# Patient Record
Sex: FEMALE | Race: BLACK OR AFRICAN AMERICAN | Employment: OTHER | URBAN - METROPOLITAN AREA
[De-identification: names, ages, dates, MRNs, and addresses within clinical notes are randomized per-mention and may not be internally consistent; named-entity substitution may affect disease eponyms.]

---

## 2017-01-10 RX ORDER — ALPRAZOLAM 0.5 MG/1
TABLET ORAL
Qty: 90 TABLET | Refills: 2 | Status: SHIPPED | OUTPATIENT
Start: 2017-01-10 | End: 2017-04-07 | Stop reason: SDUPTHER

## 2017-01-17 ENCOUNTER — HOSPITAL ENCOUNTER (OUTPATIENT)
Dept: ENDOSCOPY | Age: 82
Discharge: HOME | End: 2017-01-17
Attending: INTERNAL MEDICINE | Admitting: INTERNAL MEDICINE

## 2017-01-27 RX ORDER — TRAZODONE HYDROCHLORIDE 50 MG/1
TABLET ORAL
Qty: 30 TABLET | Refills: 2 | Status: SHIPPED | OUTPATIENT
Start: 2017-01-27 | End: 2017-06-29 | Stop reason: SDUPTHER

## 2017-02-23 RX ORDER — SITAGLIPTIN 50 MG/1
TABLET, FILM COATED ORAL
Qty: 30 TABLET | Refills: 5 | Status: SHIPPED | OUTPATIENT
Start: 2017-02-23 | End: 2017-10-30 | Stop reason: SDUPTHER

## 2017-03-08 ENCOUNTER — TELEPHONE (OUTPATIENT)
Dept: OTHER | Facility: CLINIC | Age: 82
End: 2017-03-08

## 2017-03-14 ENCOUNTER — HOSPITAL ENCOUNTER (OUTPATIENT)
Dept: ENDOSCOPY | Age: 82
Discharge: HOME | End: 2017-03-14
Attending: INTERNAL MEDICINE | Admitting: INTERNAL MEDICINE

## 2017-03-14 VITALS
TEMPERATURE: 98.4 F | OXYGEN SATURATION: 97 % | DIASTOLIC BLOOD PRESSURE: 78 MMHG | SYSTOLIC BLOOD PRESSURE: 152 MMHG | HEART RATE: 70 BPM | WEIGHT: 210 LBS | BODY MASS INDEX: 37.21 KG/M2 | RESPIRATION RATE: 20 BRPM | HEIGHT: 63 IN

## 2017-03-14 LAB
GLUCOSE BLD-MCNC: 93 MG/DL (ref 70–99)
PERFORMED ON: NORMAL

## 2017-03-20 RX ORDER — BACLOFEN 20 MG/1
TABLET ORAL
Qty: 90 TABLET | Refills: 2 | Status: SHIPPED | OUTPATIENT
Start: 2017-03-20 | End: 2017-08-11 | Stop reason: SDUPTHER

## 2017-04-24 ENCOUNTER — CARE COORDINATION (OUTPATIENT)
Dept: OTHER | Facility: CLINIC | Age: 82
End: 2017-04-24

## 2017-05-01 ENCOUNTER — CARE COORDINATION (OUTPATIENT)
Dept: OTHER | Facility: CLINIC | Age: 82
End: 2017-05-01

## 2017-05-09 ENCOUNTER — CARE COORDINATION (OUTPATIENT)
Dept: OTHER | Facility: CLINIC | Age: 82
End: 2017-05-09

## 2017-05-09 ENCOUNTER — HOSPITAL ENCOUNTER (OUTPATIENT)
Dept: ENDOSCOPY | Age: 82
Discharge: HOME | End: 2017-05-09
Attending: INTERNAL MEDICINE | Admitting: INTERNAL MEDICINE

## 2017-05-09 VITALS
DIASTOLIC BLOOD PRESSURE: 63 MMHG | TEMPERATURE: 98.5 F | BODY MASS INDEX: 38.98 KG/M2 | OXYGEN SATURATION: 98 % | HEART RATE: 74 BPM | SYSTOLIC BLOOD PRESSURE: 128 MMHG | WEIGHT: 220 LBS | RESPIRATION RATE: 20 BRPM | HEIGHT: 63 IN

## 2017-05-09 LAB
GLUCOSE BLD-MCNC: 83 MG/DL (ref 70–99)
PERFORMED ON: NORMAL

## 2017-05-09 RX ORDER — ONDANSETRON 2 MG/ML
4 INJECTION INTRAMUSCULAR; INTRAVENOUS ONCE
Status: DISCONTINUED | OUTPATIENT
Start: 2017-05-09 | End: 2017-05-10 | Stop reason: HOSPADM

## 2017-05-12 ENCOUNTER — CARE COORDINATION (OUTPATIENT)
Dept: CASE MANAGEMENT | Age: 82
End: 2017-05-12

## 2017-05-13 ENCOUNTER — CARE COORDINATION (OUTPATIENT)
Dept: CASE MANAGEMENT | Age: 82
End: 2017-05-13

## 2017-05-14 ENCOUNTER — CARE COORDINATION (OUTPATIENT)
Dept: CASE MANAGEMENT | Age: 82
End: 2017-05-14

## 2017-05-16 ENCOUNTER — TELEPHONE (OUTPATIENT)
Dept: INTERNAL MEDICINE | Age: 82
End: 2017-05-16

## 2017-06-08 ENCOUNTER — TELEPHONE (OUTPATIENT)
Dept: INTERNAL MEDICINE | Age: 82
End: 2017-06-08

## 2017-06-16 ENCOUNTER — TELEPHONE (OUTPATIENT)
Dept: INTERNAL MEDICINE | Age: 82
End: 2017-06-16

## 2017-06-20 ENCOUNTER — OFFICE VISIT (OUTPATIENT)
Dept: INTERNAL MEDICINE | Age: 82
End: 2017-06-20

## 2017-06-20 VITALS — DIASTOLIC BLOOD PRESSURE: 78 MMHG | HEIGHT: 63 IN | SYSTOLIC BLOOD PRESSURE: 122 MMHG

## 2017-06-20 DIAGNOSIS — I10 ESSENTIAL HYPERTENSION: ICD-10-CM

## 2017-06-20 DIAGNOSIS — E11.69 DIABETES MELLITUS TYPE 2 IN OBESE (HCC): ICD-10-CM

## 2017-06-20 DIAGNOSIS — N30.00 ACUTE CYSTITIS WITHOUT HEMATURIA: ICD-10-CM

## 2017-06-20 DIAGNOSIS — N30.00 ACUTE CYSTITIS WITHOUT HEMATURIA: Primary | ICD-10-CM

## 2017-06-20 DIAGNOSIS — E66.9 DIABETES MELLITUS TYPE 2 IN OBESE (HCC): ICD-10-CM

## 2017-06-20 LAB
BACTERIA: ABNORMAL /HPF
BILIRUBIN URINE: NEGATIVE
BLOOD, URINE: ABNORMAL
CLARITY: ABNORMAL
COLOR: YELLOW
COMMENT UA: ABNORMAL
EPITHELIAL CELLS, UA: 2 /HPF (ref 0–5)
GLUCOSE URINE: NEGATIVE MG/DL
KETONES, URINE: NEGATIVE MG/DL
LEUKOCYTE ESTERASE, URINE: ABNORMAL
MICROSCOPIC EXAMINATION: YES
NITRITE, URINE: POSITIVE
PH UA: 5.5
PROTEIN UA: >=300 MG/DL
RBC UA: 38 /HPF (ref 0–4)
SPECIFIC GRAVITY UA: 1.02
URINE TYPE: ABNORMAL
UROBILINOGEN, URINE: 0.2 E.U./DL
WBC UA: >900 /HPF (ref 0–5)

## 2017-06-20 PROCEDURE — 4040F PNEUMOC VAC/ADMIN/RCVD: CPT | Performed by: INTERNAL MEDICINE

## 2017-06-20 PROCEDURE — G8399 PT W/DXA RESULTS DOCUMENT: HCPCS | Performed by: INTERNAL MEDICINE

## 2017-06-20 PROCEDURE — 1123F ACP DISCUSS/DSCN MKR DOCD: CPT | Performed by: INTERNAL MEDICINE

## 2017-06-20 PROCEDURE — 99214 OFFICE O/P EST MOD 30 MIN: CPT | Performed by: INTERNAL MEDICINE

## 2017-06-20 PROCEDURE — G8427 DOCREV CUR MEDS BY ELIG CLIN: HCPCS | Performed by: INTERNAL MEDICINE

## 2017-06-20 PROCEDURE — 1036F TOBACCO NON-USER: CPT | Performed by: INTERNAL MEDICINE

## 2017-06-20 PROCEDURE — G8417 CALC BMI ABV UP PARAM F/U: HCPCS | Performed by: INTERNAL MEDICINE

## 2017-06-20 PROCEDURE — 1090F PRES/ABSN URINE INCON ASSESS: CPT | Performed by: INTERNAL MEDICINE

## 2017-06-20 ASSESSMENT — ENCOUNTER SYMPTOMS
EYE REDNESS: 0
ABDOMINAL PAIN: 0
BACK PAIN: 1
CHEST TIGHTNESS: 0
COUGH: 0
SHORTNESS OF BREATH: 0
VOMITING: 0
NAUSEA: 0

## 2017-06-20 ASSESSMENT — PATIENT HEALTH QUESTIONNAIRE - PHQ9
1. LITTLE INTEREST OR PLEASURE IN DOING THINGS: 0
2. FEELING DOWN, DEPRESSED OR HOPELESS: 0
SUM OF ALL RESPONSES TO PHQ QUESTIONS 1-9: 0
SUM OF ALL RESPONSES TO PHQ9 QUESTIONS 1 & 2: 0

## 2017-06-21 ENCOUNTER — TELEPHONE (OUTPATIENT)
Dept: INTERNAL MEDICINE | Age: 82
End: 2017-06-21

## 2017-06-21 RX ORDER — NITROFURANTOIN 25; 75 MG/1; MG/1
100 CAPSULE ORAL 2 TIMES DAILY
Qty: 14 CAPSULE | Refills: 0 | Status: SHIPPED | OUTPATIENT
Start: 2017-06-21 | End: 2017-06-22 | Stop reason: SINTOL

## 2017-06-22 ENCOUNTER — TELEPHONE (OUTPATIENT)
Dept: INTERNAL MEDICINE | Age: 82
End: 2017-06-22

## 2017-06-22 LAB
ORGANISM: ABNORMAL
URINE CULTURE, ROUTINE: ABNORMAL

## 2017-06-22 RX ORDER — CIPROFLOXACIN 250 MG/1
250 TABLET, FILM COATED ORAL 2 TIMES DAILY
Qty: 20 TABLET | Refills: 0 | Status: SHIPPED | OUTPATIENT
Start: 2017-06-22 | End: 2017-07-02

## 2017-06-22 RX ORDER — METOPROLOL TARTRATE 50 MG/1
TABLET, FILM COATED ORAL
Qty: 60 TABLET | Refills: 11 | Status: SHIPPED | OUTPATIENT
Start: 2017-06-22 | End: 2018-07-19 | Stop reason: SDUPTHER

## 2017-06-30 RX ORDER — TRAZODONE HYDROCHLORIDE 50 MG/1
TABLET ORAL
Qty: 30 TABLET | Refills: 2 | Status: SHIPPED | OUTPATIENT
Start: 2017-06-30 | End: 2017-10-13 | Stop reason: SDUPTHER

## 2017-07-11 RX ORDER — ALPRAZOLAM 0.5 MG/1
TABLET ORAL
Qty: 90 TABLET | Refills: 1 | OUTPATIENT
Start: 2017-07-11 | End: 2017-09-12 | Stop reason: SDUPTHER

## 2017-07-25 ENCOUNTER — HOSPITAL ENCOUNTER (OUTPATIENT)
Dept: ENDOSCOPY | Age: 82
Discharge: HOME | End: 2017-07-25
Attending: INTERNAL MEDICINE | Admitting: INTERNAL MEDICINE

## 2017-07-25 VITALS
BODY MASS INDEX: 37.21 KG/M2 | TEMPERATURE: 98.6 F | DIASTOLIC BLOOD PRESSURE: 71 MMHG | SYSTOLIC BLOOD PRESSURE: 120 MMHG | HEIGHT: 63 IN | OXYGEN SATURATION: 97 % | WEIGHT: 210 LBS | RESPIRATION RATE: 18 BRPM | HEART RATE: 73 BPM

## 2017-07-25 LAB
GLUCOSE BLD-MCNC: 101 MG/DL (ref 70–99)
PERFORMED ON: ABNORMAL

## 2017-07-25 RX ORDER — ONDANSETRON 2 MG/ML
INJECTION INTRAMUSCULAR; INTRAVENOUS
Status: DISPENSED
Start: 2017-07-25 | End: 2017-07-25

## 2017-08-07 ENCOUNTER — TELEPHONE (OUTPATIENT)
Dept: INTERNAL MEDICINE | Age: 82
End: 2017-08-07

## 2017-08-11 ENCOUNTER — OFFICE VISIT (OUTPATIENT)
Dept: INTERNAL MEDICINE | Age: 82
End: 2017-08-11

## 2017-08-11 VITALS — HEIGHT: 63 IN | DIASTOLIC BLOOD PRESSURE: 74 MMHG | SYSTOLIC BLOOD PRESSURE: 152 MMHG

## 2017-08-11 DIAGNOSIS — N30.00 ACUTE CYSTITIS WITHOUT HEMATURIA: ICD-10-CM

## 2017-08-11 DIAGNOSIS — I10 ESSENTIAL HYPERTENSION: ICD-10-CM

## 2017-08-11 DIAGNOSIS — E11.69 DIABETES MELLITUS TYPE 2 IN OBESE (HCC): Primary | ICD-10-CM

## 2017-08-11 DIAGNOSIS — E66.9 DIABETES MELLITUS TYPE 2 IN OBESE (HCC): Primary | ICD-10-CM

## 2017-08-11 PROCEDURE — 1036F TOBACCO NON-USER: CPT | Performed by: INTERNAL MEDICINE

## 2017-08-11 PROCEDURE — G8399 PT W/DXA RESULTS DOCUMENT: HCPCS | Performed by: INTERNAL MEDICINE

## 2017-08-11 PROCEDURE — 1090F PRES/ABSN URINE INCON ASSESS: CPT | Performed by: INTERNAL MEDICINE

## 2017-08-11 PROCEDURE — 99214 OFFICE O/P EST MOD 30 MIN: CPT | Performed by: INTERNAL MEDICINE

## 2017-08-11 PROCEDURE — 4040F PNEUMOC VAC/ADMIN/RCVD: CPT | Performed by: INTERNAL MEDICINE

## 2017-08-11 PROCEDURE — G8427 DOCREV CUR MEDS BY ELIG CLIN: HCPCS | Performed by: INTERNAL MEDICINE

## 2017-08-11 PROCEDURE — 3288F FALL RISK ASSESSMENT DOCD: CPT | Performed by: INTERNAL MEDICINE

## 2017-08-11 PROCEDURE — G8510 SCR DEP NEG, NO PLAN REQD: HCPCS | Performed by: INTERNAL MEDICINE

## 2017-08-11 PROCEDURE — 1123F ACP DISCUSS/DSCN MKR DOCD: CPT | Performed by: INTERNAL MEDICINE

## 2017-08-11 PROCEDURE — G8417 CALC BMI ABV UP PARAM F/U: HCPCS | Performed by: INTERNAL MEDICINE

## 2017-08-11 RX ORDER — LANSOPRAZOLE 30 MG/1
CAPSULE, DELAYED RELEASE ORAL
Qty: 60 CAPSULE | Refills: 5 | Status: SHIPPED | OUTPATIENT
Start: 2017-08-11 | End: 2018-02-26 | Stop reason: SDUPTHER

## 2017-08-11 RX ORDER — BACLOFEN 20 MG/1
TABLET ORAL
Qty: 90 TABLET | Refills: 2 | Status: SHIPPED | OUTPATIENT
Start: 2017-08-11 | End: 2017-12-13 | Stop reason: SDUPTHER

## 2017-08-11 ASSESSMENT — ENCOUNTER SYMPTOMS
CHEST TIGHTNESS: 0
SHORTNESS OF BREATH: 0
EYE REDNESS: 0
COUGH: 0
BACK PAIN: 0
VOMITING: 0
ABDOMINAL PAIN: 0
NAUSEA: 0

## 2017-08-11 ASSESSMENT — PATIENT HEALTH QUESTIONNAIRE - PHQ9
SUM OF ALL RESPONSES TO PHQ QUESTIONS 1-9: 0
1. LITTLE INTEREST OR PLEASURE IN DOING THINGS: 0
2. FEELING DOWN, DEPRESSED OR HOPELESS: 0
SUM OF ALL RESPONSES TO PHQ9 QUESTIONS 1 & 2: 0

## 2017-08-31 ENCOUNTER — TELEPHONE (OUTPATIENT)
Dept: INTERNAL MEDICINE | Age: 82
End: 2017-08-31

## 2017-09-01 ENCOUNTER — TELEPHONE (OUTPATIENT)
Dept: INTERNAL MEDICINE | Age: 82
End: 2017-09-01

## 2017-09-01 RX ORDER — FAMOTIDINE 20 MG/1
20 TABLET, FILM COATED ORAL 2 TIMES DAILY
Qty: 30 TABLET | Refills: 2 | Status: SHIPPED | OUTPATIENT
Start: 2017-09-01 | End: 2017-10-18 | Stop reason: SDUPTHER

## 2017-09-12 RX ORDER — ALPRAZOLAM 0.5 MG/1
0.5 TABLET ORAL 3 TIMES DAILY PRN
Qty: 90 TABLET | Refills: 1 | OUTPATIENT
Start: 2017-09-12 | End: 2017-12-06 | Stop reason: SDUPTHER

## 2017-09-25 ENCOUNTER — OFFICE VISIT (OUTPATIENT)
Dept: INTERNAL MEDICINE | Age: 82
End: 2017-09-25

## 2017-09-25 VITALS
SYSTOLIC BLOOD PRESSURE: 112 MMHG | OXYGEN SATURATION: 98 % | RESPIRATION RATE: 16 BRPM | HEART RATE: 68 BPM | DIASTOLIC BLOOD PRESSURE: 70 MMHG

## 2017-09-25 DIAGNOSIS — K21.00 GASTROESOPHAGEAL REFLUX DISEASE WITH ESOPHAGITIS: Primary | ICD-10-CM

## 2017-09-25 DIAGNOSIS — I10 ESSENTIAL HYPERTENSION: ICD-10-CM

## 2017-09-25 DIAGNOSIS — F33.41 MAJOR DEPRESSIVE DISORDER, RECURRENT, IN PARTIAL REMISSION (HCC): ICD-10-CM

## 2017-09-25 DIAGNOSIS — E11.9 TYPE 2 DIABETES MELLITUS WITHOUT COMPLICATION, WITHOUT LONG-TERM CURRENT USE OF INSULIN (HCC): ICD-10-CM

## 2017-09-25 DIAGNOSIS — Z23 NEED FOR INFLUENZA VACCINATION: ICD-10-CM

## 2017-09-25 LAB
ANION GAP SERPL CALCULATED.3IONS-SCNC: 11 MMOL/L (ref 3–16)
BUN BLDV-MCNC: 17 MG/DL (ref 7–20)
CALCIUM SERPL-MCNC: 10.5 MG/DL (ref 8.3–10.6)
CHLORIDE BLD-SCNC: 102 MMOL/L (ref 99–110)
CO2: 31 MMOL/L (ref 21–32)
CREAT SERPL-MCNC: 0.8 MG/DL (ref 0.6–1.2)
GFR AFRICAN AMERICAN: >60
GFR NON-AFRICAN AMERICAN: >60
GLUCOSE BLD-MCNC: 83 MG/DL (ref 70–99)
POTASSIUM SERPL-SCNC: 4.2 MMOL/L (ref 3.5–5.1)
SODIUM BLD-SCNC: 144 MMOL/L (ref 136–145)

## 2017-09-25 PROCEDURE — 99214 OFFICE O/P EST MOD 30 MIN: CPT | Performed by: INTERNAL MEDICINE

## 2017-09-25 PROCEDURE — G0008 ADMIN INFLUENZA VIRUS VAC: HCPCS | Performed by: INTERNAL MEDICINE

## 2017-09-25 PROCEDURE — G8399 PT W/DXA RESULTS DOCUMENT: HCPCS | Performed by: INTERNAL MEDICINE

## 2017-09-25 PROCEDURE — 4040F PNEUMOC VAC/ADMIN/RCVD: CPT | Performed by: INTERNAL MEDICINE

## 2017-09-25 PROCEDURE — 1036F TOBACCO NON-USER: CPT | Performed by: INTERNAL MEDICINE

## 2017-09-25 PROCEDURE — G8427 DOCREV CUR MEDS BY ELIG CLIN: HCPCS | Performed by: INTERNAL MEDICINE

## 2017-09-25 PROCEDURE — G8417 CALC BMI ABV UP PARAM F/U: HCPCS | Performed by: INTERNAL MEDICINE

## 2017-09-25 PROCEDURE — 90662 IIV NO PRSV INCREASED AG IM: CPT | Performed by: INTERNAL MEDICINE

## 2017-09-25 PROCEDURE — 1090F PRES/ABSN URINE INCON ASSESS: CPT | Performed by: INTERNAL MEDICINE

## 2017-09-25 PROCEDURE — 1123F ACP DISCUSS/DSCN MKR DOCD: CPT | Performed by: INTERNAL MEDICINE

## 2017-09-25 RX ORDER — ONDANSETRON 4 MG/1
4 TABLET, FILM COATED ORAL EVERY 8 HOURS PRN
Qty: 20 TABLET | Refills: 0 | Status: SHIPPED | OUTPATIENT
Start: 2017-09-25 | End: 2019-01-14 | Stop reason: SDUPTHER

## 2017-09-25 ASSESSMENT — ENCOUNTER SYMPTOMS
SHORTNESS OF BREATH: 0
HEARTBURN: 1
ABDOMINAL PAIN: 1
CHEST TIGHTNESS: 0
EYE REDNESS: 0
COUGH: 1
BACK PAIN: 0
NAUSEA: 1
BELCHING: 1

## 2017-09-26 LAB
ESTIMATED AVERAGE GLUCOSE: 91.1 MG/DL
HBA1C MFR BLD: 4.8 %

## 2017-10-11 ENCOUNTER — TELEPHONE (OUTPATIENT)
Dept: INTERNAL MEDICINE | Age: 82
End: 2017-10-11

## 2017-10-11 RX ORDER — AZITHROMYCIN 250 MG/1
TABLET, FILM COATED ORAL
Qty: 1 PACKET | Refills: 1 | Status: SHIPPED | OUTPATIENT
Start: 2017-10-11 | End: 2017-10-21

## 2017-10-16 RX ORDER — TRAZODONE HYDROCHLORIDE 50 MG/1
TABLET ORAL
Qty: 30 TABLET | Refills: 2 | Status: SHIPPED | OUTPATIENT
Start: 2017-10-16 | End: 2018-01-12 | Stop reason: SDUPTHER

## 2017-10-18 ENCOUNTER — TELEPHONE (OUTPATIENT)
Dept: INTERNAL MEDICINE | Age: 82
End: 2017-10-18

## 2017-10-18 RX ORDER — FAMOTIDINE 20 MG/1
20 TABLET, FILM COATED ORAL 2 TIMES DAILY
Qty: 30 TABLET | Refills: 5 | Status: SHIPPED | OUTPATIENT
Start: 2017-10-18 | End: 2018-06-14 | Stop reason: SDUPTHER

## 2017-10-18 NOTE — TELEPHONE ENCOUNTER
Pt is out of refills and would like to know if she is suppose to continue taking famotidine (PEPCID) 20 MG tablet. If so, pt will need a new rx sent into her pharmacy. Pharmacy info above. Pt would also like to make sure that she is taking her Super Biotin 1000 Mcg correctly. Please call.

## 2017-10-26 ENCOUNTER — TELEPHONE (OUTPATIENT)
Dept: INTERNAL MEDICINE | Age: 82
End: 2017-10-26

## 2017-10-30 RX ORDER — SITAGLIPTIN 50 MG/1
TABLET, FILM COATED ORAL
Qty: 30 TABLET | Refills: 5 | Status: SHIPPED | OUTPATIENT
Start: 2017-10-30 | End: 2018-05-14 | Stop reason: SDUPTHER

## 2017-11-15 ENCOUNTER — OFFICE VISIT (OUTPATIENT)
Dept: ENT CLINIC | Age: 82
End: 2017-11-15

## 2017-11-15 VITALS
WEIGHT: 200 LBS | BODY MASS INDEX: 35.44 KG/M2 | HEIGHT: 63 IN | DIASTOLIC BLOOD PRESSURE: 62 MMHG | SYSTOLIC BLOOD PRESSURE: 122 MMHG | HEART RATE: 60 BPM

## 2017-11-15 DIAGNOSIS — H61.22 HEARING LOSS OF LEFT EAR DUE TO CERUMEN IMPACTION: Primary | ICD-10-CM

## 2017-11-15 PROCEDURE — 69210 REMOVE IMPACTED EAR WAX UNI: CPT | Performed by: OTOLARYNGOLOGY

## 2017-11-27 ENCOUNTER — HOSPITAL ENCOUNTER (OUTPATIENT)
Dept: SURGERY | Age: 82
Discharge: HOME | End: 2017-11-27
Attending: OTOLARYNGOLOGY | Admitting: OTOLARYNGOLOGY

## 2017-11-27 VITALS
HEART RATE: 51 BPM | BODY MASS INDEX: 36.5 KG/M2 | WEIGHT: 206 LBS | RESPIRATION RATE: 17 BRPM | HEIGHT: 63 IN | DIASTOLIC BLOOD PRESSURE: 68 MMHG | SYSTOLIC BLOOD PRESSURE: 127 MMHG | TEMPERATURE: 97 F | OXYGEN SATURATION: 98 %

## 2017-11-27 LAB
GLUCOSE BLD-MCNC: 84 MG/DL (ref 70–99)
GLUCOSE BLD-MCNC: 89 MG/DL (ref 70–99)
PERFORMED ON: NORMAL
PERFORMED ON: NORMAL

## 2017-11-27 PROCEDURE — 69210 REMOVE IMPACTED EAR WAX UNI: CPT | Performed by: OTOLARYNGOLOGY

## 2017-11-27 RX ORDER — SODIUM CHLORIDE 0.9 % (FLUSH) 0.9 %
10 SYRINGE (ML) INJECTION EVERY 12 HOURS SCHEDULED
Status: DISCONTINUED | OUTPATIENT
Start: 2017-11-27 | End: 2017-11-28 | Stop reason: HOSPADM

## 2017-11-27 RX ORDER — ONDANSETRON 2 MG/ML
4 INJECTION INTRAMUSCULAR; INTRAVENOUS EVERY 6 HOURS PRN
Status: DISCONTINUED | OUTPATIENT
Start: 2017-11-27 | End: 2017-11-28 | Stop reason: HOSPADM

## 2017-11-27 RX ORDER — LIDOCAINE HYDROCHLORIDE 10 MG/ML
0.5 INJECTION, SOLUTION EPIDURAL; INFILTRATION; INTRACAUDAL; PERINEURAL ONCE
Status: DISCONTINUED | OUTPATIENT
Start: 2017-11-27 | End: 2017-11-28 | Stop reason: HOSPADM

## 2017-11-27 RX ORDER — SODIUM CHLORIDE 0.9 % (FLUSH) 0.9 %
10 SYRINGE (ML) INJECTION PRN
Status: DISCONTINUED | OUTPATIENT
Start: 2017-11-27 | End: 2017-11-28 | Stop reason: HOSPADM

## 2017-11-27 RX ORDER — SODIUM CHLORIDE 9 MG/ML
INJECTION, SOLUTION INTRAVENOUS CONTINUOUS
Status: DISCONTINUED | OUTPATIENT
Start: 2017-11-27 | End: 2017-11-28 | Stop reason: HOSPADM

## 2017-11-27 RX ORDER — FENTANYL CITRATE 50 UG/ML
25 INJECTION, SOLUTION INTRAMUSCULAR; INTRAVENOUS EVERY 5 MIN PRN
Status: DISCONTINUED | OUTPATIENT
Start: 2017-11-27 | End: 2017-11-28 | Stop reason: HOSPADM

## 2017-11-27 RX ORDER — HYDROMORPHONE HCL 110MG/55ML
0.5 PATIENT CONTROLLED ANALGESIA SYRINGE INTRAVENOUS EVERY 5 MIN PRN
Status: DISCONTINUED | OUTPATIENT
Start: 2017-11-27 | End: 2017-11-28 | Stop reason: HOSPADM

## 2017-11-27 RX ORDER — MEPERIDINE HYDROCHLORIDE 25 MG/ML
12.5 INJECTION INTRAMUSCULAR; INTRAVENOUS; SUBCUTANEOUS EVERY 5 MIN PRN
Status: DISCONTINUED | OUTPATIENT
Start: 2017-11-27 | End: 2017-11-28 | Stop reason: HOSPADM

## 2017-11-27 RX ORDER — ONDANSETRON 2 MG/ML
4 INJECTION INTRAMUSCULAR; INTRAVENOUS
Status: ACTIVE | OUTPATIENT
Start: 2017-11-27 | End: 2017-11-27

## 2017-11-27 RX ORDER — HYDROMORPHONE HCL 110MG/55ML
0.25 PATIENT CONTROLLED ANALGESIA SYRINGE INTRAVENOUS EVERY 5 MIN PRN
Status: DISCONTINUED | OUTPATIENT
Start: 2017-11-27 | End: 2017-11-28 | Stop reason: HOSPADM

## 2017-11-27 RX ORDER — LIDOCAINE HYDROCHLORIDE 10 MG/ML
1 INJECTION, SOLUTION EPIDURAL; INFILTRATION; INTRACAUDAL; PERINEURAL
Status: ACTIVE | OUTPATIENT
Start: 2017-11-27 | End: 2017-11-27

## 2017-11-27 RX ORDER — FENTANYL CITRATE 50 UG/ML
50 INJECTION, SOLUTION INTRAMUSCULAR; INTRAVENOUS EVERY 5 MIN PRN
Status: DISCONTINUED | OUTPATIENT
Start: 2017-11-27 | End: 2017-11-28 | Stop reason: HOSPADM

## 2017-11-27 RX ORDER — ONDANSETRON 2 MG/ML
4 INJECTION INTRAMUSCULAR; INTRAVENOUS PRN
Status: DISCONTINUED | OUTPATIENT
Start: 2017-11-27 | End: 2017-11-28 | Stop reason: HOSPADM

## 2017-11-27 RX ADMIN — SODIUM CHLORIDE: 9 INJECTION, SOLUTION INTRAVENOUS at 12:24

## 2017-11-27 RX ADMIN — FENTANYL CITRATE 50 MCG: 50 INJECTION, SOLUTION INTRAMUSCULAR; INTRAVENOUS at 13:47

## 2017-11-27 ASSESSMENT — PAIN SCALES - GENERAL: PAINLEVEL_OUTOF10: 8

## 2017-11-27 ASSESSMENT — PAIN - FUNCTIONAL ASSESSMENT: PAIN_FUNCTIONAL_ASSESSMENT: 0-10

## 2017-11-27 NOTE — H&P
test; Sickle cell anemia ([facility] [address] and UTI (urinary tract infection). Past Surgical History:   has a past surgical history that includes Appendectomy; Hysterectomy;  section (N/A); and Cystocopy (2015). Medications:  Current Outpatient Prescriptions on File Prior to Encounter   Medication Sig Dispense Refill    JANUVIA 50 MG tablet 1 TABLET DAILY 30 tablet 5    famotidine (PEPCID) 20 MG tablet Take 1 tablet by mouth 2 times daily 30 tablet 5    traZODone (DESYREL) 50 MG tablet 1 TABLET AT BEDTIME FOR SLEEP 30 tablet 2    ALPRAZolam (XANAX) 0.5 MG tablet Take 1 tablet by mouth 3 times daily as needed for Anxiety 90 tablet 1    lansoprazole (PREVACID) 30 MG delayed release capsule TAKE 1 CAPSULE BY MOUTH TWO TIMES A DAY 60 capsule 5    baclofen (LIORESAL) 20 MG tablet Take 1 tablet by mouth 3 times daily as needed (muscle spasm) 90 tablet 2    metoprolol tartrate (LOPRESSOR) 50 MG tablet Take 1 tablet by mouth 2 times daily 60 tablet 11    Blood Glucose Monitoring Suppl KIT Tests daily 1 kit 0    mirtazapine (REMERON) 7.5 MG tablet Take 1 tablet by mouth nightly 30 tablet 3    solifenacin (VESICARE) 10 MG tablet Take 5 mg by mouth daily      promethazine (PHENERGAN) 12.5 MG tablet Take 1 tablet by mouth every 6 hours as needed for Nausea 20 tablet 0    levothyroxine (SYNTHROID) 100 MCG tablet 1 TABLET EVERY DAY 30 tablet 11    nystatin (MYCOSTATIN) 545609 UNIT/GM powder USE AS DIRECTED 60 g 5    glucose monitoring kit (FREESTYLE) monitoring kit 1 kit by Does not apply route daily as needed 1 kit 0    glucose blood VI test strips (FREESTYLE TEST STRIPS) strip 1 each by In Vitro route 2 times daily 100 each 3    SUPER BIOTIN PO Take 6,000 mcg by mouth daily      acetaminophen (TYLENOL) 500 MG tablet Take 500 mg by mouth every 6 hours as needed for Pain      Pyridoxine HCl (VITAMIN B-6) 50 MG tablet Take 50 mg by mouth daily.        No current facility-administered medications on file prior to encounter. Allergies: Allergies   Allergen Reactions    Aspirin     Coumadin [Warfarin Sodium]     Dalteparin Sodium     Heparin     Lovenox [Enoxaparin Sodium]     Other      Almonds    Peanut-Containing Drug Products     Penicillins     Plavix [Clopidogrel Bisulfate]     Sulfa Antibiotics     Vicodin [Hydrocodone-Acetaminophen]      Vomit and diarrhea        Social History:   reports that she quit smoking about 47 years ago. She has never used smokeless tobacco. She reports that she does not drink alcohol or use drugs. Family History:  family history includes Diabetes in her mother; Heart Disease in her mother. Physical Exam:  BP (!) 159/79   Pulse (!) 41   Temp 98 °F (36.7 °C) (Temporal)   Resp 18   Ht 5' 3\" (1.6 m)   Wt 206 lb (93.4 kg)   SpO2 99%   BMI 36.49 kg/m²     General appearance:  Appears comfortable. Well nourished  Eyes: Sclera clear, pupils equal  ENT: Moist mucus membranes, no thrush. Trachea midline. Cardiovascular: Regular rhythm, normal S1, S2. No murmur, gallop, rub. No edema in lower extremities  Respiratory: Clear to auscultation bilaterally, no wheeze, good inspiratory effort  Gastrointestinal: Abdomen soft, non-tender, not distended, normal bowel sounds  Musculoskeletal: No cyanosis in digits, neck supple  Neurology: Cranial nerves grossly intact. Alert and oriented in time, place and person. No speech or motor deficits  Psychiatry: Appropriate affect.  Not agitated  Skin: Warm, dry, normal turgor, no rash    Labs:  CBC:   Lab Results   Component Value Date    WBC 6.8 04/20/2017    RBC 4.74 04/20/2017    HGB 11.7 04/20/2017    HCT 38.6 04/20/2017    MCV 81.5 04/20/2017    MCH 24.7 04/20/2017    MCHC 30.4 04/20/2017    RDW 14.1 04/20/2017     04/20/2017    MPV 9.1 04/20/2017     BMP:    Lab Results   Component Value Date     09/25/2017    K 4.2 09/25/2017     09/25/2017    CO2 31 09/25/2017    BUN 17 09/25/2017    CREATININE 0.8 09/25/2017    CALCIUM 10.5 09/25/2017    GFRAA >60 09/25/2017    GFRAA 85 12/24/2012    LABGLOM >60 09/25/2017    GLUCOSE 83 09/25/2017     EKG was reviewed by me and showed :  SB with a rate of 51   No acute findings noted       Problem List  HTN  GERD  T2DM      Assessment & Recommendation:     1. GERD:  On bid PPI therapy,  Recent EGD showed mild gastritis  2. HTN:  BP control is adequate  3. T2DM:  Last AIC was 4.8,   BG today is 80     Patient is medically optimized for surgery. Thank you for the opportunity to participate in the care of your patient.   Samira Powell CNP    11/27/2017 7:29 AM

## 2017-11-27 NOTE — PROGRESS NOTES
Report received from [facility]. Preparing pt and family for d/c home. Doing well. Pain controlled.     Parvin Duenas RN, BSN, [facility]  Pre-Op/Recovery   Same Day Surgery

## 2017-11-27 NOTE — ANESTHESIA PRE-OP
[facility] Anesthesiology  Pre-Anesthesia Evaluation/Consultation       Name:  Maco Schwartz                                         Age:  80 y.o.   MRN:    [id number]           Procedure (Scheduled): EAR WAX REMOVAL   Surgeon:     Dr. Roland Parra MD     Allergies   Allergen Reactions    Aspirin     Coumadin [Warfarin Sodium]     Dalteparin Sodium     Heparin     Lovenox [Enoxaparin Sodium]     Other      Almonds    Peanut-Containing Drug Products     Penicillins     Plavix [Clopidogrel Bisulfate]     Sulfa Antibiotics     Vicodin [Hydrocodone-Acetaminophen]      Vomit and diarrhea     Patient Active Problem List   Diagnosis    Acquired hypothyroidism    Esophageal reflux    Urinary tract infection    Adjustment disorder with depressed mood    Urinary incontinence    Skin rash    Back pain    Morbidly obese (HCC)    Chest pain    Arthritis    Chronic pain    Reactive depression    Anxiety    Mixed incontinence    Diabetes mellitus type 2 in obese ([address]    Lump of right breast    Carpal tunnel syndrome, right    Essential hypertension    Gastroesophageal reflux disease without esophagitis    Type 2 diabetes mellitus without complication, without long-term current use of insulin (HCC)    Acute cystitis without hematuria    Physical deconditioning    Hearing loss of left ear due to cerumen impaction     Past Medical History:   Diagnosis Date    Arthritis     Chronic back pain     Diabetes mellitus ([address]     Esophagitis     Essential hypertension 2016    Fatty liver     G6PD deficiency (HCC)     Gastric polyps     GERD (gastroesophageal reflux disease)     Hemorrhoids     Hypothyroidism     Kidney stone     Normal cardiac stress test 3/2014    Sickle cell anemia (HCC)     UTI (urinary tract infection) 3/2014    Ecoli - R to Cipro and ampicillin     Past Surgical History:   Procedure Laterality Date    APPENDECTOMY       SECTION N/A     tablet Take 500 mg by mouth every 6 hours as needed for Pain    Historical Provider, MD   Pyridoxine HCl (VITAMIN B-6) 50 MG tablet Take 50 mg by mouth daily. Historical Provider, MD       Current Outpatient Prescriptions   Medication Sig Dispense Refill    JANUVIA 50 MG tablet 1 TABLET DAILY 30 tablet 5    famotidine (PEPCID) 20 MG tablet Take 1 tablet by mouth 2 times daily 30 tablet 5    traZODone (DESYREL) 50 MG tablet 1 TABLET AT BEDTIME FOR SLEEP 30 tablet 2    ALPRAZolam (XANAX) 0.5 MG tablet Take 1 tablet by mouth 3 times daily as needed for Anxiety 90 tablet 1    lansoprazole (PREVACID) 30 MG delayed release capsule TAKE 1 CAPSULE BY MOUTH TWO TIMES A DAY 60 capsule 5    baclofen (LIORESAL) 20 MG tablet Take 1 tablet by mouth 3 times daily as needed (muscle spasm) 90 tablet 2    metoprolol tartrate (LOPRESSOR) 50 MG tablet Take 1 tablet by mouth 2 times daily 60 tablet 11    Blood Glucose Monitoring Suppl KIT Tests daily 1 kit 0    mirtazapine (REMERON) 7.5 MG tablet Take 1 tablet by mouth nightly 30 tablet 3    solifenacin (VESICARE) 10 MG tablet Take 5 mg by mouth daily      promethazine (PHENERGAN) 12.5 MG tablet Take 1 tablet by mouth every 6 hours as needed for Nausea 20 tablet 0    levothyroxine (SYNTHROID) 100 MCG tablet 1 TABLET EVERY DAY 30 tablet 11    nystatin (MYCOSTATIN) 896453 UNIT/GM powder USE AS DIRECTED 60 g 5    glucose monitoring kit (FREESTYLE) monitoring kit 1 kit by Does not apply route daily as needed 1 kit 0    glucose blood VI test strips (FREESTYLE TEST STRIPS) strip 1 each by In Vitro route 2 times daily 100 each 3    SUPER BIOTIN PO Take 6,000 mcg by mouth daily      acetaminophen (TYLENOL) 500 MG tablet Take 500 mg by mouth every 6 hours as needed for Pain      Pyridoxine HCl (VITAMIN B-6) 50 MG tablet Take 50 mg by mouth daily.        Current Facility-Administered Medications   Medication Dose Route Frequency Provider Last Rate Last Dose    fentaNYL (SUBLIMAZE) injection 25 mcg  25 mcg Intravenous Q5 Min PRN Ajay Eyal, DO        fentaNYL (SUBLIMAZE) injection 50 mcg  50 mcg Intravenous Q5 Min PRN Ajay Eyal, DO        HYDROmorphone (DILAUDID) injection 0.25 mg  0.25 mg Intravenous Q5 Min PRN Ajay Eyal, DO        HYDROmorphone (DILAUDID) injection 0.5 mg  0.5 mg Intravenous Q5 Min PRN Ajay Eyal, DO        [facility]) injection 4 mg  4 mg Intravenous Once PRN Ajay Eyal, DO        meperidine (DEMEROL) injection 12.5 mg  12.5 mg Intravenous Q5 Min PRN Ajay Eyal, DO           Vital Signs  (Current) There were no vitals filed for this visit.   (for past 48 hrs)  No Data Recorded  (last three values)   BP Readings from Last 3 Encounters:   11/15/17 122/62   09/25/17 112/70   08/11/17 (!) 152/74       CBC  Lab Results   Component Value Date    WBC 6.8 04/20/2017    RBC 4.74 04/20/2017    HGB 11.7 04/20/2017    HCT 38.6 04/20/2017    MCV 81.5 04/20/2017    RDW 14.1 04/20/2017     04/20/2017       CMP    Lab Results   Component Value Date     09/25/2017    K 4.2 09/25/2017     09/25/2017    CO2 31 09/25/2017    BUN 17 09/25/2017    CREATININE 0.8 09/25/2017    GFRAA >60 09/25/2017    GFRAA 85 12/24/2012    AGRATIO 1.1 09/08/2016    LABGLOM >60 09/25/2017    GLUCOSE 83 09/25/2017    PROT 7.5 04/17/2017    PROT 7.9 08/04/2012    CALCIUM 10.5 09/25/2017    BILITOT 0.4 04/17/2017    ALKPHOS 48 04/17/2017    AST 30 04/17/2017    ALT 15 04/17/2017       BMP    Lab Results   Component Value Date     09/25/2017    K 4.2 09/25/2017     09/25/2017    CO2 31 09/25/2017    BUN 17 09/25/2017    CREATININE 0.8 09/25/2017    CALCIUM 10.5 09/25/2017    GFRAA >60 09/25/2017    GFRAA 85 12/24/2012    LABGLOM >60 09/25/2017    GLUCOSE 83 09/25/2017       Coags   Lab Results   Component Value Date    PROTIME 11.6 06/26/2015    INR 1.07 06/26/2015    APTT 38.6 06/26/2015       HCG (If Applicable) No results found for: PREGTESTUR, PREGSERUM, HCG, HCGQUANT     ABGs  No results found for: PHART, PO2ART, AYM2EJD, BIO7LMT, BEART, N5AVMHUT     Type & Screen (If Applicable)  No results found for: LABABO, LABRH      POCGlucose  No results for input(s): GLUCOSE in the last 72 hours. NPO Status  > 8 hours                       BMI  There is no height or weight on file to calculate BMI. Estimated body mass index is 35.43 kg/m² as calculated from the following:    Height as of 11/15/17: 5' 3\" (1.6 m). Weight as of 11/15/17: 200 lb (90.7 kg). Additional Testing (Echo, Stress, ECG, PFTs, etc)        Anesthesia Evaluation  Patient summary reviewed and Nursing notes reviewed  Airway: Mallampati: II  TM distance: >3 FB   Neck ROM: full  Mouth opening: > = 3 FB Dental: normal exam         Pulmonary:normal exam                               Cardiovascular:  Exercise tolerance: poor (<4 METS),   (+) hypertension: moderate,       ECG reviewed  Rhythm: regular  Rate: normal    Stress test reviewed             ROS comment: 2014:  Summary   Normal myocardial perfusion study.   Normal LV function. Neuro/Psych:   (+) psychiatric history:   (-) neuromuscular disease           GI/Hepatic/Renal:   (+) GERD:, liver disease:,           Endo/Other:    (+) Type II DM, well controlled, , hypothyroidism::., .                 Abdominal:           Vascular:                                        Anesthesia Plan      general     ASA 3     (Plan for GETA/Mask with standard ASA monitoring. Additional monitoring as dictated by intra-operative course. Patient appropriately NPO for the procedure. Risk/Benefits reviewed with patient and all anesthetic questions answered prior to procedure.  )  Induction: intravenous. MIPS: Postoperative opioids intended and Prophylactic antiemetics administered. Anesthetic plan and risks discussed with patient. Plan discussed with CRNA.                 DOS STAFF ADDENDUM:    Pt seen and examined,

## 2017-11-28 RX ORDER — LEVOTHYROXINE SODIUM 0.1 MG/1
TABLET ORAL
Qty: 30 TABLET | Refills: 11 | Status: SHIPPED | OUTPATIENT
Start: 2017-11-28 | End: 2018-11-20 | Stop reason: SDUPTHER

## 2017-11-28 NOTE — OP NOTE
[facility]                      [facility], [address] [place] [address]                                 OPERATIVE REPORT    PATIENT NAME: Augusto Kong                    :        1935  MED REC NO:   [id number]                          ROOM:  ACCOUNT NO:   [de-identified]                          ADMIT DATE: 2017  PROVIDER:     Flory Silvestre MD    DATE OF PROCEDURE:  2017        PREOPERATIVE DIAGNOSIS:  Left ear canal obstruction. POSTOPERATIVE DIAGNOSIS:  Left ear canal obstruction. PROCEDURE:  Exam under anesthesia of left ear and ear cleaning. The patient is an 70-year-old presenting with a left-sided hearing loss. There was a massive accumulation of cerumen, old blood, keratin, and  squamous debris that had accumulated in the left ear canal.  Attempts at  removal in the office were unsuccessful given the patient's sensitivity,  the propensity for bleeding, and concerns for the underlying tympanic  membrane. She was therefore brought to the operating room and placed on  the table in supine position. After general anesthesia was induced, the  right ear was examined and noted to be normal with respect to the ear  canal, eardrum, and middle ear space. On the left side, however, there was  total occlusion obscuring the aditus. There was scabbing and almost a  _cast____ like nature to the debris in the canal.  This was bluntly removed off  the ear canal with a curette. Alcohol irrigation was required to loosen  the debris whereby suction irrigation was able to evacuate the debris in  the more medial portions of the ear canal.  Following removal, it could be  seen that the eardrum was normal.  There was no laceration or perforation. I saw no evidence of secondary infection or otomycosis. Cortisporin  suspension was placed as a one time application and the patient then  awakened and taken to recovery room.   She tolerated the procedure

## 2017-12-01 ENCOUNTER — OFFICE VISIT (OUTPATIENT)
Dept: ENT CLINIC | Age: 82
End: 2017-12-01

## 2017-12-01 VITALS — HEART RATE: 60 BPM | SYSTOLIC BLOOD PRESSURE: 136 MMHG | DIASTOLIC BLOOD PRESSURE: 76 MMHG

## 2017-12-01 DIAGNOSIS — H61.22 HEARING LOSS OF LEFT EAR DUE TO CERUMEN IMPACTION: Primary | ICD-10-CM

## 2017-12-01 PROCEDURE — 99024 POSTOP FOLLOW-UP VISIT: CPT | Performed by: OTOLARYNGOLOGY

## 2017-12-07 RX ORDER — PROMETHAZINE HYDROCHLORIDE 12.5 MG/1
TABLET ORAL
Qty: 20 TABLET | Refills: 5 | Status: SHIPPED | OUTPATIENT
Start: 2017-12-07 | End: 2018-02-14 | Stop reason: SDUPTHER

## 2017-12-13 ENCOUNTER — TELEPHONE (OUTPATIENT)
Dept: INTERNAL MEDICINE | Age: 82
End: 2017-12-13

## 2017-12-13 NOTE — TELEPHONE ENCOUNTER
Pt has an rx for Tramadol 300 Mgs Take 1 Tablet Daily that was last filled 11-. The rx has refills left on it, but she has 1 pill left. Pt is not able to have her refills filled because it is too early. Pt said that she is early because she had surgery last week so she took more then the directions state. Pt would like to know if Dr. Gera Elliott can call and okay an early refill. Pharmacy info above. Please call pt to discuss.

## 2017-12-13 NOTE — TELEPHONE ENCOUNTER
This is not my prescription. This came from Dr Puja Romero, so he has to decide what to do with her.

## 2017-12-14 RX ORDER — BACLOFEN 20 MG/1
TABLET ORAL
Qty: 90 TABLET | Refills: 2 | Status: SHIPPED | OUTPATIENT
Start: 2017-12-14 | End: 2018-04-09 | Stop reason: SDUPTHER

## 2018-01-12 RX ORDER — TRAZODONE HYDROCHLORIDE 50 MG/1
TABLET ORAL
Qty: 30 TABLET | Refills: 2 | Status: SHIPPED | OUTPATIENT
Start: 2018-01-12 | End: 2018-04-16 | Stop reason: SDUPTHER

## 2018-02-14 ENCOUNTER — OFFICE VISIT (OUTPATIENT)
Dept: INTERNAL MEDICINE | Age: 83
End: 2018-02-14

## 2018-02-14 VITALS
DIASTOLIC BLOOD PRESSURE: 62 MMHG | HEART RATE: 67 BPM | WEIGHT: 200 LBS | SYSTOLIC BLOOD PRESSURE: 118 MMHG | OXYGEN SATURATION: 97 % | BODY MASS INDEX: 35.44 KG/M2 | HEIGHT: 63 IN

## 2018-02-14 DIAGNOSIS — R10.13 EPIGASTRIC PAIN: ICD-10-CM

## 2018-02-14 DIAGNOSIS — I10 ESSENTIAL HYPERTENSION: ICD-10-CM

## 2018-02-14 DIAGNOSIS — N28.89 RIGHT RENAL MASS: ICD-10-CM

## 2018-02-14 DIAGNOSIS — E66.9 DIABETES MELLITUS TYPE 2 IN OBESE (HCC): ICD-10-CM

## 2018-02-14 DIAGNOSIS — E66.9 DIABETES MELLITUS TYPE 2 IN OBESE (HCC): Primary | ICD-10-CM

## 2018-02-14 DIAGNOSIS — R11.0 NAUSEA: ICD-10-CM

## 2018-02-14 DIAGNOSIS — R53.83 OTHER FATIGUE: ICD-10-CM

## 2018-02-14 DIAGNOSIS — E11.69 DIABETES MELLITUS TYPE 2 IN OBESE (HCC): ICD-10-CM

## 2018-02-14 DIAGNOSIS — E11.69 DIABETES MELLITUS TYPE 2 IN OBESE (HCC): Primary | ICD-10-CM

## 2018-02-14 LAB
A/G RATIO: 1.3 (ref 1.1–2.2)
ALBUMIN SERPL-MCNC: 4.4 G/DL (ref 3.4–5)
ALP BLD-CCNC: 63 U/L (ref 40–129)
ALT SERPL-CCNC: 19 U/L (ref 10–40)
ANION GAP SERPL CALCULATED.3IONS-SCNC: 14 MMOL/L (ref 3–16)
AST SERPL-CCNC: 29 U/L (ref 15–37)
BASOPHILS ABSOLUTE: 0 K/UL (ref 0–0.2)
BASOPHILS RELATIVE PERCENT: 0.5 %
BILIRUB SERPL-MCNC: 0.5 MG/DL (ref 0–1)
BUN BLDV-MCNC: 18 MG/DL (ref 7–20)
CALCIUM SERPL-MCNC: 10.6 MG/DL (ref 8.3–10.6)
CHLORIDE BLD-SCNC: 100 MMOL/L (ref 99–110)
CO2: 29 MMOL/L (ref 21–32)
CREAT SERPL-MCNC: 0.7 MG/DL (ref 0.6–1.2)
EOSINOPHILS ABSOLUTE: 0.1 K/UL (ref 0–0.6)
EOSINOPHILS RELATIVE PERCENT: 1.2 %
GFR AFRICAN AMERICAN: >60
GFR NON-AFRICAN AMERICAN: >60
GLOBULIN: 3.3 G/DL
GLUCOSE BLD-MCNC: 113 MG/DL (ref 70–99)
HCT VFR BLD CALC: 39.1 % (ref 36–48)
HEMOGLOBIN: 12.2 G/DL (ref 12–16)
LYMPHOCYTES ABSOLUTE: 2.2 K/UL (ref 1–5.1)
LYMPHOCYTES RELATIVE PERCENT: 31.7 %
MCH RBC QN AUTO: 25.3 PG (ref 26–34)
MCHC RBC AUTO-ENTMCNC: 31.2 G/DL (ref 31–36)
MCV RBC AUTO: 81 FL (ref 80–100)
MONOCYTES ABSOLUTE: 0.3 K/UL (ref 0–1.3)
MONOCYTES RELATIVE PERCENT: 4.6 %
NEUTROPHILS ABSOLUTE: 4.3 K/UL (ref 1.7–7.7)
NEUTROPHILS RELATIVE PERCENT: 62 %
PDW BLD-RTO: 13.5 % (ref 12.4–15.4)
PLATELET # BLD: 190 K/UL (ref 135–450)
PMV BLD AUTO: 9.8 FL (ref 5–10.5)
POTASSIUM SERPL-SCNC: 4.4 MMOL/L (ref 3.5–5.1)
RBC # BLD: 4.83 M/UL (ref 4–5.2)
SODIUM BLD-SCNC: 143 MMOL/L (ref 136–145)
TOTAL PROTEIN: 7.7 G/DL (ref 6.4–8.2)
TSH REFLEX: 1.2 UIU/ML (ref 0.27–4.2)
WBC # BLD: 6.9 K/UL (ref 4–11)

## 2018-02-14 PROCEDURE — G8484 FLU IMMUNIZE NO ADMIN: HCPCS | Performed by: INTERNAL MEDICINE

## 2018-02-14 PROCEDURE — 1036F TOBACCO NON-USER: CPT | Performed by: INTERNAL MEDICINE

## 2018-02-14 PROCEDURE — G8427 DOCREV CUR MEDS BY ELIG CLIN: HCPCS | Performed by: INTERNAL MEDICINE

## 2018-02-14 PROCEDURE — 1123F ACP DISCUSS/DSCN MKR DOCD: CPT | Performed by: INTERNAL MEDICINE

## 2018-02-14 PROCEDURE — 99214 OFFICE O/P EST MOD 30 MIN: CPT | Performed by: INTERNAL MEDICINE

## 2018-02-14 PROCEDURE — G8399 PT W/DXA RESULTS DOCUMENT: HCPCS | Performed by: INTERNAL MEDICINE

## 2018-02-14 PROCEDURE — 4040F PNEUMOC VAC/ADMIN/RCVD: CPT | Performed by: INTERNAL MEDICINE

## 2018-02-14 PROCEDURE — 1090F PRES/ABSN URINE INCON ASSESS: CPT | Performed by: INTERNAL MEDICINE

## 2018-02-14 PROCEDURE — G8417 CALC BMI ABV UP PARAM F/U: HCPCS | Performed by: INTERNAL MEDICINE

## 2018-02-14 RX ORDER — PROMETHAZINE HYDROCHLORIDE 12.5 MG/1
TABLET ORAL
Qty: 20 TABLET | Refills: 5 | Status: SHIPPED | OUTPATIENT
Start: 2018-02-14 | End: 2018-04-12 | Stop reason: SDUPTHER

## 2018-02-14 ASSESSMENT — ENCOUNTER SYMPTOMS
COUGH: 0
EYE REDNESS: 0
ABDOMINAL PAIN: 1
BACK PAIN: 0
VOMITING: 1
NAUSEA: 1
SHORTNESS OF BREATH: 0
CHEST TIGHTNESS: 0

## 2018-02-14 NOTE — PROGRESS NOTES
Subjective:      Patient ID: Rangel Mckeon is a 80 y.o. female. Chief Complaint   Patient presents with    Hypertension     f/u, unable to eat       Hypertension   This is a chronic problem. The current episode started more than 1 year ago. The problem is controlled. Pertinent negatives include no chest pain, headaches, neck pain, peripheral edema or shortness of breath. There are no associated agents to hypertension. Past treatments include lifestyle changes. The current treatment provides significant improvement. Compliance problems include exercise. Abdominal Pain   This is a chronic problem. The current episode started more than 1 month ago. The onset quality is gradual. The problem occurs constantly. The pain is located in the epigastric region. The pain is at a severity of 2/10. The pain is mild. The quality of the pain is aching. The abdominal pain does not radiate. Associated symptoms include nausea and vomiting. Pertinent negatives include no arthralgias, dysuria, fever, frequency, headaches or weight loss. The pain is aggravated by eating. The pain is relieved by belching. She has tried proton pump inhibitors for the symptoms. The treatment provided mild relief. Prior diagnostic workup includes GI consult. Her past medical history is significant for GERD. Current Outpatient Prescriptions on File Prior to Visit   Medication Sig Dispense Refill    traZODone (DESYREL) 50 MG tablet 1 TABLET AT BEDTIME FOR SLEEP 30 tablet 2    baclofen (LIORESAL) 20 MG tablet Take 1 tablet by mouth 3 times daily as needed (muscle spasm) 90 tablet 2    ALPRAZolam (XANAX) 0.5 MG tablet Take 1 tablet by mouth 3 times daily as needed for Anxiety .  90 tablet 1    levothyroxine (SYNTHROID) 100 MCG tablet 1 TABLET EVERY DAY 30 tablet 11    JANUVIA 50 MG tablet 1 TABLET DAILY 30 tablet 5    famotidine (PEPCID) 20 MG tablet Take 1 tablet by mouth 2 times daily 30 tablet 5    lansoprazole (PREVACID) 30 MG delayed release capsule TAKE 1 CAPSULE BY MOUTH TWO TIMES A DAY 60 capsule 5    metoprolol tartrate (LOPRESSOR) 50 MG tablet Take 1 tablet by mouth 2 times daily 60 tablet 11    Blood Glucose Monitoring Suppl KIT Tests daily 1 kit 0    mirtazapine (REMERON) 7.5 MG tablet Take 1 tablet by mouth nightly 30 tablet 3    solifenacin (VESICARE) 10 MG tablet Take 5 mg by mouth daily      nystatin (MYCOSTATIN) 185396 UNIT/GM powder USE AS DIRECTED 60 g 5    glucose monitoring kit (FREESTYLE) monitoring kit 1 kit by Does not apply route daily as needed 1 kit 0    glucose blood VI test strips (FREESTYLE TEST STRIPS) strip 1 each by In Vitro route 2 times daily 100 each 3    SUPER BIOTIN PO Take 6,000 mcg by mouth daily      acetaminophen (TYLENOL) 500 MG tablet Take 500 mg by mouth every 6 hours as needed for Pain      Pyridoxine HCl (VITAMIN B-6) 50 MG tablet Take 50 mg by mouth daily. No current facility-administered medications on file prior to visit. Review of Systems   Constitutional: Negative for fatigue, fever, unexpected weight change and weight loss. HENT: Negative for congestion and hearing loss. Eyes: Negative for redness and visual disturbance. Respiratory: Negative for cough, chest tightness and shortness of breath. Cardiovascular: Negative for chest pain and leg swelling. Gastrointestinal: Positive for abdominal pain, nausea and vomiting. Endocrine: Negative for polydipsia and polyuria. Genitourinary: Negative for dysuria and frequency. Musculoskeletal: Negative for arthralgias, back pain and neck pain. Skin: Negative for rash and wound. Neurological: Negative for dizziness, weakness and headaches. Hematological: Negative for adenopathy. Does not bruise/bleed easily. Psychiatric/Behavioral: Negative for sleep disturbance. The patient is not nervous/anxious. Objective:   Physical Exam   Constitutional: She is oriented to person, place, and time.  She appears

## 2018-02-15 LAB
ESTIMATED AVERAGE GLUCOSE: 88.2 MG/DL
HBA1C MFR BLD: 4.7 %

## 2018-02-26 RX ORDER — LANSOPRAZOLE 30 MG/1
CAPSULE, DELAYED RELEASE ORAL
Qty: 60 CAPSULE | Refills: 5 | Status: SHIPPED | OUTPATIENT
Start: 2018-02-26 | End: 2018-08-27 | Stop reason: SDUPTHER

## 2018-03-16 ENCOUNTER — TELEPHONE (OUTPATIENT)
Dept: INTERNAL MEDICINE | Age: 83
End: 2018-03-16

## 2018-03-16 RX ORDER — SPIRONOLACTONE 25 MG/1
25 TABLET ORAL DAILY
Qty: 30 TABLET | Refills: 5 | Status: SHIPPED | OUTPATIENT
Start: 2018-03-16 | End: 2018-09-14 | Stop reason: SDUPTHER

## 2018-03-16 NOTE — TELEPHONE ENCOUNTER
Rx sent to her pharmacy.      Orders Placed This Encounter   Medications    spironolactone (ALDACTONE) 25 MG tablet     Sig: Take 1 tablet by mouth daily     Dispense:  30 tablet     Refill:  5

## 2018-03-16 NOTE — TELEPHONE ENCOUNTER
Dr. Ivan Dejesus took the patient off of Mytbetriq and Vesicare as of today. The patient was told to call Dr. Rina Lance and ask for a medicine that can take the urine off of her body. Pt said that she is swelling in her legs. Pt is not sure if she needs to be seen first. Please call to discuss. Pharmacy info above.

## 2018-04-09 RX ORDER — BACLOFEN 20 MG/1
TABLET ORAL
Qty: 90 TABLET | Refills: 2 | Status: ON HOLD | OUTPATIENT
Start: 2018-04-09 | End: 2018-06-06 | Stop reason: HOSPADM

## 2018-04-12 ENCOUNTER — TELEPHONE (OUTPATIENT)
Dept: INTERNAL MEDICINE | Age: 83
End: 2018-04-12

## 2018-04-12 DIAGNOSIS — R11.0 NAUSEA: ICD-10-CM

## 2018-04-12 RX ORDER — PROMETHAZINE HYDROCHLORIDE 12.5 MG/1
TABLET ORAL
Qty: 20 TABLET | Refills: 1 | Status: SHIPPED | OUTPATIENT
Start: 2018-04-12 | End: 2018-05-03 | Stop reason: SDUPTHER

## 2018-04-20 ENCOUNTER — OFFICE VISIT (OUTPATIENT)
Dept: INTERNAL MEDICINE | Age: 83
End: 2018-04-20

## 2018-04-20 VITALS
OXYGEN SATURATION: 98 % | DIASTOLIC BLOOD PRESSURE: 60 MMHG | HEART RATE: 64 BPM | RESPIRATION RATE: 14 BRPM | SYSTOLIC BLOOD PRESSURE: 118 MMHG

## 2018-04-20 DIAGNOSIS — E66.9 DIABETES MELLITUS TYPE 2 IN OBESE (HCC): ICD-10-CM

## 2018-04-20 DIAGNOSIS — I10 ESSENTIAL HYPERTENSION: Primary | ICD-10-CM

## 2018-04-20 DIAGNOSIS — R11.0 NAUSEA: ICD-10-CM

## 2018-04-20 DIAGNOSIS — E11.69 DIABETES MELLITUS TYPE 2 IN OBESE (HCC): ICD-10-CM

## 2018-04-20 PROCEDURE — 1090F PRES/ABSN URINE INCON ASSESS: CPT | Performed by: INTERNAL MEDICINE

## 2018-04-20 PROCEDURE — 1036F TOBACCO NON-USER: CPT | Performed by: INTERNAL MEDICINE

## 2018-04-20 PROCEDURE — G8417 CALC BMI ABV UP PARAM F/U: HCPCS | Performed by: INTERNAL MEDICINE

## 2018-04-20 PROCEDURE — 4040F PNEUMOC VAC/ADMIN/RCVD: CPT | Performed by: INTERNAL MEDICINE

## 2018-04-20 PROCEDURE — 99214 OFFICE O/P EST MOD 30 MIN: CPT | Performed by: INTERNAL MEDICINE

## 2018-04-20 PROCEDURE — G8399 PT W/DXA RESULTS DOCUMENT: HCPCS | Performed by: INTERNAL MEDICINE

## 2018-04-20 PROCEDURE — G8427 DOCREV CUR MEDS BY ELIG CLIN: HCPCS | Performed by: INTERNAL MEDICINE

## 2018-04-20 PROCEDURE — 1123F ACP DISCUSS/DSCN MKR DOCD: CPT | Performed by: INTERNAL MEDICINE

## 2018-04-20 ASSESSMENT — ENCOUNTER SYMPTOMS
ABDOMINAL PAIN: 1
BACK PAIN: 0
CHEST TIGHTNESS: 0
NAUSEA: 1
VOMITING: 1
EYE REDNESS: 0
SHORTNESS OF BREATH: 0
COUGH: 0

## 2018-05-03 ENCOUNTER — TELEPHONE (OUTPATIENT)
Dept: INTERNAL MEDICINE | Age: 83
End: 2018-05-03

## 2018-05-03 DIAGNOSIS — R11.0 NAUSEA: ICD-10-CM

## 2018-05-03 RX ORDER — PROMETHAZINE HYDROCHLORIDE 12.5 MG/1
TABLET ORAL
Qty: 20 TABLET | Refills: 1 | Status: SHIPPED | OUTPATIENT
Start: 2018-05-03 | End: 2018-05-29 | Stop reason: SDUPTHER

## 2018-05-14 RX ORDER — SITAGLIPTIN 50 MG/1
TABLET, FILM COATED ORAL
Qty: 30 TABLET | Refills: 5 | Status: SHIPPED | OUTPATIENT
Start: 2018-05-14 | End: 2018-12-10 | Stop reason: SDUPTHER

## 2018-05-29 DIAGNOSIS — R11.0 NAUSEA: ICD-10-CM

## 2018-05-29 RX ORDER — PROMETHAZINE HYDROCHLORIDE 12.5 MG/1
TABLET ORAL
Qty: 20 TABLET | Refills: 1 | Status: SHIPPED | OUTPATIENT
Start: 2018-05-29 | End: 2018-06-25 | Stop reason: SDUPTHER

## 2018-05-31 ENCOUNTER — HOSPITAL ENCOUNTER (OUTPATIENT)
Dept: ULTRASOUND IMAGING | Age: 83
Discharge: HOME | End: 2018-05-31
Attending: INTERNAL MEDICINE | Admitting: INTERNAL MEDICINE

## 2018-05-31 DIAGNOSIS — R11.0 NAUSEA: ICD-10-CM

## 2018-06-02 PROBLEM — N39.0 UTI (URINARY TRACT INFECTION): Status: ACTIVE | Noted: 2018-06-02

## 2018-06-03 PROBLEM — R41.0 DELIRIUM: Status: ACTIVE | Noted: 2018-06-03

## 2018-06-04 PROBLEM — R10.11 RUQ PAIN: Status: ACTIVE | Noted: 2018-06-04

## 2018-06-08 ENCOUNTER — TELEPHONE (OUTPATIENT)
Dept: INTERNAL MEDICINE | Age: 83
End: 2018-06-08

## 2018-06-08 DIAGNOSIS — K21.00 GASTROESOPHAGEAL REFLUX DISEASE WITH ESOPHAGITIS: Primary | ICD-10-CM

## 2018-06-08 RX ORDER — FAMOTIDINE 20 MG/1
20 TABLET, FILM COATED ORAL 2 TIMES DAILY
Qty: 60 TABLET | Refills: 5 | Status: CANCELLED | OUTPATIENT
Start: 2018-06-08

## 2018-06-14 ENCOUNTER — TELEPHONE (OUTPATIENT)
Dept: INTERNAL MEDICINE | Age: 83
End: 2018-06-14

## 2018-06-14 DIAGNOSIS — F32.9 REACTIVE DEPRESSION: Primary | Chronic | ICD-10-CM

## 2018-06-14 DIAGNOSIS — K21.9 GASTROESOPHAGEAL REFLUX DISEASE WITHOUT ESOPHAGITIS: ICD-10-CM

## 2018-06-15 RX ORDER — MIRTAZAPINE 7.5 MG/1
7.5 TABLET, FILM COATED ORAL NIGHTLY
Qty: 30 TABLET | Refills: 3 | Status: SHIPPED | OUTPATIENT
Start: 2018-06-15 | End: 2018-10-08 | Stop reason: SDUPTHER

## 2018-06-15 RX ORDER — FAMOTIDINE 20 MG/1
20 TABLET, FILM COATED ORAL 2 TIMES DAILY
Qty: 30 TABLET | Refills: 5 | Status: ON HOLD | OUTPATIENT
Start: 2018-06-15 | End: 2018-10-29 | Stop reason: HOSPADM

## 2018-06-20 ENCOUNTER — TELEPHONE (OUTPATIENT)
Dept: INTERNAL MEDICINE | Age: 83
End: 2018-06-20

## 2018-06-20 DIAGNOSIS — N30.00 ACUTE CYSTITIS WITHOUT HEMATURIA: Primary | ICD-10-CM

## 2018-06-20 RX ORDER — CIPROFLOXACIN 250 MG/1
250 TABLET, FILM COATED ORAL 2 TIMES DAILY
Qty: 14 TABLET | Refills: 0 | Status: SHIPPED | OUTPATIENT
Start: 2018-06-20 | End: 2018-06-30

## 2018-06-25 ENCOUNTER — TELEPHONE (OUTPATIENT)
Dept: INTERNAL MEDICINE | Age: 83
End: 2018-06-25

## 2018-07-02 PROBLEM — N39.0 UTI (URINARY TRACT INFECTION): Status: RESOLVED | Noted: 2018-06-02 | Resolved: 2018-07-02

## 2018-07-09 ENCOUNTER — TELEPHONE (OUTPATIENT)
Dept: INTERNAL MEDICINE | Age: 83
End: 2018-07-09

## 2018-07-09 DIAGNOSIS — N30.00 ACUTE CYSTITIS WITHOUT HEMATURIA: Primary | ICD-10-CM

## 2018-07-09 RX ORDER — NITROFURANTOIN 25; 75 MG/1; MG/1
100 CAPSULE ORAL 2 TIMES DAILY WITH MEALS
Qty: 14 CAPSULE | Refills: 0 | Status: SHIPPED | OUTPATIENT
Start: 2018-07-09 | End: 2018-07-16

## 2018-07-17 ENCOUNTER — ANESTHESIA (OUTPATIENT)
Dept: ENDOSCOPY | Age: 83
End: 2018-07-17
Payer: MEDICARE

## 2018-07-17 ENCOUNTER — HOSPITAL ENCOUNTER (OUTPATIENT)
Age: 83
Setting detail: OUTPATIENT SURGERY
Discharge: HOME | End: 2018-07-17
Attending: INTERNAL MEDICINE | Admitting: INTERNAL MEDICINE
Payer: MEDICARE

## 2018-07-17 ENCOUNTER — ANESTHESIA EVENT (OUTPATIENT)
Dept: ENDOSCOPY | Age: 83
End: 2018-07-17
Payer: MEDICARE

## 2018-07-17 VITALS
RESPIRATION RATE: 15 BRPM | SYSTOLIC BLOOD PRESSURE: 116 MMHG | DIASTOLIC BLOOD PRESSURE: 56 MMHG | OXYGEN SATURATION: 99 %

## 2018-07-17 VITALS
DIASTOLIC BLOOD PRESSURE: 80 MMHG | TEMPERATURE: 98.1 F | WEIGHT: 210 LBS | BODY MASS INDEX: 35.85 KG/M2 | HEIGHT: 64 IN | SYSTOLIC BLOOD PRESSURE: 134 MMHG | RESPIRATION RATE: 16 BRPM | OXYGEN SATURATION: 96 % | HEART RATE: 74 BPM

## 2018-07-17 PROCEDURE — 2720000010 HC SURG SUPPLY STERILE: Performed by: INTERNAL MEDICINE

## 2018-07-17 PROCEDURE — 7100000010 HC PHASE II RECOVERY - FIRST 15 MIN: Performed by: INTERNAL MEDICINE

## 2018-07-17 PROCEDURE — 2580000003 HC RX 258: Performed by: NURSE ANESTHETIST, CERTIFIED REGISTERED

## 2018-07-17 PROCEDURE — 2500000003 HC RX 250 WO HCPCS: Performed by: NURSE ANESTHETIST, CERTIFIED REGISTERED

## 2018-07-17 PROCEDURE — 3700000000 HC ANESTHESIA ATTENDED CARE: Performed by: INTERNAL MEDICINE

## 2018-07-17 PROCEDURE — 88305 TISSUE EXAM BY PATHOLOGIST: CPT

## 2018-07-17 PROCEDURE — 6360000002 HC RX W HCPCS: Performed by: NURSE ANESTHETIST, CERTIFIED REGISTERED

## 2018-07-17 PROCEDURE — 7100000011 HC PHASE II RECOVERY - ADDTL 15 MIN: Performed by: INTERNAL MEDICINE

## 2018-07-17 PROCEDURE — 3609012400 HC EGD TRANSORAL BIOPSY SINGLE/MULTIPLE: Performed by: INTERNAL MEDICINE

## 2018-07-17 RX ORDER — LIDOCAINE HYDROCHLORIDE 20 MG/ML
INJECTION, SOLUTION EPIDURAL; INFILTRATION; INTRACAUDAL; PERINEURAL PRN
Status: DISCONTINUED | OUTPATIENT
Start: 2018-07-17 | End: 2018-07-17 | Stop reason: SDUPTHER

## 2018-07-17 RX ORDER — BACLOFEN 20 MG/1
TABLET ORAL 3 TIMES DAILY PRN
Refills: 2 | COMMUNITY
Start: 2018-06-25 | End: 2018-09-07 | Stop reason: ALTCHOICE

## 2018-07-17 RX ORDER — SODIUM CHLORIDE, SODIUM LACTATE, POTASSIUM CHLORIDE, CALCIUM CHLORIDE 600; 310; 30; 20 MG/100ML; MG/100ML; MG/100ML; MG/100ML
INJECTION, SOLUTION INTRAVENOUS CONTINUOUS PRN
Status: DISCONTINUED | OUTPATIENT
Start: 2018-07-17 | End: 2018-07-17 | Stop reason: SDUPTHER

## 2018-07-17 RX ORDER — PROPOFOL 10 MG/ML
INJECTION, EMULSION INTRAVENOUS PRN
Status: DISCONTINUED | OUTPATIENT
Start: 2018-07-17 | End: 2018-07-17 | Stop reason: SDUPTHER

## 2018-07-17 RX ADMIN — PROPOFOL 40 MG: 10 INJECTION, EMULSION INTRAVENOUS at 11:15

## 2018-07-17 RX ADMIN — SODIUM CHLORIDE, SODIUM LACTATE, POTASSIUM CHLORIDE, AND CALCIUM CHLORIDE: 600; 310; 30; 20 INJECTION, SOLUTION INTRAVENOUS at 11:09

## 2018-07-17 RX ADMIN — LIDOCAINE HYDROCHLORIDE 100 MG: 20 INJECTION, SOLUTION EPIDURAL; INFILTRATION; INTRACAUDAL; PERINEURAL at 11:14

## 2018-07-17 RX ADMIN — PROPOFOL 30 MG: 10 INJECTION, EMULSION INTRAVENOUS at 11:17

## 2018-07-17 ASSESSMENT — PULMONARY FUNCTION TESTS
PIF_VALUE: 1
PIF_VALUE: 0
PIF_VALUE: 1
PIF_VALUE: 0
PIF_VALUE: 1

## 2018-07-17 ASSESSMENT — PAIN - FUNCTIONAL ASSESSMENT: PAIN_FUNCTIONAL_ASSESSMENT: 0-10

## 2018-07-17 NOTE — ANESTHESIA PRE PROCEDURE
Department of Anesthesiology  Preprocedure Note       Name:  Lydia Rich   Age:  80 y.o.  :  1935                                          MRN:  [id number]         Date:  2018      Surgeon: Alaina Contreras):  Sofy Cramer MD    Procedure: Procedure(s):  EGD ESOPHAGOGASTRODUODENOSCOPY    Medications prior to admission:   Prior to Admission medications    Medication Sig Start Date End Date Taking? Authorizing Provider   promethazine (PHENERGAN) 12.5 MG tablet Take 1 tablet by mouth every 6 hours as needed for Nausea 18   Litzy Recinos MD   famotidine (PEPCID) 20 MG tablet Take 1 tablet by mouth 2 times daily 6/15/18   Cassie Doss MD   mirtazapine (REMERON) 7.5 MG tablet Take 1 tablet by mouth nightly 6/15/18   Cassie Doss MD   traMADol [facility]) 300 MG extended release tablet Take 300 mg by mouth daily. Ac Lock     Historical Provider, MD   JANUVIA 50 MG tablet 1 TABLET DAILY 18   Cassie Doss MD   traZODone (DESYREL) 50 MG tablet 1 TABLET AT BEDTIME FOR SLEEP 18   Cassie Doss MD   spironolactone (ALDACTONE) 25 MG tablet Take 1 tablet by mouth daily 3/16/18   Cassie Doss MD   lansoprazole (PREVACID) 30 MG delayed release capsule TAKE 1 CAPSULE BY MOUTH TWO TIMES A DAY 18   Cassie Doss MD   levothyroxine (SYNTHROID) 100 MCG tablet 1 TABLET EVERY DAY 17   Cassie Doss MD   metoprolol tartrate (LOPRESSOR) 50 MG tablet Take 1 tablet by mouth 2 times daily 17   Cassie Doss MD   Blood Glucose Monitoring Suppl KIT Tests daily 17   Cassie Doss MD   solifenacin (VESICARE) 10 MG tablet Take 5 mg by mouth daily    Historical Provider, MD   glucose monitoring kit (FREESTYLE) monitoring kit 1 kit by Does not apply route daily as needed 16   Richard Verdin MD   glucose blood VI test strips (FREESTYLE TEST STRIPS) strip 1 each by In Vitro route 2 times daily 16   Cassie Doss MD   SUPER BIOTIN PO Take 6,000 mcg by mouth daily    Historical Provider, MD   acetaminophen (TYLENOL) 500 MG tablet Take 500 mg by mouth every 6 hours as needed for Pain    Historical Provider, MD   Pyridoxine HCl (VITAMIN B-6) 50 MG tablet Take 50 mg by mouth daily. Historical Provider, MD       Current medications:    No current facility-administered medications for this encounter. Current Outpatient Prescriptions   Medication Sig Dispense Refill    promethazine (PHENERGAN) 12.5 MG tablet Take 1 tablet by mouth every 6 hours as needed for Nausea 30 tablet 5    famotidine (PEPCID) 20 MG tablet Take 1 tablet by mouth 2 times daily 30 tablet 5    mirtazapine (REMERON) 7.5 MG tablet Take 1 tablet by mouth nightly 30 tablet 3    traMADol (ULTRAM ER) 300 MG extended release tablet Take 300 mg by mouth daily. .     Susie Bumps 50 MG tablet 1 TABLET DAILY 30 tablet 5    traZODone (DESYREL) 50 MG tablet 1 TABLET AT BEDTIME FOR SLEEP 30 tablet 5    spironolactone (ALDACTONE) 25 MG tablet Take 1 tablet by mouth daily 30 tablet 5    lansoprazole (PREVACID) 30 MG delayed release capsule TAKE 1 CAPSULE BY MOUTH TWO TIMES A DAY 60 capsule 5    levothyroxine (SYNTHROID) 100 MCG tablet 1 TABLET EVERY DAY 30 tablet 11    metoprolol tartrate (LOPRESSOR) 50 MG tablet Take 1 tablet by mouth 2 times daily 60 tablet 11    Blood Glucose Monitoring Suppl KIT Tests daily 1 kit 0    solifenacin (VESICARE) 10 MG tablet Take 5 mg by mouth daily      glucose monitoring kit (FREESTYLE) monitoring kit 1 kit by Does not apply route daily as needed 1 kit 0    glucose blood VI test strips (FREESTYLE TEST STRIPS) strip 1 each by In Vitro route 2 times daily 100 each 3    SUPER BIOTIN PO Take 6,000 mcg by mouth daily      acetaminophen (TYLENOL) 500 MG tablet Take 500 mg by mouth every 6 hours as needed for Pain      Pyridoxine HCl (VITAMIN B-6) 50 MG tablet Take 50 mg by mouth daily. Allergies:     Allergies   Allergen Reactions    Aspirin     Coumadin [Warfarin Sodium]     Dalteparin Sodium     Heparin     Lovenox [Enoxaparin

## 2018-07-17 NOTE — ANESTHESIA POSTPROCEDURE EVALUATION
Department of Anesthesiology  Postprocedure Note    Patient: Blas Muniz  MRN: [id number]  YOB: 1935  Date of evaluation: 7/17/2018  Time:  11:29 AM     Procedure Summary     Date:  07/17/18 Room / Location:  [facility] ENDO 02 / [facility] ENDOSCOPY    Anesthesia Start:  1109 Anesthesia Stop:  0290    Procedure:  EGD ESOPHAGOGASTRODUODENOSCOPY (N/A ) Diagnosis:  (NAUSEA R11.0)    Surgeon:  Concha Dalton MD Responsible Provider:  Chapis Banda DO    Anesthesia Type:  MAC ASA Status:  3          Anesthesia Type: MAC    Eliseo Phase I: Eliseo Score: 10    Eliseo Phase II:      Last vitals: Reviewed and per EMR flowsheets.        Anesthesia Post Evaluation    Patient location during evaluation: PACU  Patient participation: complete - patient participated  Level of consciousness: awake and awake and alert  Pain score: 0  Airway patency: patent  Nausea & Vomiting: no nausea and no vomiting  Complications: no  Cardiovascular status: blood pressure returned to baseline and hemodynamically stable  Respiratory status: acceptable, spontaneous ventilation and nasal cannula  Hydration status: euvolemic

## 2018-07-25 ENCOUNTER — OFFICE VISIT (OUTPATIENT)
Dept: INTERNAL MEDICINE | Age: 83
End: 2018-07-25

## 2018-07-25 VITALS
RESPIRATION RATE: 16 BRPM | DIASTOLIC BLOOD PRESSURE: 86 MMHG | SYSTOLIC BLOOD PRESSURE: 126 MMHG | HEART RATE: 89 BPM | OXYGEN SATURATION: 97 %

## 2018-07-25 DIAGNOSIS — K31.84 GASTROPARESIS DUE TO DM (HCC): ICD-10-CM

## 2018-07-25 DIAGNOSIS — E11.43 GASTROPARESIS DUE TO DM (HCC): ICD-10-CM

## 2018-07-25 DIAGNOSIS — E11.69 DIABETES MELLITUS TYPE 2 IN OBESE (HCC): Primary | ICD-10-CM

## 2018-07-25 DIAGNOSIS — E66.9 DIABETES MELLITUS TYPE 2 IN OBESE (HCC): Primary | ICD-10-CM

## 2018-07-25 DIAGNOSIS — F32.9 REACTIVE DEPRESSION: Chronic | ICD-10-CM

## 2018-07-25 DIAGNOSIS — I10 ESSENTIAL HYPERTENSION: ICD-10-CM

## 2018-07-25 DIAGNOSIS — R10.11 RIGHT UPPER QUADRANT ABDOMINAL PAIN: ICD-10-CM

## 2018-07-25 PROCEDURE — 4040F PNEUMOC VAC/ADMIN/RCVD: CPT | Performed by: INTERNAL MEDICINE

## 2018-07-25 PROCEDURE — 1123F ACP DISCUSS/DSCN MKR DOCD: CPT | Performed by: INTERNAL MEDICINE

## 2018-07-25 PROCEDURE — G8417 CALC BMI ABV UP PARAM F/U: HCPCS | Performed by: INTERNAL MEDICINE

## 2018-07-25 PROCEDURE — G8427 DOCREV CUR MEDS BY ELIG CLIN: HCPCS | Performed by: INTERNAL MEDICINE

## 2018-07-25 PROCEDURE — 1036F TOBACCO NON-USER: CPT | Performed by: INTERNAL MEDICINE

## 2018-07-25 PROCEDURE — 99214 OFFICE O/P EST MOD 30 MIN: CPT | Performed by: INTERNAL MEDICINE

## 2018-07-25 PROCEDURE — G8399 PT W/DXA RESULTS DOCUMENT: HCPCS | Performed by: INTERNAL MEDICINE

## 2018-07-25 PROCEDURE — 1090F PRES/ABSN URINE INCON ASSESS: CPT | Performed by: INTERNAL MEDICINE

## 2018-07-25 PROCEDURE — 1101F PT FALLS ASSESS-DOCD LE1/YR: CPT | Performed by: INTERNAL MEDICINE

## 2018-07-25 ASSESSMENT — ENCOUNTER SYMPTOMS
BACK PAIN: 0
COUGH: 0
NAUSEA: 0
SHORTNESS OF BREATH: 0
EYE REDNESS: 0
ABDOMINAL PAIN: 1
CHEST TIGHTNESS: 0

## 2018-07-25 NOTE — PROGRESS NOTES
Subjective:      Patient ID: Esvin Eaton is a 80 y.o. female    Chief Complaint   Patient presents with    Diabetes       Diabetes   She presents for her follow-up diabetic visit. She has type 2 diabetes mellitus. Her disease course has been stable. Pertinent negatives for hypoglycemia include no dizziness, headaches, nervousness/anxiousness, speech difficulty or tremors. Pertinent negatives for diabetes include no chest pain, no fatigue, no polydipsia, no polyuria, no weakness and no weight loss. Pertinent negatives for hypoglycemia complications include no nocturnal hypoglycemia. Current diabetic treatment includes diet and oral agent (monotherapy). She is compliant with treatment all of the time. She is following a generally healthy diet. She never participates in exercise. An ACE inhibitor/angiotensin II receptor blocker is not being taken. Hypertension   This is a chronic problem. The current episode started more than 1 year ago. The problem is controlled. Associated symptoms include peripheral edema. Pertinent negatives include no chest pain, headaches, neck pain or shortness of breath. Risk factors for coronary artery disease include sedentary lifestyle, obesity and diabetes mellitus. Past treatments include beta blockers and diuretics. The current treatment provides significant improvement. There are no compliance problems. Abdominal Pain   This is a chronic problem. The current episode started more than 1 month ago. The onset quality is gradual. The problem has been unchanged. The pain is located in the RUQ. The pain is at a severity of 3/10. The pain is mild. The quality of the pain is aching and colicky. The abdominal pain does not radiate. Pertinent negatives include no arthralgias, dysuria, fever, frequency, headaches, nausea or weight loss. Nothing aggravates the pain. The pain is relieved by nothing. She has tried oral narcotic analgesics for the symptoms. The treatment provided mild relief. Peanut-Containing Drug Products     Penicillins     Plavix [Clopidogrel Bisulfate]     Sulfa Antibiotics     Vicodin [Hydrocodone-Acetaminophen] Other (See Comments)     Vomit and diarrhea    Aspirin Nausea And Vomiting       Review of Systems   Constitutional: Negative for fatigue, fever, unexpected weight change and weight loss. HENT: Negative for congestion and hearing loss. Eyes: Negative for redness and visual disturbance. Respiratory: Negative for cough, chest tightness and shortness of breath. Cardiovascular: Negative for chest pain and leg swelling. Gastrointestinal: Positive for abdominal pain. Negative for nausea. Endocrine: Negative for polydipsia and polyuria. Genitourinary: Negative for dysuria and frequency. Musculoskeletal: Negative for arthralgias, back pain and neck pain. Skin: Negative for rash and wound. Neurological: Negative for dizziness, tremors, speech difficulty, weakness and headaches. Hematological: Negative for adenopathy. Does not bruise/bleed easily. Psychiatric/Behavioral: Negative for sleep disturbance. The patient is not nervous/anxious. Stable depression. Objective:   Physical Exam   Constitutional: She is oriented to person, place, and time. She appears well-developed and well-nourished. Cardiovascular: Normal rate and regular rhythm. No murmur heard. Pulmonary/Chest: Effort normal and breath sounds normal. She has no wheezes. She has no rales. Abdominal: Soft. Bowel sounds are normal. She exhibits no distension. There is tenderness (RUQ mild tenderness ). Musculoskeletal: She exhibits no edema. Neurological: She is alert and oriented to person, place, and time. Skin: Skin is warm and dry. No rash noted. Assessment and plan       1. Right upper quadrant abdominal pain  Persistent right upper quadrant pain. History of cholelithiasis.  Recent EGD was normal. She is scheduled to follow-up with Dr. Kiel Smith for further

## 2018-08-01 ENCOUNTER — HOSPITAL ENCOUNTER (OUTPATIENT)
Dept: NUCLEAR MEDICINE | Age: 83
Discharge: HOME | End: 2018-08-01
Payer: MEDICARE

## 2018-08-01 ENCOUNTER — TELEPHONE (OUTPATIENT)
Dept: INTERNAL MEDICINE | Age: 83
End: 2018-08-01

## 2018-08-01 DIAGNOSIS — R11.2 NAUSEA AND VOMITING, INTRACTABILITY OF VOMITING NOT SPECIFIED, UNSPECIFIED VOMITING TYPE: ICD-10-CM

## 2018-08-01 PROCEDURE — 78264 GASTRIC EMPTYING IMG STUDY: CPT

## 2018-08-01 PROCEDURE — 3430000000 HC RX DIAGNOSTIC RADIOPHARMACEUTICAL: Performed by: INTERNAL MEDICINE

## 2018-08-01 PROCEDURE — A9541 TC99M SULFUR COLLOID: HCPCS | Performed by: INTERNAL MEDICINE

## 2018-08-01 RX ADMIN — Medication 500 MICRO CURIE: at 09:06

## 2018-08-06 ENCOUNTER — TELEPHONE (OUTPATIENT)
Dept: INTERNAL MEDICINE | Age: 83
End: 2018-08-06

## 2018-08-06 DIAGNOSIS — K80.10 CALCULUS OF GALLBLADDER WITH CHRONIC CHOLECYSTITIS WITHOUT OBSTRUCTION: Primary | ICD-10-CM

## 2018-08-06 NOTE — TELEPHONE ENCOUNTER
Patient called to get referral to a Surgeon by Dr. Sridevi Rocha (GI). Patient had EGD and the recommendation was to have surgery.

## 2018-08-17 ENCOUNTER — OFFICE VISIT (OUTPATIENT)
Dept: SURGERY | Age: 83
End: 2018-08-17

## 2018-08-17 VITALS
HEIGHT: 63 IN | SYSTOLIC BLOOD PRESSURE: 125 MMHG | BODY MASS INDEX: 39.87 KG/M2 | TEMPERATURE: 98 F | WEIGHT: 225 LBS | DIASTOLIC BLOOD PRESSURE: 81 MMHG

## 2018-08-17 DIAGNOSIS — K80.20 SYMPTOMATIC CHOLELITHIASIS: Primary | ICD-10-CM

## 2018-08-17 PROCEDURE — G8417 CALC BMI ABV UP PARAM F/U: HCPCS | Performed by: SURGERY

## 2018-08-17 PROCEDURE — 99203 OFFICE O/P NEW LOW 30 MIN: CPT | Performed by: SURGERY

## 2018-08-17 PROCEDURE — 4040F PNEUMOC VAC/ADMIN/RCVD: CPT | Performed by: SURGERY

## 2018-08-17 PROCEDURE — G8427 DOCREV CUR MEDS BY ELIG CLIN: HCPCS | Performed by: SURGERY

## 2018-08-17 PROCEDURE — 1036F TOBACCO NON-USER: CPT | Performed by: SURGERY

## 2018-08-17 PROCEDURE — G8399 PT W/DXA RESULTS DOCUMENT: HCPCS | Performed by: SURGERY

## 2018-08-17 PROCEDURE — 1123F ACP DISCUSS/DSCN MKR DOCD: CPT | Performed by: SURGERY

## 2018-08-17 PROCEDURE — 1090F PRES/ABSN URINE INCON ASSESS: CPT | Performed by: SURGERY

## 2018-08-17 PROCEDURE — 1101F PT FALLS ASSESS-DOCD LE1/YR: CPT | Performed by: SURGERY

## 2018-08-17 NOTE — PROGRESS NOTES
PATIENT NAME: Belkis Perales OF BIRTH: 1935     TODAY'S DATE: 8/17/2018    Reason for Visit:  Postprandial pain    Requesting Physician:  Dr. Smalls Camera:              The patient is a 80 y.o. female who presents with complaints of nausea and abdominal pain. The patient is a poor historian but there does seem to be a postprandial element. The patient underwent an EGD which was normal.     Chief Complaint   Patient presents with    Surgical Consult     gallstones with  abdominal pain that radiates to back pain and nausea     Establish Care       REVIEW OF SYSTEMS:  CONSTITUTIONAL:  negative  HEENT:  negative  RESPIRATORY:  negative  CARDIOVASCULAR:  negative  GASTROINTESTINAL:  negative except for nausea and abdominal pain  GENITOURINARY:  negative  HEMATOLOGIC/LYMPHATIC:  negative  NEUROLOGICAL:  negative    PHYSICAL EXAM:  VITALS:  /81   Temp 98 °F (36.7 °C)   Ht 5' 3\" (1.6 m)   Wt 225 lb (102.1 kg)   BMI 39.86 kg/m²     CONSTITUTIONAL:  alert, no apparent distress and moderately obese  EYES:  sclera clear  ENT:  normocepalic, without obvious abnormality  NECK:  supple, symmetrical, trachea midline and no carotid bruits  LUNGS:  clear to auscultation  CARDIOVASCULAR:  regular rate and rhythm and no murmur noted  ABDOMEN:  scars noted lower midline, normal bowel sounds, soft, non-distended, non-tender, voluntary guarding absent, no masses palpated and hernia absent  MUSCULOSKELETAL:  0+ pitting edema lower extremities  NEUROLOGIC:  Mental Status Exam:  Level of Alertness:   awake  Orientation:   person, place, time  SKIN:  no bruising or bleeding and normal skin color, texture, turgor    Radiology Review:    LIVER: Slightly increased in echogenicity. No focal mass. Antegrade flow in portal vein.       GALLBLADDER: Normal wall thickness with scattered small   gallstones, all measuring less than 1 cm and no sonographic   Campuzano's sign.       COMMON DUCT: 5.1 mm.

## 2018-08-17 NOTE — PATIENT INSTRUCTIONS
Sayra Zapien has a pain level on 0/10 scale  7    Location  Abdominal pain     Description aching    Radiation   Yes to back pain     Duration  2 month(s)    Time  constant, moderate

## 2018-08-17 NOTE — LETTER
[facility] [address] [phone]   [phone]                                                                                                      Junior Phu MD [facility]                                        SURGERY ORDER   -- Time of order -- 18    1:21 PM    Facility:   [facility]  # _________________                                  Scheduled by: ____________    Surgery Date & Time:                                                                              Pt arrival:      Patient Name:  Margaret Flor     :  1935 PCP:  Johanny Bishop MD       Home Ph:    [phone] (home) [phone] (work)                                                    PROCEDURE:  Laparoscopic cholecystectomy with gram  [id number]    DIAGNOSIS:  Symptomatic cholelithiasis K 80.20    Anesthesia: _General  Time Needed:  1 hour   Pt Position:  supine         Outpatient __x__ Admit ______  Assist._____  Pre-Op H & P to be done by: PCP     Labs Needed:   CBC ___  PT/PTT___ INR ____  CMP ___ EKG ___   Urine Hcg ___    Cardiac Clearance ___  (if Krystyna Grise to be done by) __________________    Patient to meet with Anesthesiology prior to surgery ___no__     Medications to be stopped 5 days before surgery: _________  Additional / Special Orders:             **Needs medical clearance                                                                                         Junior Phu MD [address] [facility] [address]   Fax   [phone]            Date Of Procedure:    PATIENT:       Margaret Flor                    :  1935        Allergies   Allergen Reactions    Coumadin [Warfarin Sodium]     Dalteparin Sodium     Heparin     Lovenox [Enoxaparin Sodium]     Other      Almonds    Peanut-Containing Drug Products     Penicillins     Plavix [Clopidogrel Bisulfate]     Sulfa Antibiotics

## 2018-08-22 ENCOUNTER — TELEPHONE (OUTPATIENT)
Dept: INTERNAL MEDICINE | Age: 83
End: 2018-08-22

## 2018-08-22 NOTE — TELEPHONE ENCOUNTER
Spoke with patient. She would like an order for a power wheel chair sent to [facility]. She wants a wheel chair that will lift up to help her get up. Patient is having pain in her hips and legs. Patient will call back with fax number. She states worker's comp is paying for this.

## 2018-08-23 ENCOUNTER — TELEPHONE (OUTPATIENT)
Dept: SURGERY | Age: 83
End: 2018-08-23

## 2018-08-23 ENCOUNTER — TELEPHONE (OUTPATIENT)
Dept: INTERNAL MEDICINE CLINIC | Age: 83
End: 2018-08-23

## 2018-08-27 RX ORDER — LANSOPRAZOLE 30 MG/1
CAPSULE, DELAYED RELEASE ORAL
Qty: 60 CAPSULE | Refills: 5 | Status: SHIPPED | OUTPATIENT
Start: 2018-08-27 | End: 2019-03-08 | Stop reason: SDUPTHER

## 2018-08-28 NOTE — TELEPHONE ENCOUNTER
Called patient to schedule surgery, medical clearance is scheduled 9/5/18 . Scheduled surgery for TUES 9/11/18 to arrive @ 8:30 am main entrance of [facility]. Gave instructions & mailed to patient's home address.

## 2018-09-05 ENCOUNTER — OFFICE VISIT (OUTPATIENT)
Dept: INTERNAL MEDICINE | Age: 83
End: 2018-09-05

## 2018-09-05 VITALS
DIASTOLIC BLOOD PRESSURE: 70 MMHG | SYSTOLIC BLOOD PRESSURE: 118 MMHG | WEIGHT: 215 LBS | BODY MASS INDEX: 38.09 KG/M2 | HEART RATE: 78 BPM

## 2018-09-05 DIAGNOSIS — I10 ESSENTIAL HYPERTENSION: ICD-10-CM

## 2018-09-05 DIAGNOSIS — Z01.818 PRE-OP EXAM: Primary | ICD-10-CM

## 2018-09-05 DIAGNOSIS — Z01.818 PRE-OP EXAM: ICD-10-CM

## 2018-09-05 DIAGNOSIS — F33.41 RECURRENT MAJOR DEPRESSIVE DISORDER IN PARTIAL REMISSION (HCC): ICD-10-CM

## 2018-09-05 DIAGNOSIS — E11.9 TYPE 2 DIABETES MELLITUS WITHOUT COMPLICATION, WITHOUT LONG-TERM CURRENT USE OF INSULIN (HCC): ICD-10-CM

## 2018-09-05 DIAGNOSIS — K81.1 CHRONIC CHOLECYSTITIS: ICD-10-CM

## 2018-09-05 LAB
A/G RATIO: 1.2 (ref 1.1–2.2)
ALBUMIN SERPL-MCNC: 4.3 G/DL (ref 3.4–5)
ALP BLD-CCNC: 62 U/L (ref 40–129)
ALT SERPL-CCNC: 20 U/L (ref 10–40)
ANION GAP SERPL CALCULATED.3IONS-SCNC: 14 MMOL/L (ref 3–16)
AST SERPL-CCNC: 32 U/L (ref 15–37)
BASOPHILS ABSOLUTE: 0.1 K/UL (ref 0–0.2)
BASOPHILS RELATIVE PERCENT: 0.7 %
BILIRUB SERPL-MCNC: 0.4 MG/DL (ref 0–1)
BUN BLDV-MCNC: 19 MG/DL (ref 7–20)
CALCIUM SERPL-MCNC: 10.5 MG/DL (ref 8.3–10.6)
CHLORIDE BLD-SCNC: 101 MMOL/L (ref 99–110)
CO2: 27 MMOL/L (ref 21–32)
CREAT SERPL-MCNC: 1 MG/DL (ref 0.6–1.2)
EOSINOPHILS ABSOLUTE: 0.1 K/UL (ref 0–0.6)
EOSINOPHILS RELATIVE PERCENT: 1.5 %
GFR AFRICAN AMERICAN: >60
GFR NON-AFRICAN AMERICAN: 53
GLOBULIN: 3.5 G/DL
GLUCOSE BLD-MCNC: 101 MG/DL (ref 70–99)
HCT VFR BLD CALC: 38.6 % (ref 36–48)
HEMOGLOBIN: 12.1 G/DL (ref 12–16)
LYMPHOCYTES ABSOLUTE: 3.2 K/UL (ref 1–5.1)
LYMPHOCYTES RELATIVE PERCENT: 42.9 %
MCH RBC QN AUTO: 25.1 PG (ref 26–34)
MCHC RBC AUTO-ENTMCNC: 31.4 G/DL (ref 31–36)
MCV RBC AUTO: 80.1 FL (ref 80–100)
MONOCYTES ABSOLUTE: 0.3 K/UL (ref 0–1.3)
MONOCYTES RELATIVE PERCENT: 4.2 %
NEUTROPHILS ABSOLUTE: 3.8 K/UL (ref 1.7–7.7)
NEUTROPHILS RELATIVE PERCENT: 50.7 %
PDW BLD-RTO: 13.7 % (ref 12.4–15.4)
PLATELET # BLD: 191 K/UL (ref 135–450)
PMV BLD AUTO: 9.5 FL (ref 5–10.5)
POTASSIUM SERPL-SCNC: 4.9 MMOL/L (ref 3.5–5.1)
RBC # BLD: 4.82 M/UL (ref 4–5.2)
SODIUM BLD-SCNC: 142 MMOL/L (ref 136–145)
TOTAL PROTEIN: 7.8 G/DL (ref 6.4–8.2)
WBC # BLD: 7.5 K/UL (ref 4–11)

## 2018-09-05 PROCEDURE — G8399 PT W/DXA RESULTS DOCUMENT: HCPCS | Performed by: INTERNAL MEDICINE

## 2018-09-05 PROCEDURE — 1036F TOBACCO NON-USER: CPT | Performed by: INTERNAL MEDICINE

## 2018-09-05 PROCEDURE — G8427 DOCREV CUR MEDS BY ELIG CLIN: HCPCS | Performed by: INTERNAL MEDICINE

## 2018-09-05 PROCEDURE — 1123F ACP DISCUSS/DSCN MKR DOCD: CPT | Performed by: INTERNAL MEDICINE

## 2018-09-05 PROCEDURE — 1090F PRES/ABSN URINE INCON ASSESS: CPT | Performed by: INTERNAL MEDICINE

## 2018-09-05 PROCEDURE — G8417 CALC BMI ABV UP PARAM F/U: HCPCS | Performed by: INTERNAL MEDICINE

## 2018-09-05 PROCEDURE — 99214 OFFICE O/P EST MOD 30 MIN: CPT | Performed by: INTERNAL MEDICINE

## 2018-09-05 PROCEDURE — 4040F PNEUMOC VAC/ADMIN/RCVD: CPT | Performed by: INTERNAL MEDICINE

## 2018-09-05 PROCEDURE — 1101F PT FALLS ASSESS-DOCD LE1/YR: CPT | Performed by: INTERNAL MEDICINE

## 2018-09-05 NOTE — PROGRESS NOTES
Chief Complaint   Patient presents with   Alex Dubois gallstone removal on 18     Dysuria     thinks she has an infection          Jasmina Mccormick is a 80 y.o. female who presents for a preoperative physical examination. She is scheduled to have laparoscopic cholecystectomy done by Dr. Leda Hope. Roxana at [facility] on 18    History of Present Illness:      She recently had a bout of acute cholecystitis. She was found to have gallstones. She has improved with antibiotic treatment. She has been recommended for gallbladder surgery.     Past Medical History:   Diagnosis Date    Arthritis     Chronic back pain     Diabetes mellitus ([address]     Esophagitis     Essential hypertension 2016    Fatty liver     G6PD deficiency (HCC)     Gastric polyps     GERD (gastroesophageal reflux disease)     Hemorrhoids     Hypothyroidism     Kidney stone     Normal cardiac stress test 3/2014    Sickle cell anemia (HCC)     UTI (urinary tract infection) 3/2014    Ecoli - R to Cipro and ampicillin        Review of patient's past surgical history indicates:     Past Surgical History:   Procedure Laterality Date    APPENDECTOMY       SECTION      X1    CYSTOSCOPY  2015    urethral dilitation    HYSTERECTOMY      OTHER SURGICAL HISTORY  2017    Ear cleaning under anesthesia    UPPER GASTROINTESTINAL ENDOSCOPY N/A 2018    EGD BIOPSY performed by Dionisio Pastrana MD at [facility] ENDOSCOPY                                                   Current Outpatient Prescriptions   Medication Sig Dispense Refill    lansoprazole (PREVACID) 30 MG delayed release capsule TAKE 1 CAPSULE BY MOUTH TWO TIMES A DAY 60 capsule 5    metoprolol tartrate (LOPRESSOR) 50 MG tablet Take 1 tablet by mouth 2 times daily 60 tablet 11    baclofen (LIORESAL) 20 MG tablet 3 times daily as needed  2    promethazine (PHENERGAN) 12.5 MG tablet Take 1 tablet by mouth every 6 hours as needed for Nausea 30 tablet 5 Differential; Future  - Comprehensive Metabolic Panel; Future    3. Essential hypertension  Stable hypertension.  - Comprehensive Metabolic Panel; Future    4. Type 2 diabetes mellitus without complication, without long-term current use of insulin (HCC)  Stable borderline diabetes. - Hemoglobin A1C; Future    5. Recurrent major depressive disorder in partial remission (HCC)  Stable. Continue Remeron. She is medically cleared for surgery and anesthesia.

## 2018-09-06 LAB
ESTIMATED AVERAGE GLUCOSE: 99.7 MG/DL
HBA1C MFR BLD: 5.1 %

## 2018-09-07 RX ORDER — IBUPROFEN 800 MG
TABLET ORAL DAILY
COMMUNITY

## 2018-09-07 NOTE — PROGRESS NOTES
[address]                          Date of Procedure 9/11 Time of Procedure 1030    PRIOR TO PROCEDURE DATE:  1. Please follow any guidelines/instructions prior to your procedure as advised by your surgeon. 2. Arrange for someone to drive you home and be with you for the first 24 hours after discharge for your safety after your procedure for which you received sedation. Ensure it is someone we can share information with regarding your discharge. 3. You must contact your surgeon for instructions IF:   You are taking any blood thinners, aspirin, anti-inflammatory or vitamin E.   There is a change in your physical condition such as a cold, fever, rash, cuts, sores or any other infection, especially near your surgical site. 4. Do not drink alcohol the day before or day of your procedure. 5. A Pre-op History and Physical for surgery MUST be completed by your Physician or Urgent Care within 30 days of your procedure date. Please bring a copy with you on the day of your procedure and along with any other testing performed. THE DAY OF YOUR PROCEDURE:  1. Follow instructions for ARRIVAL TIME as DIRECTED BY YOUR SURGEON. If your surgeon does not give you a specific arrival time, please arrive at  0830.    2. Enter the MAIN entrance from [address] [place] [address] Street and follow the signs to the free [employer] parking (offered free of charge 6am-5pm). 3. Enter the Main Entrance of the hospital (do not enter from the lower level of the parking garage). Upon entrance, check in with the  at the main desk on your left. If no one is available at the desk, proceed into the [facility] Waiting Room and go through the door directly into the [facility]. There is a Check-in desk ACROSS from Room 5 (marked with a sign hanging from the ceiling). The phone number for the surgery center is [phone].     4. Please call [phone] option #2 option #2 if you have not been preregistered yet. On the day of your procedure bring your insurance card and photo ID. You will be registered at your bedside once brought back to your room. 5. DO NOT EAT OR DRINK ANYTHING AFTER MIDNIGHT. 6. MEDICATIONS    Take the following medications with a SMALL sip of water: TAKE METOPROLOL, LEVOTHYROXINE, PRILOSEC, AND PREVACID    Use your usual dose of inhalers the morning of surgery. BRING your rescue inhaler with you to hospital.    Anesthesia does NOT want you to take insulin the morning of surgery. They will control your blood sugar while you are at the hospital. Please contact your ordering physician for instructions regarding your insulin the night before your procedure. If you have an insulin pump, please keep it set on basal rate. 7. Do not swallow water when brushing teeth. No gum, candy, mints or ice chips. Refrain from smoking or at least decrease the amount. 8. Dress in loose, comfortable clothing appropriate for redressing after your procedure. Do not wear jewelry (including body piercings), make-up (especially NO eye make-up), fingernail polish (NO toenail polish if foot/leg surgery), lotion, powders or metal hairclips. 9. Dentures, glasses, or contacts will need to be removed before your procedure. Bring cases for your glasses, contacts, dentures, or hearing aids to protect them while you are in surgery. 10. If you use a CPAP, please bring it with you on the day of your procedure. 11. We recommend that valuable personal  belongings, such as credit cards, cash, cell phones, e-tablets or jewelry, be left at home during your stay. The hospital will not be responsible for valuables that are not secured in the hospital safe. However, if your insurance requires a co-pay, you may want to bring a method of payment, i.e. Check or credit card, if you wish to pay your co-pay the day of surgery.       12. If you are to stay overnight, you may bring a bag patterns. Nausea, headache, muscle aches, or sore throat may also occur after anesthesia. Your nurse will help you manage these potential side effects. 2. For comfort and safety, arrange to have someone at home with you for the first 24 hours after discharge. 3. You and your family will be given written instructions about your diet, activity, dressing care, medications, and return visits. 4. Once at home, should issues with nausea, pain, or bleeding occur, or should you notice any signs of infection, you should call your surgeon. 5. Always clean your hands before and after caring for your wound. Do not let your family touch your surgery site without cleaning their hands. 6. Narcotic pain medications can cause significant constipation. You may want to add a stool softener to your postoperative medication schedule or speak to your surgeon on how best to manage this SIDE EFFECT. SPECIAL INSTRUCTIONS     Thank you for allowing us to care for you. We strive to exceed your expectations in the delivery of care and service provided to you and your family. If you need to contact us for any reason, please call us at [phone]    Instructions reviewed with patient during preadmission testing phone interview. Karly Cisse. 9/7/2018 .11:44 AM      ADDITIONAL EDUCATIONAL INFORMATION REVIEWED PER PHONE WITH YOU AND/OR YOUR FAMILY:  No Bring a urine sample on day of surgery  Yes Pain Goal-Taking Control of Your Pain  Yes FAQs about Surgical Site Infections  Yes Hibiclens® Bathing Instructions

## 2018-09-10 ENCOUNTER — ANESTHESIA EVENT (OUTPATIENT)
Dept: OPERATING ROOM | Age: 83
End: 2018-09-10
Payer: MEDICARE

## 2018-09-11 ENCOUNTER — ANESTHESIA (OUTPATIENT)
Dept: OPERATING ROOM | Age: 83
End: 2018-09-11
Payer: MEDICARE

## 2018-09-11 ENCOUNTER — APPOINTMENT (OUTPATIENT)
Dept: GENERAL RADIOLOGY | Age: 83
End: 2018-09-11
Attending: SURGERY
Payer: MEDICARE

## 2018-09-11 ENCOUNTER — HOSPITAL ENCOUNTER (OUTPATIENT)
Age: 83
Setting detail: OUTPATIENT SURGERY
Discharge: HOME | End: 2018-09-11
Attending: SURGERY | Admitting: SURGERY
Payer: MEDICARE

## 2018-09-11 VITALS
OXYGEN SATURATION: 93 % | HEART RATE: 82 BPM | SYSTOLIC BLOOD PRESSURE: 139 MMHG | BODY MASS INDEX: 38.09 KG/M2 | TEMPERATURE: 98.2 F | WEIGHT: 215 LBS | DIASTOLIC BLOOD PRESSURE: 87 MMHG | HEIGHT: 63 IN | RESPIRATION RATE: 18 BRPM

## 2018-09-11 VITALS
SYSTOLIC BLOOD PRESSURE: 136 MMHG | DIASTOLIC BLOOD PRESSURE: 59 MMHG | OXYGEN SATURATION: 100 % | RESPIRATION RATE: 8 BRPM | TEMPERATURE: 97.3 F

## 2018-09-11 DIAGNOSIS — K81.9 CHOLECYSTITIS: ICD-10-CM

## 2018-09-11 LAB
GLUCOSE BLD-MCNC: 110 MG/DL (ref 70–99)
GLUCOSE BLD-MCNC: 123 MG/DL (ref 70–99)
PERFORMED ON: ABNORMAL
PERFORMED ON: ABNORMAL

## 2018-09-11 PROCEDURE — 2780000010 HC IMPLANT OTHER: Performed by: SURGERY

## 2018-09-11 PROCEDURE — 2500000003 HC RX 250 WO HCPCS: Performed by: SURGERY

## 2018-09-11 PROCEDURE — 47379 UNLISTED LAPS PX LIVER: CPT | Performed by: SURGERY

## 2018-09-11 PROCEDURE — 74300 X-RAY BILE DUCTS/PANCREAS: CPT

## 2018-09-11 PROCEDURE — 3600000014 HC SURGERY LEVEL 4 ADDTL 15MIN: Performed by: SURGERY

## 2018-09-11 PROCEDURE — C1773 RET DEV, INSERTABLE: HCPCS | Performed by: SURGERY

## 2018-09-11 PROCEDURE — 6360000002 HC RX W HCPCS: Performed by: NURSE ANESTHETIST, CERTIFIED REGISTERED

## 2018-09-11 PROCEDURE — 2720000010 HC SURG SUPPLY STERILE: Performed by: SURGERY

## 2018-09-11 PROCEDURE — 6360000004 HC RX CONTRAST MEDICATION: Performed by: SURGERY

## 2018-09-11 PROCEDURE — 7100000000 HC PACU RECOVERY - FIRST 15 MIN: Performed by: SURGERY

## 2018-09-11 PROCEDURE — 2580000003 HC RX 258: Performed by: SURGERY

## 2018-09-11 PROCEDURE — 3700000001 HC ADD 15 MINUTES (ANESTHESIA): Performed by: SURGERY

## 2018-09-11 PROCEDURE — 88304 TISSUE EXAM BY PATHOLOGIST: CPT

## 2018-09-11 PROCEDURE — 7100000010 HC PHASE II RECOVERY - FIRST 15 MIN: Performed by: SURGERY

## 2018-09-11 PROCEDURE — 88313 SPECIAL STAINS GROUP 2: CPT

## 2018-09-11 PROCEDURE — 47563 LAPARO CHOLECYSTECTOMY/GRAPH: CPT | Performed by: SURGERY

## 2018-09-11 PROCEDURE — 2500000003 HC RX 250 WO HCPCS: Performed by: NURSE ANESTHETIST, CERTIFIED REGISTERED

## 2018-09-11 PROCEDURE — 3700000000 HC ANESTHESIA ATTENDED CARE: Performed by: SURGERY

## 2018-09-11 PROCEDURE — 2580000003 HC RX 258: Performed by: ANESTHESIOLOGY

## 2018-09-11 PROCEDURE — 6360000002 HC RX W HCPCS: Performed by: SURGERY

## 2018-09-11 PROCEDURE — 7100000011 HC PHASE II RECOVERY - ADDTL 15 MIN: Performed by: SURGERY

## 2018-09-11 PROCEDURE — 88307 TISSUE EXAM BY PATHOLOGIST: CPT

## 2018-09-11 PROCEDURE — 3600000004 HC SURGERY LEVEL 4 BASE: Performed by: SURGERY

## 2018-09-11 PROCEDURE — C1758 CATHETER, URETERAL: HCPCS | Performed by: SURGERY

## 2018-09-11 PROCEDURE — 7100000001 HC PACU RECOVERY - ADDTL 15 MIN: Performed by: SURGERY

## 2018-09-11 PROCEDURE — 6360000002 HC RX W HCPCS: Performed by: ANESTHESIOLOGY

## 2018-09-11 PROCEDURE — 2709999900 HC NON-CHARGEABLE SUPPLY: Performed by: SURGERY

## 2018-09-11 RX ORDER — SUCCINYLCHOLINE CHLORIDE 20 MG/ML
INJECTION INTRAMUSCULAR; INTRAVENOUS PRN
Status: DISCONTINUED | OUTPATIENT
Start: 2018-09-11 | End: 2018-09-11 | Stop reason: SDUPTHER

## 2018-09-11 RX ORDER — SODIUM CHLORIDE 0.9 % (FLUSH) 0.9 %
10 SYRINGE (ML) INJECTION EVERY 12 HOURS SCHEDULED
Status: DISCONTINUED | OUTPATIENT
Start: 2018-09-11 | End: 2018-09-11 | Stop reason: HOSPADM

## 2018-09-11 RX ORDER — CIPROFLOXACIN 2 MG/ML
400 INJECTION, SOLUTION INTRAVENOUS ONCE
Status: COMPLETED | OUTPATIENT
Start: 2018-09-11 | End: 2018-09-11

## 2018-09-11 RX ORDER — SODIUM CHLORIDE 0.9 % (FLUSH) 0.9 %
10 SYRINGE (ML) INJECTION PRN
Status: DISCONTINUED | OUTPATIENT
Start: 2018-09-11 | End: 2018-09-11 | Stop reason: HOSPADM

## 2018-09-11 RX ORDER — FENTANYL CITRATE 50 UG/ML
25 INJECTION, SOLUTION INTRAMUSCULAR; INTRAVENOUS EVERY 5 MIN PRN
Status: DISCONTINUED | OUTPATIENT
Start: 2018-09-11 | End: 2018-09-11 | Stop reason: HOSPADM

## 2018-09-11 RX ORDER — FENTANYL CITRATE 50 UG/ML
INJECTION, SOLUTION INTRAMUSCULAR; INTRAVENOUS PRN
Status: DISCONTINUED | OUTPATIENT
Start: 2018-09-11 | End: 2018-09-11 | Stop reason: SDUPTHER

## 2018-09-11 RX ORDER — METOPROLOL TARTRATE 50 MG/1
50 TABLET, FILM COATED ORAL 2 TIMES DAILY
COMMUNITY
End: 2019-06-04 | Stop reason: SDUPTHER

## 2018-09-11 RX ORDER — OXYCODONE HYDROCHLORIDE 5 MG/1
5 TABLET ORAL EVERY 6 HOURS PRN
Qty: 20 TABLET | Refills: 0 | Status: SHIPPED | OUTPATIENT
Start: 2018-09-11 | End: 2018-09-16

## 2018-09-11 RX ORDER — BUPIVACAINE HYDROCHLORIDE 5 MG/ML
INJECTION, SOLUTION EPIDURAL; INTRACAUDAL PRN
Status: DISCONTINUED | OUTPATIENT
Start: 2018-09-11 | End: 2018-09-11 | Stop reason: HOSPADM

## 2018-09-11 RX ORDER — LIDOCAINE HYDROCHLORIDE 10 MG/ML
INJECTION, SOLUTION EPIDURAL; INFILTRATION; INTRACAUDAL; PERINEURAL
Status: DISCONTINUED
Start: 2018-09-11 | End: 2018-09-11 | Stop reason: HOSPADM

## 2018-09-11 RX ORDER — GLYCOPYRROLATE 1 MG/5 ML
SYRINGE (ML) INTRAVENOUS PRN
Status: DISCONTINUED | OUTPATIENT
Start: 2018-09-11 | End: 2018-09-11 | Stop reason: SDUPTHER

## 2018-09-11 RX ORDER — NEOSTIGMINE METHYLSULFATE 5 MG/5 ML
SYRINGE (ML) INTRAVENOUS PRN
Status: DISCONTINUED | OUTPATIENT
Start: 2018-09-11 | End: 2018-09-11 | Stop reason: SDUPTHER

## 2018-09-11 RX ORDER — PROMETHAZINE HYDROCHLORIDE 25 MG/ML
6.25 INJECTION, SOLUTION INTRAMUSCULAR; INTRAVENOUS
Status: COMPLETED | OUTPATIENT
Start: 2018-09-11 | End: 2018-09-11

## 2018-09-11 RX ORDER — ONDANSETRON 2 MG/ML
4 INJECTION INTRAMUSCULAR; INTRAVENOUS
Status: COMPLETED | OUTPATIENT
Start: 2018-09-11 | End: 2018-09-11

## 2018-09-11 RX ORDER — SODIUM CHLORIDE, SODIUM LACTATE, POTASSIUM CHLORIDE, AND CALCIUM CHLORIDE .6; .31; .03; .02 G/100ML; G/100ML; G/100ML; G/100ML
IRRIGANT IRRIGATION PRN
Status: DISCONTINUED | OUTPATIENT
Start: 2018-09-11 | End: 2018-09-11 | Stop reason: HOSPADM

## 2018-09-11 RX ORDER — FENTANYL CITRATE 50 UG/ML
50 INJECTION, SOLUTION INTRAMUSCULAR; INTRAVENOUS EVERY 5 MIN PRN
Status: DISCONTINUED | OUTPATIENT
Start: 2018-09-11 | End: 2018-09-11 | Stop reason: HOSPADM

## 2018-09-11 RX ORDER — SODIUM CHLORIDE, SODIUM LACTATE, POTASSIUM CHLORIDE, CALCIUM CHLORIDE 600; 310; 30; 20 MG/100ML; MG/100ML; MG/100ML; MG/100ML
INJECTION, SOLUTION INTRAVENOUS CONTINUOUS
Status: DISCONTINUED | OUTPATIENT
Start: 2018-09-11 | End: 2018-09-11 | Stop reason: HOSPADM

## 2018-09-11 RX ORDER — ROCURONIUM BROMIDE 10 MG/ML
INJECTION, SOLUTION INTRAVENOUS PRN
Status: DISCONTINUED | OUTPATIENT
Start: 2018-09-11 | End: 2018-09-11 | Stop reason: SDUPTHER

## 2018-09-11 RX ORDER — MAGNESIUM HYDROXIDE 1200 MG/15ML
LIQUID ORAL CONTINUOUS PRN
Status: DISCONTINUED | OUTPATIENT
Start: 2018-09-11 | End: 2018-09-11 | Stop reason: HOSPADM

## 2018-09-11 RX ORDER — PROPOFOL 10 MG/ML
INJECTION, EMULSION INTRAVENOUS PRN
Status: DISCONTINUED | OUTPATIENT
Start: 2018-09-11 | End: 2018-09-11 | Stop reason: SDUPTHER

## 2018-09-11 RX ORDER — LIDOCAINE HYDROCHLORIDE 10 MG/ML
1 INJECTION, SOLUTION EPIDURAL; INFILTRATION; INTRACAUDAL; PERINEURAL
Status: DISCONTINUED | OUTPATIENT
Start: 2018-09-11 | End: 2018-09-11 | Stop reason: HOSPADM

## 2018-09-11 RX ADMIN — HYDROMORPHONE HYDROCHLORIDE 0.25 MG: 1 INJECTION, SOLUTION INTRAMUSCULAR; INTRAVENOUS; SUBCUTANEOUS at 14:07

## 2018-09-11 RX ADMIN — PHENYLEPHRINE HYDROCHLORIDE 100 MCG: 10 INJECTION, SOLUTION INTRAMUSCULAR; INTRAVENOUS; SUBCUTANEOUS at 12:11

## 2018-09-11 RX ADMIN — PHENYLEPHRINE HYDROCHLORIDE 100 MCG: 10 INJECTION, SOLUTION INTRAMUSCULAR; INTRAVENOUS; SUBCUTANEOUS at 12:06

## 2018-09-11 RX ADMIN — Medication 0.5 MG: at 13:01

## 2018-09-11 RX ADMIN — ROCURONIUM BROMIDE 40 MG: 10 INJECTION, SOLUTION INTRAVENOUS at 12:01

## 2018-09-11 RX ADMIN — PROMETHAZINE HYDROCHLORIDE 6.25 MG: 25 INJECTION INTRAMUSCULAR; INTRAVENOUS at 13:41

## 2018-09-11 RX ADMIN — HYDROMORPHONE HYDROCHLORIDE 0.25 MG: 1 INJECTION, SOLUTION INTRAMUSCULAR; INTRAVENOUS; SUBCUTANEOUS at 13:57

## 2018-09-11 RX ADMIN — PROMETHAZINE HYDROCHLORIDE 6.25 MG: 25 INJECTION INTRAMUSCULAR; INTRAVENOUS at 15:45

## 2018-09-11 RX ADMIN — CIPROFLOXACIN 400 MG: 2 INJECTION, SOLUTION INTRAVENOUS at 11:59

## 2018-09-11 RX ADMIN — Medication 0.4 MG: at 12:58

## 2018-09-11 RX ADMIN — PROPOFOL 40 MG: 10 INJECTION, EMULSION INTRAVENOUS at 12:17

## 2018-09-11 RX ADMIN — SUCCINYLCHOLINE CHLORIDE 100 MG: 20 INJECTION, SOLUTION INTRAMUSCULAR; INTRAVENOUS; PARENTERAL at 11:55

## 2018-09-11 RX ADMIN — Medication 2 MG: at 12:58

## 2018-09-11 RX ADMIN — Medication 3 MG: at 13:01

## 2018-09-11 RX ADMIN — HYDROMORPHONE HYDROCHLORIDE 0.5 MG: 1 INJECTION, SOLUTION INTRAMUSCULAR; INTRAVENOUS; SUBCUTANEOUS at 14:27

## 2018-09-11 RX ADMIN — PROPOFOL 100 MG: 10 INJECTION, EMULSION INTRAVENOUS at 11:54

## 2018-09-11 RX ADMIN — SODIUM CHLORIDE, SODIUM LACTATE, POTASSIUM CHLORIDE, AND CALCIUM CHLORIDE: 600; 310; 30; 20 INJECTION, SOLUTION INTRAVENOUS at 11:51

## 2018-09-11 RX ADMIN — PROPOFOL 50 MG: 10 INJECTION, EMULSION INTRAVENOUS at 11:55

## 2018-09-11 RX ADMIN — ONDANSETRON 4 MG: 2 INJECTION INTRAMUSCULAR; INTRAVENOUS at 13:30

## 2018-09-11 RX ADMIN — FENTANYL CITRATE 50 MCG: 50 INJECTION INTRAMUSCULAR; INTRAVENOUS at 12:19

## 2018-09-11 RX ADMIN — FENTANYL CITRATE 25 MCG: 50 INJECTION INTRAMUSCULAR; INTRAVENOUS at 15:49

## 2018-09-11 RX ADMIN — PHENYLEPHRINE HYDROCHLORIDE 100 MCG: 10 INJECTION, SOLUTION INTRAMUSCULAR; INTRAVENOUS; SUBCUTANEOUS at 12:03

## 2018-09-11 RX ADMIN — FENTANYL CITRATE 50 MCG: 50 INJECTION INTRAMUSCULAR; INTRAVENOUS at 11:53

## 2018-09-11 ASSESSMENT — PULMONARY FUNCTION TESTS
PIF_VALUE: 29
PIF_VALUE: 28
PIF_VALUE: 28
PIF_VALUE: 17
PIF_VALUE: 28
PIF_VALUE: 29
PIF_VALUE: 31
PIF_VALUE: 17
PIF_VALUE: 23
PIF_VALUE: 23
PIF_VALUE: 17
PIF_VALUE: 2
PIF_VALUE: 20
PIF_VALUE: 28
PIF_VALUE: 2
PIF_VALUE: 2
PIF_VALUE: 4
PIF_VALUE: 13
PIF_VALUE: 20
PIF_VALUE: 22
PIF_VALUE: 9
PIF_VALUE: 28
PIF_VALUE: 28
PIF_VALUE: 18
PIF_VALUE: 0
PIF_VALUE: 17
PIF_VALUE: 21
PIF_VALUE: 18
PIF_VALUE: 29
PIF_VALUE: 24
PIF_VALUE: 23
PIF_VALUE: 24
PIF_VALUE: 18
PIF_VALUE: 27
PIF_VALUE: 17
PIF_VALUE: 21
PIF_VALUE: 18
PIF_VALUE: 30
PIF_VALUE: 21
PIF_VALUE: 28
PIF_VALUE: 29
PIF_VALUE: 21
PIF_VALUE: 21
PIF_VALUE: 24
PIF_VALUE: 18
PIF_VALUE: 27
PIF_VALUE: 28
PIF_VALUE: 28
PIF_VALUE: 17
PIF_VALUE: 20
PIF_VALUE: 0
PIF_VALUE: 1
PIF_VALUE: 28
PIF_VALUE: 22
PIF_VALUE: 28
PIF_VALUE: 17
PIF_VALUE: 18
PIF_VALUE: 28
PIF_VALUE: 18
PIF_VALUE: 28
PIF_VALUE: 28
PIF_VALUE: 27
PIF_VALUE: 28
PIF_VALUE: 20
PIF_VALUE: 1
PIF_VALUE: 19
PIF_VALUE: 2
PIF_VALUE: 28
PIF_VALUE: 27
PIF_VALUE: 28
PIF_VALUE: 27
PIF_VALUE: 29
PIF_VALUE: 18
PIF_VALUE: 28
PIF_VALUE: 0
PIF_VALUE: 27
PIF_VALUE: 4
PIF_VALUE: 18
PIF_VALUE: 25

## 2018-09-11 ASSESSMENT — PAIN SCALES - GENERAL
PAINLEVEL_OUTOF10: 9
PAINLEVEL_OUTOF10: 6
PAINLEVEL_OUTOF10: 4
PAINLEVEL_OUTOF10: 4

## 2018-09-11 ASSESSMENT — PAIN - FUNCTIONAL ASSESSMENT: PAIN_FUNCTIONAL_ASSESSMENT: 0-10

## 2018-09-11 ASSESSMENT — PAIN DESCRIPTION - DESCRIPTORS
DESCRIPTORS: PATIENT UNABLE TO DESCRIBE
DESCRIPTORS: ACHING
DESCRIPTORS: ACHING

## 2018-09-11 ASSESSMENT — PAIN DESCRIPTION - PROGRESSION: CLINICAL_PROGRESSION: NOT CHANGED

## 2018-09-11 ASSESSMENT — LIFESTYLE VARIABLES: SMOKING_STATUS: 0

## 2018-09-11 ASSESSMENT — PAIN DESCRIPTION - PAIN TYPE
TYPE: SURGICAL PAIN
TYPE: SURGICAL PAIN

## 2018-09-11 ASSESSMENT — PAIN DESCRIPTION - LOCATION
LOCATION: ABDOMEN
LOCATION: ABDOMEN

## 2018-09-11 ASSESSMENT — PAIN DESCRIPTION - ORIENTATION: ORIENTATION: UPPER

## 2018-09-11 NOTE — BRIEF OP NOTE
Brief Postoperative Note  ______________________________________________________________    Patient: Jillian Trujillo  YOB: 1935  MRN: [id number]  Date of Procedure: 9/11/2018    Pre-Op Diagnosis: SYMPTOMATIC CHOLECYSTITIS    Post-Op Diagnosis: Same       Procedure(s):  LAPAROSCOPIC CHOLECYSTECTOMY WITH CHOLANGIOGRAM; True cut liver biopsy    Anesthesia: General    Surgeon(s):  Aubrie Campos MD    Staff:  First Assistant: Solitario Cheng MD  Scrub Person First: Joel Salas RN; Darling Walls     Estimated Blood Loss: 15 mL    Complications: None    Specimens:   ID Type Source Tests Collected by Time Destination   A : GALLBLADDER Tissue Tissue SURGICAL PATHOLOGY Aubrie Campos MD 9/11/2018 1230    B : B LIVER BIOPSY Tissue Tissue SURGICAL PATHOLOGY Aubrie Campos MD 9/11/2018 1253        Implants:  * No implants in log *      Drains:      Findings: cirrhotic liver     Solitario Cheng MD  Date: 9/11/2018  Time: 1:05 PM

## 2018-09-11 NOTE — PROGRESS NOTES
Pt complaining of severe nausea and pain. Questioned as to which was worse. Pt has already been treated for both. Pt states pain. Medicated for pain at this time.

## 2018-09-11 NOTE — ANESTHESIA PRE PROCEDURE
Department of Anesthesiology  Preprocedure Note       Name:  Juarez Uribe   Age:  80 y.o.  :  1935                                          MRN:  [id number]         Date:  2018      Surgeon: Mich Penny):  Syeda Lin MD    Procedure: Procedure(s):  LAPAROSCOPIC CHOLECYSTECTOMY WITH CHOLANGIOGRAM    Medications prior to admission:   Prior to Admission medications    Medication Sig Start Date End Date Taking? Authorizing Provider   metoprolol tartrate (LOPRESSOR) 50 MG tablet Take 50 mg by mouth 2 times daily   Yes Historical Provider, MD   Cholecalciferol (VITAMIN D3) 400 units CAPS Take by mouth daily   Yes Historical Provider, MD   lansoprazole (PREVACID) 30 MG delayed release capsule TAKE 1 CAPSULE BY MOUTH TWO TIMES A DAY 18  Yes Yonatan Gross MD   metoprolol tartrate (LOPRESSOR) 50 MG tablet Take 1 tablet by mouth 2 times daily 18 Yes Richard Verdin MD   promethazine (PHENERGAN) 12.5 MG tablet Take 1 tablet by mouth every 6 hours as needed for Nausea 18  Yes Minesh David MD   famotidine (PEPCID) 20 MG tablet Take 1 tablet by mouth 2 times daily 6/15/18  Yes Yonatan Gross MD   mirtazapine (REMERON) 7.5 MG tablet Take 1 tablet by mouth nightly 6/15/18  Yes Yonatan Gross MD   traMADol [facility]) 300 MG extended release tablet Take 300 mg by mouth daily. Delvin Abraham Historical Provider, MD CALZADAUVIA 50 MG tablet 1 TABLET DAILY 18  Yes Yonatan Gross MD   traZODone (DESYREL) 50 MG tablet 1 TABLET AT BEDTIME FOR SLEEP 18  Yes Yonatan Gross MD   spironolactone (ALDACTONE) 25 MG tablet Take 1 tablet by mouth daily 3/16/18  Yes Yonatan Gross MD   levothyroxine (SYNTHROID) 100 MCG tablet 1 TABLET EVERY DAY 17  Yes Yonatan Gross MD   Blood Glucose Monitoring Suppl KIT Tests daily 17  Yes Yonatan Gross MD   glucose monitoring kit (FREESTYLE) monitoring kit 1 kit by Does not apply route daily as needed 16  Yes Yonatan Gross MD   glucose blood VI test strips (FREESTYLE TEST STRIPS) strip 1 each by In Vitro route 2 times daily 2/26/16  Yes Francisco Abraham MD   SUPER BIOTIN PO Take 1,000 mcg by mouth daily    Yes Historical Provider, MD   acetaminophen (TYLENOL) 500 MG tablet Take 500 mg by mouth every 6 hours as needed for Pain   Yes Historical Provider, MD   Pyridoxine HCl (VITAMIN B-6) 50 MG tablet Take 50 mg by mouth daily. Yes Historical Provider, MD       Current medications:    Current Facility-Administered Medications   Medication Dose Route Frequency Provider Last Rate Last Dose    ciprofloxacin (CIPRO) IVPB 400 mg  400 mg Intravenous Once Cyndia Lanes, MD        lactated ringers infusion   Intravenous Continuous Rehan Pires MD        sodium chloride flush 0.9 % injection 10 mL  10 mL Intravenous 2 times per day Rehan Pires MD        sodium chloride flush 0.9 % injection 10 mL  10 mL Intravenous PRN Rehan Pires MD        lidocaine PF 1 % injection 1 mL  1 mL Intradermal Once PRN Rehan Pires MD           Allergies:     Allergies   Allergen Reactions    Coumadin [Warfarin Sodium]     Dalteparin Sodium     Vaibhav Beans [Broad Beans]     Heparin     Lovenox [Enoxaparin Sodium]     Other      Almonds    Peanut-Containing Drug Products     Penicillins     Plavix [Clopidogrel Bisulfate]     Sulfa Antibiotics     Vicodin [Hydrocodone-Acetaminophen] Other (See Comments)     Vomit and diarrhea    Aspirin Nausea And Vomiting       Problem List:    Patient Active Problem List   Diagnosis Code    Acquired hypothyroidism E03.9    Esophageal reflux K21.9    Urinary tract infection N39.0    Adjustment disorder with depressed mood F43.21    Urinary incontinence R32    Skin rash R21    Back pain M54.9    Morbidly obese ([facility]) E66.01    Chest pain R07.9    Arthritis M19.90    Chronic pain G89.29    Reactive depression F32.9    Anxiety F41.9    Mixed incontinence N39.46    Diabetes mellitus type 2 in obese (HCC) E11.69, E66.9    Lump of right

## 2018-09-11 NOTE — OP NOTE
removed. Two clips were then applied to the distal cystic  duct. The cystic duct was then divided. Two clips applied to the cystic  artery. This was then divided. Following this, the gallbladder was then  removed from the gallbladder bed using electrocautery. This was accomplished  without difficulty. Upon its removal, the gallbladder was then positioned in  a retrieval bag and brought out through the 10-mm trocar site. The 10-mm  trocar was then reintroduced. The abdomen was inspected. Good hemostasis  was noted along the gallbladder bed. The clips were all occlusive and  intact. We then turned our attention to performing a liver biopsy. Three passes using a True cut biopsy syringe   Were complete to be sent for pathology. Hemostasis was achieved. The abdomen was then irrigated, aspirated of clear fluid. The  abdomen was then de-insufflated, with clear visualization of the trocars  being removed during this process. The 10-mm trocar site was then closed by approximating the fascia using  interrupted 0 Vicryl sutures. The wounds were then all irrigated and the  skin at the trocar sites were then closed using 4-0 Monocryl. The wounds  were then cleaned, dried, and dressed with DermaFlex. The patient tolerated  the procedure well, was taken to the recovery room in stable condition.

## 2018-09-11 NOTE — FLOWSHEET NOTE
09/11/18 0955   Encounter Summary   Services provided to: Patient and family together   Referral/Consult From: [address] [facility]   Complexity of Encounter Moderate   Length of Encounter 30 minutes   Routine   Type Initial   Assessment Calm; Approachable; Hopeful   Intervention Active listening;Explored feelings, thoughts, concerns;Nurtured hope;Prayer   Outcome Comfort;Expressed gratitude; Hopeful;Receptive

## 2018-09-14 ENCOUNTER — TELEPHONE (OUTPATIENT)
Dept: SURGERY | Age: 83
End: 2018-09-14

## 2018-09-14 RX ORDER — SPIRONOLACTONE 25 MG/1
25 TABLET ORAL DAILY
Qty: 30 TABLET | Refills: 5 | Status: SHIPPED | OUTPATIENT
Start: 2018-09-14 | End: 2019-04-02

## 2018-09-17 ENCOUNTER — TELEPHONE (OUTPATIENT)
Dept: INTERNAL MEDICINE | Age: 83
End: 2018-09-17

## 2018-09-17 DIAGNOSIS — N30.00 ACUTE CYSTITIS WITHOUT HEMATURIA: Primary | ICD-10-CM

## 2018-09-17 RX ORDER — NITROFURANTOIN 25; 75 MG/1; MG/1
100 CAPSULE ORAL 2 TIMES DAILY WITH MEALS
Qty: 14 CAPSULE | Refills: 0 | Status: SHIPPED | OUTPATIENT
Start: 2018-09-17 | End: 2018-10-24 | Stop reason: SDUPTHER

## 2018-09-21 ENCOUNTER — OFFICE VISIT (OUTPATIENT)
Dept: SURGERY | Age: 83
End: 2018-09-21

## 2018-09-21 VITALS
DIASTOLIC BLOOD PRESSURE: 63 MMHG | WEIGHT: 225 LBS | SYSTOLIC BLOOD PRESSURE: 107 MMHG | TEMPERATURE: 98.2 F | BODY MASS INDEX: 39.87 KG/M2 | HEIGHT: 63 IN

## 2018-09-21 DIAGNOSIS — Z90.49 S/P LAPAROSCOPIC CHOLECYSTECTOMY: Primary | ICD-10-CM

## 2018-09-21 PROCEDURE — 99024 POSTOP FOLLOW-UP VISIT: CPT | Performed by: SURGERY

## 2018-09-21 NOTE — Clinical Note
Anushka Perales is recovering well from surgery and I have no concerns. She had a macronodular looking liver and biopsies were not revealing. She may benefit from a GI f/u.    Thanks, Lindsey Allen

## 2018-09-25 NOTE — PROGRESS NOTES
PATIENT NAME: Belkis Perales OF BIRTH: 1935     TODAY'S DATE: 9/25/2018    Reason for Visit:  Post-op check    Requesting Physician:  Dr. Smalls Camera:              The patient is a 80 y.o. female who presents for follow up without complaints. Chief Complaint   Patient presents with    Post-Op Check     LAPAROSCOPIC CHOLECYSTECTOMY WITH IOC; TRUE-CUT LIVER BIOPSY       REVIEW OF SYSTEMS:  CONSTITUTIONAL:  negative  HEENT:  negative  RESPIRATORY:  negative  CARDIOVASCULAR:  negative  GASTROINTESTINAL:  negative except for nausea and abdominal pain  GENITOURINARY:  negative  HEMATOLOGIC/LYMPHATIC:  negative  NEUROLOGICAL:  negative    PHYSICAL EXAM:  VITALS:  /63   Temp 98.2 °F (36.8 °C)   Ht 5' 3\" (1.6 m)   Wt 225 lb (102.1 kg)   BMI 39.86 kg/m²     CONSTITUTIONAL:  alert, no apparent distress and moderately obese  EYES:  sclera clear  ENT:  normocepalic, without obvious abnormality  NECK:  supple, symmetrical, trachea midline and no carotid bruits  LUNGS:  clear to auscultation  CARDIOVASCULAR:  regular rate and rhythm and no murmur noted  ABDOMEN:  Incisions healing well, normal bowel sounds, soft, non-distended, non-tender, voluntary guarding absent, no masses palpated and hernia absent  MUSCULOSKELETAL:  0+ pitting edema lower extremities  NEUROLOGIC:  Mental Status Exam:  Level of Alertness:   awake  Orientation:   person, place, time  SKIN:  no bruising or bleeding and normal skin color, texture, turgor    Pathology Review:    A: Gallbladder:     - Chronic cholecystitis and cholelithiasis. B: Liver biopsy:     - Moderate steatosis in a subcapsular liver biopsy.     - Please refer to Comment and Microscopic Description. IMPRESSION/RECOMMENDATIONS:    The patient is s/p laparoscopic cholecystectomy with gram. The patient is recovering well from surgery and is returning to normal activities. The patient did have a macronodular look to entire liver.  She may benefit from GI follow up. All questions were answered and I will see the patient back in the office on a PRN basis.     Regulo Schmidt

## 2018-10-08 ENCOUNTER — TELEPHONE (OUTPATIENT)
Dept: INTERNAL MEDICINE CLINIC | Age: 83
End: 2018-10-08

## 2018-10-08 DIAGNOSIS — R11.0 NAUSEA: ICD-10-CM

## 2018-10-08 RX ORDER — PROMETHAZINE HYDROCHLORIDE 12.5 MG/1
TABLET ORAL
Qty: 8 TABLET | Refills: 0 | Status: SHIPPED | OUTPATIENT
Start: 2018-10-08 | End: 2018-10-17 | Stop reason: SDUPTHER

## 2018-10-08 NOTE — TELEPHONE ENCOUNTER
Per pharmacy, patient has a prescription for Phenergan at [facility]. They are closed until Tuesday. Patient would like Rx sent to [facility] [place] on [facility].

## 2018-10-15 RX ORDER — TRAZODONE HYDROCHLORIDE 50 MG/1
TABLET ORAL
Qty: 30 TABLET | Refills: 5 | Status: SHIPPED | OUTPATIENT
Start: 2018-10-15 | End: 2019-05-14 | Stop reason: SDUPTHER

## 2018-10-17 DIAGNOSIS — R11.0 NAUSEA: ICD-10-CM

## 2018-10-17 RX ORDER — PROMETHAZINE HYDROCHLORIDE 12.5 MG/1
TABLET ORAL
Qty: 30 TABLET | Refills: 0 | Status: SHIPPED | OUTPATIENT
Start: 2018-10-17 | End: 2018-11-20 | Stop reason: SDUPTHER

## 2018-10-24 ENCOUNTER — TELEPHONE (OUTPATIENT)
Dept: INTERNAL MEDICINE CLINIC | Age: 83
End: 2018-10-24

## 2018-10-24 DIAGNOSIS — N30.00 ACUTE CYSTITIS WITHOUT HEMATURIA: ICD-10-CM

## 2018-10-24 RX ORDER — NITROFURANTOIN 25; 75 MG/1; MG/1
100 CAPSULE ORAL 2 TIMES DAILY WITH MEALS
Qty: 14 CAPSULE | Refills: 0 | Status: ON HOLD | OUTPATIENT
Start: 2018-10-24 | End: 2018-10-29 | Stop reason: HOSPADM

## 2018-10-25 ENCOUNTER — HOSPITAL ENCOUNTER (INPATIENT)
Age: 83
LOS: 3 days | Discharge: NURSING, REHAB OR LONG-TERM CARE | DRG: 690 | End: 2018-10-29
Attending: EMERGENCY MEDICINE | Admitting: HOSPITALIST
Payer: MEDICARE

## 2018-10-25 ENCOUNTER — APPOINTMENT (OUTPATIENT)
Dept: GENERAL RADIOLOGY | Age: 83
DRG: 690 | End: 2018-10-25
Payer: MEDICARE

## 2018-10-25 DIAGNOSIS — R26.2 UNABLE TO AMBULATE: Primary | ICD-10-CM

## 2018-10-25 DIAGNOSIS — R53.1 GENERAL WEAKNESS: ICD-10-CM

## 2018-10-25 DIAGNOSIS — R60.0 LOCALIZED EDEMA: ICD-10-CM

## 2018-10-25 DIAGNOSIS — N30.00 ACUTE CYSTITIS WITHOUT HEMATURIA: ICD-10-CM

## 2018-10-25 PROCEDURE — 85025 COMPLETE CBC W/AUTO DIFF WBC: CPT

## 2018-10-25 PROCEDURE — 93005 ELECTROCARDIOGRAM TRACING: CPT | Performed by: EMERGENCY MEDICINE

## 2018-10-25 PROCEDURE — 85610 PROTHROMBIN TIME: CPT

## 2018-10-25 PROCEDURE — 71046 X-RAY EXAM CHEST 2 VIEWS: CPT

## 2018-10-25 PROCEDURE — 83880 ASSAY OF NATRIURETIC PEPTIDE: CPT

## 2018-10-25 PROCEDURE — 80053 COMPREHEN METABOLIC PANEL: CPT

## 2018-10-25 PROCEDURE — 99285 EMERGENCY DEPT VISIT HI MDM: CPT

## 2018-10-25 PROCEDURE — 84484 ASSAY OF TROPONIN QUANT: CPT

## 2018-10-25 RX ORDER — ONDANSETRON 2 MG/ML
8 INJECTION INTRAMUSCULAR; INTRAVENOUS ONCE
Status: COMPLETED | OUTPATIENT
Start: 2018-10-25 | End: 2018-10-26

## 2018-10-25 RX ORDER — MORPHINE SULFATE 4 MG/ML
4 INJECTION, SOLUTION INTRAMUSCULAR; INTRAVENOUS ONCE
Status: COMPLETED | OUTPATIENT
Start: 2018-10-25 | End: 2018-10-26

## 2018-10-26 ENCOUNTER — APPOINTMENT (OUTPATIENT)
Dept: ULTRASOUND IMAGING | Age: 83
DRG: 690 | End: 2018-10-26
Payer: MEDICARE

## 2018-10-26 PROBLEM — I27.20 PULMONARY HYPERTENSION (HCC): Status: ACTIVE | Noted: 2018-10-26

## 2018-10-26 PROBLEM — R53.1 WEAKNESS: Status: ACTIVE | Noted: 2018-10-26

## 2018-10-26 PROBLEM — D75.A G6PD DEFICIENCY: Status: ACTIVE | Noted: 2018-10-26

## 2018-10-26 LAB
A/G RATIO: 1 (ref 1.1–2.2)
ALBUMIN SERPL-MCNC: 3.7 G/DL (ref 3.4–5)
ALP BLD-CCNC: 59 U/L (ref 40–129)
ALT SERPL-CCNC: 19 U/L (ref 10–40)
ANION GAP SERPL CALCULATED.3IONS-SCNC: 10 MMOL/L (ref 3–16)
ANION GAP SERPL CALCULATED.3IONS-SCNC: 11 MMOL/L (ref 3–16)
AST SERPL-CCNC: 29 U/L (ref 15–37)
BACTERIA: ABNORMAL /HPF
BASOPHILS ABSOLUTE: 0 K/UL (ref 0–0.2)
BASOPHILS ABSOLUTE: 0.2 K/UL (ref 0–0.2)
BASOPHILS RELATIVE PERCENT: 0.2 %
BASOPHILS RELATIVE PERCENT: 2.1 %
BILIRUB SERPL-MCNC: 0.7 MG/DL (ref 0–1)
BILIRUBIN URINE: NEGATIVE
BLOOD, URINE: ABNORMAL
BUN BLDV-MCNC: 13 MG/DL (ref 7–20)
BUN BLDV-MCNC: 13 MG/DL (ref 7–20)
CALCIUM SERPL-MCNC: 10.3 MG/DL (ref 8.3–10.6)
CALCIUM SERPL-MCNC: 9.9 MG/DL (ref 8.3–10.6)
CHLORIDE BLD-SCNC: 101 MMOL/L (ref 99–110)
CHLORIDE BLD-SCNC: 97 MMOL/L (ref 99–110)
CLARITY: CLEAR
CO2: 27 MMOL/L (ref 21–32)
CO2: 28 MMOL/L (ref 21–32)
COLOR: YELLOW
CREAT SERPL-MCNC: 0.7 MG/DL (ref 0.6–1.2)
CREAT SERPL-MCNC: 0.8 MG/DL (ref 0.6–1.2)
EOSINOPHILS ABSOLUTE: 0.1 K/UL (ref 0–0.6)
EOSINOPHILS ABSOLUTE: 0.1 K/UL (ref 0–0.6)
EOSINOPHILS RELATIVE PERCENT: 0.8 %
EOSINOPHILS RELATIVE PERCENT: 0.9 %
GFR AFRICAN AMERICAN: >60
GFR AFRICAN AMERICAN: >60
GFR NON-AFRICAN AMERICAN: >60
GFR NON-AFRICAN AMERICAN: >60
GLOBULIN: 3.6 G/DL
GLUCOSE BLD-MCNC: 94 MG/DL (ref 70–99)
GLUCOSE BLD-MCNC: 98 MG/DL (ref 70–99)
GLUCOSE URINE: NEGATIVE MG/DL
HCT VFR BLD CALC: 35.3 % (ref 36–48)
HCT VFR BLD CALC: 36.4 % (ref 36–48)
HEMOGLOBIN: 11 G/DL (ref 12–16)
HEMOGLOBIN: 11.3 G/DL (ref 12–16)
INR BLD: 1.14 (ref 0.86–1.14)
KETONES, URINE: NEGATIVE MG/DL
LEUKOCYTE ESTERASE, URINE: ABNORMAL
LYMPHOCYTES ABSOLUTE: 2.4 K/UL (ref 1–5.1)
LYMPHOCYTES ABSOLUTE: 2.9 K/UL (ref 1–5.1)
LYMPHOCYTES RELATIVE PERCENT: 21.7 %
LYMPHOCYTES RELATIVE PERCENT: 32.1 %
MCH RBC QN AUTO: 24.6 PG (ref 26–34)
MCH RBC QN AUTO: 25 PG (ref 26–34)
MCHC RBC AUTO-ENTMCNC: 30.9 G/DL (ref 31–36)
MCHC RBC AUTO-ENTMCNC: 31.2 G/DL (ref 31–36)
MCV RBC AUTO: 79.6 FL (ref 80–100)
MCV RBC AUTO: 80.3 FL (ref 80–100)
MICROSCOPIC EXAMINATION: YES
MONOCYTES ABSOLUTE: 0.6 K/UL (ref 0–1.3)
MONOCYTES ABSOLUTE: 0.6 K/UL (ref 0–1.3)
MONOCYTES RELATIVE PERCENT: 5.4 %
MONOCYTES RELATIVE PERCENT: 7.2 %
NEUTROPHILS ABSOLUTE: 5.3 K/UL (ref 1.7–7.7)
NEUTROPHILS ABSOLUTE: 7.8 K/UL (ref 1.7–7.7)
NEUTROPHILS RELATIVE PERCENT: 59.6 %
NEUTROPHILS RELATIVE PERCENT: 70 %
NITRITE, URINE: POSITIVE
PDW BLD-RTO: 13.5 % (ref 12.4–15.4)
PDW BLD-RTO: 13.6 % (ref 12.4–15.4)
PH UA: 7
PLATELET # BLD: 171 K/UL (ref 135–450)
PLATELET # BLD: 191 K/UL (ref 135–450)
PMV BLD AUTO: 8.2 FL (ref 5–10.5)
PMV BLD AUTO: 8.5 FL (ref 5–10.5)
POTASSIUM REFLEX MAGNESIUM: 4.2 MMOL/L (ref 3.5–5.1)
POTASSIUM REFLEX MAGNESIUM: 4.7 MMOL/L (ref 3.5–5.1)
PRO-BNP: 37 PG/ML (ref 0–449)
PROTEIN UA: NEGATIVE MG/DL
PROTHROMBIN TIME: 13 SEC (ref 9.8–13)
RBC # BLD: 4.4 M/UL (ref 4–5.2)
RBC # BLD: 4.58 M/UL (ref 4–5.2)
RBC UA: ABNORMAL /HPF (ref 0–2)
SODIUM BLD-SCNC: 134 MMOL/L (ref 136–145)
SODIUM BLD-SCNC: 140 MMOL/L (ref 136–145)
SPECIFIC GRAVITY UA: 1.01
TOTAL PROTEIN: 7.3 G/DL (ref 6.4–8.2)
TROPONIN: <0.01 NG/ML
URINE TYPE: ABNORMAL
UROBILINOGEN, URINE: 0.2 E.U./DL
WBC # BLD: 11.1 K/UL (ref 4–11)
WBC # BLD: 8.9 K/UL (ref 4–11)
WBC UA: ABNORMAL /HPF (ref 0–5)

## 2018-10-26 PROCEDURE — 87077 CULTURE AEROBIC IDENTIFY: CPT

## 2018-10-26 PROCEDURE — 2580000003 HC RX 258: Performed by: EMERGENCY MEDICINE

## 2018-10-26 PROCEDURE — 6360000002 HC RX W HCPCS: Performed by: STUDENT IN AN ORGANIZED HEALTH CARE EDUCATION/TRAINING PROGRAM

## 2018-10-26 PROCEDURE — 2580000003 HC RX 258: Performed by: STUDENT IN AN ORGANIZED HEALTH CARE EDUCATION/TRAINING PROGRAM

## 2018-10-26 PROCEDURE — 93971 EXTREMITY STUDY: CPT

## 2018-10-26 PROCEDURE — 36415 COLL VENOUS BLD VENIPUNCTURE: CPT

## 2018-10-26 PROCEDURE — 85025 COMPLETE CBC W/AUTO DIFF WBC: CPT

## 2018-10-26 PROCEDURE — 96374 THER/PROPH/DIAG INJ IV PUSH: CPT

## 2018-10-26 PROCEDURE — 80048 BASIC METABOLIC PNL TOTAL CA: CPT

## 2018-10-26 PROCEDURE — 97530 THERAPEUTIC ACTIVITIES: CPT

## 2018-10-26 PROCEDURE — 6370000000 HC RX 637 (ALT 250 FOR IP): Performed by: STUDENT IN AN ORGANIZED HEALTH CARE EDUCATION/TRAINING PROGRAM

## 2018-10-26 PROCEDURE — 76770 US EXAM ABDO BACK WALL COMP: CPT

## 2018-10-26 PROCEDURE — 94761 N-INVAS EAR/PLS OXIMETRY MLT: CPT

## 2018-10-26 PROCEDURE — 96375 TX/PRO/DX INJ NEW DRUG ADDON: CPT

## 2018-10-26 PROCEDURE — 81001 URINALYSIS AUTO W/SCOPE: CPT

## 2018-10-26 PROCEDURE — G8987 SELF CARE CURRENT STATUS: HCPCS

## 2018-10-26 PROCEDURE — G8979 MOBILITY GOAL STATUS: HCPCS

## 2018-10-26 PROCEDURE — 93970 EXTREMITY STUDY: CPT

## 2018-10-26 PROCEDURE — 97162 PT EVAL MOD COMPLEX 30 MIN: CPT

## 2018-10-26 PROCEDURE — 97166 OT EVAL MOD COMPLEX 45 MIN: CPT

## 2018-10-26 PROCEDURE — 99222 1ST HOSP IP/OBS MODERATE 55: CPT | Performed by: INTERNAL MEDICINE

## 2018-10-26 PROCEDURE — 1200000000 HC SEMI PRIVATE

## 2018-10-26 PROCEDURE — G8988 SELF CARE GOAL STATUS: HCPCS

## 2018-10-26 PROCEDURE — G8978 MOBILITY CURRENT STATUS: HCPCS

## 2018-10-26 PROCEDURE — 6360000002 HC RX W HCPCS: Performed by: EMERGENCY MEDICINE

## 2018-10-26 PROCEDURE — 87086 URINE CULTURE/COLONY COUNT: CPT

## 2018-10-26 PROCEDURE — 6370000000 HC RX 637 (ALT 250 FOR IP): Performed by: INTERNAL MEDICINE

## 2018-10-26 PROCEDURE — 87186 SC STD MICRODIL/AGAR DIL: CPT

## 2018-10-26 RX ORDER — TRAZODONE HYDROCHLORIDE 50 MG/1
50 TABLET ORAL NIGHTLY
Status: DISCONTINUED | OUTPATIENT
Start: 2018-10-26 | End: 2018-10-29 | Stop reason: HOSPADM

## 2018-10-26 RX ORDER — PANTOPRAZOLE SODIUM 40 MG/1
40 TABLET, DELAYED RELEASE ORAL
Status: DISCONTINUED | OUTPATIENT
Start: 2018-10-26 | End: 2018-10-29 | Stop reason: HOSPADM

## 2018-10-26 RX ORDER — NICOTINE POLACRILEX 4 MG
15 LOZENGE BUCCAL PRN
Status: DISCONTINUED | OUTPATIENT
Start: 2018-10-26 | End: 2018-10-29 | Stop reason: HOSPADM

## 2018-10-26 RX ORDER — KETOROLAC TROMETHAMINE 15 MG/ML
15 INJECTION, SOLUTION INTRAMUSCULAR; INTRAVENOUS ONCE
Status: COMPLETED | OUTPATIENT
Start: 2018-10-26 | End: 2018-10-26

## 2018-10-26 RX ORDER — HYDROCHLOROTHIAZIDE 25 MG/1
25 TABLET ORAL DAILY
Status: DISCONTINUED | OUTPATIENT
Start: 2018-10-26 | End: 2018-10-29 | Stop reason: HOSPADM

## 2018-10-26 RX ORDER — DEXTROSE MONOHYDRATE 50 MG/ML
100 INJECTION, SOLUTION INTRAVENOUS PRN
Status: DISCONTINUED | OUTPATIENT
Start: 2018-10-26 | End: 2018-10-29 | Stop reason: HOSPADM

## 2018-10-26 RX ORDER — MIRTAZAPINE 15 MG/1
7.5 TABLET, FILM COATED ORAL NIGHTLY
Status: DISCONTINUED | OUTPATIENT
Start: 2018-10-26 | End: 2018-10-29 | Stop reason: HOSPADM

## 2018-10-26 RX ORDER — LIDOCAINE 4 G/G
1 PATCH TOPICAL DAILY
Status: DISCONTINUED | OUTPATIENT
Start: 2018-10-26 | End: 2018-10-29 | Stop reason: HOSPADM

## 2018-10-26 RX ORDER — SODIUM CHLORIDE 0.9 % (FLUSH) 0.9 %
10 SYRINGE (ML) INJECTION EVERY 12 HOURS SCHEDULED
Status: DISCONTINUED | OUTPATIENT
Start: 2018-10-26 | End: 2018-10-29 | Stop reason: HOSPADM

## 2018-10-26 RX ORDER — METOPROLOL TARTRATE 50 MG/1
50 TABLET, FILM COATED ORAL 2 TIMES DAILY
Status: DISCONTINUED | OUTPATIENT
Start: 2018-10-26 | End: 2018-10-29 | Stop reason: HOSPADM

## 2018-10-26 RX ORDER — SPIRONOLACTONE 25 MG/1
25 TABLET ORAL DAILY
Status: DISCONTINUED | OUTPATIENT
Start: 2018-10-26 | End: 2018-10-29 | Stop reason: HOSPADM

## 2018-10-26 RX ORDER — SODIUM CHLORIDE 0.9 % (FLUSH) 0.9 %
10 SYRINGE (ML) INJECTION PRN
Status: DISCONTINUED | OUTPATIENT
Start: 2018-10-26 | End: 2018-10-29 | Stop reason: HOSPADM

## 2018-10-26 RX ORDER — ONDANSETRON 2 MG/ML
4 INJECTION INTRAMUSCULAR; INTRAVENOUS EVERY 6 HOURS PRN
Status: DISCONTINUED | OUTPATIENT
Start: 2018-10-26 | End: 2018-10-29 | Stop reason: HOSPADM

## 2018-10-26 RX ORDER — LEVOTHYROXINE SODIUM 0.1 MG/1
100 TABLET ORAL DAILY
Status: DISCONTINUED | OUTPATIENT
Start: 2018-10-26 | End: 2018-10-29 | Stop reason: HOSPADM

## 2018-10-26 RX ORDER — ACETAMINOPHEN 325 MG/1
650 TABLET ORAL EVERY 4 HOURS PRN
Status: DISCONTINUED | OUTPATIENT
Start: 2018-10-26 | End: 2018-10-29 | Stop reason: HOSPADM

## 2018-10-26 RX ORDER — DEXTROSE MONOHYDRATE 25 G/50ML
12.5 INJECTION, SOLUTION INTRAVENOUS PRN
Status: DISCONTINUED | OUTPATIENT
Start: 2018-10-26 | End: 2018-10-29 | Stop reason: HOSPADM

## 2018-10-26 RX ADMIN — Medication 10 ML: at 08:51

## 2018-10-26 RX ADMIN — MORPHINE SULFATE 4 MG: 4 INJECTION INTRAVENOUS at 00:00

## 2018-10-26 RX ADMIN — Medication 10 ML: at 21:24

## 2018-10-26 RX ADMIN — SPIRONOLACTONE 25 MG: 25 TABLET ORAL at 08:51

## 2018-10-26 RX ADMIN — METOPROLOL TARTRATE 50 MG: 50 TABLET ORAL at 08:51

## 2018-10-26 RX ADMIN — KETOROLAC TROMETHAMINE 15 MG: 15 INJECTION, SOLUTION INTRAMUSCULAR; INTRAVENOUS at 06:06

## 2018-10-26 RX ADMIN — LINAGLIPTIN 5 MG: 5 TABLET, FILM COATED ORAL at 08:51

## 2018-10-26 RX ADMIN — METOPROLOL TARTRATE 50 MG: 50 TABLET ORAL at 21:24

## 2018-10-26 RX ADMIN — Medication 10 ML: at 11:03

## 2018-10-26 RX ADMIN — PANTOPRAZOLE SODIUM 40 MG: 40 TABLET, DELAYED RELEASE ORAL at 08:51

## 2018-10-26 RX ADMIN — HYDROCHLOROTHIAZIDE 25 MG: 25 TABLET ORAL at 13:33

## 2018-10-26 RX ADMIN — DEXTROSE MONOHYDRATE 1 G: 5 INJECTION INTRAVENOUS at 03:45

## 2018-10-26 RX ADMIN — ONDANSETRON 4 MG: 2 INJECTION INTRAMUSCULAR; INTRAVENOUS at 11:03

## 2018-10-26 RX ADMIN — TRAZODONE HYDROCHLORIDE 50 MG: 50 TABLET ORAL at 21:23

## 2018-10-26 RX ADMIN — LEVOTHYROXINE SODIUM 100 MCG: 100 TABLET ORAL at 06:05

## 2018-10-26 RX ADMIN — ONDANSETRON 8 MG: 2 INJECTION INTRAMUSCULAR; INTRAVENOUS at 00:00

## 2018-10-26 RX ADMIN — ACETAMINOPHEN 650 MG: 325 TABLET ORAL at 22:50

## 2018-10-26 RX ADMIN — MIRTAZAPINE 7.5 MG: 15 TABLET, FILM COATED ORAL at 21:24

## 2018-10-26 ASSESSMENT — PAIN DESCRIPTION - DESCRIPTORS
DESCRIPTORS: ACHING
DESCRIPTORS: ACHING
DESCRIPTORS: SORE
DESCRIPTORS: ACHING
DESCRIPTORS: SORE

## 2018-10-26 ASSESSMENT — PAIN DESCRIPTION - PROGRESSION
CLINICAL_PROGRESSION: NOT CHANGED

## 2018-10-26 ASSESSMENT — PAIN DESCRIPTION - FREQUENCY
FREQUENCY: CONTINUOUS

## 2018-10-26 ASSESSMENT — PAIN SCALES - GENERAL
PAINLEVEL_OUTOF10: 7
PAINLEVEL_OUTOF10: 8
PAINLEVEL_OUTOF10: 7
PAINLEVEL_OUTOF10: 6
PAINLEVEL_OUTOF10: 3
PAINLEVEL_OUTOF10: 10
PAINLEVEL_OUTOF10: 3

## 2018-10-26 ASSESSMENT — PAIN DESCRIPTION - ORIENTATION
ORIENTATION: LEFT;LOWER
ORIENTATION: LEFT;LOWER
ORIENTATION: RIGHT;LEFT
ORIENTATION: RIGHT;LEFT
ORIENTATION: LEFT;LOWER

## 2018-10-26 ASSESSMENT — ENCOUNTER SYMPTOMS
ABDOMINAL PAIN: 1
COUGH: 1
NAUSEA: 0
SHORTNESS OF BREATH: 0
CONSTIPATION: 0
DIARRHEA: 0

## 2018-10-26 ASSESSMENT — PAIN DESCRIPTION - LOCATION
LOCATION: BACK
LOCATION: LEG
LOCATION: LEG
LOCATION: BACK;SHOULDER
LOCATION: BACK

## 2018-10-26 ASSESSMENT — PAIN DESCRIPTION - PAIN TYPE
TYPE: CHRONIC PAIN
TYPE: ACUTE PAIN
TYPE: ACUTE PAIN

## 2018-10-26 ASSESSMENT — PAIN DESCRIPTION - ONSET
ONSET: ON-GOING

## 2018-10-26 NOTE — CONSULTS
10/26/2018     Hemoglobin/Hematocrit:  Lab Results   Component Value Date    HGB 11.0 10/26/2018    HCT 35.3 10/26/2018     BMP:  Lab Results   Component Value Date     10/26/2018    K 4.2 10/26/2018     10/26/2018    CO2 28 10/26/2018    BUN 13 10/26/2018    LABALBU 3.7 10/25/2018    CREATININE 0.8 10/26/2018    CALCIUM 9.9 10/26/2018    GFRAA >60 10/26/2018    GFRAA 85 12/24/2012    LABGLOM >60 10/26/2018     PT/INR:    Lab Results   Component Value Date    PROTIME 13.0 10/25/2018    INR 1.14 10/25/2018     PTT:    Lab Results   Component Value Date    APTT 37.4 06/02/2018   [APTT    Urinalysis: tr blood, + nitrites, Lg leuks,  WBC    Urine Culture: pending         Antibiotic Therapy:  Rocephin     Imaging: ITZ today    Impression/Plan: 81 yo F with severe incontinence issues and know Rt renal lesion (stable), presents with increased incontinence, dysuria, and UTI  -labs stable   -continue abx  -ITZ today to reassess Rt renal lesion  -will address incontinence issues as outpatient as this is chronic. She suggest interstim vs botox again.      Myron Estrada, OLGA LIDIA - CNP

## 2018-10-26 NOTE — H&P
Internal Medicine PGY-1 Resident History & Physical      PCP: Sarah King MD    Date of Admission: 10/25/2018    Date of Service: Pt seen/examined on 10/26/18 and Admitted to Inpatient with expected LOS greater than two midnights due to medical therapy. Chief Complaint:  Generalized weakness, urinary incontinence, dysuria      History Of Present Illness: Ms. Qian Hill is an 81 yo female with acquired hypothyroidism, chronic urinary incontinence (documented as mixed), depression, NIDDM, HTN, fatty liver, sickle cell anemia and GDPD deficiency who presents with weakness and worsening BLE edema. She has had urinary incontinence for the last 3-4 years with a few UTIs since that time. Two previous urine cultures grew mixed tyrone and one grew pan-sensitive E. Coli. Urology has been following a R renal mass for a few years but it has been stable. She recently had a cholecystectomy and her scars are healing nicely. Her last ECHO, in June 2018, showed EF 34-09% with no diastolic dysfxn. She has mild MR and moderate TR. Her most recent TSH was wnl and HgbA1c 5.1. She lives at home with her son and ambulates with a walker. She relies on her son for a lot of her care. She doesn't want to go to a nursing facility, but she was refusing to walk in the ED for Dr. Olya Costa and she has been complaining of increased difficulty with ambulation. In the ED, she has + nitrite and large LE in her urine. As for her other labs, her WBC is 11.1 and she is at her baseline Hgb of 11.3. Her urine was sent for culture. Albumin is 3.7. CXR normal - no edema or consolidation. She is a poor historian. She says chronically has difficulty with word finding. She was tangential while I was trying to gather a history.     Past Medical History:          Diagnosis Date    Arthritis     Chronic back pain     Diabetes mellitus ([address]     Esophagitis     Essential hypertension 4/6/2016    Fatty liver     G6PD deficiency (HCC)     Gastric polyps famotidine (PEPCID) 20 MG tablet Take 1 tablet by mouth 2 times daily 6/15/18   Abdulaziz Larson MD   traMADol [facility]) 300 MG extended release tablet Take 300 mg by mouth daily. Gaby Foybble Historical Provider, MD CALZADAUVIA 50 MG tablet 1 TABLET DAILY 5/14/18   Abdulaziz Larson MD   levothyroxine (SYNTHROID) 100 MCG tablet 1 TABLET EVERY DAY 11/28/17   Abdulaziz Larson MD   Blood Glucose Monitoring Suppl KIT Tests daily 6/8/17   Abdulaziz Larson MD   glucose monitoring kit (FREESTYLE) monitoring kit 1 kit by Does not apply route daily as needed 2/26/16   Richard Verdin MD   glucose blood VI test strips (FREESTYLE TEST STRIPS) strip 1 each by In Vitro route 2 times daily 2/26/16   Richard Verdin MD   SUPER BIOTIN PO Take 1,000 mcg by mouth daily     Historical Provider, MD   acetaminophen (TYLENOL) 500 MG tablet Take 500 mg by mouth every 6 hours as needed for Pain    Historical Provider, MD   Pyridoxine HCl (VITAMIN B-6) 50 MG tablet Take 50 mg by mouth daily. Historical Provider, MD       Allergies:  Coumadin [warfarin sodium]; Dalteparin sodium; Vaibhav beans [broad beans]; Heparin; Lovenox [enoxaparin sodium]; Other; Peanut-containing drug products; Penicillins; Plavix [clopidogrel bisulfate]; Sulfa antibiotics; Vicodin [hydrocodone-acetaminophen]; and Aspirin    Social History:      The patient currently lives with son. TOBACCO:   reports that she quit smoking about 48 years ago. She has never used smokeless tobacco.  ETOH:  reports that she does not drink alcohol. Family History:      Reviewed in detail and negative for DM, CAD, Cancer, CVA. Positive as follows:    Family History   Problem Relation Age of Onset    Diabetes Mother     Heart Disease Mother         heart congestion       REVIEW OF SYSTEMS: Pertinent positives as noted in the HPI. All other systems reviewed and negative. ROS: Review of Systems   Constitutional: Positive for fatigue. Negative for fever. Respiratory: Positive for cough. Negative for shortness of breath. Cardiovascular: Negative for chest pain. Gastrointestinal: Positive for abdominal pain. Negative for constipation, diarrhea and nausea. Genitourinary: Positive for dysuria. Chronic urinary incontinence   Musculoskeletal: Positive for arthralgias (L scapular pain). Neurological: Positive for speech difficulty (chronic difficulty with word finding). Negative for light-headedness. PHYSICAL EXAM PERFORMED:    /72   Pulse 86   Resp 16   Ht 5' 3\" (1.6 m)   Wt 225 lb (102.1 kg)   SpO2 94%   BMI 39.86 kg/m²     General appearance:  No apparent distress, appears stated age and cooperative. HEENT:  Normal cephalic,atraumatic without obvious deformity. Pupils equal, round, and reactive to light. Extra ocular muscles intact. Conjunctivae/corneas clear. Facial hair  Neck: Supple, with full range of motion. No jugular venous distention. Trachea midline. Respiratory:  Normal respiratory effort. Clear to auscultation, bilaterally without Rales/Wheezes/Rhonchi. Cardiovascular:  Regular rate and rhythm with normal S1/S2 without murmurs, rubs or gallops. Abdomen: Soft, non-tender, non-distended with normal bowel sounds. Well-healed scars s/p cholecystectomy   Musculoskeletal:  2+ BLE edema to knees  Skin: Skin color, texture, turgor normal.  Norashes or lesions. Neurologic:  Neurovascularly intact without any focal sensory/motor deficits.  Cranialnerves: II-XII intact, grossly non-focal.  Psychiatric:  Alert and oriented, tangential, difficulty with word finding  Capillary Refill: Brisk,< 3 seconds   Peripheral Pulses: +2 palpable, equal bilaterally       Labs:     Recent Labs      10/25/18   2357   WBC  11.1*   HGB  11.3*   HCT  36.4   PLT  191     Recent Labs      10/25/18   2357   NA  134*   K  4.7   CL  97*   CO2  27   BUN  13   CREATININE  0.7   CALCIUM  10.3     Recent Labs      10/25/18   2357   AST  29   ALT  19   BILITOT  0.7   ALKPHOS  59     Recent Labs      10/25/18   2357   INR  1.14

## 2018-10-26 NOTE — PROGRESS NOTES
Patient admitted from ER via stretcher to [id number]. Patient alert x 4. VSS. Safety precautions in place. Will continue to monitor and treat per MD orders.

## 2018-10-26 NOTE — PROGRESS NOTES
Physical Therapy    Facility/Department: [address]  Initial Assessment / treatment    NAME: Pillo Darnell  : 1935  MRN: [id number]    Date of Service: 10/26/2018    Discharge Recommendations: Pillo Darnell scored a 16/24 on the AM-PAC short mobility form. Current research shows that an AM-PAC score of 17 or less is typically not associated with a discharge to the patient's home setting. Based on the patients AM-PAC score and their current functional mobility deficits, it is recommended that the patient have 3-5 sessions per week of Physical Therapy at d/c to increase the patients independence. PT Equipment Recommendations  Equipment Needed: No    Patient Diagnosis(es): The primary encounter diagnosis was Unable to ambulate. Diagnoses of Localized edema, Acute cystitis without hematuria, and General weakness were also pertinent to this visit. has a past medical history of Arthritis; Chronic back pain; Diabetes mellitus ([address] Esophagitis; Essential hypertension; Fatty liver; G6PD deficiency ([address] Gastric polyps; GERD (gastroesophageal reflux disease); Hemorrhoids; History of blood transfusion; Hypothyroidism; Kidney stone; Mediterranean anemia; Normal cardiac stress test; Sickle cell anemia ([address] and UTI (urinary tract infection). has a past surgical history that includes Appendectomy; Hysterectomy;  section; Cystocopy (2015); other surgical history (2017); Upper gastrointestinal endoscopy (N/A, 2018); and pr lap,cholecystectomy/graph (N/A, 2018). Restrictions  Position Activity Restriction  Other position/activity restrictions: up with assist  Vision/Hearing  Vision: Within Functional Limits  Hearing: Within functional limits     Subjective  General  Chart Reviewed: Yes  Additional Pertinent Hx: 80 y.o. F admitted 10/26 with c/o urinary incontinence, LE edema, difficulty walking, upper back pain. +acute cystitis with chronic incontinence.  LE

## 2018-10-26 NOTE — PROGRESS NOTES
Pt returned from ultrasound. Call light within reach, bed alarm on and bed in lowest position. Visitor at bedside.

## 2018-10-26 NOTE — ED NOTES
Bed: A06-06  Expected date:   Expected time:   Means of arrival:   Comments:  Diana Gallegos RN  10/25/18 5098

## 2018-10-26 NOTE — ED PROVIDER NOTES
[address]          ATTENDING PHYSICIAN NOTE       Date of evaluation: 10/25/2018    Chief Complaint     No chief complaint on file. History of Present Illness     Fab Timmons is a 80 y.o. female who presents with bilateral leg swelling, urinary incontinence, and mid scapular back pain that have been going on for an unclear period of time. The patient has some sort of baseline verbal difficulty, such that she reports that she has difficulty communicating and has difficulty providing a history. It sounds as though she has had bilateral leg swelling that has been going on for perhaps a week and progressively worsening. She typically gets around with a walker, but has had difficulty doing so secondary to the pain and swelling. She also reports that she's had urinary incontinence, such that she is not certain when she is going to urinate and then she just urinates on herself. It's not clear how long this is going on. She has no pain associated with this, although she does describe sharp, stabbing pain in her left back just medial to her scapula, although not in the midline. This is not associated with cough or chest pain. She's had no associated fevers that she is aware of. She reports that she's had this sort of back pain before, although she does not know what causes it. She has had swelling intermittently in her legs, although reports that she typically does not have any fluid there and she has \"skinny legs. \"    Review of Systems     Review of Systems     Review of systems is positive as above. All other systems are reviewed and found to be negative, within the limitations of the patient's difficulty providing a history    Past Medical, Surgical, Family, and Social History     She has a past medical history of Arthritis; Chronic back pain; Diabetes mellitus ([address] Esophagitis; Essential hypertension; Fatty liver; G6PD deficiency ([address]  Gastric polyps;

## 2018-10-27 LAB
ANION GAP SERPL CALCULATED.3IONS-SCNC: 11 MMOL/L (ref 3–16)
BASOPHILS ABSOLUTE: 0 K/UL (ref 0–0.2)
BASOPHILS RELATIVE PERCENT: 0.3 %
BUN BLDV-MCNC: 16 MG/DL (ref 7–20)
CALCIUM SERPL-MCNC: 9.9 MG/DL (ref 8.3–10.6)
CHLORIDE BLD-SCNC: 101 MMOL/L (ref 99–110)
CO2: 27 MMOL/L (ref 21–32)
CREAT SERPL-MCNC: 0.8 MG/DL (ref 0.6–1.2)
EOSINOPHILS ABSOLUTE: 0.1 K/UL (ref 0–0.6)
EOSINOPHILS RELATIVE PERCENT: 1.4 %
GFR AFRICAN AMERICAN: >60
GFR NON-AFRICAN AMERICAN: >60
GLUCOSE BLD-MCNC: 110 MG/DL (ref 70–99)
HCT VFR BLD CALC: 35.3 % (ref 36–48)
HEMOGLOBIN: 10.8 G/DL (ref 12–16)
LYMPHOCYTES ABSOLUTE: 2.2 K/UL (ref 1–5.1)
LYMPHOCYTES RELATIVE PERCENT: 27 %
MCH RBC QN AUTO: 24.8 PG (ref 26–34)
MCHC RBC AUTO-ENTMCNC: 30.7 G/DL (ref 31–36)
MCV RBC AUTO: 80.8 FL (ref 80–100)
MONOCYTES ABSOLUTE: 0.5 K/UL (ref 0–1.3)
MONOCYTES RELATIVE PERCENT: 6.7 %
NEUTROPHILS ABSOLUTE: 5.3 K/UL (ref 1.7–7.7)
NEUTROPHILS RELATIVE PERCENT: 64.6 %
PDW BLD-RTO: 13.8 % (ref 12.4–15.4)
PLATELET # BLD: 180 K/UL (ref 135–450)
PMV BLD AUTO: 8.2 FL (ref 5–10.5)
POTASSIUM REFLEX MAGNESIUM: 4.3 MMOL/L (ref 3.5–5.1)
RBC # BLD: 4.37 M/UL (ref 4–5.2)
SODIUM BLD-SCNC: 139 MMOL/L (ref 136–145)
WBC # BLD: 8.2 K/UL (ref 4–11)

## 2018-10-27 PROCEDURE — G0008 ADMIN INFLUENZA VIRUS VAC: HCPCS | Performed by: HOSPITALIST

## 2018-10-27 PROCEDURE — 6360000002 HC RX W HCPCS: Performed by: STUDENT IN AN ORGANIZED HEALTH CARE EDUCATION/TRAINING PROGRAM

## 2018-10-27 PROCEDURE — 6370000000 HC RX 637 (ALT 250 FOR IP): Performed by: STUDENT IN AN ORGANIZED HEALTH CARE EDUCATION/TRAINING PROGRAM

## 2018-10-27 PROCEDURE — 36415 COLL VENOUS BLD VENIPUNCTURE: CPT

## 2018-10-27 PROCEDURE — 80048 BASIC METABOLIC PNL TOTAL CA: CPT

## 2018-10-27 PROCEDURE — 94762 N-INVAS EAR/PLS OXIMTRY CONT: CPT

## 2018-10-27 PROCEDURE — 6360000002 HC RX W HCPCS: Performed by: HOSPITALIST

## 2018-10-27 PROCEDURE — 6370000000 HC RX 637 (ALT 250 FOR IP): Performed by: INTERNAL MEDICINE

## 2018-10-27 PROCEDURE — 1200000000 HC SEMI PRIVATE

## 2018-10-27 PROCEDURE — 85025 COMPLETE CBC W/AUTO DIFF WBC: CPT

## 2018-10-27 PROCEDURE — 90686 IIV4 VACC NO PRSV 0.5 ML IM: CPT | Performed by: HOSPITALIST

## 2018-10-27 PROCEDURE — 2580000003 HC RX 258: Performed by: STUDENT IN AN ORGANIZED HEALTH CARE EDUCATION/TRAINING PROGRAM

## 2018-10-27 RX ORDER — KETOROLAC TROMETHAMINE 15 MG/ML
15 INJECTION, SOLUTION INTRAMUSCULAR; INTRAVENOUS ONCE
Status: COMPLETED | OUTPATIENT
Start: 2018-10-27 | End: 2018-10-27

## 2018-10-27 RX ORDER — TRAMADOL HYDROCHLORIDE 50 MG/1
50 TABLET ORAL EVERY 6 HOURS PRN
Status: DISCONTINUED | OUTPATIENT
Start: 2018-10-27 | End: 2018-10-28

## 2018-10-27 RX ADMIN — ACETAMINOPHEN 650 MG: 325 TABLET ORAL at 15:50

## 2018-10-27 RX ADMIN — MIRTAZAPINE 7.5 MG: 15 TABLET, FILM COATED ORAL at 19:59

## 2018-10-27 RX ADMIN — CEFTRIAXONE 1 G: 1 INJECTION, POWDER, FOR SOLUTION INTRAMUSCULAR; INTRAVENOUS at 00:38

## 2018-10-27 RX ADMIN — Medication 10 ML: at 10:01

## 2018-10-27 RX ADMIN — TRAMADOL HYDROCHLORIDE 50 MG: 50 TABLET, FILM COATED ORAL at 19:58

## 2018-10-27 RX ADMIN — LEVOTHYROXINE SODIUM 100 MCG: 100 TABLET ORAL at 06:17

## 2018-10-27 RX ADMIN — KETOROLAC TROMETHAMINE 15 MG: 15 INJECTION, SOLUTION INTRAMUSCULAR; INTRAVENOUS at 06:18

## 2018-10-27 RX ADMIN — INFLUENZA A VIRUS A/MICHIGAN/45/2015 X-275 (H1N1) ANTIGEN (FORMALDEHYDE INACTIVATED), INFLUENZA A VIRUS A/SINGAPORE/INFIMH-16-0019/2016 IVR-186 (H3N2) ANTIGEN (FORMALDEHYDE INACTIVATED), INFLUENZA B VIRUS B/PHUKET/3073/2013 ANTIGEN (FORMALDEHYDE INACTIVATED), AND INFLUENZA B VIRUS B/MARYLAND/15/2016 BX-69A ANTIGEN (FORMALDEHYDE INACTIVATED) 0.5 ML: 15; 15; 15; 15 INJECTION, SUSPENSION INTRAMUSCULAR at 10:00

## 2018-10-27 RX ADMIN — HYDROCHLOROTHIAZIDE 25 MG: 25 TABLET ORAL at 10:00

## 2018-10-27 RX ADMIN — METOPROLOL TARTRATE 50 MG: 50 TABLET ORAL at 19:59

## 2018-10-27 RX ADMIN — TRAZODONE HYDROCHLORIDE 50 MG: 50 TABLET ORAL at 19:58

## 2018-10-27 RX ADMIN — LINAGLIPTIN 5 MG: 5 TABLET, FILM COATED ORAL at 10:00

## 2018-10-27 RX ADMIN — METOPROLOL TARTRATE 50 MG: 50 TABLET ORAL at 10:00

## 2018-10-27 RX ADMIN — Medication 10 ML: at 19:59

## 2018-10-27 RX ADMIN — SPIRONOLACTONE 25 MG: 25 TABLET ORAL at 10:00

## 2018-10-27 RX ADMIN — PANTOPRAZOLE SODIUM 40 MG: 40 TABLET, DELAYED RELEASE ORAL at 06:18

## 2018-10-27 ASSESSMENT — PAIN DESCRIPTION - PROGRESSION
CLINICAL_PROGRESSION: NOT CHANGED

## 2018-10-27 ASSESSMENT — PAIN DESCRIPTION - LOCATION
LOCATION: KNEE;SHOULDER
LOCATION: BACK;LEG
LOCATION: LEG
LOCATION: BACK;LEG

## 2018-10-27 ASSESSMENT — PAIN DESCRIPTION - DESCRIPTORS
DESCRIPTORS: CONSTANT
DESCRIPTORS: CONSTANT;SHARP
DESCRIPTORS: CONSTANT;SHARP

## 2018-10-27 ASSESSMENT — PAIN DESCRIPTION - ORIENTATION
ORIENTATION: RIGHT
ORIENTATION: LOWER
ORIENTATION: LOWER

## 2018-10-27 ASSESSMENT — PAIN SCALES - GENERAL
PAINLEVEL_OUTOF10: 3
PAINLEVEL_OUTOF10: 8
PAINLEVEL_OUTOF10: 2
PAINLEVEL_OUTOF10: 6
PAINLEVEL_OUTOF10: 8
PAINLEVEL_OUTOF10: 8

## 2018-10-27 ASSESSMENT — PAIN DESCRIPTION - ONSET
ONSET: ON-GOING

## 2018-10-27 ASSESSMENT — PAIN DESCRIPTION - FREQUENCY
FREQUENCY: CONTINUOUS

## 2018-10-27 ASSESSMENT — PAIN DESCRIPTION - PAIN TYPE
TYPE: CHRONIC PAIN

## 2018-10-27 ASSESSMENT — ACTIVITIES OF DAILY LIVING (ADL): EFFECT OF PAIN ON DAILY ACTIVITIES: DISCOMFORT

## 2018-10-27 NOTE — PROGRESS NOTES
Continuous oximetry on for overnight sleep study per physician order. Study started 10/26 at 2249 and completed 10/27 at 0640. Study performed on room air. Copy of report on chart.

## 2018-10-27 NOTE — PROGRESS NOTES
Internal Medicine PGY-1 Resident Progress Note        PCP: Kelsea Clifton MD    Date of Admission: 10/25/2018    Chief Complaint: BLE edema and pain     Subjective: Pt is doing okay this morning. She reports trouble sleeping overnight d/t numbness is her right hand, which she attributes to poor blood circulation. Pt also endorses slight tenderness on her posterior legs bilaterally. She has been feeling nauseous for the past day, which continued overnight. Pt is also endorsing feeling pain before urination begins, and burning during urination. No other acute complaints. Medications:  Reviewed    Infusion Medications    dextrose       Scheduled Medications    pantoprazole  40 mg Oral QAM AC    levothyroxine  100 mcg Oral Daily    metoprolol tartrate  50 mg Oral BID    mirtazapine  7.5 mg Oral Nightly    spironolactone  25 mg Oral Daily    traZODone  50 mg Oral Nightly    linagliptin  5 mg Oral Daily    sodium chloride flush  10 mL Intravenous 2 times per day    cefTRIAXone (ROCEPHIN) IV  1 g Intravenous Q24H    lidocaine  1 patch Transdermal Daily    influenza virus vaccine  0.5 mL Intramuscular Once    hydrochlorothiazide  25 mg Oral Daily     PRN Meds: sodium chloride flush, magnesium hydroxide, ondansetron, glucose, dextrose, glucagon (rDNA), dextrose, acetaminophen      Intake/Output Summary (Last 24 hours) at 10/27/18 0825  Last data filed at 10/27/18 0617   Gross per 24 hour   Intake              700 ml   Output             2250 ml   Net            -1550 ml       Physical Exam Performed:    BP (!) 147/78   Pulse 77   Temp 99 °F (37.2 °C) (Oral)   Resp 16   Ht 5' 3\" (1.6 m)   Wt 244 lb 7.8 oz (110.9 kg)   SpO2 96%   BMI 43.31 kg/m²     General appearance: No apparent distress, appears stated age and cooperative. HEENT: Pupils equal, round, and reactive to light. Conjunctivae/corneas clear. Neck: Supple, with full range of motion. No jugular venous distention. Trachea midline.   Respiratory: Normal respiratory effort. Clear to auscultation, bilaterally without Rales/Wheezes/Rhonchi. Cardiovascular: Regular rate and rhythm with normal S1/S2 without murmurs, rubs or gallops. Abdomen: Soft, slight tenderness and discomfort to palpation over the suprapubic area, non-distended with normal bowel sounds. Musculoskeletal: 2+ edema on BLE to knee. No clubbing, cyanosis bilaterally. Full range of motion without deformity. Skin: Skin color, texture, turgor normal.  No rashes or lesions. Neurologic:  Neurovascularly intact without any focal sensory/motor deficits. Cranial nerves: II-XII intact, grossly non-focal.  Psychiatric: Alert and oriented, thought content appropriate, normal insight      Labs:   Recent Labs      10/25/18   2357  10/26/18   0700   WBC  11.1*  8.9   HGB  11.3*  11.0*   HCT  36.4  35.3*   PLT  191  171     Recent Labs      10/25/18   2357  10/26/18   0700   NA  134*  140   K  4.7  4.2   CL  97*  101   CO2  27  28   BUN  13  13   CREATININE  0.7  0.8   CALCIUM  10.3  9.9     Recent Labs      10/25/18   2357   AST  29   ALT  19   BILITOT  0.7   ALKPHOS  59     Recent Labs      10/25/18   2357   INR  1.14     Recent Labs      10/25/18   2357   TROPONINI  <0.01       Urinalysis:    Lab Results   Component Value Date    NITRU POSITIVE 10/26/2018    WBCUA  10/26/2018    BACTERIA 4+ 10/26/2018    RBCUA 0-2 10/26/2018    BLOODU TRACE-INTACT 10/26/2018    SPECGRAV 1.015 10/26/2018    GLUCOSEU Negative 10/26/2018    GLUCOSEU Negative 04/09/2012       Radiology:  US RENAL COMPLETE   Final Result      1. No hydronephrosis. 2. 10 x 14 mm echogenic lesion medial left kidney. This would correspond to the fat attenuation lesion seen on prior study of 2015, consistent with a benign lesion, possible angiomyolipoma                VL Extremity Venous Bilateral   Final Result      XR CHEST STANDARD (2 VW)   Final Result     Normal chest x-rays.                 Assessment/Plan:  Active Hospital Problems

## 2018-10-27 NOTE — PLAN OF CARE
Problem: Falls - Risk of:  Goal: Will remain free from falls  Will remain free from falls   Outcome: Ongoing  Patient at risk for falls. Patient resting quietly in bed. Side rails up x 3. Bed locked in lowest position. Bed alarm on. Bedside table and call light within reach. Patient instructed to call for assistance. Patient verbalized understanding. Will continue to monitor. Problem: Risk for Impaired Skin Integrity  Goal: Tissue integrity - skin and mucous membranes  Structural intactness and normal physiological function of skin and  mucous membranes. Outcome: Ongoing      Problem: Pain:  Goal: Control of acute pain  Control of acute pain   Outcome: Ongoing  Patient complains of pain at level 3/10 in legs and 7/10 chronic back pain. Patient describes pain as soreness in legs and aching in lower back. Patient requests pain medication. MD notified of pain rating, ordered Tylenol PRN. Patient medicated with Tylenol PRN. Will continue to monitor.

## 2018-10-27 NOTE — PROGRESS NOTES
Internal Medicine PGY-1 Resident Progress Note        PCP: Lance Hampton MD    Date of Admission: 10/25/2018    Subjective: Pt states that she had nausea overnight, but it's not new for her. She didn't ask for zofran overnight. She was made aware the the order is in and she can request a dose when feeling nauseated. She complains of intermittent numbness and tingling in her R palm. She has some clear/white phlegm. Denies CP and SOB. She is considering placement for rehab - wants to talk to son first. She has been to [facility] in the past.      Medications:  Reviewed    Infusion Medications    dextrose       Scheduled Medications    pantoprazole  40 mg Oral QAM AC    levothyroxine  100 mcg Oral Daily    metoprolol tartrate  50 mg Oral BID    mirtazapine  7.5 mg Oral Nightly    spironolactone  25 mg Oral Daily    traZODone  50 mg Oral Nightly    linagliptin  5 mg Oral Daily    sodium chloride flush  10 mL Intravenous 2 times per day    cefTRIAXone (ROCEPHIN) IV  1 g Intravenous Q24H    lidocaine  1 patch Transdermal Daily    influenza virus vaccine  0.5 mL Intramuscular Once    hydrochlorothiazide  25 mg Oral Daily     PRN Meds: sodium chloride flush, magnesium hydroxide, ondansetron, glucose, dextrose, glucagon (rDNA), dextrose, acetaminophen      Intake/Output Summary (Last 24 hours) at 10/27/18 0732  Last data filed at 10/27/18 0617   Gross per 24 hour   Intake              700 ml   Output             2250 ml   Net            -1550 ml       Physical Exam Performed:    BP (!) 147/78   Pulse 77   Temp 99 °F (37.2 °C) (Oral)   Resp 16   Ht 5' 3\" (1.6 m)   Wt 244 lb 7.8 oz (110.9 kg)   SpO2 96%   BMI 43.31 kg/m²        General appearance:  No apparent distress, appears stated age and cooperative. HEENT:  Normal cephalic,atraumatic without obvious deformity. Pupils equal, round, and reactive to light. Extra ocular muscles intact. Conjunctivae/corneas clear.  Facial hair  Neck: Supple, with full range

## 2018-10-28 ENCOUNTER — APPOINTMENT (OUTPATIENT)
Dept: GENERAL RADIOLOGY | Age: 83
DRG: 690 | End: 2018-10-28
Payer: MEDICARE

## 2018-10-28 LAB
ANION GAP SERPL CALCULATED.3IONS-SCNC: 12 MMOL/L (ref 3–16)
BASOPHILS ABSOLUTE: 0.1 K/UL (ref 0–0.2)
BASOPHILS RELATIVE PERCENT: 0.8 %
BUN BLDV-MCNC: 18 MG/DL (ref 7–20)
CALCIUM SERPL-MCNC: 9.8 MG/DL (ref 8.3–10.6)
CHLORIDE BLD-SCNC: 102 MMOL/L (ref 99–110)
CO2: 27 MMOL/L (ref 21–32)
CREAT SERPL-MCNC: 0.7 MG/DL (ref 0.6–1.2)
EOSINOPHILS ABSOLUTE: 0.1 K/UL (ref 0–0.6)
EOSINOPHILS RELATIVE PERCENT: 2 %
GFR AFRICAN AMERICAN: >60
GFR NON-AFRICAN AMERICAN: >60
GLUCOSE BLD-MCNC: 118 MG/DL (ref 70–99)
HCT VFR BLD CALC: 36 % (ref 36–48)
HEMOGLOBIN: 11 G/DL (ref 12–16)
LYMPHOCYTES ABSOLUTE: 2 K/UL (ref 1–5.1)
LYMPHOCYTES RELATIVE PERCENT: 27.3 %
MCH RBC QN AUTO: 24.5 PG (ref 26–34)
MCHC RBC AUTO-ENTMCNC: 30.6 G/DL (ref 31–36)
MCV RBC AUTO: 80.2 FL (ref 80–100)
MONOCYTES ABSOLUTE: 0.6 K/UL (ref 0–1.3)
MONOCYTES RELATIVE PERCENT: 7.6 %
NEUTROPHILS ABSOLUTE: 4.6 K/UL (ref 1.7–7.7)
NEUTROPHILS RELATIVE PERCENT: 62.3 %
ORGANISM: ABNORMAL
PDW BLD-RTO: 13.6 % (ref 12.4–15.4)
PLATELET # BLD: 181 K/UL (ref 135–450)
PMV BLD AUTO: 8.3 FL (ref 5–10.5)
POTASSIUM REFLEX MAGNESIUM: 4.3 MMOL/L (ref 3.5–5.1)
RBC # BLD: 4.49 M/UL (ref 4–5.2)
SODIUM BLD-SCNC: 141 MMOL/L (ref 136–145)
URINE CULTURE, ROUTINE: ABNORMAL
URINE CULTURE, ROUTINE: ABNORMAL
WBC # BLD: 7.4 K/UL (ref 4–11)

## 2018-10-28 PROCEDURE — 6360000002 HC RX W HCPCS: Performed by: STUDENT IN AN ORGANIZED HEALTH CARE EDUCATION/TRAINING PROGRAM

## 2018-10-28 PROCEDURE — 80048 BASIC METABOLIC PNL TOTAL CA: CPT

## 2018-10-28 PROCEDURE — 6370000000 HC RX 637 (ALT 250 FOR IP): Performed by: STUDENT IN AN ORGANIZED HEALTH CARE EDUCATION/TRAINING PROGRAM

## 2018-10-28 PROCEDURE — 6370000000 HC RX 637 (ALT 250 FOR IP): Performed by: INTERNAL MEDICINE

## 2018-10-28 PROCEDURE — 73562 X-RAY EXAM OF KNEE 3: CPT

## 2018-10-28 PROCEDURE — 1200000000 HC SEMI PRIVATE

## 2018-10-28 PROCEDURE — 36415 COLL VENOUS BLD VENIPUNCTURE: CPT

## 2018-10-28 PROCEDURE — 85025 COMPLETE CBC W/AUTO DIFF WBC: CPT

## 2018-10-28 PROCEDURE — 2580000003 HC RX 258: Performed by: STUDENT IN AN ORGANIZED HEALTH CARE EDUCATION/TRAINING PROGRAM

## 2018-10-28 PROCEDURE — 99232 SBSQ HOSP IP/OBS MODERATE 35: CPT | Performed by: INTERNAL MEDICINE

## 2018-10-28 RX ORDER — KETOROLAC TROMETHAMINE 15 MG/ML
15 INJECTION, SOLUTION INTRAMUSCULAR; INTRAVENOUS ONCE
Status: COMPLETED | OUTPATIENT
Start: 2018-10-28 | End: 2018-10-28

## 2018-10-28 RX ORDER — KETOROLAC TROMETHAMINE 15 MG/ML
15 INJECTION, SOLUTION INTRAMUSCULAR; INTRAVENOUS EVERY 6 HOURS PRN
Status: DISCONTINUED | OUTPATIENT
Start: 2018-10-28 | End: 2018-10-28

## 2018-10-28 RX ADMIN — DICLOFENAC 2 G: 10 GEL TOPICAL at 20:48

## 2018-10-28 RX ADMIN — KETOROLAC TROMETHAMINE 15 MG: 15 INJECTION, SOLUTION INTRAMUSCULAR; INTRAVENOUS at 03:08

## 2018-10-28 RX ADMIN — KETOROLAC TROMETHAMINE 15 MG: 15 INJECTION, SOLUTION INTRAMUSCULAR; INTRAVENOUS at 13:01

## 2018-10-28 RX ADMIN — CEFTRIAXONE 1 G: 1 INJECTION, POWDER, FOR SOLUTION INTRAMUSCULAR; INTRAVENOUS at 23:48

## 2018-10-28 RX ADMIN — CEFTRIAXONE 1 G: 1 INJECTION, POWDER, FOR SOLUTION INTRAMUSCULAR; INTRAVENOUS at 00:32

## 2018-10-28 RX ADMIN — Medication 10 ML: at 20:47

## 2018-10-28 RX ADMIN — TRAZODONE HYDROCHLORIDE 50 MG: 50 TABLET ORAL at 20:47

## 2018-10-28 RX ADMIN — METOPROLOL TARTRATE 50 MG: 50 TABLET ORAL at 10:11

## 2018-10-28 RX ADMIN — MIRTAZAPINE 7.5 MG: 15 TABLET, FILM COATED ORAL at 20:46

## 2018-10-28 RX ADMIN — SPIRONOLACTONE 25 MG: 25 TABLET ORAL at 10:11

## 2018-10-28 RX ADMIN — LINAGLIPTIN 5 MG: 5 TABLET, FILM COATED ORAL at 10:11

## 2018-10-28 RX ADMIN — ACETAMINOPHEN 650 MG: 325 TABLET ORAL at 20:50

## 2018-10-28 RX ADMIN — Medication 10 ML: at 10:12

## 2018-10-28 RX ADMIN — LEVOTHYROXINE SODIUM 100 MCG: 100 TABLET ORAL at 06:14

## 2018-10-28 RX ADMIN — ONDANSETRON 4 MG: 2 INJECTION INTRAMUSCULAR; INTRAVENOUS at 03:08

## 2018-10-28 RX ADMIN — METOPROLOL TARTRATE 50 MG: 50 TABLET ORAL at 20:47

## 2018-10-28 RX ADMIN — HYDROCHLOROTHIAZIDE 25 MG: 25 TABLET ORAL at 10:11

## 2018-10-28 RX ADMIN — ONDANSETRON 4 MG: 2 INJECTION INTRAMUSCULAR; INTRAVENOUS at 13:01

## 2018-10-28 RX ADMIN — DICLOFENAC 2 G: 10 GEL TOPICAL at 13:01

## 2018-10-28 RX ADMIN — PANTOPRAZOLE SODIUM 40 MG: 40 TABLET, DELAYED RELEASE ORAL at 06:14

## 2018-10-28 ASSESSMENT — PAIN SCALES - GENERAL
PAINLEVEL_OUTOF10: 7
PAINLEVEL_OUTOF10: 9
PAINLEVEL_OUTOF10: 7
PAINLEVEL_OUTOF10: 0
PAINLEVEL_OUTOF10: 7

## 2018-10-28 ASSESSMENT — PAIN DESCRIPTION - PROGRESSION
CLINICAL_PROGRESSION: NOT CHANGED

## 2018-10-28 ASSESSMENT — PAIN DESCRIPTION - FREQUENCY: FREQUENCY: CONTINUOUS

## 2018-10-28 ASSESSMENT — PAIN DESCRIPTION - ORIENTATION: ORIENTATION: RIGHT

## 2018-10-28 ASSESSMENT — PAIN DESCRIPTION - PAIN TYPE: TYPE: ACUTE PAIN

## 2018-10-28 ASSESSMENT — PAIN DESCRIPTION - DESCRIPTORS: DESCRIPTORS: ACHING

## 2018-10-28 ASSESSMENT — PAIN DESCRIPTION - LOCATION: LOCATION: LEG;KNEE

## 2018-10-28 ASSESSMENT — PAIN DESCRIPTION - ONSET: ONSET: ON-GOING

## 2018-10-28 NOTE — PROGRESS NOTES
Regular rate and rhythm with normal S1/S2 without murmurs, rubs or gallops. Abdomen: Soft, non-tender, non-distended with normal bowel sounds. Well-healed scars s/p cholecystectomy   Musculoskeletal:  BLE swelling resolved. Right knee tenderness on the medial side  Skin: Skin color, texture, turgor normal.  Norashes or lesions. Neurologic:  Neurovascularly intact without any focal sensory/motor deficits. Cranialnerves: II-XII intact, grossly non-focal.  Psychiatric:  Alert and oriented,  Capillary Refill: Brisk,< 3 seconds   Peripheral Pulses: +2 palpable, equal bilaterally       Labs:   Recent Labs      10/26/18   0700  10/27/18   0801  10/28/18   0612   WBC  8.9  8.2  7.4   HGB  11.0*  10.8*  11.0*   HCT  35.3*  35.3*  36.0   PLT  171  180  181     Recent Labs      10/26/18   0700  10/27/18   0801  10/28/18   0612   NA  140  139  141   K  4.2  4.3  4.3   CL  101  101  102   CO2  28  27  27   BUN  13  16  18   CREATININE  0.8  0.8  0.7   CALCIUM  9.9  9.9  9.8     Recent Labs      10/25/18   2357   AST  29   ALT  19   BILITOT  0.7   ALKPHOS  59     Recent Labs      10/25/18   2357   INR  1.14     Recent Labs      10/25/18   2357   TROPONINI  <0.01       Urinalysis:      Lab Results   Component Value Date    NITRU POSITIVE 10/26/2018    WBCUA  10/26/2018    BACTERIA 4+ 10/26/2018    RBCUA 0-2 10/26/2018    BLOODU TRACE-INTACT 10/26/2018    SPECGRAV 1.015 10/26/2018    GLUCOSEU Negative 10/26/2018    GLUCOSEU Negative 04/09/2012       Radiology:  XR KNEE RIGHT (3 VIEWS)   Final Result      1. 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 Correlate clinically. 2.  No findings for acute bony or soft tissue abnormality. US RENAL COMPLETE   Final Result      1. No hydronephrosis. 2. 10 x 14 mm echogenic lesion medial left kidney.  This would correspond to the fat attenuation lesion seen on prior study of 2015, consistent with a benign

## 2018-10-29 VITALS
BODY MASS INDEX: 39.8 KG/M2 | HEIGHT: 63 IN | SYSTOLIC BLOOD PRESSURE: 118 MMHG | RESPIRATION RATE: 18 BRPM | WEIGHT: 224.65 LBS | DIASTOLIC BLOOD PRESSURE: 71 MMHG | HEART RATE: 59 BPM | TEMPERATURE: 97.9 F | OXYGEN SATURATION: 97 %

## 2018-10-29 LAB
ANION GAP SERPL CALCULATED.3IONS-SCNC: 11 MMOL/L (ref 3–16)
BASOPHILS ABSOLUTE: 0.1 K/UL (ref 0–0.2)
BASOPHILS RELATIVE PERCENT: 1.1 %
BUN BLDV-MCNC: 20 MG/DL (ref 7–20)
CALCIUM SERPL-MCNC: 9.8 MG/DL (ref 8.3–10.6)
CHLORIDE BLD-SCNC: 102 MMOL/L (ref 99–110)
CO2: 28 MMOL/L (ref 21–32)
CREAT SERPL-MCNC: 0.9 MG/DL (ref 0.6–1.2)
EOSINOPHILS ABSOLUTE: 0.2 K/UL (ref 0–0.6)
EOSINOPHILS RELATIVE PERCENT: 2.6 %
GFR AFRICAN AMERICAN: >60
GFR NON-AFRICAN AMERICAN: 60
GLUCOSE BLD-MCNC: 110 MG/DL (ref 70–99)
HCT VFR BLD CALC: 37.9 % (ref 36–48)
HEMOGLOBIN: 11.8 G/DL (ref 12–16)
LYMPHOCYTES ABSOLUTE: 2.7 K/UL (ref 1–5.1)
LYMPHOCYTES RELATIVE PERCENT: 40.9 %
MCH RBC QN AUTO: 25 PG (ref 26–34)
MCHC RBC AUTO-ENTMCNC: 31.1 G/DL (ref 31–36)
MCV RBC AUTO: 80.2 FL (ref 80–100)
MONOCYTES ABSOLUTE: 0.6 K/UL (ref 0–1.3)
MONOCYTES RELATIVE PERCENT: 8.4 %
NEUTROPHILS ABSOLUTE: 3.1 K/UL (ref 1.7–7.7)
NEUTROPHILS RELATIVE PERCENT: 47 %
PDW BLD-RTO: 13.7 % (ref 12.4–15.4)
PLATELET # BLD: 202 K/UL (ref 135–450)
PMV BLD AUTO: 8.8 FL (ref 5–10.5)
POTASSIUM REFLEX MAGNESIUM: 4.4 MMOL/L (ref 3.5–5.1)
RBC # BLD: 4.72 M/UL (ref 4–5.2)
SODIUM BLD-SCNC: 141 MMOL/L (ref 136–145)
WBC # BLD: 6.7 K/UL (ref 4–11)

## 2018-10-29 PROCEDURE — 6370000000 HC RX 637 (ALT 250 FOR IP): Performed by: STUDENT IN AN ORGANIZED HEALTH CARE EDUCATION/TRAINING PROGRAM

## 2018-10-29 PROCEDURE — 2580000003 HC RX 258: Performed by: STUDENT IN AN ORGANIZED HEALTH CARE EDUCATION/TRAINING PROGRAM

## 2018-10-29 PROCEDURE — 85025 COMPLETE CBC W/AUTO DIFF WBC: CPT

## 2018-10-29 PROCEDURE — 6370000000 HC RX 637 (ALT 250 FOR IP): Performed by: INTERNAL MEDICINE

## 2018-10-29 PROCEDURE — 6360000002 HC RX W HCPCS: Performed by: STUDENT IN AN ORGANIZED HEALTH CARE EDUCATION/TRAINING PROGRAM

## 2018-10-29 PROCEDURE — 80048 BASIC METABOLIC PNL TOTAL CA: CPT

## 2018-10-29 PROCEDURE — 36415 COLL VENOUS BLD VENIPUNCTURE: CPT

## 2018-10-29 PROCEDURE — 99238 HOSP IP/OBS DSCHRG MGMT 30/<: CPT | Performed by: INTERNAL MEDICINE

## 2018-10-29 RX ORDER — KETOROLAC TROMETHAMINE 15 MG/ML
15 INJECTION, SOLUTION INTRAMUSCULAR; INTRAVENOUS EVERY 6 HOURS PRN
Status: DISCONTINUED | OUTPATIENT
Start: 2018-10-29 | End: 2018-10-29 | Stop reason: HOSPADM

## 2018-10-29 RX ORDER — HYDROCHLOROTHIAZIDE 25 MG/1
25 TABLET ORAL DAILY
Qty: 30 TABLET | Refills: 0 | Status: SHIPPED | OUTPATIENT
Start: 2018-10-30 | End: 2018-11-20 | Stop reason: SDUPTHER

## 2018-10-29 RX ADMIN — HYDROCHLOROTHIAZIDE 25 MG: 25 TABLET ORAL at 09:32

## 2018-10-29 RX ADMIN — LEVOTHYROXINE SODIUM 100 MCG: 100 TABLET ORAL at 06:43

## 2018-10-29 RX ADMIN — ACETAMINOPHEN 650 MG: 325 TABLET ORAL at 12:07

## 2018-10-29 RX ADMIN — KETOROLAC TROMETHAMINE 15 MG: 15 INJECTION, SOLUTION INTRAMUSCULAR; INTRAVENOUS at 09:33

## 2018-10-29 RX ADMIN — SPIRONOLACTONE 25 MG: 25 TABLET ORAL at 09:31

## 2018-10-29 RX ADMIN — METOPROLOL TARTRATE 50 MG: 50 TABLET ORAL at 09:32

## 2018-10-29 RX ADMIN — DICLOFENAC 2 G: 10 GEL TOPICAL at 12:12

## 2018-10-29 RX ADMIN — PANTOPRAZOLE SODIUM 40 MG: 40 TABLET, DELAYED RELEASE ORAL at 06:42

## 2018-10-29 RX ADMIN — ACETAMINOPHEN 650 MG: 325 TABLET ORAL at 02:19

## 2018-10-29 RX ADMIN — ONDANSETRON 4 MG: 2 INJECTION INTRAMUSCULAR; INTRAVENOUS at 02:04

## 2018-10-29 RX ADMIN — LINAGLIPTIN 5 MG: 5 TABLET, FILM COATED ORAL at 09:32

## 2018-10-29 RX ADMIN — DICLOFENAC 2 G: 10 GEL TOPICAL at 14:36

## 2018-10-29 RX ADMIN — Medication 10 ML: at 09:31

## 2018-10-29 ASSESSMENT — PAIN DESCRIPTION - ONSET
ONSET: ON-GOING

## 2018-10-29 ASSESSMENT — PAIN DESCRIPTION - LOCATION
LOCATION: HIP;KNEE
LOCATION: KNEE;HIP
LOCATION: KNEE;HIP
LOCATION: LEG;KNEE

## 2018-10-29 ASSESSMENT — PAIN SCALES - GENERAL
PAINLEVEL_OUTOF10: 8
PAINLEVEL_OUTOF10: 3
PAINLEVEL_OUTOF10: 3
PAINLEVEL_OUTOF10: 7
PAINLEVEL_OUTOF10: 8

## 2018-10-29 ASSESSMENT — PAIN DESCRIPTION - PAIN TYPE
TYPE: ACUTE PAIN

## 2018-10-29 ASSESSMENT — PAIN DESCRIPTION - ORIENTATION
ORIENTATION: RIGHT;LEFT
ORIENTATION: RIGHT

## 2018-10-29 ASSESSMENT — PAIN DESCRIPTION - PROGRESSION
CLINICAL_PROGRESSION: NOT CHANGED

## 2018-10-29 ASSESSMENT — PAIN DESCRIPTION - FREQUENCY
FREQUENCY: CONTINUOUS

## 2018-10-29 ASSESSMENT — PAIN DESCRIPTION - DESCRIPTORS
DESCRIPTORS: ACHING
DESCRIPTORS: SHARP;JABBING
DESCRIPTORS: SHARP;JABBING
DESCRIPTORS: SHARP

## 2018-10-29 NOTE — PROGRESS NOTES
it is helping her pain     Depression  -continue mirtazapine  -continue trazodone        DVT Prophylaxis: SCDs, holding chemical for G6PD   Diet:  Carb Control  Code Status: DNR/DNI     PT/OT Eval Status: ordered     Dispo - [facility]       I will discuss the patient with the senior resident and MD Berkley Baca, PGY1  [phone]  10/29/2018  9:04 AM

## 2018-10-29 NOTE — DISCHARGE SUMMARY
Hospital Medicine Discharge Summary    Patient ID: All Wei   Gender: female  : 1935   Age: 80 y.o. MRN: [id number]  Code Status: Limited   Patient's PCP: Elton Simmons MD    Admit Date: 10/25/2018     Discharge Date:   10/29/18    Admitting Physician: Molly Hyde MD     Discharge Physician: Jed Matias MD     Discharge Diagnoses: Uncomplicated Klebsiella UTI, Pulmonary HTN       Active Hospital Problems    Diagnosis Date Noted    Essential hypertension [I10] 2016     Priority: Medium    Weakness [R53.1] 10/26/2018    Pulmonary hypertension ([address] [I27.20] 10/26/2018    G6PD deficiency ([facility] [D55.0] 10/26/2018    Unable to ambulate [R26.2]     Acute cystitis without hematuria [N30.00]        The patient was seen and examined on day of discharge and this discharge summary is in conjunction with any daily progress note from day of discharge. Hospital Course: Ms. Remington Ha is an 81 yo woman with hypothyroidism, chronic urinary incontinence, NIDDM, HTN, and G6PD deficiency who presents with BLE edema and dysuria. She was found to have a Klebsiella UTI that was treated with 4 doses of Ceftriaxone. Her BLE improved with HCTZ-spironolactone. Her last ECHO shows RVSP 47. An inpatient sleep study with puls ox checks q 4 hrs didn't show desaturations overnight that would be concerning for sleep apnea. Urology was consulted for chronic mixed urinary incontinence and they will f/u with the patient when she is discharged. They discussed stimulation and Botox, both of which the patient has refused in the past. Special care was taken when ordering medications because of pt's G6PD deficiency.       Disposition:  Skilled Facility    Physical Exam Performed:     /71   Pulse 59   Temp 97.9 °F (36.6 °C) (Axillary)   Resp 18   Ht 5' 3\" (1.6 m)   Wt 224 lb 10.4 oz (101.9 kg)   SpO2 97%   BMI 39.79 kg/m²       General appearance:  No apparent distress, appears stated age and cooperative. HEENT:  Normal cephalic,atraumatic without obvious deformity. Pupils equal, round, and reactive to light.  Extra ocular muscles intact. Conjunctivae/corneas clear. Facial hair  Neck: Supple, with full range of motion. No jugular venous distention. Trachea midline. Respiratory:  Normal respiratory effort. Clear to auscultation, bilaterally without Rales/Wheezes/Rhonchi. Cardiovascular:  Regular rate and rhythm with normal S1/S2 without murmurs, rubs or gallops. Abdomen: Soft, non-tender, non-distended with normal bowel sounds. Well-healed scars s/p cholecystectomy   Musculoskeletal:  BLE swelling resolved. Right knee tenderness on the medial side. Chronic L shoulder pain   Skin: Skin color, texture, turgor normal.  Norashes or lesions. Neurologic:  Neurovascularly intact without any focal sensory/motor deficits. Cranialnerves: II-XII intact, grossly non-focal.  Psychiatric:  Alert and oriented,  Capillary Refill: Brisk,< 3 seconds   Peripheral Pulses: +2 palpable, equal bilaterally     Labs: For convenience and continuity at follow-up the following most recent labs are provided:      CBC:    Lab Results   Component Value Date    WBC 6.7 10/29/2018    HGB 11.8 10/29/2018    HCT 37.9 10/29/2018     10/29/2018       Renal:    Lab Results   Component Value Date     10/29/2018    K 4.4 10/29/2018     10/29/2018    CO2 28 10/29/2018    BUN 20 10/29/2018    CREATININE 0.9 10/29/2018    CALCIUM 9.8 10/29/2018    PHOS 3.9 06/06/2018         Significant Diagnostic Studies    Radiology:   XR KNEE RIGHT (3 VIEWS)   Final Result      1. 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 Correlate clinically. 2.  No findings for acute bony or soft tissue abnormality. US RENAL COMPLETE   Final Result      1. No hydronephrosis. 2. 10 x 14 mm echogenic lesion medial left kidney.  This would correspond to the fat attenuation

## 2018-10-31 LAB
EKG ATRIAL RATE: 83 BPM
EKG DIAGNOSIS: NORMAL
EKG P AXIS: 66 DEGREES
EKG P-R INTERVAL: 154 MS
EKG Q-T INTERVAL: 358 MS
EKG QRS DURATION: 74 MS
EKG QTC CALCULATION (BAZETT): 420 MS
EKG R AXIS: 8 DEGREES
EKG T AXIS: 58 DEGREES
EKG VENTRICULAR RATE: 83 BPM

## 2018-11-08 ENCOUNTER — TELEPHONE (OUTPATIENT)
Dept: INTERNAL MEDICINE CLINIC | Age: 83
End: 2018-11-08

## 2018-11-14 ENCOUNTER — TELEPHONE (OUTPATIENT)
Dept: INTERNAL MEDICINE CLINIC | Age: 83
End: 2018-11-14

## 2018-11-14 NOTE — TELEPHONE ENCOUNTER
Pt is being discharged from [address] [place] [address] Rd on 11/18.  Hosp F/U scheduled for 11/19 @ 1:40pm

## 2018-11-20 ENCOUNTER — TELEPHONE (OUTPATIENT)
Dept: INTERNAL MEDICINE CLINIC | Age: 83
End: 2018-11-20

## 2018-11-20 DIAGNOSIS — R11.0 NAUSEA: ICD-10-CM

## 2018-11-20 RX ORDER — PROMETHAZINE HYDROCHLORIDE 12.5 MG/1
TABLET ORAL
Qty: 30 TABLET | Refills: 0 | Status: SHIPPED | OUTPATIENT
Start: 2018-11-20 | End: 2018-11-29 | Stop reason: SDUPTHER

## 2018-11-20 RX ORDER — LEVOTHYROXINE SODIUM 0.1 MG/1
TABLET ORAL
Qty: 30 TABLET | Refills: 11 | Status: SHIPPED | OUTPATIENT
Start: 2018-11-20 | End: 2019-12-16 | Stop reason: SDUPTHER

## 2018-11-20 RX ORDER — HYDROCHLOROTHIAZIDE 25 MG/1
25 TABLET ORAL DAILY
Qty: 30 TABLET | Refills: 0 | Status: SHIPPED | OUTPATIENT
Start: 2018-11-20 | End: 2018-12-17 | Stop reason: SDUPTHER

## 2018-11-21 ENCOUNTER — TELEPHONE (OUTPATIENT)
Dept: INTERNAL MEDICINE CLINIC | Age: 83
End: 2018-11-21

## 2018-11-26 ENCOUNTER — CARE COORDINATION (OUTPATIENT)
Dept: CASE MANAGEMENT | Age: 83
End: 2018-11-26

## 2018-11-26 ENCOUNTER — TELEPHONE (OUTPATIENT)
Dept: INTERNAL MEDICINE CLINIC | Age: 83
End: 2018-11-26

## 2018-11-26 DIAGNOSIS — R26.2 UNABLE TO AMBULATE: Primary | ICD-10-CM

## 2018-11-26 PROCEDURE — 1111F DSCHRG MED/CURRENT MED MERGE: CPT | Performed by: INTERNAL MEDICINE

## 2018-11-29 DIAGNOSIS — R11.0 NAUSEA: ICD-10-CM

## 2018-11-29 DIAGNOSIS — N30.00 ACUTE CYSTITIS WITHOUT HEMATURIA: ICD-10-CM

## 2018-11-29 DIAGNOSIS — K21.9 GASTROESOPHAGEAL REFLUX DISEASE WITHOUT ESOPHAGITIS: ICD-10-CM

## 2018-11-29 RX ORDER — NITROFURANTOIN 25; 75 MG/1; MG/1
100 CAPSULE ORAL 2 TIMES DAILY WITH MEALS
Qty: 14 CAPSULE | Refills: 0 | OUTPATIENT
Start: 2018-11-29 | End: 2018-12-06

## 2018-11-29 RX ORDER — FAMOTIDINE 20 MG/1
20 TABLET, FILM COATED ORAL 2 TIMES DAILY
Qty: 30 TABLET | Refills: 2 | Status: SHIPPED | OUTPATIENT
Start: 2018-11-29 | End: 2019-02-26 | Stop reason: SDUPTHER

## 2018-11-29 RX ORDER — PROMETHAZINE HYDROCHLORIDE 12.5 MG/1
TABLET ORAL
Qty: 30 TABLET | Refills: 2 | Status: SHIPPED | OUTPATIENT
Start: 2018-11-29 | End: 2018-12-28 | Stop reason: SDUPTHER

## 2018-11-30 ENCOUNTER — TELEPHONE (OUTPATIENT)
Dept: INTERNAL MEDICINE CLINIC | Age: 83
End: 2018-11-30

## 2018-11-30 DIAGNOSIS — N75.0 BARTHOLIN GLAND CYST: Primary | ICD-10-CM

## 2018-11-30 RX ORDER — DOXYCYCLINE HYCLATE 100 MG
100 TABLET ORAL 2 TIMES DAILY WITH MEALS
Qty: 20 TABLET | Refills: 0 | Status: SHIPPED | OUTPATIENT
Start: 2018-11-30 | End: 2018-12-10

## 2018-12-03 ENCOUNTER — OFFICE VISIT (OUTPATIENT)
Dept: GYNECOLOGY | Age: 83
End: 2018-12-03
Payer: MEDICARE

## 2018-12-03 VITALS
HEART RATE: 109 BPM | HEIGHT: 63 IN | BODY MASS INDEX: 40.04 KG/M2 | SYSTOLIC BLOOD PRESSURE: 137 MMHG | WEIGHT: 226 LBS | DIASTOLIC BLOOD PRESSURE: 86 MMHG

## 2018-12-03 DIAGNOSIS — R22.42 MASS OF LEFT THIGH: Primary | ICD-10-CM

## 2018-12-03 PROCEDURE — G8427 DOCREV CUR MEDS BY ELIG CLIN: HCPCS | Performed by: OBSTETRICS & GYNECOLOGY

## 2018-12-03 PROCEDURE — 4040F PNEUMOC VAC/ADMIN/RCVD: CPT | Performed by: OBSTETRICS & GYNECOLOGY

## 2018-12-03 PROCEDURE — G8482 FLU IMMUNIZE ORDER/ADMIN: HCPCS | Performed by: OBSTETRICS & GYNECOLOGY

## 2018-12-03 PROCEDURE — 1090F PRES/ABSN URINE INCON ASSESS: CPT | Performed by: OBSTETRICS & GYNECOLOGY

## 2018-12-03 PROCEDURE — 1036F TOBACCO NON-USER: CPT | Performed by: OBSTETRICS & GYNECOLOGY

## 2018-12-03 PROCEDURE — 1123F ACP DISCUSS/DSCN MKR DOCD: CPT | Performed by: OBSTETRICS & GYNECOLOGY

## 2018-12-03 PROCEDURE — 11402 EXC TR-EXT B9+MARG 1.1-2 CM: CPT | Performed by: OBSTETRICS & GYNECOLOGY

## 2018-12-03 PROCEDURE — G8417 CALC BMI ABV UP PARAM F/U: HCPCS | Performed by: OBSTETRICS & GYNECOLOGY

## 2018-12-03 PROCEDURE — 1101F PT FALLS ASSESS-DOCD LE1/YR: CPT | Performed by: OBSTETRICS & GYNECOLOGY

## 2018-12-03 PROCEDURE — 99202 OFFICE O/P NEW SF 15 MIN: CPT | Performed by: OBSTETRICS & GYNECOLOGY

## 2018-12-03 PROCEDURE — G8399 PT W/DXA RESULTS DOCUMENT: HCPCS | Performed by: OBSTETRICS & GYNECOLOGY

## 2018-12-03 RX ORDER — TRAMADOL HYDROCHLORIDE 50 MG/1
TABLET ORAL
Refills: 0 | COMMUNITY
Start: 2018-09-11 | End: 2018-12-03

## 2018-12-03 ASSESSMENT — ENCOUNTER SYMPTOMS
VOMITING: 0
SHORTNESS OF BREATH: 0
APNEA: 0
ABDOMINAL DISTENTION: 0
BACK PAIN: 0
COLOR CHANGE: 1
COUGH: 0
TROUBLE SWALLOWING: 0
NAUSEA: 0
ABDOMINAL PAIN: 0
PHOTOPHOBIA: 0
CHEST TIGHTNESS: 0
SORE THROAT: 0
DIARRHEA: 0
WHEEZING: 0
CONSTIPATION: 0
BLOOD IN STOOL: 0
RECTAL PAIN: 0
ANAL BLEEDING: 0

## 2018-12-03 NOTE — PROGRESS NOTES
SAB TAB Ectopic Molar Multiple Live Births             2      # Outcome Date GA Lbr Scotty/2nd Weight Sex Delivery Anes PTL Lv   2             1 [place]                 Social History     Social History    Marital status:      Spouse name: N/A    Number of children: N/A    Years of education: N/A     Occupational History    Not on file. Social History Main Topics    Smoking status: Former Smoker     Quit date: 3/3/1970    Smokeless tobacco: Never Used    Alcohol use No    Drug use: No    Sexual activity: No     Other Topics Concern    Not on file     Social History Narrative    No narrative on file     Allergies   Allergen Reactions    Percocet [Oxycodone-Acetaminophen] Anaphylaxis     Reported that she has swelling, rash and difficulty breathing.      Coumadin [Warfarin Sodium]     Dalteparin Sodium     Vaibhav Beans [Broad Beans]     Heparin     Lovenox [Enoxaparin Sodium]     Other      Almonds    Peanut-Containing Drug Products     Penicillins     Plavix [Clopidogrel Bisulfate]     Sulfa Antibiotics     Vicodin [Hydrocodone-Acetaminophen] Other (See Comments)     Vomit and diarrhea    Aspirin Nausea And Vomiting     Outpatient Prescriptions Marked as Taking for the 12/3/18 encounter (Office Visit) with Rico Issa MD   Medication Sig Dispense Refill    doxycycline hyclate (VIBRA-TABS) 100 MG tablet Take 1 tablet by mouth 2 times daily (with meals) for 10 days 20 tablet 0    promethazine (PHENERGAN) 12.5 MG tablet ONE TABLET EVERY 6 HOURS IF NEEDED FOR NAUSEA 30 tablet 2    famotidine (PEPCID) 20 MG tablet Take 1 tablet by mouth 2 times daily 30 tablet 2    levothyroxine (SYNTHROID) 100 MCG tablet 1 TABLET EVERY DAY 30 tablet 11    hydrochlorothiazide (HYDRODIURIL) 25 MG tablet Take 1 tablet by mouth daily 30 tablet 0    diclofenac sodium 1 % GEL Apply 2 g topically 4 times daily as needed for Pain 1 Tube 3    traZODone (DESYREL) 50 MG tablet 1 TABLET AT intolerance, polydipsia, polyphagia and polyuria. Genitourinary: Negative for difficulty urinating, dyspareunia, dysuria, enuresis, frequency, genital sores, hematuria, menstrual problem, pelvic pain, urgency, vaginal bleeding, vaginal discharge and vaginal pain. Musculoskeletal: Negative for arthralgias, back pain, joint swelling and myalgias. Skin: Positive for color change (new bleeding skin mass ). Negative for rash. Neurological: Negative for dizziness, seizures, syncope, light-headedness and headaches. Psychiatric/Behavioral: Negative for agitation, behavioral problems, confusion, decreased concentration, dysphoric mood, hallucinations, self-injury, sleep disturbance and suicidal ideas. The patient is not nervous/anxious and is not hyperactive. Physical Exam:  /86   Pulse 109   Ht 5' 3\" (1.6 m)   Wt 226 lb (102.5 kg)   BMI 40.03 kg/m²   Body mass index is 40.03 kg/m². Physical Exam   Constitutional: She is oriented to person, place, and time. She appears well-developed and well-nourished. HENT:   Head: Normocephalic and atraumatic. Neck: Normal range of motion. Neck supple. Cardiovascular: Normal rate. Pulmonary/Chest: No respiratory distress. Abdominal: Soft. Bowel sounds are normal. She exhibits no distension. There is no rebound and no guarding. Genitourinary:       There is no rash, tenderness or lesion on the right labia. There is no rash, tenderness or lesion on the left labia. Genitourinary Comments: Left inner thigh mass 1.5x1 cm, hyperpigmented with superficial denuded area    Neurological: She is alert and oriented to person, place, and time. Skin: Skin is warm. Psychiatric: She has a normal mood and affect. Her behavior is normal.   bimanual exam deferred since patient was unable to place feet in stirrups     Procedure note   Risks, benefits and alternatives of procedure discussed with patient and verbal consent obtained. Skin was prepped with betadine.

## 2018-12-06 ENCOUNTER — TELEPHONE (OUTPATIENT)
Dept: INTERNAL MEDICINE CLINIC | Age: 83
End: 2018-12-06

## 2018-12-06 DIAGNOSIS — E66.9 DIABETES MELLITUS TYPE 2 IN OBESE (HCC): Primary | ICD-10-CM

## 2018-12-06 DIAGNOSIS — E11.69 DIABETES MELLITUS TYPE 2 IN OBESE (HCC): Primary | ICD-10-CM

## 2018-12-07 ENCOUNTER — TELEPHONE (OUTPATIENT)
Dept: INTERNAL MEDICINE CLINIC | Age: 83
End: 2018-12-07

## 2018-12-07 RX ORDER — LANCETS 30 GAUGE
1 EACH MISCELLANEOUS DAILY
Qty: 300 EACH | Refills: 1 | Status: SHIPPED | OUTPATIENT
Start: 2018-12-07

## 2018-12-07 RX ORDER — BLOOD-GLUCOSE METER
1 KIT MISCELLANEOUS DAILY
Qty: 1 KIT | Refills: 0 | Status: SHIPPED | OUTPATIENT
Start: 2018-12-07

## 2018-12-10 RX ORDER — SITAGLIPTIN 50 MG/1
TABLET, FILM COATED ORAL
Qty: 30 TABLET | Refills: 5 | Status: SHIPPED | OUTPATIENT
Start: 2018-12-10 | End: 2019-07-10 | Stop reason: SDUPTHER

## 2018-12-17 RX ORDER — HYDROCHLOROTHIAZIDE 25 MG/1
25 TABLET ORAL DAILY
Qty: 30 TABLET | Refills: 3 | Status: SHIPPED | OUTPATIENT
Start: 2018-12-17 | End: 2019-05-07 | Stop reason: SDUPTHER

## 2018-12-28 ENCOUNTER — TELEPHONE (OUTPATIENT)
Dept: INTERNAL MEDICINE CLINIC | Age: 83
End: 2018-12-28

## 2018-12-28 DIAGNOSIS — R11.0 NAUSEA: ICD-10-CM

## 2018-12-28 RX ORDER — PROMETHAZINE HYDROCHLORIDE 12.5 MG/1
TABLET ORAL
Qty: 30 TABLET | Refills: 0 | Status: SHIPPED | OUTPATIENT
Start: 2018-12-28 | End: 2019-01-08 | Stop reason: SDUPTHER

## 2018-12-28 NOTE — TELEPHONE ENCOUNTER
Script sent.      Orders Placed This Encounter   Medications    promethazine (PHENERGAN) 12.5 MG tablet     Sig: ONE TABLET EVERY 6 HOURS IF NEEDED FOR NAUSEA     Dispense:  30 tablet     Refill:  0

## 2019-01-03 ENCOUNTER — TELEPHONE (OUTPATIENT)
Dept: INTERNAL MEDICINE CLINIC | Age: 84
End: 2019-01-03

## 2019-01-03 DIAGNOSIS — N30.00 ACUTE CYSTITIS WITHOUT HEMATURIA: Primary | ICD-10-CM

## 2019-01-03 DIAGNOSIS — N30.00 ACUTE CYSTITIS WITHOUT HEMATURIA: ICD-10-CM

## 2019-01-03 RX ORDER — NITROFURANTOIN 25; 75 MG/1; MG/1
100 CAPSULE ORAL 2 TIMES DAILY WITH MEALS
Qty: 14 CAPSULE | Refills: 0 | OUTPATIENT
Start: 2019-01-03 | End: 2019-01-10

## 2019-01-03 RX ORDER — NITROFURANTOIN 25; 75 MG/1; MG/1
100 CAPSULE ORAL 2 TIMES DAILY
Qty: 14 CAPSULE | Refills: 0 | Status: SHIPPED | OUTPATIENT
Start: 2019-01-03 | End: 2019-01-10

## 2019-01-08 ENCOUNTER — TELEPHONE (OUTPATIENT)
Dept: INTERNAL MEDICINE CLINIC | Age: 84
End: 2019-01-08

## 2019-01-08 DIAGNOSIS — R11.0 NAUSEA: ICD-10-CM

## 2019-01-08 RX ORDER — PROMETHAZINE HYDROCHLORIDE 12.5 MG/1
TABLET ORAL
Qty: 30 TABLET | Refills: 0 | Status: SHIPPED | OUTPATIENT
Start: 2019-01-08 | End: 2019-01-25 | Stop reason: SDUPTHER

## 2019-01-14 RX ORDER — ONDANSETRON 4 MG/1
4 TABLET, FILM COATED ORAL EVERY 8 HOURS PRN
Qty: 20 TABLET | Refills: 0 | Status: SHIPPED | OUTPATIENT
Start: 2019-01-14 | End: 2019-02-03

## 2019-01-17 ENCOUNTER — TELEPHONE (OUTPATIENT)
Dept: INTERNAL MEDICINE CLINIC | Age: 84
End: 2019-01-17

## 2019-01-21 ENCOUNTER — TELEPHONE (OUTPATIENT)
Dept: INTERNAL MEDICINE CLINIC | Age: 84
End: 2019-01-21

## 2019-01-25 ENCOUNTER — TELEPHONE (OUTPATIENT)
Dept: INTERNAL MEDICINE CLINIC | Age: 84
End: 2019-01-25

## 2019-01-25 DIAGNOSIS — R11.0 NAUSEA: ICD-10-CM

## 2019-01-25 RX ORDER — PROMETHAZINE HYDROCHLORIDE 12.5 MG/1
12.5 TABLET ORAL DAILY PRN
Qty: 30 TABLET | Refills: 0 | Status: SHIPPED | OUTPATIENT
Start: 2019-01-25 | End: 2019-02-08 | Stop reason: SDUPTHER

## 2019-02-05 ENCOUNTER — TELEPHONE (OUTPATIENT)
Dept: INTERNAL MEDICINE CLINIC | Age: 84
End: 2019-02-05

## 2019-02-08 DIAGNOSIS — R11.0 NAUSEA: ICD-10-CM

## 2019-02-08 RX ORDER — PROMETHAZINE HYDROCHLORIDE 12.5 MG/1
12.5 TABLET ORAL DAILY PRN
Qty: 30 TABLET | Refills: 0 | Status: SHIPPED | OUTPATIENT
Start: 2019-02-08 | End: 2019-03-08 | Stop reason: SDUPTHER

## 2019-02-14 ENCOUNTER — TELEPHONE (OUTPATIENT)
Dept: INTERNAL MEDICINE CLINIC | Age: 84
End: 2019-02-14

## 2019-02-14 NOTE — TELEPHONE ENCOUNTER
Presley from [facility] just wanted to inform they will be discharging the pt from PT due to to many missed appts

## 2019-02-26 DIAGNOSIS — K21.9 GASTROESOPHAGEAL REFLUX DISEASE WITHOUT ESOPHAGITIS: ICD-10-CM

## 2019-02-26 RX ORDER — FAMOTIDINE 20 MG/1
20 TABLET, FILM COATED ORAL 2 TIMES DAILY
Qty: 30 TABLET | Refills: 1 | Status: SHIPPED | OUTPATIENT
Start: 2019-02-26 | End: 2019-04-24 | Stop reason: SDUPTHER

## 2019-03-06 DIAGNOSIS — N30.00 ACUTE CYSTITIS WITHOUT HEMATURIA: Primary | ICD-10-CM

## 2019-03-06 RX ORDER — NITROFURANTOIN 25; 75 MG/1; MG/1
100 CAPSULE ORAL 2 TIMES DAILY
Qty: 14 CAPSULE | Refills: 0 | Status: SHIPPED | OUTPATIENT
Start: 2019-03-06 | End: 2019-03-21 | Stop reason: SDUPTHER

## 2019-03-08 ENCOUNTER — TELEPHONE (OUTPATIENT)
Dept: INTERNAL MEDICINE CLINIC | Age: 84
End: 2019-03-08

## 2019-03-08 DIAGNOSIS — R11.0 NAUSEA: ICD-10-CM

## 2019-03-08 DIAGNOSIS — J06.9 URI, ACUTE: Primary | ICD-10-CM

## 2019-03-08 RX ORDER — PROMETHAZINE HYDROCHLORIDE 12.5 MG/1
12.5 TABLET ORAL DAILY PRN
Qty: 30 TABLET | Refills: 0 | Status: SHIPPED | OUTPATIENT
Start: 2019-03-08 | End: 2019-03-21 | Stop reason: SDUPTHER

## 2019-03-08 RX ORDER — LANSOPRAZOLE 30 MG/1
CAPSULE, DELAYED RELEASE ORAL
Qty: 60 CAPSULE | Refills: 1 | Status: SHIPPED | OUTPATIENT
Start: 2019-03-08 | End: 2019-05-09 | Stop reason: SDUPTHER

## 2019-03-08 RX ORDER — AZITHROMYCIN 250 MG/1
250 TABLET, FILM COATED ORAL SEE ADMIN INSTRUCTIONS
Qty: 6 TABLET | Refills: 0 | Status: SHIPPED | OUTPATIENT
Start: 2019-03-08 | End: 2019-03-15 | Stop reason: ALTCHOICE

## 2019-03-21 ENCOUNTER — TELEPHONE (OUTPATIENT)
Dept: INTERNAL MEDICINE CLINIC | Age: 84
End: 2019-03-21

## 2019-03-21 DIAGNOSIS — G56.01 CARPAL TUNNEL SYNDROME, RIGHT: Primary | ICD-10-CM

## 2019-03-21 DIAGNOSIS — N30.00 ACUTE CYSTITIS WITHOUT HEMATURIA: ICD-10-CM

## 2019-03-21 DIAGNOSIS — R11.0 NAUSEA: ICD-10-CM

## 2019-03-22 RX ORDER — NITROFURANTOIN 25; 75 MG/1; MG/1
100 CAPSULE ORAL 2 TIMES DAILY
Qty: 14 CAPSULE | Refills: 0 | Status: SHIPPED | OUTPATIENT
Start: 2019-03-22 | End: 2019-04-08 | Stop reason: SDUPTHER

## 2019-03-22 RX ORDER — PROMETHAZINE HYDROCHLORIDE 12.5 MG/1
12.5 TABLET ORAL DAILY PRN
Qty: 30 TABLET | Refills: 0 | Status: SHIPPED | OUTPATIENT
Start: 2019-03-22 | End: 2019-04-08 | Stop reason: SDUPTHER

## 2019-04-02 ENCOUNTER — TELEPHONE (OUTPATIENT)
Dept: INTERNAL MEDICINE CLINIC | Age: 84
End: 2019-04-02

## 2019-04-02 RX ORDER — SPIRONOLACTONE 25 MG/1
25 TABLET ORAL 2 TIMES DAILY
Qty: 60 TABLET | Refills: 5 | Status: SHIPPED | OUTPATIENT
Start: 2019-04-02 | End: 2019-11-05 | Stop reason: SDUPTHER

## 2019-04-02 NOTE — TELEPHONE ENCOUNTER
Please have the patient increase her spironolactone 25 mg to one tablet twice daily I have sent a prescription for this medication to her pharmacy to reflect the new directions.

## 2019-04-02 NOTE — TELEPHONE ENCOUNTER
Patient takes spironolactone 25 mg 1 tab daily. Patient is c/o bilateral leg swelling with discomfort. Patient says she can feel the swelling in her hands (bilateral). Patient cannot come into the office as her transportation (son) is dealing with the death of his . Patient would like to know what can be done to decrease the swelling. And patient hasn't checked her BP. Patient states the swelling has been off and on for several weeks. Patient says she is still drinking plenty of water. Please advise.

## 2019-04-08 DIAGNOSIS — R11.0 NAUSEA: ICD-10-CM

## 2019-04-08 DIAGNOSIS — N30.00 ACUTE CYSTITIS WITHOUT HEMATURIA: ICD-10-CM

## 2019-04-09 RX ORDER — PROMETHAZINE HYDROCHLORIDE 12.5 MG/1
12.5 TABLET ORAL DAILY PRN
Qty: 30 TABLET | Refills: 0 | Status: SHIPPED | OUTPATIENT
Start: 2019-04-09 | End: 2019-05-09 | Stop reason: SDUPTHER

## 2019-04-09 RX ORDER — NITROFURANTOIN 25; 75 MG/1; MG/1
100 CAPSULE ORAL 2 TIMES DAILY
Qty: 14 CAPSULE | Refills: 0 | Status: SHIPPED | OUTPATIENT
Start: 2019-04-09 | End: 2019-05-09 | Stop reason: SDUPTHER

## 2019-04-23 DIAGNOSIS — J06.9 URI, ACUTE: ICD-10-CM

## 2019-04-24 DIAGNOSIS — R11.0 NAUSEA: ICD-10-CM

## 2019-04-24 DIAGNOSIS — K21.9 GASTROESOPHAGEAL REFLUX DISEASE WITHOUT ESOPHAGITIS: ICD-10-CM

## 2019-04-24 RX ORDER — PROMETHAZINE HYDROCHLORIDE 12.5 MG/1
12.5 TABLET ORAL DAILY PRN
Qty: 30 TABLET | Refills: 0 | OUTPATIENT
Start: 2019-04-24

## 2019-04-24 RX ORDER — FAMOTIDINE 20 MG/1
20 TABLET, FILM COATED ORAL 2 TIMES DAILY
Qty: 60 TABLET | Refills: 1 | Status: SHIPPED | OUTPATIENT
Start: 2019-04-24 | End: 2019-08-07 | Stop reason: ALTCHOICE

## 2019-04-24 RX ORDER — AZITHROMYCIN 250 MG/1
TABLET, FILM COATED ORAL
Qty: 6 TABLET | Refills: 0 | OUTPATIENT
Start: 2019-04-24

## 2019-04-24 NOTE — TELEPHONE ENCOUNTER
Refill request:      promethazine (PHENERGAN) 12.5 MG tablet [[id number]    famotidine (PEPCID) 20 MG tablet [[id number]    Patient states she needs a antibiotic called in for UTI . Please call to advise. [address]  [place]. - [place] [phone] - F [phone] call to advise.

## 2019-04-29 DIAGNOSIS — R11.0 NAUSEA: ICD-10-CM

## 2019-04-29 RX ORDER — PROMETHAZINE HYDROCHLORIDE 12.5 MG/1
12.5 TABLET ORAL DAILY PRN
Qty: 30 TABLET | Refills: 0 | OUTPATIENT
Start: 2019-04-29

## 2019-05-02 ENCOUNTER — TELEPHONE (OUTPATIENT)
Dept: INTERNAL MEDICINE CLINIC | Age: 84
End: 2019-05-02

## 2019-05-02 NOTE — TELEPHONE ENCOUNTER
Pt would like to know if she can get a refill on nitrofurantoin, macrocrystal-monohydrate, (MACROBID) 100 MG capsule due to her bladder and kidney issues. Pt also would like to know if she can get an order for PT states she is in pain and needs assistant on how to move. [address] [facility] [place] [phone] [facility] [phone]

## 2019-05-03 ENCOUNTER — TELEPHONE (OUTPATIENT)
Dept: INTERNAL MEDICINE CLINIC | Age: 84
End: 2019-05-03

## 2019-05-03 DIAGNOSIS — R11.0 NAUSEA: ICD-10-CM

## 2019-05-03 RX ORDER — PROMETHAZINE HYDROCHLORIDE 12.5 MG/1
12.5 TABLET ORAL DAILY PRN
Qty: 30 TABLET | Refills: 0 | Status: CANCELLED | OUTPATIENT
Start: 2019-05-03

## 2019-05-03 NOTE — TELEPHONE ENCOUNTER
The Urologist , Dr Erik Mcgowan or his associate , would help her with her bladder.  Not a therapist.

## 2019-05-03 NOTE — TELEPHONE ENCOUNTER
She would need a urine culture to see if she has an infection. She can get physical therapy. She has had [facility] out giving her therapy in the past. Should we calol them again? She is not due for promethazine till next week. I want her to be limited to only one of those pills a day.

## 2019-05-03 NOTE — TELEPHONE ENCOUNTER
Spoke to pt gave MD advise. Pt stated doesn't matter who as long as it can be for bladder. [facility] back to [facility]? She stated the nurses that did come out there didn't like coming out that far       Pt stated has burning of the bladder and it was under control. Pt stated unable to get out to get a urine culture. Lazaro Otoole

## 2019-05-07 RX ORDER — HYDROCHLOROTHIAZIDE 25 MG/1
25 TABLET ORAL DAILY
Qty: 30 TABLET | Refills: 3 | Status: SHIPPED | OUTPATIENT
Start: 2019-05-07 | End: 2019-05-14 | Stop reason: SDUPTHER

## 2019-05-08 ENCOUNTER — TELEPHONE (OUTPATIENT)
Dept: INTERNAL MEDICINE CLINIC | Age: 84
End: 2019-05-08

## 2019-05-08 DIAGNOSIS — N30.00 ACUTE CYSTITIS WITHOUT HEMATURIA: ICD-10-CM

## 2019-05-08 DIAGNOSIS — R11.0 NAUSEA: ICD-10-CM

## 2019-05-08 NOTE — TELEPHONE ENCOUNTER
Pt complaining of severe pain requesting a  Lidocaine patch.       Pt wants a refill  nitrofurantoin, macrocrystal-monohydrate, (MACROBID) 100 MG capsule      promethazine (PHENERGAN) 12.5 MG tablet

## 2019-05-09 RX ORDER — NITROFURANTOIN 25; 75 MG/1; MG/1
100 CAPSULE ORAL 2 TIMES DAILY
Qty: 14 CAPSULE | Refills: 0 | Status: SHIPPED | OUTPATIENT
Start: 2019-05-09 | End: 2019-05-16

## 2019-05-09 RX ORDER — LANSOPRAZOLE 30 MG/1
CAPSULE, DELAYED RELEASE ORAL
Qty: 60 CAPSULE | Refills: 1 | Status: SHIPPED | OUTPATIENT
Start: 2019-05-09 | End: 2019-05-14 | Stop reason: SDUPTHER

## 2019-05-09 RX ORDER — PROMETHAZINE HYDROCHLORIDE 12.5 MG/1
12.5 TABLET ORAL DAILY PRN
Qty: 30 TABLET | Refills: 0 | Status: SHIPPED | OUTPATIENT
Start: 2019-05-09 | End: 2019-06-03 | Stop reason: SDUPTHER

## 2019-05-09 NOTE — TELEPHONE ENCOUNTER
I do not want to order the patch , but I did send the other medicine. She should try aspercream with lidocaine.

## 2019-05-14 RX ORDER — HYDROCHLOROTHIAZIDE 25 MG/1
TABLET ORAL
Qty: 30 TABLET | Refills: 0 | Status: SHIPPED | OUTPATIENT
Start: 2019-05-14 | End: 2020-04-08

## 2019-05-14 RX ORDER — LANSOPRAZOLE 30 MG/1
CAPSULE, DELAYED RELEASE ORAL
Qty: 60 CAPSULE | Refills: 0 | Status: SHIPPED | OUTPATIENT
Start: 2019-05-14 | End: 2019-07-05

## 2019-05-14 RX ORDER — TRAZODONE HYDROCHLORIDE 50 MG/1
TABLET ORAL
Qty: 30 TABLET | Refills: 0 | Status: SHIPPED | OUTPATIENT
Start: 2019-05-14 | End: 2019-08-07

## 2019-05-30 RX ORDER — TRAZODONE HYDROCHLORIDE 50 MG/1
TABLET ORAL
Qty: 30 TABLET | Refills: 4 | Status: SHIPPED | OUTPATIENT
Start: 2019-05-30 | End: 2019-10-26 | Stop reason: SDUPTHER

## 2019-06-03 DIAGNOSIS — R11.0 NAUSEA: ICD-10-CM

## 2019-06-04 ENCOUNTER — TELEPHONE (OUTPATIENT)
Dept: INTERNAL MEDICINE CLINIC | Age: 84
End: 2019-06-04

## 2019-06-04 RX ORDER — METOPROLOL TARTRATE 50 MG/1
50 TABLET, FILM COATED ORAL 2 TIMES DAILY
Qty: 180 TABLET | Refills: 0 | Status: SHIPPED | OUTPATIENT
Start: 2019-06-04 | End: 2019-11-13 | Stop reason: SDUPTHER

## 2019-06-11 RX ORDER — PROMETHAZINE HYDROCHLORIDE 12.5 MG/1
12.5 TABLET ORAL DAILY PRN
Qty: 30 TABLET | Refills: 0 | Status: SHIPPED | OUTPATIENT
Start: 2019-06-11 | End: 2019-07-05 | Stop reason: SDUPTHER

## 2019-06-24 ENCOUNTER — TELEPHONE (OUTPATIENT)
Dept: INTERNAL MEDICINE CLINIC | Age: 84
End: 2019-06-24

## 2019-06-24 DIAGNOSIS — N30.00 ACUTE CYSTITIS WITHOUT HEMATURIA: Primary | ICD-10-CM

## 2019-06-24 NOTE — TELEPHONE ENCOUNTER
Patient said she has cloudy urine and it started about 3-4 days ago. She also states she has uncontrollable urination. Please call she would like to know what to do next.

## 2019-06-27 DIAGNOSIS — N30.00 ACUTE CYSTITIS WITHOUT HEMATURIA: ICD-10-CM

## 2019-06-27 LAB
BACTERIA: ABNORMAL /HPF
BILIRUBIN URINE: NEGATIVE
BLOOD, URINE: ABNORMAL
CLARITY: ABNORMAL
COLOR: ABNORMAL
COMMENT UA: ABNORMAL
EPITHELIAL CELLS, UA: 5 /HPF (ref 0–5)
GLUCOSE URINE: NEGATIVE MG/DL
KETONES, URINE: NEGATIVE MG/DL
LEUKOCYTE ESTERASE, URINE: ABNORMAL
MICROSCOPIC EXAMINATION: YES
NITRITE, URINE: POSITIVE
PH UA: 6 (ref 5–8)
PROTEIN UA: >=300 MG/DL
RBC UA: >100 /HPF (ref 0–2)
SPECIFIC GRAVITY UA: 1.02 (ref 1–1.03)
URINE TYPE: ABNORMAL
UROBILINOGEN, URINE: 0.2 E.U./DL
WBC UA: >900 /HPF (ref 0–5)

## 2019-06-30 LAB
ORGANISM: ABNORMAL
URINE CULTURE, ROUTINE: ABNORMAL
URINE CULTURE, ROUTINE: ABNORMAL

## 2019-07-01 ENCOUNTER — TELEPHONE (OUTPATIENT)
Dept: INTERNAL MEDICINE CLINIC | Age: 84
End: 2019-07-01

## 2019-07-01 DIAGNOSIS — N30.00 ACUTE CYSTITIS WITHOUT HEMATURIA: Primary | ICD-10-CM

## 2019-07-01 RX ORDER — DOXYCYCLINE HYCLATE 100 MG
100 TABLET ORAL 2 TIMES DAILY WITH MEALS
Qty: 14 TABLET | Refills: 0 | Status: SHIPPED | OUTPATIENT
Start: 2019-07-01 | End: 2019-07-08 | Stop reason: SDUPTHER

## 2019-07-05 DIAGNOSIS — R11.0 NAUSEA: ICD-10-CM

## 2019-07-05 RX ORDER — PROMETHAZINE HYDROCHLORIDE 12.5 MG/1
12.5 TABLET ORAL DAILY PRN
Qty: 30 TABLET | Refills: 0 | Status: SHIPPED | OUTPATIENT
Start: 2019-07-05 | End: 2019-07-30 | Stop reason: SDUPTHER

## 2019-07-05 RX ORDER — LANSOPRAZOLE 30 MG/1
CAPSULE, DELAYED RELEASE ORAL
Qty: 60 CAPSULE | Refills: 0 | Status: SHIPPED | OUTPATIENT
Start: 2019-07-05 | End: 2019-08-05 | Stop reason: SDUPTHER

## 2019-07-08 ENCOUNTER — TELEPHONE (OUTPATIENT)
Dept: INTERNAL MEDICINE CLINIC | Age: 84
End: 2019-07-08

## 2019-07-08 DIAGNOSIS — N30.00 ACUTE CYSTITIS WITHOUT HEMATURIA: ICD-10-CM

## 2019-07-08 RX ORDER — DOXYCYCLINE HYCLATE 100 MG
100 TABLET ORAL 2 TIMES DAILY WITH MEALS
Qty: 14 TABLET | Refills: 0 | Status: SHIPPED | OUTPATIENT
Start: 2019-07-08 | End: 2019-07-19 | Stop reason: SDUPTHER

## 2019-07-08 NOTE — TELEPHONE ENCOUNTER
Pt requesting a refill on doxycycline hyclate (VIBRA-TABS) 100 MG tablet     Pt states she having sharp pains in her left leg as well as swollen and would like to know if she can get some type of topical ointment.

## 2019-07-10 RX ORDER — SITAGLIPTIN 50 MG/1
TABLET, FILM COATED ORAL
Qty: 30 TABLET | Refills: 5 | Status: SHIPPED | OUTPATIENT
Start: 2019-07-10 | End: 2019-11-21 | Stop reason: SDUPTHER

## 2019-07-19 DIAGNOSIS — N30.00 ACUTE CYSTITIS WITHOUT HEMATURIA: ICD-10-CM

## 2019-07-19 RX ORDER — DOXYCYCLINE HYCLATE 100 MG
100 TABLET ORAL 2 TIMES DAILY WITH MEALS
Qty: 14 TABLET | Refills: 0 | Status: SHIPPED | OUTPATIENT
Start: 2019-07-19 | End: 2019-07-26

## 2019-07-24 ENCOUNTER — TELEPHONE (OUTPATIENT)
Dept: INTERNAL MEDICINE CLINIC | Age: 84
End: 2019-07-24

## 2019-07-26 ENCOUNTER — APPOINTMENT (OUTPATIENT)
Dept: CT IMAGING | Age: 84
End: 2019-07-26
Payer: MEDICARE

## 2019-07-26 ENCOUNTER — HOSPITAL ENCOUNTER (EMERGENCY)
Age: 84
Discharge: HOME | End: 2019-07-26
Attending: EMERGENCY MEDICINE
Payer: MEDICARE

## 2019-07-26 ENCOUNTER — APPOINTMENT (OUTPATIENT)
Dept: GENERAL RADIOLOGY | Age: 84
End: 2019-07-26
Payer: MEDICARE

## 2019-07-26 VITALS
BODY MASS INDEX: 44.3 KG/M2 | TEMPERATURE: 98.2 F | WEIGHT: 250 LBS | HEART RATE: 86 BPM | HEIGHT: 63 IN | OXYGEN SATURATION: 96 % | DIASTOLIC BLOOD PRESSURE: 89 MMHG | RESPIRATION RATE: 20 BRPM | SYSTOLIC BLOOD PRESSURE: 138 MMHG

## 2019-07-26 DIAGNOSIS — N39.0 URINARY TRACT INFECTION WITH HEMATURIA, SITE UNSPECIFIED: Primary | ICD-10-CM

## 2019-07-26 DIAGNOSIS — R31.9 URINARY TRACT INFECTION WITH HEMATURIA, SITE UNSPECIFIED: Primary | ICD-10-CM

## 2019-07-26 LAB
ANION GAP SERPL CALCULATED.3IONS-SCNC: 12 MMOL/L (ref 3–16)
BACTERIA: ABNORMAL /HPF
BASOPHILS ABSOLUTE: 0 K/UL (ref 0–0.2)
BASOPHILS RELATIVE PERCENT: 0.6 %
BILIRUBIN URINE: NEGATIVE
BLOOD, URINE: ABNORMAL
BUN BLDV-MCNC: 17 MG/DL (ref 7–20)
CALCIUM SERPL-MCNC: 10.1 MG/DL (ref 8.3–10.6)
CHLORIDE BLD-SCNC: 103 MMOL/L (ref 99–110)
CLARITY: ABNORMAL
CO2: 25 MMOL/L (ref 21–32)
COLOR: YELLOW
CREAT SERPL-MCNC: 1 MG/DL (ref 0.6–1.2)
EKG ATRIAL RATE: 88 BPM
EKG DIAGNOSIS: NORMAL
EKG P AXIS: 68 DEGREES
EKG P-R INTERVAL: 146 MS
EKG Q-T INTERVAL: 360 MS
EKG QRS DURATION: 76 MS
EKG QTC CALCULATION (BAZETT): 435 MS
EKG R AXIS: 11 DEGREES
EKG T AXIS: 59 DEGREES
EKG VENTRICULAR RATE: 88 BPM
EOSINOPHILS ABSOLUTE: 0.1 K/UL (ref 0–0.6)
EOSINOPHILS RELATIVE PERCENT: 1.4 %
GFR AFRICAN AMERICAN: >60
GFR NON-AFRICAN AMERICAN: 53
GLUCOSE BLD-MCNC: 113 MG/DL (ref 70–99)
GLUCOSE BLD-MCNC: 123 MG/DL (ref 70–99)
GLUCOSE URINE: NEGATIVE MG/DL
HCT VFR BLD CALC: 35.6 % (ref 36–48)
HEMOGLOBIN: 10.9 G/DL (ref 12–16)
KETONES, URINE: NEGATIVE MG/DL
LEUKOCYTE ESTERASE, URINE: ABNORMAL
LYMPHOCYTES ABSOLUTE: 2.8 K/UL (ref 1–5.1)
LYMPHOCYTES RELATIVE PERCENT: 39.1 %
MCH RBC QN AUTO: 24.8 PG (ref 26–34)
MCHC RBC AUTO-ENTMCNC: 30.5 G/DL (ref 31–36)
MCV RBC AUTO: 81.4 FL (ref 80–100)
MICROSCOPIC EXAMINATION: YES
MONOCYTES ABSOLUTE: 0.4 K/UL (ref 0–1.3)
MONOCYTES RELATIVE PERCENT: 6.2 %
NEUTROPHILS ABSOLUTE: 3.8 K/UL (ref 1.7–7.7)
NEUTROPHILS RELATIVE PERCENT: 52.7 %
NITRITE, URINE: POSITIVE
PDW BLD-RTO: 13.6 % (ref 12.4–15.4)
PERFORMED ON: ABNORMAL
PH UA: 6 (ref 5–8)
PLATELET # BLD: 185 K/UL (ref 135–450)
PMV BLD AUTO: 8.3 FL (ref 5–10.5)
POTASSIUM REFLEX MAGNESIUM: 4.8 MMOL/L (ref 3.5–5.1)
PRO-BNP: 14 PG/ML (ref 0–449)
PROTEIN UA: 30 MG/DL
RBC # BLD: 4.38 M/UL (ref 4–5.2)
RBC UA: ABNORMAL /HPF (ref 0–2)
SODIUM BLD-SCNC: 140 MMOL/L (ref 136–145)
SPECIFIC GRAVITY UA: 1.02 (ref 1–1.03)
TROPONIN: <0.01 NG/ML
URINE TYPE: ABNORMAL
UROBILINOGEN, URINE: 0.2 E.U./DL
WBC # BLD: 7.2 K/UL (ref 4–11)
WBC UA: ABNORMAL /HPF (ref 0–5)

## 2019-07-26 PROCEDURE — 83880 ASSAY OF NATRIURETIC PEPTIDE: CPT

## 2019-07-26 PROCEDURE — 93005 ELECTROCARDIOGRAM TRACING: CPT | Performed by: EMERGENCY MEDICINE

## 2019-07-26 PROCEDURE — 85025 COMPLETE CBC W/AUTO DIFF WBC: CPT

## 2019-07-26 PROCEDURE — 80048 BASIC METABOLIC PNL TOTAL CA: CPT

## 2019-07-26 PROCEDURE — 6370000000 HC RX 637 (ALT 250 FOR IP): Performed by: PHYSICIAN ASSISTANT

## 2019-07-26 PROCEDURE — 99285 EMERGENCY DEPT VISIT HI MDM: CPT

## 2019-07-26 PROCEDURE — 70450 CT HEAD/BRAIN W/O DYE: CPT

## 2019-07-26 PROCEDURE — 71046 X-RAY EXAM CHEST 2 VIEWS: CPT

## 2019-07-26 PROCEDURE — 84484 ASSAY OF TROPONIN QUANT: CPT

## 2019-07-26 PROCEDURE — 2500000003 HC RX 250 WO HCPCS: Performed by: PHYSICIAN ASSISTANT

## 2019-07-26 PROCEDURE — 96375 TX/PRO/DX INJ NEW DRUG ADDON: CPT

## 2019-07-26 PROCEDURE — 36415 COLL VENOUS BLD VENIPUNCTURE: CPT

## 2019-07-26 PROCEDURE — 6360000002 HC RX W HCPCS: Performed by: PHYSICIAN ASSISTANT

## 2019-07-26 PROCEDURE — 96374 THER/PROPH/DIAG INJ IV PUSH: CPT

## 2019-07-26 PROCEDURE — 81001 URINALYSIS AUTO W/SCOPE: CPT

## 2019-07-26 RX ORDER — ONDANSETRON 2 MG/ML
4 INJECTION INTRAMUSCULAR; INTRAVENOUS EVERY 6 HOURS PRN
Status: DISCONTINUED | OUTPATIENT
Start: 2019-07-26 | End: 2019-07-27 | Stop reason: HOSPADM

## 2019-07-26 RX ORDER — CEPHALEXIN 500 MG/1
500 CAPSULE ORAL 4 TIMES DAILY
Qty: 28 CAPSULE | Refills: 0 | Status: SHIPPED | OUTPATIENT
Start: 2019-07-26 | End: 2019-08-02

## 2019-07-26 RX ORDER — CEPHALEXIN 500 MG/1
500 CAPSULE ORAL ONCE
Status: COMPLETED | OUTPATIENT
Start: 2019-07-26 | End: 2019-07-26

## 2019-07-26 RX ORDER — KETOROLAC TROMETHAMINE 30 MG/ML
15 INJECTION, SOLUTION INTRAMUSCULAR; INTRAVENOUS ONCE
Status: COMPLETED | OUTPATIENT
Start: 2019-07-26 | End: 2019-07-26

## 2019-07-26 RX ADMIN — ONDANSETRON 4 MG: 2 INJECTION INTRAMUSCULAR; INTRAVENOUS at 17:53

## 2019-07-26 RX ADMIN — CEPHALEXIN 500 MG: 500 CAPSULE ORAL at 18:42

## 2019-07-26 RX ADMIN — KETOROLAC TROMETHAMINE 15 MG: 30 INJECTION, SOLUTION INTRAMUSCULAR at 21:19

## 2019-07-26 RX ADMIN — FAMOTIDINE 20 MG: 10 INJECTION, SOLUTION INTRAVENOUS at 20:01

## 2019-07-26 ASSESSMENT — PAIN DESCRIPTION - PAIN TYPE: TYPE: ACUTE PAIN

## 2019-07-26 ASSESSMENT — PAIN DESCRIPTION - DESCRIPTORS: DESCRIPTORS: ACHING

## 2019-07-26 ASSESSMENT — PAIN DESCRIPTION - FREQUENCY: FREQUENCY: CONTINUOUS

## 2019-07-26 ASSESSMENT — PAIN DESCRIPTION - LOCATION: LOCATION: GENERALIZED

## 2019-07-26 ASSESSMENT — PAIN SCALES - GENERAL: PAINLEVEL_OUTOF10: 9

## 2019-07-26 NOTE — ED NOTES
Repositioned patient and applied a towel behind patient's neck for comfort. Provided a cup of water for patient to drink as patient stated she had a dry mouth.      Kayode Al RN  07/26/19 [id number]

## 2019-07-26 NOTE — ED PROVIDER NOTES
other surgical history (11/27/2017); Upper gastrointestinal endoscopy (N/A, 7/17/2018); and pr lap,cholecystectomy/graph (N/A, 9/11/2018). Her family history includes Cancer in her maternal grandmother; Diabetes in her mother; Heart Disease in her mother. She reports that she quit smoking about 49 years ago. She has never used smokeless tobacco. She reports that she does not drink alcohol or use drugs. Medications     Previous Medications    ACETAMINOPHEN (TYLENOL) 500 MG TABLET    Take 500 mg by mouth every 6 hours as needed for Pain    BLOOD GLUCOSE MONITORING SUPPL KIT    Tests daily    BLOOD GLUCOSE TEST STRIPS (FREESTYLE TEST STRIPS) STRIP    1 each by In Vitro route 2 times daily    CHOLECALCIFEROL (VITAMIN D3) 400 UNITS CAPS    Take by mouth daily    DICLOFENAC SODIUM 1 % GEL    Apply 2 g topically 4 times daily as needed for Pain    DOXYCYCLINE HYCLATE (VIBRA-TABS) 100 MG TABLET    Take 1 tablet by mouth 2 times daily (with meals) for 7 days    FAMOTIDINE (PEPCID) 20 MG TABLET    Take 1 tablet by mouth 2 times daily    GLUCOSE MONITORING KIT (FREESTYLE) MONITORING KIT    1 kit by Does not apply route daily    HYDROCHLOROTHIAZIDE (HYDRODIURIL) 25 MG TABLET    TAKE ONE TABLET BY MOUTH ONCE A DAY. JANUVIA 50 MG TABLET    1 TABLET DAILY    LANCETS MISC    1 each by Does not apply route daily    LANSOPRAZOLE (PREVACID) 30 MG DELAYED RELEASE CAPSULE    TAKE 1 CAPSULE BY MOUTH TWO TIMES A DAY    LEVOTHYROXINE (SYNTHROID) 100 MCG TABLET    1 TABLET EVERY DAY    METOPROLOL TARTRATE (LOPRESSOR) 50 MG TABLET    Take 1 tablet by mouth 2 times daily    MIRTAZAPINE (REMERON) 7.5 MG TABLET    Take 1 tablet by mouth nightly    PROMETHAZINE (PHENERGAN) 12.5 MG TABLET    Take 1 tablet by mouth daily as needed for Nausea    PYRIDOXINE HCL (VITAMIN B-6) 50 MG TABLET    Take 50 mg by mouth daily.     SPIRONOLACTONE (ALDACTONE) 25 MG TABLET    Take 1 tablet by mouth 2 times daily    SUPER BIOTIN PO    Take 1,000 mcg by

## 2019-07-30 DIAGNOSIS — R11.0 NAUSEA: ICD-10-CM

## 2019-07-30 RX ORDER — PROMETHAZINE HYDROCHLORIDE 12.5 MG/1
12.5 TABLET ORAL DAILY PRN
Qty: 30 TABLET | Refills: 0 | Status: SHIPPED | OUTPATIENT
Start: 2019-07-30 | End: 2019-08-12 | Stop reason: SDUPTHER

## 2019-08-02 ENCOUNTER — OFFICE VISIT (OUTPATIENT)
Dept: INTERNAL MEDICINE CLINIC | Age: 84
End: 2019-08-02
Payer: MEDICARE

## 2019-08-02 VITALS
SYSTOLIC BLOOD PRESSURE: 128 MMHG | HEART RATE: 100 BPM | BODY MASS INDEX: 42.16 KG/M2 | WEIGHT: 238 LBS | DIASTOLIC BLOOD PRESSURE: 82 MMHG | OXYGEN SATURATION: 97 %

## 2019-08-02 DIAGNOSIS — E11.9 TYPE 2 DIABETES MELLITUS WITHOUT COMPLICATION, WITHOUT LONG-TERM CURRENT USE OF INSULIN (HCC): ICD-10-CM

## 2019-08-02 DIAGNOSIS — E66.01 MORBID OBESITY WITH BMI OF 40.0-44.9, ADULT (HCC): ICD-10-CM

## 2019-08-02 DIAGNOSIS — E66.9 DIABETES MELLITUS TYPE 2 IN OBESE (HCC): ICD-10-CM

## 2019-08-02 DIAGNOSIS — N30.00 ACUTE CYSTITIS WITHOUT HEMATURIA: Primary | ICD-10-CM

## 2019-08-02 DIAGNOSIS — L30.9 DERMATITIS: ICD-10-CM

## 2019-08-02 DIAGNOSIS — R26.81 UNSTEADY GAIT: ICD-10-CM

## 2019-08-02 DIAGNOSIS — D75.A G6PD DEFICIENCY: ICD-10-CM

## 2019-08-02 DIAGNOSIS — R10.32 LEFT LOWER QUADRANT PAIN: ICD-10-CM

## 2019-08-02 DIAGNOSIS — E11.69 DIABETES MELLITUS TYPE 2 IN OBESE (HCC): ICD-10-CM

## 2019-08-02 PROCEDURE — 4040F PNEUMOC VAC/ADMIN/RCVD: CPT | Performed by: INTERNAL MEDICINE

## 2019-08-02 PROCEDURE — 99214 OFFICE O/P EST MOD 30 MIN: CPT | Performed by: INTERNAL MEDICINE

## 2019-08-02 PROCEDURE — G8399 PT W/DXA RESULTS DOCUMENT: HCPCS | Performed by: INTERNAL MEDICINE

## 2019-08-02 PROCEDURE — 1123F ACP DISCUSS/DSCN MKR DOCD: CPT | Performed by: INTERNAL MEDICINE

## 2019-08-02 PROCEDURE — G8417 CALC BMI ABV UP PARAM F/U: HCPCS | Performed by: INTERNAL MEDICINE

## 2019-08-02 PROCEDURE — G8427 DOCREV CUR MEDS BY ELIG CLIN: HCPCS | Performed by: INTERNAL MEDICINE

## 2019-08-02 PROCEDURE — 1036F TOBACCO NON-USER: CPT | Performed by: INTERNAL MEDICINE

## 2019-08-02 PROCEDURE — 1090F PRES/ABSN URINE INCON ASSESS: CPT | Performed by: INTERNAL MEDICINE

## 2019-08-02 RX ORDER — AMMONIUM LACTATE 12 G/100G
CREAM TOPICAL
Qty: 385 G | Refills: 4 | Status: SHIPPED | OUTPATIENT
Start: 2019-08-02 | End: 2019-09-01

## 2019-08-02 ASSESSMENT — ENCOUNTER SYMPTOMS
EYE REDNESS: 0
BACK PAIN: 0
NAUSEA: 0
ABDOMINAL PAIN: 1
CHEST TIGHTNESS: 0
COUGH: 0
SHORTNESS OF BREATH: 0

## 2019-08-05 ENCOUNTER — TELEPHONE (OUTPATIENT)
Dept: INTERNAL MEDICINE CLINIC | Age: 84
End: 2019-08-05

## 2019-08-06 RX ORDER — LANSOPRAZOLE 30 MG/1
CAPSULE, DELAYED RELEASE ORAL
Qty: 60 CAPSULE | Refills: 0 | Status: SHIPPED | OUTPATIENT
Start: 2019-08-06 | End: 2019-09-03 | Stop reason: SDUPTHER

## 2019-08-07 NOTE — TELEPHONE ENCOUNTER
Pt was read  note. Pt stated that was on a routine antibiotic, but did not know the name of it. Wanted to know if should be taking it?   Please advise

## 2019-08-12 DIAGNOSIS — R11.0 NAUSEA: ICD-10-CM

## 2019-08-12 RX ORDER — PROMETHAZINE HYDROCHLORIDE 12.5 MG/1
12.5 TABLET ORAL DAILY PRN
Qty: 30 TABLET | Refills: 0 | Status: SHIPPED | OUTPATIENT
Start: 2019-08-12 | End: 2019-09-03 | Stop reason: SDUPTHER

## 2019-08-26 ENCOUNTER — HOSPITAL ENCOUNTER (OUTPATIENT)
Dept: ULTRASOUND IMAGING | Age: 84
Discharge: HOME | End: 2019-08-26
Payer: MEDICARE

## 2019-08-26 DIAGNOSIS — R31.9 URINARY TRACT INFECTION WITH HEMATURIA, SITE UNSPECIFIED: ICD-10-CM

## 2019-08-26 DIAGNOSIS — D41.00 NEOPLASM OF UNCERTAIN BEHAVIOR OF KIDNEY, UNSPECIFIED LATERALITY: ICD-10-CM

## 2019-08-26 DIAGNOSIS — R31.29 OTHER MICROSCOPIC HEMATURIA: ICD-10-CM

## 2019-08-26 DIAGNOSIS — R39.15 URGENCY OF URINATION: ICD-10-CM

## 2019-08-26 DIAGNOSIS — N39.42 INCONTINENCE WITHOUT SENSORY AWARENESS: ICD-10-CM

## 2019-08-26 DIAGNOSIS — I16.9 HYPERTENSIVE CRISIS: ICD-10-CM

## 2019-08-26 DIAGNOSIS — N35.92 STRICTURE OF FEMALE URETHRA, UNSPECIFIED STRICTURE TYPE: ICD-10-CM

## 2019-08-26 DIAGNOSIS — N39.0 URINARY TRACT INFECTION WITH HEMATURIA, SITE UNSPECIFIED: ICD-10-CM

## 2019-08-26 PROCEDURE — 76770 US EXAM ABDO BACK WALL COMP: CPT

## 2019-09-03 DIAGNOSIS — R11.0 NAUSEA: ICD-10-CM

## 2019-09-03 RX ORDER — PROMETHAZINE HYDROCHLORIDE 12.5 MG/1
12.5 TABLET ORAL DAILY PRN
Qty: 30 TABLET | Refills: 0 | Status: SHIPPED | OUTPATIENT
Start: 2019-09-03 | End: 2019-10-11 | Stop reason: SDUPTHER

## 2019-09-03 RX ORDER — HYDROCHLOROTHIAZIDE 25 MG/1
25 TABLET ORAL DAILY
Qty: 30 TABLET | Refills: 3 | Status: SHIPPED | OUTPATIENT
Start: 2019-09-03 | End: 2019-12-30

## 2019-09-03 RX ORDER — LANSOPRAZOLE 30 MG/1
CAPSULE, DELAYED RELEASE ORAL
Qty: 60 CAPSULE | Refills: 0 | Status: SHIPPED | OUTPATIENT
Start: 2019-09-03 | End: 2019-10-11 | Stop reason: SDUPTHER

## 2019-10-04 DIAGNOSIS — Z98.818 OTHER DENTAL PROCEDURE STATUS: Primary | ICD-10-CM

## 2019-10-04 RX ORDER — AZITHROMYCIN 250 MG/1
250 TABLET, FILM COATED ORAL SEE ADMIN INSTRUCTIONS
Qty: 6 TABLET | Refills: 0 | Status: SHIPPED | OUTPATIENT
Start: 2019-10-04 | End: 2019-12-04

## 2019-10-11 DIAGNOSIS — R11.0 NAUSEA: ICD-10-CM

## 2019-10-11 RX ORDER — LANSOPRAZOLE 30 MG/1
CAPSULE, DELAYED RELEASE ORAL
Qty: 60 CAPSULE | Refills: 0 | Status: SHIPPED | OUTPATIENT
Start: 2019-10-11 | End: 2019-11-13 | Stop reason: SDUPTHER

## 2019-10-11 RX ORDER — PROMETHAZINE HYDROCHLORIDE 12.5 MG/1
12.5 TABLET ORAL DAILY PRN
Qty: 30 TABLET | Refills: 0 | Status: SHIPPED | OUTPATIENT
Start: 2019-10-11 | End: 2019-11-19 | Stop reason: SDUPTHER

## 2019-10-16 ENCOUNTER — TELEPHONE (OUTPATIENT)
Dept: INTERNAL MEDICINE CLINIC | Age: 84
End: 2019-10-16

## 2019-10-28 RX ORDER — TRAZODONE HYDROCHLORIDE 50 MG/1
TABLET ORAL
Qty: 30 TABLET | Refills: 4 | Status: SHIPPED | OUTPATIENT
Start: 2019-10-28 | End: 2020-03-31

## 2019-11-05 RX ORDER — SPIRONOLACTONE 25 MG/1
25 TABLET ORAL 2 TIMES DAILY
Qty: 60 TABLET | Refills: 5 | Status: SHIPPED | OUTPATIENT
Start: 2019-11-05 | End: 2020-08-25

## 2019-11-13 RX ORDER — LANSOPRAZOLE 30 MG/1
CAPSULE, DELAYED RELEASE ORAL
Qty: 60 CAPSULE | Refills: 2 | Status: SHIPPED | OUTPATIENT
Start: 2019-11-13 | End: 2020-02-12

## 2019-11-13 RX ORDER — METOPROLOL TARTRATE 50 MG/1
50 TABLET, FILM COATED ORAL 2 TIMES DAILY
Qty: 60 TABLET | Refills: 2 | Status: SHIPPED | OUTPATIENT
Start: 2019-11-13 | End: 2020-03-23

## 2019-11-15 RX ORDER — ACETAMINOPHEN 500 MG
500 TABLET ORAL EVERY 6 HOURS PRN
Qty: 120 TABLET | Refills: 3 | Status: SHIPPED | OUTPATIENT
Start: 2019-11-15 | End: 2020-04-14

## 2019-11-19 ENCOUNTER — NURSE ONLY (OUTPATIENT)
Dept: INTERNAL MEDICINE CLINIC | Age: 84
End: 2019-11-19
Payer: MEDICARE

## 2019-11-19 ENCOUNTER — TELEPHONE (OUTPATIENT)
Dept: INTERNAL MEDICINE CLINIC | Age: 84
End: 2019-11-19

## 2019-11-19 DIAGNOSIS — Z23 NEED FOR INFLUENZA VACCINATION: Primary | ICD-10-CM

## 2019-11-19 DIAGNOSIS — R11.0 NAUSEA: ICD-10-CM

## 2019-11-19 PROCEDURE — 90653 IIV ADJUVANT VACCINE IM: CPT | Performed by: INTERNAL MEDICINE

## 2019-11-19 PROCEDURE — G0008 ADMIN INFLUENZA VIRUS VAC: HCPCS | Performed by: INTERNAL MEDICINE

## 2019-11-19 RX ORDER — PROMETHAZINE HYDROCHLORIDE 12.5 MG/1
12.5 TABLET ORAL DAILY PRN
Qty: 30 TABLET | Refills: 0 | Status: SHIPPED | OUTPATIENT
Start: 2019-11-19 | End: 2019-12-18

## 2019-12-04 ENCOUNTER — OFFICE VISIT (OUTPATIENT)
Dept: INTERNAL MEDICINE CLINIC | Age: 84
End: 2019-12-04
Payer: MEDICARE

## 2019-12-04 VITALS
OXYGEN SATURATION: 98 % | WEIGHT: 200 LBS | HEART RATE: 81 BPM | HEIGHT: 63 IN | BODY MASS INDEX: 35.44 KG/M2 | DIASTOLIC BLOOD PRESSURE: 62 MMHG | SYSTOLIC BLOOD PRESSURE: 120 MMHG

## 2019-12-04 DIAGNOSIS — E66.9 DIABETES MELLITUS TYPE 2 IN OBESE (HCC): Primary | ICD-10-CM

## 2019-12-04 DIAGNOSIS — I27.20 PULMONARY HYPERTENSION (HCC): ICD-10-CM

## 2019-12-04 DIAGNOSIS — F33.41 RECURRENT MAJOR DEPRESSIVE DISORDER IN PARTIAL REMISSION (HCC): ICD-10-CM

## 2019-12-04 DIAGNOSIS — E11.69 DIABETES MELLITUS TYPE 2 IN OBESE (HCC): ICD-10-CM

## 2019-12-04 DIAGNOSIS — E11.69 DIABETES MELLITUS TYPE 2 IN OBESE (HCC): Primary | ICD-10-CM

## 2019-12-04 DIAGNOSIS — E66.9 DIABETES MELLITUS TYPE 2 IN OBESE (HCC): ICD-10-CM

## 2019-12-04 LAB
ANION GAP SERPL CALCULATED.3IONS-SCNC: 17 MMOL/L (ref 3–16)
BUN BLDV-MCNC: 20 MG/DL (ref 7–20)
CALCIUM SERPL-MCNC: 10.2 MG/DL (ref 8.3–10.6)
CHLORIDE BLD-SCNC: 101 MMOL/L (ref 99–110)
CO2: 23 MMOL/L (ref 21–32)
CREAT SERPL-MCNC: 1.1 MG/DL (ref 0.6–1.2)
GFR AFRICAN AMERICAN: 57
GFR NON-AFRICAN AMERICAN: 47
GLUCOSE BLD-MCNC: 149 MG/DL (ref 70–99)
POTASSIUM SERPL-SCNC: 4.7 MMOL/L (ref 3.5–5.1)
SODIUM BLD-SCNC: 141 MMOL/L (ref 136–145)

## 2019-12-04 PROCEDURE — 1123F ACP DISCUSS/DSCN MKR DOCD: CPT | Performed by: INTERNAL MEDICINE

## 2019-12-04 PROCEDURE — G8399 PT W/DXA RESULTS DOCUMENT: HCPCS | Performed by: INTERNAL MEDICINE

## 2019-12-04 PROCEDURE — 1090F PRES/ABSN URINE INCON ASSESS: CPT | Performed by: INTERNAL MEDICINE

## 2019-12-04 PROCEDURE — 99213 OFFICE O/P EST LOW 20 MIN: CPT | Performed by: INTERNAL MEDICINE

## 2019-12-04 PROCEDURE — G8417 CALC BMI ABV UP PARAM F/U: HCPCS | Performed by: INTERNAL MEDICINE

## 2019-12-04 PROCEDURE — 4040F PNEUMOC VAC/ADMIN/RCVD: CPT | Performed by: INTERNAL MEDICINE

## 2019-12-04 PROCEDURE — 1036F TOBACCO NON-USER: CPT | Performed by: INTERNAL MEDICINE

## 2019-12-04 PROCEDURE — G8427 DOCREV CUR MEDS BY ELIG CLIN: HCPCS | Performed by: INTERNAL MEDICINE

## 2019-12-04 PROCEDURE — G8482 FLU IMMUNIZE ORDER/ADMIN: HCPCS | Performed by: INTERNAL MEDICINE

## 2019-12-04 ASSESSMENT — ENCOUNTER SYMPTOMS
SHORTNESS OF BREATH: 0
CHEST TIGHTNESS: 0
EYE REDNESS: 0
COUGH: 0
NAUSEA: 0
ABDOMINAL PAIN: 0
BACK PAIN: 0

## 2019-12-05 LAB
ESTIMATED AVERAGE GLUCOSE: 102.5 MG/DL
HBA1C MFR BLD: 5.2 %

## 2019-12-10 ENCOUNTER — TELEPHONE (OUTPATIENT)
Dept: PRIMARY CARE CLINIC | Age: 84
End: 2019-12-10

## 2019-12-12 DIAGNOSIS — R11.0 NAUSEA: Primary | ICD-10-CM

## 2019-12-12 RX ORDER — ONDANSETRON 4 MG/1
4 TABLET, FILM COATED ORAL EVERY 8 HOURS PRN
Qty: 12 TABLET | Refills: 2 | Status: SHIPPED | OUTPATIENT
Start: 2019-12-12 | End: 2020-04-15

## 2019-12-18 DIAGNOSIS — R11.0 NAUSEA: ICD-10-CM

## 2019-12-18 RX ORDER — PROMETHAZINE HYDROCHLORIDE 12.5 MG/1
12.5 TABLET ORAL DAILY PRN
Qty: 30 TABLET | Refills: 0 | Status: SHIPPED | OUTPATIENT
Start: 2019-12-18 | End: 2020-01-17

## 2019-12-30 ENCOUNTER — TELEPHONE (OUTPATIENT)
Dept: INTERNAL MEDICINE CLINIC | Age: 84
End: 2019-12-30

## 2019-12-30 RX ORDER — HYDROCHLOROTHIAZIDE 25 MG/1
25 TABLET ORAL DAILY
Qty: 30 TABLET | Refills: 3 | Status: SHIPPED | OUTPATIENT
Start: 2019-12-30 | End: 2020-04-29 | Stop reason: SDUPTHER

## 2019-12-30 NOTE — TELEPHONE ENCOUNTER
I am sorry , but the prescription is for 1 daily if needed , NOT FOR 2 OR MORE DAILY, I have told the patient and written it on the prescription instructions. It will not be refilled early.

## 2020-01-16 ENCOUNTER — TELEPHONE (OUTPATIENT)
Dept: PRIMARY CARE CLINIC | Age: 85
End: 2020-01-16

## 2020-01-17 RX ORDER — PROMETHAZINE HYDROCHLORIDE 12.5 MG/1
12.5 TABLET ORAL DAILY PRN
Qty: 30 TABLET | Refills: 0 | Status: SHIPPED | OUTPATIENT
Start: 2020-01-17 | End: 2020-02-12

## 2020-01-24 ENCOUNTER — TELEPHONE (OUTPATIENT)
Dept: INTERNAL MEDICINE CLINIC | Age: 85
End: 2020-01-24

## 2020-02-03 ENCOUNTER — TELEPHONE (OUTPATIENT)
Dept: INTERNAL MEDICINE CLINIC | Age: 85
End: 2020-02-03

## 2020-02-03 RX ORDER — ONDANSETRON 4 MG/1
4 TABLET, FILM COATED ORAL 3 TIMES DAILY PRN
Qty: 15 TABLET | Refills: 0 | Status: SHIPPED | OUTPATIENT
Start: 2020-02-03 | End: 2020-02-27

## 2020-02-12 RX ORDER — LANSOPRAZOLE 30 MG/1
CAPSULE, DELAYED RELEASE ORAL
Qty: 60 CAPSULE | Refills: 2 | Status: SHIPPED | OUTPATIENT
Start: 2020-02-12 | End: 2020-04-29 | Stop reason: SDUPTHER

## 2020-02-12 RX ORDER — PROMETHAZINE HYDROCHLORIDE 12.5 MG/1
12.5 TABLET ORAL DAILY PRN
Qty: 30 TABLET | Refills: 0 | Status: SHIPPED | OUTPATIENT
Start: 2020-02-12 | End: 2020-03-17

## 2020-02-20 ENCOUNTER — TELEPHONE (OUTPATIENT)
Dept: INTERNAL MEDICINE CLINIC | Age: 85
End: 2020-02-20

## 2020-02-27 RX ORDER — ONDANSETRON 4 MG/1
4 TABLET, FILM COATED ORAL 3 TIMES DAILY PRN
Qty: 15 TABLET | Refills: 0 | Status: SHIPPED | OUTPATIENT
Start: 2020-02-27 | End: 2020-03-12

## 2020-03-12 RX ORDER — ONDANSETRON 4 MG/1
4 TABLET, FILM COATED ORAL 3 TIMES DAILY PRN
Qty: 15 TABLET | Refills: 0 | Status: SHIPPED
Start: 2020-03-12 | End: 2020-04-08 | Stop reason: SDUPTHER

## 2020-03-17 RX ORDER — PROMETHAZINE HYDROCHLORIDE 12.5 MG/1
12.5 TABLET ORAL DAILY PRN
Qty: 30 TABLET | Refills: 0 | Status: SHIPPED | OUTPATIENT
Start: 2020-03-17 | End: 2020-04-15

## 2020-03-31 RX ORDER — TRAZODONE HYDROCHLORIDE 50 MG/1
TABLET ORAL
Qty: 30 TABLET | Refills: 4 | Status: SHIPPED | OUTPATIENT
Start: 2020-03-31 | End: 2020-04-29 | Stop reason: SDUPTHER

## 2020-04-08 ENCOUNTER — VIRTUAL VISIT (OUTPATIENT)
Dept: INTERNAL MEDICINE CLINIC | Age: 85
End: 2020-04-08

## 2020-04-08 VITALS
SYSTOLIC BLOOD PRESSURE: 105 MMHG | TEMPERATURE: 97 F | WEIGHT: 200 LBS | HEART RATE: 74 BPM | HEIGHT: 63 IN | DIASTOLIC BLOOD PRESSURE: 73 MMHG | BODY MASS INDEX: 35.44 KG/M2

## 2020-04-08 ASSESSMENT — ENCOUNTER SYMPTOMS
SHORTNESS OF BREATH: 0
COUGH: 0
EYE REDNESS: 0
BACK PAIN: 0
ABDOMINAL PAIN: 0
CHEST TIGHTNESS: 0
NAUSEA: 1
VOMITING: 0

## 2020-04-08 NOTE — PROGRESS NOTES
Subjective:      Patient ID: Hayder Frias is a 80 y.o. female    Chief Complaint   Patient presents with    Diabetes     4 month f/u       Diabetes   She presents for her follow-up diabetic visit. She has type 2 diabetes mellitus. Her disease course has been stable. Pertinent negatives for hypoglycemia include no dizziness, headaches, hunger, nervousness/anxiousness or sweats. Pertinent negatives for diabetes include no chest pain, no fatigue, no polydipsia, no polyuria, no weakness and no weight loss. Symptoms are stable. Current diabetic treatment includes diet. She is compliant with treatment most of the time. Her weight is stable. She never participates in exercise. An ACE inhibitor/angiotensin II receptor blocker is not being taken. Hypertension   This is a chronic problem. The current episode started more than 1 year ago. The problem is controlled. Pertinent negatives include no chest pain, headaches, neck pain, peripheral edema, shortness of breath or sweats. Past treatments include beta blockers and diuretics. The current treatment provides significant improvement. Compliance problems include exercise. Nausea & Vomiting   This is a chronic (Nausea only ) problem. The current episode started more than 1 year ago. The problem occurs 2 to 4 times per day. Associated symptoms include nausea. Pertinent negatives include no abdominal pain, arthralgias, chest pain, congestion, coughing, fatigue, fever, headaches, neck pain, rash, vomiting or weakness. Treatments tried: promethazine  The treatment provided mild relief.        Current Outpatient Medications on File Prior to Visit   Medication Sig Dispense Refill    traZODone (DESYREL) 50 MG tablet 1 TABLET AT BEDTIME FOR SLEEP 30 tablet 4    metoprolol tartrate (LOPRESSOR) 50 MG tablet Take 1 tablet by mouth 2 times daily 60 tablet 11    promethazine (PHENERGAN) 12.5 MG tablet Take 1 tablet by mouth daily as needed for Nausea 30 tablet 0    lansoprazole

## 2020-04-14 RX ORDER — ACETAMINOPHEN 500 MG/1
TABLET ORAL
Qty: 120 TABLET | Refills: 3 | Status: SHIPPED | OUTPATIENT
Start: 2020-04-14 | End: 2020-05-19 | Stop reason: SDUPTHER

## 2020-04-15 RX ORDER — ONDANSETRON 4 MG/1
TABLET, FILM COATED ORAL
Qty: 15 TABLET | Refills: 0 | Status: SHIPPED | OUTPATIENT
Start: 2020-04-15 | End: 2020-04-29 | Stop reason: SDUPTHER

## 2020-04-15 RX ORDER — PROMETHAZINE HYDROCHLORIDE 12.5 MG/1
12.5 TABLET ORAL DAILY PRN
Qty: 30 TABLET | Refills: 0 | Status: SHIPPED | OUTPATIENT
Start: 2020-04-15 | End: 2020-05-19 | Stop reason: SDUPTHER

## 2020-04-29 ENCOUNTER — TELEPHONE (OUTPATIENT)
Dept: INTERNAL MEDICINE CLINIC | Age: 85
End: 2020-04-29

## 2020-04-29 RX ORDER — ONDANSETRON 4 MG/1
TABLET, FILM COATED ORAL
Qty: 15 TABLET | Refills: 3 | Status: SHIPPED | OUTPATIENT
Start: 2020-04-29 | End: 2020-07-28 | Stop reason: SDUPTHER

## 2020-04-29 RX ORDER — LEVOTHYROXINE SODIUM 0.1 MG/1
TABLET ORAL
Qty: 30 TABLET | Refills: 11 | Status: SHIPPED | OUTPATIENT
Start: 2020-04-29 | End: 2021-08-03

## 2020-04-29 RX ORDER — LANSOPRAZOLE 30 MG/1
30 CAPSULE, DELAYED RELEASE ORAL DAILY
Qty: 60 CAPSULE | Refills: 11 | Status: SHIPPED | OUTPATIENT
Start: 2020-04-29 | End: 2020-06-23 | Stop reason: SDUPTHER

## 2020-04-29 RX ORDER — TRAZODONE HYDROCHLORIDE 50 MG/1
TABLET ORAL
Qty: 30 TABLET | Refills: 4 | Status: SHIPPED | OUTPATIENT
Start: 2020-04-29 | End: 2020-08-24 | Stop reason: DRUGHIGH

## 2020-04-29 RX ORDER — HYDROCHLOROTHIAZIDE 25 MG/1
25 TABLET ORAL DAILY
Qty: 30 TABLET | Refills: 11 | Status: SHIPPED | OUTPATIENT
Start: 2020-04-29 | End: 2020-11-24

## 2020-05-11 ENCOUNTER — TELEPHONE (OUTPATIENT)
Dept: INTERNAL MEDICINE CLINIC | Age: 85
End: 2020-05-11

## 2020-05-11 RX ORDER — TRAZODONE HYDROCHLORIDE 100 MG/1
100 TABLET ORAL NIGHTLY
Qty: 30 TABLET | Refills: 1 | Status: SHIPPED | OUTPATIENT
Start: 2020-05-11 | End: 2020-06-12 | Stop reason: SDUPTHER

## 2020-05-11 NOTE — TELEPHONE ENCOUNTER
Medicine sent to her pharmacy.     Orders Placed This Encounter   Medications    traZODone (DESYREL) 100 MG tablet     Sig: Take 1 tablet by mouth nightly (sleep)     Dispense:  30 tablet     Refill:  1

## 2020-05-11 NOTE — TELEPHONE ENCOUNTER
The patient is requesting something for sleep, after a death in her family.  Please advise    [facility] [address] [facility], [address] [facility] [address]

## 2020-05-19 ENCOUNTER — TELEPHONE (OUTPATIENT)
Dept: INTERNAL MEDICINE CLINIC | Age: 85
End: 2020-05-19

## 2020-05-19 RX ORDER — SENNA AND DOCUSATE SODIUM 50; 8.6 MG/1; MG/1
1 TABLET, FILM COATED ORAL DAILY
Qty: 30 TABLET | Refills: 5 | Status: SHIPPED | OUTPATIENT
Start: 2020-05-19 | End: 2021-10-11

## 2020-05-19 RX ORDER — PROMETHAZINE HYDROCHLORIDE 12.5 MG/1
12.5 TABLET ORAL DAILY PRN
Qty: 30 TABLET | Refills: 0 | Status: SHIPPED | OUTPATIENT
Start: 2020-05-19 | End: 2020-06-23

## 2020-05-19 RX ORDER — ACETAMINOPHEN 500 MG
500 TABLET ORAL EVERY 6 HOURS PRN
Qty: 120 TABLET | Refills: 3 | Status: SHIPPED | OUTPATIENT
Start: 2020-05-19 | End: 2020-08-25

## 2020-05-19 NOTE — TELEPHONE ENCOUNTER
Medication Refill:    promethazine (PHENERGAN) 12.5 MG tablet [[id number]    HM PAIN RELIEF EXTRA STRENGTH 500 MG tablet [[id number]    sennosides-docusate sodium (SENOKOT-S) 8.6-50 MG tablet 1 tablet   [[id number]    Tramadol 50 mg - 2 tablets by mouth 3 times a day - 180 tablets (quantity) - She was prescribed this by Dr. Angel Luis Cardenas. She has a prescription for them but can not get to the pharmacy. She states she can teat this prescription up.      Please call to advise     [facility]pharmacy [address] [facility], [address] [place] [address]

## 2020-06-05 ENCOUNTER — TELEPHONE (OUTPATIENT)
Dept: INTERNAL MEDICINE CLINIC | Age: 85
End: 2020-06-05

## 2020-06-12 RX ORDER — TRAZODONE HYDROCHLORIDE 100 MG/1
100 TABLET ORAL NIGHTLY
Qty: 30 TABLET | Refills: 5 | Status: SHIPPED | OUTPATIENT
Start: 2020-06-12 | End: 2020-06-16

## 2020-06-16 RX ORDER — TRAZODONE HYDROCHLORIDE 100 MG/1
100 TABLET ORAL NIGHTLY
Qty: 90 TABLET | Refills: 1 | Status: SHIPPED | OUTPATIENT
Start: 2020-06-16 | End: 2020-08-24 | Stop reason: SDUPTHER

## 2020-06-19 RX ORDER — PROMETHAZINE HYDROCHLORIDE 12.5 MG/1
12.5 TABLET ORAL DAILY PRN
Qty: 30 TABLET | Refills: 0 | OUTPATIENT
Start: 2020-06-19

## 2020-06-22 ENCOUNTER — TELEPHONE (OUTPATIENT)
Dept: INTERNAL MEDICINE CLINIC | Age: 85
End: 2020-06-22

## 2020-06-23 RX ORDER — PROMETHAZINE HYDROCHLORIDE 12.5 MG/1
12.5 TABLET ORAL DAILY PRN
Qty: 30 TABLET | Refills: 0 | Status: SHIPPED | OUTPATIENT
Start: 2020-06-23 | End: 2020-07-28 | Stop reason: SDUPTHER

## 2020-06-23 RX ORDER — LANSOPRAZOLE 30 MG/1
30 CAPSULE, DELAYED RELEASE ORAL DAILY
Qty: 60 CAPSULE | Refills: 11 | Status: SHIPPED | OUTPATIENT
Start: 2020-06-23 | End: 2020-11-10 | Stop reason: SDUPTHER

## 2020-07-28 ENCOUNTER — TELEPHONE (OUTPATIENT)
Dept: INTERNAL MEDICINE CLINIC | Age: 85
End: 2020-07-28

## 2020-07-28 RX ORDER — BIOTIN 5 MG
1 TABLET ORAL DAILY
Qty: 30 TABLET | Refills: 5 | Status: SHIPPED | OUTPATIENT
Start: 2020-07-28 | End: 2022-05-13 | Stop reason: SDUPTHER

## 2020-07-28 RX ORDER — PROMETHAZINE HYDROCHLORIDE 12.5 MG/1
12.5 TABLET ORAL DAILY PRN
Qty: 30 TABLET | Refills: 0 | Status: SHIPPED | OUTPATIENT
Start: 2020-07-28 | End: 2020-08-25

## 2020-07-28 RX ORDER — ONDANSETRON 4 MG/1
TABLET, FILM COATED ORAL
Qty: 15 TABLET | Refills: 3 | Status: SHIPPED | OUTPATIENT
Start: 2020-07-28 | End: 2021-08-16 | Stop reason: ALTCHOICE

## 2020-07-28 NOTE — TELEPHONE ENCOUNTER
Pt requesting refill on promethazine (PHENERGAN) 12.5 MG tablet, ondansetron (ZOFRAN) 4 MG tablet   And biotin 6000 mcg

## 2020-07-30 RX ORDER — PROMETHAZINE HYDROCHLORIDE 12.5 MG/1
12.5 TABLET ORAL DAILY PRN
Qty: 30 TABLET | Refills: 0 | OUTPATIENT
Start: 2020-07-30

## 2020-08-21 RX ORDER — PROMETHAZINE HYDROCHLORIDE 12.5 MG/1
12.5 TABLET ORAL DAILY PRN
Qty: 30 TABLET | Refills: 0 | OUTPATIENT
Start: 2020-08-21

## 2020-08-24 ENCOUNTER — TELEPHONE (OUTPATIENT)
Dept: INTERNAL MEDICINE CLINIC | Age: 85
End: 2020-08-24

## 2020-08-24 RX ORDER — TRAZODONE HYDROCHLORIDE 100 MG/1
100 TABLET ORAL NIGHTLY
Qty: 90 TABLET | Refills: 1 | Status: SHIPPED | OUTPATIENT
Start: 2020-08-24 | End: 2021-01-29 | Stop reason: SDUPTHER

## 2020-08-24 NOTE — TELEPHONE ENCOUNTER
Prescription sent to her [facility] pharmacy.       Orders Placed This Encounter   Medications    traZODone (DESYREL) 100 MG tablet     Sig: Take 1 tablet by mouth nightly (sleep)     Dispense:  90 tablet     Refill:  1

## 2020-08-24 NOTE — TELEPHONE ENCOUNTER
Patient called to request a refill of     traZODone (DESYREL) 100 MG tablet    [facility]/pharmacy [address] [facility], [address] [place] [address] Drive [phone]    Please call and advise.

## 2020-08-25 RX ORDER — SPIRONOLACTONE 25 MG/1
TABLET ORAL
Qty: 180 TABLET | Refills: 1 | Status: SHIPPED | OUTPATIENT
Start: 2020-08-25 | End: 2020-11-10 | Stop reason: SDUPTHER

## 2020-08-25 RX ORDER — TRAMADOL HYDROCHLORIDE 50 MG/1
TABLET ORAL
Qty: 180 TABLET | Refills: 0 | OUTPATIENT
Start: 2020-08-25

## 2020-08-25 RX ORDER — PROMETHAZINE HYDROCHLORIDE 12.5 MG/1
12.5 TABLET ORAL DAILY PRN
Qty: 30 TABLET | Refills: 0 | Status: SHIPPED | OUTPATIENT
Start: 2020-08-25 | End: 2020-10-09

## 2020-08-25 RX ORDER — ACETAMINOPHEN 500 MG/1
TABLET ORAL
Qty: 120 TABLET | Refills: 3 | Status: SHIPPED | OUTPATIENT
Start: 2020-08-25 | End: 2021-10-11

## 2020-08-25 NOTE — TELEPHONE ENCOUNTER
Patient called to request a refill of promethazine (PHENERGAN) 12.5 MG tablet [facility]/pharmacy [address] [facility],  [address] - F [phone]     Please advise.

## 2020-09-25 ENCOUNTER — TELEPHONE (OUTPATIENT)
Dept: INTERNAL MEDICINE CLINIC | Age: 85
End: 2020-09-25

## 2020-09-25 RX ORDER — NITROFURANTOIN 25; 75 MG/1; MG/1
100 CAPSULE ORAL 2 TIMES DAILY
Qty: 14 CAPSULE | Refills: 0 | Status: SHIPPED | OUTPATIENT
Start: 2020-09-25 | End: 2020-10-02

## 2020-09-25 NOTE — TELEPHONE ENCOUNTER
The patient is report burning on urination has history of UTI but she feels like it is worse    [facility], [address] [facility]

## 2020-09-25 NOTE — TELEPHONE ENCOUNTER
Antibiotics sent to her [facility] pharmacy.     Orders Placed This Encounter   Medications    nitrofurantoin, macrocrystal-monohydrate, (MACROBID) 100 MG capsule     Sig: Take 1 capsule by mouth 2 times daily for 7 days     Dispense:  14 capsule     Refill:  0

## 2020-10-09 RX ORDER — PROMETHAZINE HYDROCHLORIDE 12.5 MG/1
12.5 TABLET ORAL DAILY PRN
Qty: 30 TABLET | Refills: 0 | Status: SHIPPED | OUTPATIENT
Start: 2020-10-09 | End: 2020-11-20

## 2020-10-23 ENCOUNTER — NURSE ONLY (OUTPATIENT)
Dept: INTERNAL MEDICINE CLINIC | Age: 85
End: 2020-10-23
Payer: MEDICARE

## 2020-10-23 VITALS — TEMPERATURE: 97.8 F

## 2020-10-23 PROCEDURE — G0008 ADMIN INFLUENZA VIRUS VAC: HCPCS | Performed by: INTERNAL MEDICINE

## 2020-10-23 PROCEDURE — 90694 VACC AIIV4 NO PRSRV 0.5ML IM: CPT | Performed by: INTERNAL MEDICINE

## 2020-10-23 NOTE — PROGRESS NOTES
Vaccine Information Sheet, \"Influenza - Inactivated\"  given to Brenda Betancourt, or parent/legal guardian of  Brenda Betancourt and verbalized understanding. Patient responses:    Have you ever had a reaction to a flu vaccine? No  Do you have any current illness? No  Have you ever had [place] Syndrome? No  Do you have a serious allergy to any of the follow: Neomycin, Polymyxin, Thimerosal, eggs or egg products? No    Flu vaccine given per order. Please see immunization tab. Risks and benefits explained. Current VIS given.       Immunizations Administered     Name Date Dose Route    Influenza, Quadv, adjuvanted, 65 yrs +, IM, PF (Fluad) 10/23/2020 0.5 mL Intramuscular    Site: Deltoid- Left    Lot: [id number]    NDC: 43279-655-18

## 2020-11-09 ENCOUNTER — TELEPHONE (OUTPATIENT)
Dept: INTERNAL MEDICINE CLINIC | Age: 85
End: 2020-11-09

## 2020-11-09 NOTE — TELEPHONE ENCOUNTER
Medication Refill:     spironolactone (ALDACTONE) 25 MG tablet [[id number]    lansoprazole (PREVACID) 30 MG delayed release capsule [[id number]      [id number], [address] - F [phone]

## 2020-11-10 RX ORDER — SPIRONOLACTONE 25 MG/1
TABLET ORAL
Qty: 180 TABLET | Refills: 3 | Status: SHIPPED | OUTPATIENT
Start: 2020-11-10 | End: 2021-07-23

## 2020-11-10 RX ORDER — LANSOPRAZOLE 30 MG/1
30 CAPSULE, DELAYED RELEASE ORAL DAILY
Qty: 30 CAPSULE | Refills: 5 | Status: SHIPPED | OUTPATIENT
Start: 2020-11-10 | End: 2020-11-24 | Stop reason: SDUPTHER

## 2020-11-11 RX ORDER — METOPROLOL TARTRATE 50 MG/1
TABLET, FILM COATED ORAL
Qty: 180 TABLET | Refills: 3 | Status: SHIPPED | OUTPATIENT
Start: 2020-11-11 | End: 2021-05-14 | Stop reason: SDUPTHER

## 2020-11-18 ENCOUNTER — TELEPHONE (OUTPATIENT)
Dept: INTERNAL MEDICINE CLINIC | Age: 85
End: 2020-11-18

## 2020-11-18 DIAGNOSIS — F32.A ANXIETY AND DEPRESSION: Primary | ICD-10-CM

## 2020-11-18 DIAGNOSIS — F41.9 ANXIETY AND DEPRESSION: Primary | ICD-10-CM

## 2020-11-18 RX ORDER — SERTRALINE HYDROCHLORIDE 25 MG/1
25 TABLET, FILM COATED ORAL DAILY
Qty: 30 TABLET | Refills: 5 | Status: SHIPPED | OUTPATIENT
Start: 2020-11-18 | End: 2020-12-01

## 2020-11-18 NOTE — TELEPHONE ENCOUNTER
Patient called stating she is still having the problem with her stuttering, not able to speak. She states Dr. Ulisses Warner know she has this issue. She believes this comes from anxiety/stress and she was on :     ALPRAZolam Arabellamikki Hernandez) 0.5 MG tablet [[id number]    If you think this may help her with the situation can you please send in a prescription?      Please call to advise     [facility]/pharmacy [address] [facility], [address] [place] [address]

## 2020-11-18 NOTE — TELEPHONE ENCOUNTER
If she is anxious , I can prescribe something for her.  Alprazolam is not recommended for 80year old people, so I will send an alternate medicine, once daily sertraline, [facility]     Orders Placed This Encounter   Medications    sertraline (ZOLOFT) 25 MG tablet     Sig: Take 1 tablet by mouth daily     Dispense:  30 tablet     Refill:  5

## 2020-11-20 RX ORDER — PROMETHAZINE HYDROCHLORIDE 12.5 MG/1
12.5 TABLET ORAL DAILY PRN
Qty: 30 TABLET | Refills: 0 | Status: SHIPPED | OUTPATIENT
Start: 2020-11-20 | End: 2021-01-13 | Stop reason: SDUPTHER

## 2020-11-24 ENCOUNTER — TELEPHONE (OUTPATIENT)
Dept: INTERNAL MEDICINE CLINIC | Age: 85
End: 2020-11-24

## 2020-11-24 RX ORDER — LANSOPRAZOLE 30 MG/1
30 CAPSULE, DELAYED RELEASE ORAL DAILY
Qty: 30 CAPSULE | Refills: 5 | Status: SHIPPED | OUTPATIENT
Start: 2020-11-24 | End: 2021-07-22 | Stop reason: SDUPTHER

## 2020-11-24 RX ORDER — HYDROCHLOROTHIAZIDE 25 MG/1
TABLET ORAL
Qty: 90 TABLET | Refills: 3 | Status: SHIPPED | OUTPATIENT
Start: 2020-11-24 | End: 2021-11-10

## 2020-11-24 NOTE — TELEPHONE ENCOUNTER
Patient needs refill on medication she has ran out of medication and would like refill before holiday weekend    lansoprazole (PREVACID) 30 MG delayed release capsule       [facility], [address] [facility]

## 2020-11-25 ENCOUNTER — TELEPHONE (OUTPATIENT)
Dept: INTERNAL MEDICINE CLINIC | Age: 85
End: 2020-11-25

## 2020-11-25 NOTE — TELEPHONE ENCOUNTER
Patient would like a larger dosage of the following:  lansoprazole (PREVACID) 30 MG delayed release capsule   because she is taking a lot of her medication and she has ran out.

## 2020-12-01 ENCOUNTER — VIRTUAL VISIT (OUTPATIENT)
Dept: INTERNAL MEDICINE CLINIC | Age: 85
End: 2020-12-01
Payer: MEDICARE

## 2020-12-01 PROBLEM — D55.0: Status: ACTIVE | Noted: 2020-12-01

## 2020-12-01 PROCEDURE — 4040F PNEUMOC VAC/ADMIN/RCVD: CPT | Performed by: INTERNAL MEDICINE

## 2020-12-01 PROCEDURE — G0438 PPPS, INITIAL VISIT: HCPCS | Performed by: INTERNAL MEDICINE

## 2020-12-01 PROCEDURE — 1123F ACP DISCUSS/DSCN MKR DOCD: CPT | Performed by: INTERNAL MEDICINE

## 2020-12-01 RX ORDER — NITROFURANTOIN 25; 75 MG/1; MG/1
CAPSULE ORAL
COMMUNITY
Start: 2020-10-02 | End: 2021-12-21

## 2020-12-01 SDOH — ECONOMIC STABILITY: FOOD INSECURITY: WITHIN THE PAST 12 MONTHS, YOU WORRIED THAT YOUR FOOD WOULD RUN OUT BEFORE YOU GOT MONEY TO BUY MORE.: NEVER TRUE

## 2020-12-01 SDOH — ECONOMIC STABILITY: TRANSPORTATION INSECURITY
IN THE PAST 12 MONTHS, HAS LACK OF TRANSPORTATION KEPT YOU FROM MEETINGS, WORK, OR FROM GETTING THINGS NEEDED FOR DAILY LIVING?: NO

## 2020-12-01 SDOH — ECONOMIC STABILITY: INCOME INSECURITY: HOW HARD IS IT FOR YOU TO PAY FOR THE VERY BASICS LIKE FOOD, HOUSING, MEDICAL CARE, AND HEATING?: NOT HARD AT ALL

## 2020-12-01 SDOH — ECONOMIC STABILITY: TRANSPORTATION INSECURITY
IN THE PAST 12 MONTHS, HAS THE LACK OF TRANSPORTATION KEPT YOU FROM MEDICAL APPOINTMENTS OR FROM GETTING MEDICATIONS?: NO

## 2020-12-01 SDOH — ECONOMIC STABILITY: FOOD INSECURITY: WITHIN THE PAST 12 MONTHS, THE FOOD YOU BOUGHT JUST DIDN'T LAST AND YOU DIDN'T HAVE MONEY TO GET MORE.: NEVER TRUE

## 2020-12-01 ASSESSMENT — PATIENT HEALTH QUESTIONNAIRE - PHQ9
SUM OF ALL RESPONSES TO PHQ QUESTIONS 1-9: 0
SUM OF ALL RESPONSES TO PHQ9 QUESTIONS 1 & 2: 0
SUM OF ALL RESPONSES TO PHQ QUESTIONS 1-9: 0
SUM OF ALL RESPONSES TO PHQ QUESTIONS 1-9: 0
2. FEELING DOWN, DEPRESSED OR HOPELESS: 0
1. LITTLE INTEREST OR PLEASURE IN DOING THINGS: 0

## 2020-12-01 NOTE — PROGRESS NOTES
Pt stated that Sertraline (Zoloft) is not helping as it should. Pt believes may need increase in dosage. Pt having issues with Tramadol for workman comp.  [facility] will not deliver Tramadol so until they can, Tramadol is to be sent to [address] [place] [address]  [phone]      VV confirmed as phone call @  [phone]

## 2020-12-01 NOTE — PATIENT INSTRUCTIONS
Personalized Preventive Plan for Thania Organ - 12/1/2020  Medicare offers a range of preventive health benefits. Some of the tests and screenings are paid in full while other may be subject to a deductible, co-insurance, and/or copay. Some of these benefits include a comprehensive review of your medical history including lifestyle, illnesses that may run in your family, and various assessments and screenings as appropriate. After reviewing your medical record and screening and assessments performed today your provider may have ordered immunizations, labs, imaging, and/or referrals for you. A list of these orders (if applicable) as well as your Preventive Care list are included within your After Visit Summary for your review. Other Preventive Recommendations:    · A preventive eye exam performed by an eye specialist is recommended every 1-2 years to screen for glaucoma; cataracts, macular degeneration, and other eye disorders. · A preventive dental visit is recommended every 6 months. · Try to get at least 150 minutes of exercise per week or 10,000 steps per day on a pedometer . · Order or download the FREE \"Exercise & Physical Activity: Your Everyday Guide\" from [employer]. Call 3-515.920.9686 or search [employer] online. · You need 5788-6068 mg of calcium and 4850-5654 IU of vitamin D per day. It is possible to meet your calcium requirement with diet alone, but a vitamin D supplement is usually necessary to meet this goal.  · When exposed to the sun, use a sunscreen that protects against both UVA and UVB radiation with an SPF of 30 or greater. Reapply every 2 to 3 hours or after sweating, drying off with a towel, or swimming. · Always wear a seat belt when traveling in a car. Always wear a helmet when riding a bicycle or motorcycle.

## 2020-12-01 NOTE — PROGRESS NOTES
Medicare Annual Wellness Visit  Name: Prince Hawkins Date: 2020   MRN: [id number] Sex: Female   Age: 80 y.o. Ethnicity: Non-/Non    : 1935 Race: Abbi Davis is here for [employer] AWV    Screenings for behavioral, psychosocial and functional/safety risks, and cognitive dysfunction are all negative except as indicated below. These results, as well as other patient data from the [address] form, are documented in Flowsheets linked to this Encounter. Allergies   Allergen Reactions    Percocet [Oxycodone-Acetaminophen] Anaphylaxis     Reported that she has swelling, rash and difficulty breathing.  Coumadin [Warfarin Sodium]     Dalteparin Sodium     Vaibhav Beans [Vaibhav Beans]     Heparin     Lovenox [Enoxaparin Sodium]     Other      Almonds    Peanut-Containing Drug Products     Penicillins     Plavix [Clopidogrel Bisulfate]     Spinach     Sulfa Antibiotics     Vicodin [Hydrocodone-Acetaminophen] Other (See Comments)     Vomit and diarrhea    Aspirin Nausea And Vomiting         Prior to Visit Medications    Medication Sig Taking?  Authorizing Provider   nitrofurantoin, macrocrystal-monohydrate, (MACROBID) 100 MG capsule TAKE 1 CAPSULE BY MOUTH EVERY DAY Yes Historical Provider, MD   sertraline (ZOLOFT) 50 MG tablet Take 1 tablet by mouth daily Yes Millie Cristobal MD   hydroCHLOROthiazide (HYDRODIURIL) 25 MG tablet TAKE 1 TABLET BY MOUTH EVERY DAY Yes Millie Cristobal MD   lansoprazole (PREVACID) 30 MG delayed release capsule Take 1 capsule by mouth daily Yes Millie Cristobal MD   promethazine (PHENERGAN) 12.5 MG tablet TAKE 1 TABLET BY MOUTH DAILY AS NEEDED FOR NAUSEA Yes Millie Cristobal MD   metoprolol tartrate (LOPRESSOR) 50 MG tablet TAKE 1 TABLET BY MOUTH TWICE A DAY Yes Millie Cristobal MD   spironolactone (ALDACTONE) 25 MG tablet TAKE 1 TABLET BY MOUTH TWICE A DAY Yes Richard Verdin MD   [facility] PAIN RELIEF 500 MG tablet TAKE 1 TABLET BY MOUTH EVERY 6 HOURS AS NEEDED FOR PAIN Yes Acosta Chopra MD   traZODone (DESYREL) 100 MG tablet Take 1 tablet by mouth nightly (sleep) Yes Acosta Chopra MD   SITagliptin (JANUVIA) 50 MG tablet TAKE 1 TABLET BY MOUTH EVERY DAY Yes Acosta Chopra MD   ondansetron (ZOFRAN) 4 MG tablet Take 1 tablet by mouth 3 times daily as needed for Nausea or Vomiting Yes Acosta Chopra MD   Biotin (SUPER BIOTIN) 5 MG TABS Take 1 tablet by mouth daily Yes Acosta Chopra MD   sennosides-docusate sodium (SENOKOT-S) 8.6-50 MG tablet Take 1 tablet by mouth daily Yes Acosta Chopra MD   levothyroxine (SYNTHROID) 100 MCG tablet 1 TABLET EVERY DAY Yes Acosta Chopra MD   Lancets MISC 1 each by Does not apply route daily Yes Acosta Chopra MD   diclofenac sodium 1 % GEL Apply 2 g topically 4 times daily as needed for Pain Yes Jeet Walls MD   Cholecalciferol (VITAMIN D3) 400 units CAPS Take by mouth daily Yes Historical Provider, MD   traMADol [facility]) 300 MG extended release tablet Take 300 mg by mouth daily. . Yes Historical Provider, MD   Pyridoxine HCl (VITAMIN B-6) 50 MG tablet Take 50 mg by mouth daily.  Yes Historical Provider, MD   glucose monitoring kit (FREESTYLE) monitoring kit 1 kit by Does not apply route daily  Patient not taking: Reported on 4/8/2020  Richard Verdin MD   blood glucose test strips (FREESTYLE TEST STRIPS) strip 1 each by In Vitro route 2 times daily  Patient not taking: Reported on 4/8/2020  Richard Verdin MD   Blood Glucose Monitoring Suppl KIT Tests daily  Patient not taking: Reported on 4/8/2020  Acosta Chopra MD         Past Medical History:   Diagnosis Date    Arthritis     Chronic back pain     Diabetes mellitus [facility]     Esophagitis     Essential hypertension 4/6/2016    Fatty liver     G6P deficiency (glucose-6-phosphatase deficiency) ([facility]     G6PD deficiency     Gastric polyps     GERD (gastroesophageal reflux disease)     Hemorrhoids     History of blood transfusion     Hypothyroidism     Kidney stone     Mediterranean anemia     Normal cardiac stress test 3/2014    Sickle cell anemia (HCC)     UTI (urinary tract infection) 3/2014    Ecoli - R to Cipro and ampicillin       Past Surgical History:   Procedure Laterality Date    APPENDECTOMY       SECTION      X1    CYSTOSCOPY  2015    urethral dilitation    HYSTERECTOMY      OTHER SURGICAL HISTORY  2017    Ear cleaning under anesthesia    FL LAP,CHOLECYSTECTOMY/GRAPH N/A 2018    LAPAROSCOPIC CHOLECYSTECTOMY WITH CHOLANGIOGRAM performed by Sierra Leon MD at [address] N/A 2018    EGD BIOPSY performed by Bryce Blackmon MD at [address] ENDOSCOPY         Family History   Problem Relation Age of Onset    Diabetes Mother     Heart Disease Mother         heart congestion    Cancer Maternal Grandmother        CareTeam (Including outside providers/suppliers regularly involved in providing care):   Patient Care Team:  Parish Connolly MD as PCP - General (Internal Medicine)  Parish Connolly MD as PCP - [facility] Empaneled Provider  Sierra Leon MD as Surgeon (General Surgery)    Wt Readings from Last 3 Encounters:   20 200 lb (90.7 kg)   19 200 lb (90.7 kg)   19 238 lb (108 kg)     There were no vitals filed for this visit. There is no height or weight on file to calculate BMI. Based upon direct observation of the patient, evaluation of cognition reveals recent and remote memory intact. General Appearance: Morbidly obese, alert and oriented, distracted with some misunderstanding regarding her health insurance and snf plans.   Skin: warm and dry, no rash or erythema  Head: normocephalic and atraumatic  Eyes: pupils equal, round, and reactive to light, extraocular eye movements intact, conjunctivae normal  ENT: tympanic membrane, external ear and ear canal normal bilaterally, nose without deformity, nasal mucosa and turbinates normal without polyps  Neck: supple and non-tender without mass, no thyromegaly or thyroid nodules, no cervical lymphadenopathy  Pulmonary/Chest: clear to auscultation bilaterally- no wheezes, rales or rhonchi, normal air movement, no respiratory distress  Cardiovascular: normal rate, regular rhythm, normal S1 and S2, no murmurs, rubs, clicks, or gallops, distal pulses intact, no carotid bruits  Abdomen: soft, non-tender, non-distended, normal bowel sounds, no masses or organomegaly  Extremities: no cyanosis, clubbing or edema  Musculoskeletal: normal range of motion, no joint swelling, deformity or tenderness  Neurologic: reflexes normal and symmetric, no cranial nerve deficit, gait, coordination and speech normal    Patient's complete Health Risk Assessment and screening values have been reviewed and are found in Flowsheets. The following problems were reviewed today and where indicated follow up appointments were made and/or referrals ordered. Positive Risk Factor Screenings with Interventions:       General Health and ACP:  General  In general, how would you say your health is?: Fair  In the past 7 days, have you experienced any of the following?  New or Increased Pain, New or Increased Fatigue, Loneliness, Social Isolation, Stress or Anger?: (!) New or Increased Pain, Stress  Do you get the social and emotional support that you need?: Yes  Do you have a Living Will?: Yes  Advance Directives     Power of  Living Will ACP-Advance Directive ACP-Power of     Not on File Not on File Filed Not on File      General Health Risk Interventions:  · Pain issues: home exercises provided    Health Habits/Nutrition:  Health Habits/Nutrition  Do you exercise for at least 20 minutes 2-3 times per week?: (!) No  Have you lost any weight without trying in the past 3 months?: No  Do you eat fewer than 2 meals per day?: No  Have you seen a dentist within the past year?: Yes     Health Habits/Nutrition Interventions:  · Inadequate physical activity:  patient is not ready to increase his/her physical activity level at this time    ADL:  ADLs  In the past 7 days, did you need help from others to perform any of the following everyday activities? Eating, dressing, grooming, bathing, toileting, or walking/balance?: (!) Grooming, Dressing  In the past 7 days, did you need help from others to take care of any of the following? Laundry, housekeeping, banking/finances, shopping, telephone use, food preparation, transportation, or taking medications?: (!) None, Housekeeping, Shopping, Food Preparation  ADL Interventions:  · she is getting help from her son.      Personalized Preventive Plan   Current Health Maintenance Status  Immunization History   Administered Date(s) Administered    Influenza Vaccine, unspecified formulation 10/05/2013, 09/29/2014, 09/16/2015, 10/07/2016    Influenza Virus Vaccine 10/05/2013    Influenza Whole 09/09/2010    Influenza, High Dose (Fluzone 65 yrs and older) 09/18/2012, 09/29/2014, 09/16/2015, 10/07/2016, 09/25/2017    Influenza, Akira Bates, 6 mo and older, IM, PF (Flulaval, Fluarix) 10/27/2018    Influenza, Quadv, adjuvanted, 65 yrs +, IM, PF (Fluad) 10/23/2020    Influenza, Triv, inactivated, subunit, adjuvanted, IM (Fluad 65 yrs and older) 11/19/2019    Pneumococcal Conjugate 13-valent ([id number]) 09/16/2015    Pneumococcal Polysaccharide ([id number]) 04/11/2012        Health Maintenance   Topic Date Due    DTaP/Tdap/Td vaccine (1 - Tdap) 05/04/1954    Shingles Vaccine (1 of 2) 05/04/1985    TSH testing  06/02/2019    Annual Wellness Visit (AWV)  06/19/2019    Potassium monitoring  12/04/2020    Creatinine monitoring  12/04/2020    DEXA (modify frequency per FRAX score)  Completed    Flu vaccine  Completed    Pneumococcal 65+ years Vaccine  Completed    Hepatitis A vaccine  Aged Out    Hib vaccine  Aged Out    Meningococcal (ACWY) vaccine  Aged Out     Recommendations for [employer] Due: see orders and patient instructions/AVS.  Recommended screening schedule for the next

## 2020-12-11 ENCOUNTER — TELEPHONE (OUTPATIENT)
Dept: INTERNAL MEDICINE CLINIC | Age: 85
End: 2020-12-11

## 2020-12-11 NOTE — TELEPHONE ENCOUNTER
Pt says she needs to talk to someone concerning her medication   sertraline (ZOLOFT) 50 MG tablet [[id number]]     Order Details   Dose: 50 mg  Route        Says she is having spams in her left foot also.

## 2020-12-11 NOTE — TELEPHONE ENCOUNTER
Spoke with pt and given Dr. Jac Moe recommendations. Pt did not need refill on zoloft, she wanted to know if she should decrease dose of zoloft was taking 50mg B. I.D but instructions are for qd.

## 2020-12-11 NOTE — TELEPHONE ENCOUNTER
Does she need a refill of her sertraline? Leg and/or foot cramps can be reduced with foot stretching.

## 2021-01-08 ENCOUNTER — TELEPHONE (OUTPATIENT)
Dept: INTERNAL MEDICINE CLINIC | Age: 86
End: 2021-01-08

## 2021-01-08 DIAGNOSIS — L30.9 DERMATITIS: Primary | ICD-10-CM

## 2021-01-08 RX ORDER — AMMONIUM LACTATE 12 G/100G
LOTION TOPICAL
Qty: 225 G | Refills: 2 | Status: SHIPPED | OUTPATIENT
Start: 2021-01-08 | End: 2021-08-16 | Stop reason: ALTCHOICE

## 2021-01-12 ENCOUNTER — VIRTUAL VISIT (OUTPATIENT)
Dept: INTERNAL MEDICINE CLINIC | Age: 86
End: 2021-01-12

## 2021-01-12 DIAGNOSIS — F51.02 ADJUSTMENT INSOMNIA: ICD-10-CM

## 2021-01-13 ENCOUNTER — TELEPHONE (OUTPATIENT)
Dept: INTERNAL MEDICINE CLINIC | Age: 86
End: 2021-01-13

## 2021-01-13 DIAGNOSIS — R11.0 NAUSEA: ICD-10-CM

## 2021-01-13 RX ORDER — PROMETHAZINE HYDROCHLORIDE 12.5 MG/1
12.5 TABLET ORAL DAILY PRN
Qty: 30 TABLET | Refills: 0 | Status: SHIPPED | OUTPATIENT
Start: 2021-01-13 | End: 2021-02-22

## 2021-01-13 NOTE — TELEPHONE ENCOUNTER
promethazine (PHENERGAN) 12.5 MG tablet [[id number]- Pt in need of a refill pls advise      [id number], [address] [place] [address]

## 2021-01-13 NOTE — TELEPHONE ENCOUNTER
Refilled        Orders Placed This Encounter   Medications    promethazine (PHENERGAN) 12.5 MG tablet     Sig: Take 1 tablet by mouth daily as needed for Nausea     Dispense:  30 tablet     Refill:  0

## 2021-01-18 ENCOUNTER — TELEPHONE (OUTPATIENT)
Dept: INTERNAL MEDICINE CLINIC | Age: 86
End: 2021-01-18

## 2021-01-18 DIAGNOSIS — R11.0 NAUSEA: Primary | ICD-10-CM

## 2021-01-18 RX ORDER — ONDANSETRON 4 MG/1
4 TABLET, FILM COATED ORAL 3 TIMES DAILY PRN
Qty: 15 TABLET | Refills: 0 | Status: SHIPPED | OUTPATIENT
Start: 2021-01-18 | End: 2021-08-16 | Stop reason: ALTCHOICE

## 2021-01-18 NOTE — TELEPHONE ENCOUNTER
Pt is calling because [facility]azine but will not deliever until 1/27 and pt only has 2 left.  Wanting to know if Dr. Sergio Ansari can give permission to fill early

## 2021-01-18 NOTE — TELEPHONE ENCOUNTER
No early refill but she can get Zofran (ondansetron) script if she needs something for nausea. I sent that to her pharmacy.        Orders Placed This Encounter   Medications    ondansetron (ZOFRAN) 4 MG tablet     Sig: Take 1 tablet by mouth 3 times daily as needed for Nausea or Vomiting     Dispense:  15 tablet     Refill:  0

## 2021-01-29 RX ORDER — TRAZODONE HYDROCHLORIDE 100 MG/1
100 TABLET ORAL NIGHTLY
Qty: 90 TABLET | Refills: 1 | Status: SHIPPED | OUTPATIENT
Start: 2021-01-29 | End: 2021-03-30

## 2021-02-21 ENCOUNTER — TELEPHONE (OUTPATIENT)
Dept: INTERNAL MEDICINE CLINIC | Age: 86
End: 2021-02-21

## 2021-02-21 DIAGNOSIS — R11.0 NAUSEA: ICD-10-CM

## 2021-02-22 RX ORDER — PROMETHAZINE HYDROCHLORIDE 12.5 MG/1
12.5 TABLET ORAL DAILY PRN
Qty: 30 TABLET | Refills: 0 | Status: SHIPPED | OUTPATIENT
Start: 2021-02-22 | End: 2021-03-22 | Stop reason: SDUPTHER

## 2021-03-19 ENCOUNTER — TELEPHONE (OUTPATIENT)
Dept: INTERNAL MEDICINE CLINIC | Age: 86
End: 2021-03-19

## 2021-03-19 DIAGNOSIS — F41.9 ANXIETY AND DEPRESSION: ICD-10-CM

## 2021-03-19 DIAGNOSIS — R11.0 NAUSEA: ICD-10-CM

## 2021-03-19 DIAGNOSIS — F32.A ANXIETY AND DEPRESSION: ICD-10-CM

## 2021-03-19 NOTE — TELEPHONE ENCOUNTER
Medication Refill:     promethazine (PHENERGAN) 12.5 MG tablet [[id number]    sertraline (ZOLOFT) 50 MG tablet [[id number]    Vitamin B 6 - 50 mg  - can this be called into also? Patient states she has problems getting to pharmacy and they will deliver to her.      Last visit - 01/12/21     [id number] [place] [facility], [address] [place] [address]

## 2021-03-22 RX ORDER — PROMETHAZINE HYDROCHLORIDE 12.5 MG/1
12.5 TABLET ORAL DAILY PRN
Qty: 30 TABLET | Refills: 0 | Status: SHIPPED | OUTPATIENT
Start: 2021-03-22 | End: 2021-04-19

## 2021-03-22 RX ORDER — LANOLIN ALCOHOL/MO/W.PET/CERES
50 CREAM (GRAM) TOPICAL DAILY
Qty: 30 TABLET | Refills: 1 | Status: SHIPPED | OUTPATIENT
Start: 2021-03-22 | End: 2021-04-14

## 2021-03-30 DIAGNOSIS — F51.02 ADJUSTMENT INSOMNIA: ICD-10-CM

## 2021-03-30 RX ORDER — TRAZODONE HYDROCHLORIDE 100 MG/1
100 TABLET ORAL NIGHTLY
Qty: 90 TABLET | Refills: 1 | Status: SHIPPED | OUTPATIENT
Start: 2021-03-30 | End: 2021-10-11

## 2021-04-05 ENCOUNTER — TELEPHONE (OUTPATIENT)
Dept: INTERNAL MEDICINE CLINIC | Age: 86
End: 2021-04-05

## 2021-04-05 NOTE — TELEPHONE ENCOUNTER
Pt called stating she has an upcoming dental appt to have tooth extraction and has been swelling in her legs and feet. Pt was advised by dental physician to have Dr. Jennifer Burnett ok upcoming procedure.   Please advise

## 2021-04-14 RX ORDER — PYRIDOXINE HCL (VITAMIN B6) 50 MG
TABLET ORAL
Qty: 30 TABLET | Refills: 1 | Status: SHIPPED | OUTPATIENT
Start: 2021-04-14 | End: 2021-05-12

## 2021-04-19 DIAGNOSIS — R11.0 NAUSEA: ICD-10-CM

## 2021-04-19 RX ORDER — PROMETHAZINE HYDROCHLORIDE 12.5 MG/1
12.5 TABLET ORAL DAILY PRN
Qty: 30 TABLET | Refills: 0 | Status: SHIPPED | OUTPATIENT
Start: 2021-04-19 | End: 2021-05-17

## 2021-05-14 ENCOUNTER — TELEPHONE (OUTPATIENT)
Dept: INTERNAL MEDICINE CLINIC | Age: 86
End: 2021-05-14

## 2021-05-14 DIAGNOSIS — I10 ESSENTIAL HYPERTENSION: Primary | ICD-10-CM

## 2021-05-14 RX ORDER — METOPROLOL TARTRATE 50 MG/1
50 TABLET, FILM COATED ORAL 2 TIMES DAILY
Qty: 180 TABLET | Refills: 3 | Status: SHIPPED | OUTPATIENT
Start: 2021-05-14 | End: 2022-08-18

## 2021-05-14 RX ORDER — TRAMADOL HYDROCHLORIDE 50 MG/1
TABLET ORAL
COMMUNITY
Start: 2021-03-19

## 2021-05-14 NOTE — TELEPHONE ENCOUNTER
Medication refill :      metoprolol tartrate (LOPRESSOR) 50 MG tablet [[id number]    Patient states she is receiving tramadol from the worker's comp doctor but she called to have it refilled on or about 05/04/21 and has still not received it yet. She wants to know if you can send her a prescription. She could not find the bottle of what  MG she takes but if you could please call her back, she will have it then. Please call to advise.

## 2021-05-14 NOTE — TELEPHONE ENCOUNTER
I spoke with Sherice Gar office  [phone] regarding the pt tramadol. That office states that they informed the pt that a script has been mailed to her she just has not received it yet.

## 2021-05-17 DIAGNOSIS — R11.0 NAUSEA: ICD-10-CM

## 2021-05-17 RX ORDER — PROMETHAZINE HYDROCHLORIDE 12.5 MG/1
12.5 TABLET ORAL DAILY PRN
Qty: 30 TABLET | Refills: 0 | Status: SHIPPED | OUTPATIENT
Start: 2021-05-17 | End: 2021-06-18

## 2021-05-18 ENCOUNTER — TELEPHONE (OUTPATIENT)
Dept: INTERNAL MEDICINE CLINIC | Age: 86
End: 2021-05-18

## 2021-05-18 DIAGNOSIS — N30.00 ACUTE CYSTITIS WITHOUT HEMATURIA: Primary | ICD-10-CM

## 2021-05-18 RX ORDER — NITROFURANTOIN 25; 75 MG/1; MG/1
100 CAPSULE ORAL 2 TIMES DAILY WITH MEALS
Qty: 20 CAPSULE | Refills: 0 | Status: SHIPPED | OUTPATIENT
Start: 2021-05-18 | End: 2021-07-20 | Stop reason: SDUPTHER

## 2021-05-18 NOTE — TELEPHONE ENCOUNTER
Orders Placed This Encounter   Medications    nitrofurantoin, macrocrystal-monohydrate, (MACROBID) 100 MG capsule     Sig: Take 1 capsule by mouth 2 times daily (with meals) for 10 days     Dispense:  20 capsule     Refill:  0         Med sent to [facility].

## 2021-05-18 NOTE — TELEPHONE ENCOUNTER
Patient called to get nitrofurantoin, macrocrystal-monohydrate, (MACROBID) 100 MG capsule medication sent to the pharmacy. [facility]/pharmacy [address] [place] [address],  [address] -  [phone]    Please advise.

## 2021-05-27 ENCOUNTER — TELEPHONE (OUTPATIENT)
Dept: INTERNAL MEDICINE CLINIC | Age: 86
End: 2021-05-27

## 2021-05-27 DIAGNOSIS — N30.00 ACUTE CYSTITIS WITHOUT HEMATURIA: ICD-10-CM

## 2021-05-27 DIAGNOSIS — R11.0 NAUSEA: ICD-10-CM

## 2021-05-27 NOTE — TELEPHONE ENCOUNTER
Patient called in asking for a order to be put in for a walk in bath   She stated that with the back problems she is having and with the people she has helping her its hard to ask to have them help her get a bath and and she would like to get a bath on her own        Please call to advise

## 2021-06-03 NOTE — TELEPHONE ENCOUNTER
Patient called back to check on the status of this call, she has not gotten a call back yet. Please call to advise.

## 2021-06-17 RX ORDER — PROMETHAZINE HYDROCHLORIDE 12.5 MG/1
12.5 TABLET ORAL DAILY PRN
Qty: 30 TABLET | Refills: 0 | Status: CANCELLED | OUTPATIENT
Start: 2021-06-17

## 2021-06-17 RX ORDER — NITROFURANTOIN 25; 75 MG/1; MG/1
100 CAPSULE ORAL 2 TIMES DAILY WITH MEALS
Qty: 20 CAPSULE | Refills: 0 | Status: CANCELLED | OUTPATIENT
Start: 2021-06-17 | End: 2021-06-27

## 2021-06-18 DIAGNOSIS — R11.0 NAUSEA: ICD-10-CM

## 2021-06-18 RX ORDER — PROMETHAZINE HYDROCHLORIDE 12.5 MG/1
12.5 TABLET ORAL DAILY PRN
Qty: 30 TABLET | Refills: 0 | Status: SHIPPED | OUTPATIENT
Start: 2021-06-18 | End: 2021-07-20 | Stop reason: SDUPTHER

## 2021-06-18 NOTE — TELEPHONE ENCOUNTER
Patient is requesting the following medication be refilled:        promethazine (PHENERGAN) 12.5 MG tablet       [facility]/pharmacy [address] [address] Madandyllan Kim [phone]   [address] [place] [address]   Phone:  [phone]  Fax:  [phone]

## 2021-06-28 ENCOUNTER — TELEPHONE (OUTPATIENT)
Dept: INTERNAL MEDICINE CLINIC | Age: 86
End: 2021-06-28

## 2021-06-28 NOTE — TELEPHONE ENCOUNTER
----- Message from Exeter Alisia sent at 6/28/2021  1:41 PM EDT -----  Subject: Message to Provider    QUESTIONS  Information for Provider? URGENT? Patient is having extreme pain in right   shoulder spreading to her left. It is causing numbness and tingling and   weakness in the left shoulder It spreads down to her right foot. The pain   started last Wednesday and the pain is getting worse. Patient would like a   lidocaine patch or something for pain prescribed. Patient did not mention   on any fall or injury. Spoke with Talat at the office   ---------------------------------------------------------------------------  --------------  [address]  What is the best way for the office to contact you? OK to leave message on   voicemail  Preferred Call Back Phone Number? [phone]  ---------------------------------------------------------------------------  --------------  SCRIPT ANSWERS  Relationship to Patient?  Self

## 2021-06-30 NOTE — TELEPHONE ENCOUNTER
Office appt or ER visit suggested.  This should be handeled by On Call doctor when someone is out of town!!!

## 2021-07-02 NOTE — TELEPHONE ENCOUNTER
Spoke with patient she is till having (hx) back,shoulder pain will call her workman comp doctor ??? Will call back if needed

## 2021-07-19 ENCOUNTER — TELEPHONE (OUTPATIENT)
Dept: INTERNAL MEDICINE CLINIC | Age: 86
End: 2021-07-19

## 2021-07-19 DIAGNOSIS — R11.0 NAUSEA: ICD-10-CM

## 2021-07-19 DIAGNOSIS — N30.00 ACUTE CYSTITIS WITHOUT HEMATURIA: ICD-10-CM

## 2021-07-19 NOTE — TELEPHONE ENCOUNTER
Patient needs med refill on     promethazine (PHENERGAN) 12.5 MG tablet     nitrofurantoin, macrocrystal-monohydrate, (MACROBID) 100 MG capsule       Asked for a order for a walk In bathtub     Please send to   [facility], [address] [facility]  [address] [facility] [phone] - f [phone]     Please call to advise

## 2021-07-20 DIAGNOSIS — F41.9 ANXIETY AND DEPRESSION: ICD-10-CM

## 2021-07-20 DIAGNOSIS — F32.A ANXIETY AND DEPRESSION: ICD-10-CM

## 2021-07-20 RX ORDER — PROMETHAZINE HYDROCHLORIDE 12.5 MG/1
12.5 TABLET ORAL DAILY PRN
Qty: 30 TABLET | Refills: 0 | Status: SHIPPED | OUTPATIENT
Start: 2021-07-20 | End: 2021-08-17

## 2021-07-20 RX ORDER — NITROFURANTOIN 25; 75 MG/1; MG/1
100 CAPSULE ORAL 2 TIMES DAILY WITH MEALS
Qty: 20 CAPSULE | Refills: 0 | Status: SHIPPED | OUTPATIENT
Start: 2021-07-20 | End: 2021-10-11 | Stop reason: SDUPTHER

## 2021-07-20 NOTE — TELEPHONE ENCOUNTER
----- Message from Ariadne Noyola sent at 7/19/2021  5:47 PM EDT -----  Subject: Refill Request    QUESTIONS  Name of Medication? sertraline (ZOLOFT) 50 MG tablet  Patient-reported dosage and instructions? Take 1 tablet by mouth daily  How many days do you have left? 6  Preferred Pharmacy? [facility]/PHARMACY #[id number]  Pharmacy phone number (if available)? [phone]  ---------------------------------------------------------------------------  --------------,  Name of Medication? lansoprazole (PREVACID) 30 MG delayed release capsule  Patient-reported dosage and instructions? Take 1 capsule by mouth daily  How many days do you have left? 2  Preferred Pharmacy? [facility]/PHARMACY #[id number]  Pharmacy phone number (if available)? [phone]  ---------------------------------------------------------------------------  --------------,  Name of Medication? SITagliptin (JANUVIA) 50 MG tablet  Patient-reported dosage and instructions? TAKE 1 TABLET BY MOUTH EVERY DAY  How many days do you have left? 8  Preferred Pharmacy? [facility]ARMACY #[id number]  Pharmacy phone number (if available)? [phone]  ---------------------------------------------------------------------------  --------------  [employer]  What is the best way for the office to contact you? OK to leave message on   voicemail  Preferred Call Back Phone Number?  [phone]
1/12/21 last ov
Home

## 2021-07-20 NOTE — TELEPHONE ENCOUNTER
Med refilled.        Orders Placed This Encounter   Medications    promethazine (PHENERGAN) 12.5 MG tablet     Sig: Take 1 tablet by mouth daily as needed for Nausea     Dispense:  30 tablet     Refill:  0    nitrofurantoin, macrocrystal-monohydrate, (MACROBID) 100 MG capsule     Sig: Take 1 capsule by mouth 2 times daily (with meals) for 10 days     Dispense:  20 capsule     Refill:  0

## 2021-07-22 ENCOUNTER — TELEPHONE (OUTPATIENT)
Dept: INTERNAL MEDICINE CLINIC | Age: 86
End: 2021-07-22

## 2021-07-22 DIAGNOSIS — K21.9 GASTROESOPHAGEAL REFLUX DISEASE WITHOUT ESOPHAGITIS: ICD-10-CM

## 2021-07-22 RX ORDER — LANSOPRAZOLE 30 MG/1
30 CAPSULE, DELAYED RELEASE ORAL DAILY
Qty: 30 CAPSULE | Refills: 5 | Status: SHIPPED | OUTPATIENT
Start: 2021-07-22 | End: 2021-11-22

## 2021-07-22 NOTE — TELEPHONE ENCOUNTER
Medication refill:    SITagliptin (JANUVIA) 50 MG tablet [[id number]    lansoprazole (PREVACID) 30 MG delayed release capsule [[id number]      [facility]pharmacy #[id number],  [address]

## 2021-07-22 NOTE — TELEPHONE ENCOUNTER
Orders Placed This Encounter   Medications    SITagliptin (JANUVIA) 50 MG tablet     Sig: TAKE 1 TABLET BY MOUTH EVERY DAY     Dispense:  90 tablet     Refill:  1    lansoprazole (PREVACID) 30 MG delayed release capsule     Sig: Take 1 capsule by mouth daily     Dispense:  30 capsule     Refill:  5

## 2021-07-23 DIAGNOSIS — I10 ESSENTIAL HYPERTENSION: ICD-10-CM

## 2021-07-23 RX ORDER — SPIRONOLACTONE 25 MG/1
TABLET ORAL
Qty: 180 TABLET | Refills: 2 | Status: SHIPPED | OUTPATIENT
Start: 2021-07-23 | End: 2022-05-16 | Stop reason: SDUPTHER

## 2021-07-29 ENCOUNTER — TELEPHONE (OUTPATIENT)
Dept: INTERNAL MEDICINE CLINIC | Age: 86
End: 2021-07-29

## 2021-08-03 DIAGNOSIS — E03.9 ACQUIRED HYPOTHYROIDISM: Chronic | ICD-10-CM

## 2021-08-03 RX ORDER — LEVOTHYROXINE SODIUM 0.1 MG/1
TABLET ORAL
Qty: 90 TABLET | Refills: 3 | Status: SHIPPED | OUTPATIENT
Start: 2021-08-03 | End: 2022-04-20 | Stop reason: SDUPTHER

## 2021-08-11 ENCOUNTER — TELEPHONE (OUTPATIENT)
Dept: INTERNAL MEDICINE CLINIC | Age: 86
End: 2021-08-11

## 2021-08-11 NOTE — TELEPHONE ENCOUNTER
Patient called stating she has a spot on the left side of her buttocks, has a scab and is very sore around this. Please call to advise      . [address]  Gabe Arabic. - P [phone] - F [phone]

## 2021-08-11 NOTE — TELEPHONE ENCOUNTER
Pt states that she hears what I'm saying and she states it is neither of those things she will wait to see you on Monday 8/16/2021

## 2021-08-11 NOTE — TELEPHONE ENCOUNTER
If there are blisters it could be shingles. If it is a boil, then she may need antibiotic and possibly drainage. There is not a clear path for me to take, she needs a vist here or urgent care.

## 2021-08-16 ENCOUNTER — OFFICE VISIT (OUTPATIENT)
Dept: INTERNAL MEDICINE CLINIC | Age: 86
End: 2021-08-16
Payer: MEDICARE

## 2021-08-16 VITALS
WEIGHT: 247 LBS | DIASTOLIC BLOOD PRESSURE: 68 MMHG | HEART RATE: 100 BPM | HEIGHT: 63 IN | BODY MASS INDEX: 43.77 KG/M2 | SYSTOLIC BLOOD PRESSURE: 126 MMHG | OXYGEN SATURATION: 98 %

## 2021-08-16 DIAGNOSIS — F33.41 RECURRENT MAJOR DEPRESSIVE DISORDER IN PARTIAL REMISSION (HCC): ICD-10-CM

## 2021-08-16 DIAGNOSIS — E11.69 DIABETES MELLITUS TYPE 2 IN OBESE (HCC): Primary | ICD-10-CM

## 2021-08-16 DIAGNOSIS — E66.9 DIABETES MELLITUS TYPE 2 IN OBESE (HCC): Primary | ICD-10-CM

## 2021-08-16 DIAGNOSIS — D55.0 ANEMIA DUE TO GLUCOSE-6-PHOSPHATE DEHYDROGENASE (G6PD) DEFICIENCY (HCC): ICD-10-CM

## 2021-08-16 DIAGNOSIS — L22 DIAPER RASH: ICD-10-CM

## 2021-08-16 DIAGNOSIS — I27.20 PULMONARY HYPERTENSION (HCC): ICD-10-CM

## 2021-08-16 PROCEDURE — 1090F PRES/ABSN URINE INCON ASSESS: CPT | Performed by: INTERNAL MEDICINE

## 2021-08-16 PROCEDURE — G8427 DOCREV CUR MEDS BY ELIG CLIN: HCPCS | Performed by: INTERNAL MEDICINE

## 2021-08-16 PROCEDURE — G8417 CALC BMI ABV UP PARAM F/U: HCPCS | Performed by: INTERNAL MEDICINE

## 2021-08-16 PROCEDURE — 1036F TOBACCO NON-USER: CPT | Performed by: INTERNAL MEDICINE

## 2021-08-16 PROCEDURE — 1123F ACP DISCUSS/DSCN MKR DOCD: CPT | Performed by: INTERNAL MEDICINE

## 2021-08-16 PROCEDURE — 99214 OFFICE O/P EST MOD 30 MIN: CPT | Performed by: INTERNAL MEDICINE

## 2021-08-16 RX ORDER — ZINC OXIDE 13 %
CREAM (GRAM) TOPICAL 2 TIMES DAILY PRN
Qty: 454 G | Refills: 2 | Status: SHIPPED | OUTPATIENT
Start: 2021-08-16

## 2021-08-16 NOTE — LETTER
[facility] [address]  [address] [address]  Phone: [phone]  Fax: [phone]    Candance Herd, MD         August 16, 2021     Patient: Thania Fleming   YOB: 1935   Date of Visit: 8/16/2021       To Whom It May Concern: It is my medical opinion that Isi Watt requires a disability parking placard for the following reasons:  She cannot walk without assistance from another person or the use of an assistance device (cane, crutch, prosthetic device, wheelchair, etc.). Duration of need: 5 years    If you have any questions or concerns, please don't hesitate to call.     Sincerely,        Candance Herd, MD

## 2021-08-16 NOTE — PROGRESS NOTES
Subjective:      Patient ID: Bennett Avitia is a 80 y.o. female    Chief Complaint   Patient presents with    Diabetes     follow up     Hypertension       Diabetes  She presents for her follow-up diabetic visit. She has type 2 diabetes mellitus. Her disease course has been stable. Pertinent negatives for diabetes include no polydipsia and no polyuria. Symptoms are stable.    Hypertension        Walk in bath , wheelchair, ramp    Current Outpatient Medications on File Prior to Visit   Medication Sig Dispense Refill    levothyroxine (SYNTHROID) 100 MCG tablet TAKE 1 TABLET BY MOUTH EVERY DAY 90 tablet 3    spironolactone (ALDACTONE) 25 MG tablet TAKE 1 TABLET BY MOUTH TWICE A  tablet 2    SITagliptin (JANUVIA) 50 MG tablet TAKE 1 TABLET BY MOUTH EVERY DAY 90 tablet 1    lansoprazole (PREVACID) 30 MG delayed release capsule Take 1 capsule by mouth daily 30 capsule 5    sertraline (ZOLOFT) 50 MG tablet Take 1 tablet by mouth daily 90 tablet 3    promethazine (PHENERGAN) 12.5 MG tablet Take 1 tablet by mouth daily as needed for Nausea 30 tablet 0    traMADol (ULTRAM) 50 MG tablet TAKE 2 TABLETS BY MOUTH 3 TIMES A DAY      metoprolol tartrate (LOPRESSOR) 50 MG tablet Take 1 tablet by mouth 2 times daily 180 tablet 3    vitamin B-6 (PYRIDOXINE) 50 MG tablet TAKE 1 TABLET BY MOUTH EVERY DAY 30 tablet 5    traZODone (DESYREL) 100 MG tablet TAKE 1 TABLET BY MOUTH NIGHTLY (SLEEP) 90 tablet 1    nitrofurantoin, macrocrystal-monohydrate, (MACROBID) 100 MG capsule TAKE 1 CAPSULE BY MOUTH EVERY DAY      hydroCHLOROthiazide (HYDRODIURIL) 25 MG tablet TAKE 1 TABLET BY MOUTH EVERY DAY 90 tablet 3    [facility] PAIN RELIEF 500 MG tablet TAKE 1 TABLET BY MOUTH EVERY 6 HOURS AS NEEDED FOR PAIN 120 tablet 3    Biotin (SUPER BIOTIN) 5 MG TABS Take 1 tablet by mouth daily 30 tablet 5    sennosides-docusate sodium (SENOKOT-S) 8.6-50 MG tablet Take 1 tablet by mouth daily 30 tablet 5    glucose monitoring kit (FREESTYLE) monitoring kit 1 kit by Does not apply route daily 1 kit 0    blood glucose test strips (FREESTYLE TEST STRIPS) strip 1 each by In Vitro route 2 times daily 100 each 3    Lancets MISC 1 each by Does not apply route daily 300 each 1    Cholecalciferol (VITAMIN D3) 400 units CAPS Take by mouth daily      Blood Glucose Monitoring Suppl KIT Tests daily 1 kit 0     No current facility-administered medications on file prior to visit. Allergies   Allergen Reactions    Percocet [Oxycodone-Acetaminophen] Anaphylaxis     Reported that she has swelling, rash and difficulty breathing.  Coumadin [Warfarin Sodium]     Dalteparin Sodium     Vaibhav Beans [Vaibhav Beans]     Heparin     Lovenox [Enoxaparin Sodium]     Other      Almonds    Peanut-Containing Drug Products     Penicillins     Plavix [Clopidogrel Bisulfate]     Spinach     Sulfa Antibiotics     Vicodin [Hydrocodone-Acetaminophen] Other (See Comments)     Vomit and diarrhea    Aspirin Nausea And Vomiting       Review of Systems   Endocrine: Negative for polydipsia and polyuria. Objective:   Physical Exam    Assessment and plan       There are no diagnoses linked to this encounter.

## 2021-08-17 ENCOUNTER — TELEPHONE (OUTPATIENT)
Dept: INTERNAL MEDICINE CLINIC | Age: 86
End: 2021-08-17

## 2021-08-17 DIAGNOSIS — R11.0 NAUSEA: ICD-10-CM

## 2021-08-17 RX ORDER — PROMETHAZINE HYDROCHLORIDE 12.5 MG/1
12.5 TABLET ORAL DAILY PRN
Qty: 30 TABLET | Refills: 0 | Status: SHIPPED | OUTPATIENT
Start: 2021-08-17 | End: 2021-09-22 | Stop reason: SDUPTHER

## 2021-08-17 NOTE — TELEPHONE ENCOUNTER
Patient called in requesting refill for the following medication:      promethazine (PHENERGAN) 12.5 MG tablet       [facility] [address] - P [phone] - F [phone]    Pls advise.

## 2021-08-18 ENCOUNTER — TELEPHONE (OUTPATIENT)
Dept: PRIMARY CARE CLINIC | Age: 86
End: 2021-08-18

## 2021-08-18 NOTE — TELEPHONE ENCOUNTER
Return call to patient. Patient left  stating she needed advocacy around insurance and identity fraud that occurred last year. Patient is known to this writer and was provided information for legal advocacy Jan 2020 to assist with this matter. Advised patient she should contact the  at [facility] [employer] she initially worked with or .

## 2021-09-22 ENCOUNTER — TELEPHONE (OUTPATIENT)
Dept: INTERNAL MEDICINE CLINIC | Age: 86
End: 2021-09-22

## 2021-09-22 DIAGNOSIS — R11.0 NAUSEA: ICD-10-CM

## 2021-09-22 RX ORDER — PROMETHAZINE HYDROCHLORIDE 12.5 MG/1
12.5 TABLET ORAL DAILY PRN
Qty: 30 TABLET | Refills: 0 | Status: SHIPPED | OUTPATIENT
Start: 2021-09-22 | End: 2021-10-21

## 2021-09-22 NOTE — TELEPHONE ENCOUNTER
Patient called for med refill on promethazine (PHENERGAN) 12.5 MG tablet    Please send to [facility], [address]  [address] [facility] [phone] - F [phone]     Please call to advise

## 2021-09-28 ENCOUNTER — TELEPHONE (OUTPATIENT)
Dept: INTERNAL MEDICINE CLINIC | Age: 86
End: 2021-09-28

## 2021-09-29 ENCOUNTER — TELEPHONE (OUTPATIENT)
Dept: INTERNAL MEDICINE CLINIC | Age: 86
End: 2021-09-29

## 2021-09-29 NOTE — TELEPHONE ENCOUNTER
Patient called in wanting to follow up on if Dr. Matias Ta sent prescription to [facility] for walk in bath, wheelchair and ramp through [facility]. Patient states that [facility] has not received anything yet. Pls call and advise.

## 2021-10-09 DIAGNOSIS — F51.02 ADJUSTMENT INSOMNIA: ICD-10-CM

## 2021-10-11 ENCOUNTER — TELEPHONE (OUTPATIENT)
Dept: INTERNAL MEDICINE CLINIC | Age: 86
End: 2021-10-11

## 2021-10-11 DIAGNOSIS — N30.00 ACUTE CYSTITIS WITHOUT HEMATURIA: ICD-10-CM

## 2021-10-11 RX ORDER — TRAZODONE HYDROCHLORIDE 100 MG/1
100 TABLET ORAL NIGHTLY
Qty: 90 TABLET | Refills: 1 | Status: SHIPPED | OUTPATIENT
Start: 2021-10-11 | End: 2022-01-20

## 2021-10-11 RX ORDER — DOCUSATE SODIUM -SENNOSIDES 50; 8.6 MG/1; MG/1
TABLET, COATED ORAL
Qty: 30 TABLET | Refills: 5 | Status: SHIPPED | OUTPATIENT
Start: 2021-10-11 | End: 2021-11-22

## 2021-10-11 RX ORDER — PSEUDOEPHED/ACETAMINOPH/DIPHEN 30MG-500MG
TABLET ORAL
Qty: 120 TABLET | Refills: 3 | Status: SHIPPED | OUTPATIENT
Start: 2021-10-11

## 2021-10-11 RX ORDER — NITROFURANTOIN 25; 75 MG/1; MG/1
100 CAPSULE ORAL 2 TIMES DAILY WITH MEALS
Qty: 14 CAPSULE | Refills: 0 | Status: SHIPPED | OUTPATIENT
Start: 2021-10-11 | End: 2021-10-18

## 2021-10-11 NOTE — TELEPHONE ENCOUNTER
Patient called for med refill on    nitrofurantoin, macrocrystal-monohydrate, (MACROBID) 100 MG capsule       [facility], [facility] [address]  [address] [facility] [phone] - F [phone]     Please call to advise

## 2021-10-19 DIAGNOSIS — R11.0 NAUSEA: ICD-10-CM

## 2021-10-21 RX ORDER — PROMETHAZINE HYDROCHLORIDE 12.5 MG/1
12.5 TABLET ORAL DAILY PRN
Qty: 30 TABLET | Refills: 0 | Status: SHIPPED | OUTPATIENT
Start: 2021-10-21 | End: 2021-11-22

## 2021-11-15 ENCOUNTER — TELEPHONE (OUTPATIENT)
Dept: INTERNAL MEDICINE CLINIC | Age: 86
End: 2021-11-15

## 2021-11-15 DIAGNOSIS — R11.0 NAUSEA: ICD-10-CM

## 2021-11-15 RX ORDER — NITROFURANTOIN 25; 75 MG/1; MG/1
CAPSULE ORAL
Qty: 20 CAPSULE | Refills: 0 | OUTPATIENT
Start: 2021-11-15

## 2021-11-15 RX ORDER — PROMETHAZINE HYDROCHLORIDE 12.5 MG/1
12.5 TABLET ORAL DAILY PRN
Qty: 30 TABLET | Refills: 0 | OUTPATIENT
Start: 2021-11-15

## 2021-11-15 NOTE — TELEPHONE ENCOUNTER
Pt called into the office to request a refill on the following medication. Pt is completely out of the medication. promethazine (PHENERGAN) 12.5 MG tablet     SENEXON-S 8.6-50 MG per tablet     nitrofurantoin, macrocrystal-monohydrate, (MACROBID) 100 MG capsule     [facility], [facility] [address] [address] [facility] [phone] - f [phone]    Please advise.

## 2021-11-16 NOTE — TELEPHONE ENCOUNTER
PT Needs refill on promethazine (PHENERGAN) 12.5 MG tablet    would like it to be     [address] [facility], [facility]  [address] - [place] [phone] - F [phone]     Please advise

## 2021-11-16 NOTE — TELEPHONE ENCOUNTER
I am limiting her access to this medication to no more than 1 pill/day. She is not due for refill on this medication until Saturday, 11/20/2021.

## 2021-11-19 ENCOUNTER — TELEPHONE (OUTPATIENT)
Dept: INTERNAL MEDICINE CLINIC | Age: 86
End: 2021-11-19

## 2021-11-19 NOTE — TELEPHONE ENCOUNTER
Patient called for promethazine (PHENERGAN) 12.5 MG tablet    nitrofurantoin, macrocrystal-monohydrate, (MACROBID) 100 MG capsule    Patient stated that she knows they are not due till the 20th but she asked to have them sent today so that she can have them delivered    Please send to [facility], [facility] [address]  [address] [facility] [phone] - F [phone]    Please call to advise

## 2021-11-22 ENCOUNTER — TELEPHONE (OUTPATIENT)
Dept: INTERNAL MEDICINE CLINIC | Age: 86
End: 2021-11-22

## 2021-11-22 DIAGNOSIS — K21.9 GASTROESOPHAGEAL REFLUX DISEASE WITHOUT ESOPHAGITIS: ICD-10-CM

## 2021-11-22 RX ORDER — LANSOPRAZOLE 30 MG/1
CAPSULE, DELAYED RELEASE ORAL
Qty: 60 CAPSULE | Refills: 5 | Status: SHIPPED | OUTPATIENT
Start: 2021-11-22

## 2021-11-22 NOTE — TELEPHONE ENCOUNTER
Patient called in requesting refill for the following medication:      promethazine (PHENERGAN) 12.5 MG tablet      lansoprazole (PREVACID) 30 MG delayed release capsule    SENEXON-S 8.6-50 MG per tablet    Patient does NOT want these meds sent to [address] [place] [address]  Vanessa Taylor. - [place] [phone] - F [phone]    Pls advise

## 2021-12-03 NOTE — TELEPHONE ENCOUNTER
Patient called for refill     nitrofurantoin, macrocrystal-monohydrate, (MACROBID) 100 MG capsule           [address] [address] [address] - P [phone] - F [phone]   [address] [facility] [address]   Phone:  [phone]  Fax:  [phone]      Please advise and call

## 2021-12-03 NOTE — TELEPHONE ENCOUNTER
This is not refillable. I prescribe this medicine based on my diagnosis, not because some one asks for it. There needs to be an illness story, for me to consider prescribing medicine.

## 2021-12-10 ENCOUNTER — TELEPHONE (OUTPATIENT)
Dept: INTERNAL MEDICINE CLINIC | Age: 86
End: 2021-12-10

## 2021-12-10 NOTE — TELEPHONE ENCOUNTER
She would just need to drink more water to treat dehydration. There in not a medication to treat dehydration.

## 2021-12-10 NOTE — TELEPHONE ENCOUNTER
----- Message from Pedro Preciado sent at 12/9/2021  3:42 PM EST -----  Subject: Message to Provider    QUESTIONS  Information for Provider? patient states that she dehydration and would   like to have some medications to help with the dehydration symptoms she is   experiencing., Please call the client to inform her of which medication   she can be put on . Did she get an antibiotic that she requested for her   bladder. ---------------------------------------------------------------------------  --------------  Konstantin JAEGER  What is the best way for the office to contact you? OK to leave message on   voicemail  Preferred Call Back Phone Number? [phone]  ---------------------------------------------------------------------------  --------------  SCRIPT ANSWERS  Relationship to Patient?  Self

## 2021-12-20 ENCOUNTER — TELEPHONE (OUTPATIENT)
Dept: INTERNAL MEDICINE CLINIC | Age: 86
End: 2021-12-20

## 2021-12-21 DIAGNOSIS — R32 URINARY INCONTINENCE, UNSPECIFIED TYPE: Primary | ICD-10-CM

## 2021-12-21 DIAGNOSIS — R11.0 NAUSEA: ICD-10-CM

## 2021-12-21 RX ORDER — NITROFURANTOIN 25; 75 MG/1; MG/1
CAPSULE ORAL
Qty: 14 CAPSULE | Refills: 0 | Status: SHIPPED | OUTPATIENT
Start: 2021-12-21 | End: 2022-02-21 | Stop reason: SDUPTHER

## 2021-12-21 RX ORDER — PROMETHAZINE HYDROCHLORIDE 12.5 MG/1
12.5 TABLET ORAL DAILY PRN
Qty: 30 TABLET | Refills: 0 | Status: SHIPPED | OUTPATIENT
Start: 2021-12-21 | End: 2022-02-04 | Stop reason: SDUPTHER

## 2021-12-21 NOTE — TELEPHONE ENCOUNTER
Spoke to grandson, informed him that she would need to come in and update her HIPPA so that we can discuss her medical issues with him.      He stated that they will stop by next week

## 2021-12-21 NOTE — TELEPHONE ENCOUNTER
Patient needs refills on:    promethazine (PHENERGAN) 12.5 MG tablet    She is also wanting an antibiotic that she normally gets, but could not remember the name. She will call us back when she finds the name. .. Send to:  [facility], [address]  [address] [facility] [phone] - F [phone]    Please call and advise

## 2021-12-30 ENCOUNTER — IMMUNIZATION (OUTPATIENT)
Dept: INTERNAL MEDICINE CLINIC | Age: 86
End: 2021-12-30
Payer: MEDICARE

## 2021-12-30 ENCOUNTER — TELEPHONE (OUTPATIENT)
Dept: INTERNAL MEDICINE CLINIC | Age: 86
End: 2021-12-30

## 2021-12-30 DIAGNOSIS — Z23 NEED FOR INFLUENZA VACCINATION: Primary | ICD-10-CM

## 2021-12-30 PROCEDURE — G0008 ADMIN INFLUENZA VIRUS VAC: HCPCS | Performed by: INTERNAL MEDICINE

## 2021-12-30 PROCEDURE — 90694 VACC AIIV4 NO PRSRV 0.5ML IM: CPT | Performed by: INTERNAL MEDICINE

## 2021-12-30 NOTE — TELEPHONE ENCOUNTER
[facility] [address]. Most homes have an admissions coordinator, you make an appointment and get info, see what is the situation, the costs, the planning.       Patient Active Problem List   Diagnosis    Acquired hypothyroidism    Esophageal reflux    Adjustment disorder with depressed mood    Urinary incontinence    Skin rash    Back pain    Morbidly obese (HCC)    Chest pain    Arthritis    Chronic pain    Reactive depression    Anxiety    Mixed incontinence    Diabetes mellitus type 2 in obese ([facility]    Lump of right breast    Carpal tunnel syndrome, right    Essential hypertension    Gastroesophageal reflux disease without esophagitis    Type 2 diabetes mellitus without complication, without long-term current use of insulin ([facility])    Acute cystitis without hematuria    Physical deconditioning    Hearing loss of left ear due to cerumen impaction    Right renal mass    Delirium    Altered mental status    RUQ pain    Gastroparesis due to DM ([facility]    Recurrent major depressive disorder in partial remission ([facility]    Cholecystitis    Weakness    [facility]monary hypertension ([facility]    Unable to ambulate    G6PD deficiency    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

## 2021-12-30 NOTE — TELEPHONE ENCOUNTER
Pt's grandson called into the office to request information on past diagnosis medication and other information to give medicare and assisted living facilities. He would also like a refferal for Meghana kim and Priyanka for possible options. The primary focus is Meghana kim. Please advise and call.

## 2022-01-04 ENCOUNTER — TELEPHONE (OUTPATIENT)
Dept: INTERNAL MEDICINE CLINIC | Age: 87
End: 2022-01-04

## 2022-01-04 NOTE — TELEPHONE ENCOUNTER
Pt called into the office to request an order for for [address]. She had another option but was not sure of the name. Please call and advise.

## 2022-01-04 NOTE — TELEPHONE ENCOUNTER
If she is considering a facilty, she has to be evaluated by the facilty. I don't give an order for this. She can call the facilty and go for a visit.

## 2022-01-20 DIAGNOSIS — F51.02 ADJUSTMENT INSOMNIA: ICD-10-CM

## 2022-01-20 RX ORDER — TRAZODONE HYDROCHLORIDE 100 MG/1
100 TABLET ORAL NIGHTLY
Qty: 90 TABLET | Refills: 1 | Status: SHIPPED | OUTPATIENT
Start: 2022-01-20 | End: 2022-10-11

## 2022-02-04 DIAGNOSIS — R11.0 NAUSEA: ICD-10-CM

## 2022-02-04 RX ORDER — PROMETHAZINE HYDROCHLORIDE 12.5 MG/1
12.5 TABLET ORAL DAILY PRN
Qty: 30 TABLET | Refills: 0 | Status: SHIPPED | OUTPATIENT
Start: 2022-02-04 | End: 2022-03-04 | Stop reason: SDUPTHER

## 2022-02-04 NOTE — TELEPHONE ENCOUNTER
----- Message from [facility] sent at 2/3/2022 11:39 AM EST -----  Subject: Refill Request    QUESTIONS  Name of Medication? promethazine (PHENERGAN) 12.5 MG tablet  Patient-reported dosage and instructions? once a day   How many days do you have left? 0  Preferred Pharmacy? [facility] phone number (if available)? [phone]  ---------------------------------------------------------------------------  --------------  Caprice JAEGER  What is the best way for the office to contact you? OK to leave message on   voicemail  Preferred Call Back Phone Number?  [phone]

## 2022-02-16 ENCOUNTER — TELEPHONE (OUTPATIENT)
Dept: INTERNAL MEDICINE CLINIC | Age: 87
End: 2022-02-16

## 2022-02-16 NOTE — TELEPHONE ENCOUNTER
----- Message from Amil January, MA sent at 2/16/2022  2:17 PM EST -----  Subject: Message to Provider    QUESTIONS  Information for Provider? Patient request an order for a hospital bed. Complaining of right shoulder pain, back pain, right hip and right leg   pain. [facility] [phone].  ---------------------------------------------------------------------------  --------------  Beata JAEGER  What is the best way for the office to contact you? OK to leave message on   voicemail  Preferred Call Back Phone Number? [phone]  ---------------------------------------------------------------------------  --------------  SCRIPT ANSWERS  Relationship to Patient?  Self

## 2022-02-16 NOTE — TELEPHONE ENCOUNTER
I'm sure she would need an appointment to document need for a home hospital bed, but she should understand that Medicare may not approve a hospital bed for her.

## 2022-02-18 ENCOUNTER — TELEPHONE (OUTPATIENT)
Dept: INTERNAL MEDICINE CLINIC | Age: 87
End: 2022-02-18

## 2022-02-18 NOTE — TELEPHONE ENCOUNTER
----- Message from Celi Wood Day sent at 2/18/2022 12:06 PM EST -----  Subject: Message to Provider    QUESTIONS  Information for Provider? pt would like to know if Dr. Elizabeth Sevilla would do a   telephone visit with her to discuss getting her bed, her current bed is   giving her alot of neck, and shoulder pain, it wont adjust right. Pt is   having transportation issues and can not get to the office for a visit. She asked to be contacted at the number below? thank you [phone]  ---------------------------------------------------------------------------  --------------  Caprice JAEGER  What is the best way for the office to contact you? OK to leave message on   voicemail  Preferred Call Back Phone Number? [phone]  ---------------------------------------------------------------------------  --------------  SCRIPT ANSWERS  Relationship to Patient?  Self

## 2022-02-21 RX ORDER — NITROFURANTOIN 25; 75 MG/1; MG/1
100 CAPSULE ORAL 2 TIMES DAILY WITH MEALS
Qty: 14 CAPSULE | Refills: 0 | Status: SHIPPED | OUTPATIENT
Start: 2022-02-21 | End: 2022-02-23 | Stop reason: ALTCHOICE

## 2022-02-21 NOTE — TELEPHONE ENCOUNTER
----- Message from Rogers Day sent at 2/18/2022 12:08 PM EST -----  Subject: Refill Request    QUESTIONS  Name of Medication? nitrofurantoin, macrocrystal-monohydrate, (MACROBID)   100 MG capsule  Patient-reported dosage and instructions? 1x daily  How many days do you have left? 0  Preferred Pharmacy? [address] [place] [address]  Pharmacy phone number (if available)? [phone]  ---------------------------------------------------------------------------  --------------  Meghna JAEGER  What is the best way for the office to contact you? OK to leave message on   voicemail  Preferred Call Back Phone Number?  [phone]

## 2022-02-23 ENCOUNTER — OFFICE VISIT (OUTPATIENT)
Dept: INTERNAL MEDICINE CLINIC | Age: 87
End: 2022-02-23
Payer: MEDICARE

## 2022-02-23 DIAGNOSIS — I27.20 PULMONARY HYPERTENSION (HCC): ICD-10-CM

## 2022-02-23 DIAGNOSIS — E66.9 DIABETES MELLITUS TYPE 2 IN OBESE (HCC): ICD-10-CM

## 2022-02-23 DIAGNOSIS — G89.29 CHRONIC NECK PAIN: Primary | ICD-10-CM

## 2022-02-23 DIAGNOSIS — R32 URINARY INCONTINENCE, UNSPECIFIED TYPE: ICD-10-CM

## 2022-02-23 DIAGNOSIS — E66.01 OBESITY, CLASS III, BMI 40-49.9 (MORBID OBESITY) (HCC): ICD-10-CM

## 2022-02-23 DIAGNOSIS — E11.69 DIABETES MELLITUS TYPE 2 IN OBESE (HCC): ICD-10-CM

## 2022-02-23 DIAGNOSIS — D55.0 ANEMIA DUE TO GLUCOSE-6-PHOSPHATE DEHYDROGENASE (G6PD) DEFICIENCY (HCC): ICD-10-CM

## 2022-02-23 DIAGNOSIS — M54.2 CHRONIC NECK PAIN: Primary | ICD-10-CM

## 2022-02-23 DIAGNOSIS — F33.41 RECURRENT MAJOR DEPRESSIVE DISORDER IN PARTIAL REMISSION (HCC): ICD-10-CM

## 2022-02-23 PROCEDURE — 1123F ACP DISCUSS/DSCN MKR DOCD: CPT | Performed by: INTERNAL MEDICINE

## 2022-02-23 PROCEDURE — G8427 DOCREV CUR MEDS BY ELIG CLIN: HCPCS | Performed by: INTERNAL MEDICINE

## 2022-02-23 PROCEDURE — G8417 CALC BMI ABV UP PARAM F/U: HCPCS | Performed by: INTERNAL MEDICINE

## 2022-02-23 PROCEDURE — 1090F PRES/ABSN URINE INCON ASSESS: CPT | Performed by: INTERNAL MEDICINE

## 2022-02-23 PROCEDURE — 99214 OFFICE O/P EST MOD 30 MIN: CPT | Performed by: INTERNAL MEDICINE

## 2022-02-23 PROCEDURE — G8484 FLU IMMUNIZE NO ADMIN: HCPCS | Performed by: INTERNAL MEDICINE

## 2022-02-23 PROCEDURE — 4040F PNEUMOC VAC/ADMIN/RCVD: CPT | Performed by: INTERNAL MEDICINE

## 2022-02-23 PROCEDURE — 0509F URINE INCON PLAN DOCD: CPT | Performed by: INTERNAL MEDICINE

## 2022-02-23 PROCEDURE — 1036F TOBACCO NON-USER: CPT | Performed by: INTERNAL MEDICINE

## 2022-02-23 SDOH — ECONOMIC STABILITY: FOOD INSECURITY: WITHIN THE PAST 12 MONTHS, THE FOOD YOU BOUGHT JUST DIDN'T LAST AND YOU DIDN'T HAVE MONEY TO GET MORE.: NEVER TRUE

## 2022-02-23 SDOH — ECONOMIC STABILITY: FOOD INSECURITY: WITHIN THE PAST 12 MONTHS, YOU WORRIED THAT YOUR FOOD WOULD RUN OUT BEFORE YOU GOT MONEY TO BUY MORE.: NEVER TRUE

## 2022-02-23 ASSESSMENT — PATIENT HEALTH QUESTIONNAIRE - PHQ9
6. FEELING BAD ABOUT YOURSELF - OR THAT YOU ARE A FAILURE OR HAVE LET YOURSELF OR YOUR FAMILY DOWN: 0
SUM OF ALL RESPONSES TO PHQ QUESTIONS 1-9: 4
9. THOUGHTS THAT YOU WOULD BE BETTER OFF DEAD, OR OF HURTING YOURSELF: 0
2. FEELING DOWN, DEPRESSED OR HOPELESS: 1
10. IF YOU CHECKED OFF ANY PROBLEMS, HOW DIFFICULT HAVE THESE PROBLEMS MADE IT FOR YOU TO DO YOUR WORK, TAKE CARE OF THINGS AT HOME, OR GET ALONG WITH OTHER PEOPLE: 1
SUM OF ALL RESPONSES TO PHQ QUESTIONS 1-9: 4
8. MOVING OR SPEAKING SO SLOWLY THAT OTHER PEOPLE COULD HAVE NOTICED. OR THE OPPOSITE, BEING SO FIGETY OR RESTLESS THAT YOU HAVE BEEN MOVING AROUND A LOT MORE THAN USUAL: 0
4. FEELING TIRED OR HAVING LITTLE ENERGY: 1
SUM OF ALL RESPONSES TO PHQ QUESTIONS 1-9: 4
7. TROUBLE CONCENTRATING ON THINGS, SUCH AS READING THE NEWSPAPER OR WATCHING TELEVISION: 0
3. TROUBLE FALLING OR STAYING ASLEEP: 1
SUM OF ALL RESPONSES TO PHQ QUESTIONS 1-9: 4
5. POOR APPETITE OR OVEREATING: 1

## 2022-02-23 ASSESSMENT — SOCIAL DETERMINANTS OF HEALTH (SDOH): HOW HARD IS IT FOR YOU TO PAY FOR THE VERY BASICS LIKE FOOD, HOUSING, MEDICAL CARE, AND HEATING?: NOT HARD AT ALL

## 2022-02-23 NOTE — PROGRESS NOTES
Subjective:      Patient ID: Ankit Patricia is a 80 y.o. female    Chief Complaint   Patient presents with    Shoulder Pain     Radiates from right shoulder across back , every thing on right side has pain X 2-3 mo     Knee Pain     pain in right knee     Other     patient thinks it may be stent     Urinary Frequency       Neck Pain   This is a chronic problem. The current episode started more than 1 year ago. The problem occurs constantly. The problem has been unchanged. The pain is moderate. The pain is same all the time. Stiffness is present all day. Pertinent negatives include no chest pain, fever, headaches or weakness. She has tried acetaminophen and oral narcotics for the symptoms. The treatment provided mild relief. Diabetes  She presents for her follow-up diabetic visit. She has type 2 diabetes mellitus. Her disease course has been stable. Pertinent negatives for hypoglycemia include no dizziness, headaches or nervousness/anxiousness. Pertinent negatives for diabetes include no chest pain, no fatigue, no polydipsia, no polyuria and no weakness. Symptoms are stable. Risk factors for coronary artery disease include obesity. Current diabetic treatment includes oral agent (monotherapy) and diet. She is compliant with treatment most of the time. Hypertension  This is a chronic problem. The current episode started more than 1 year ago. The problem is controlled. Associated symptoms include neck pain. Pertinent negatives include no chest pain, headaches or shortness of breath. There are no associated agents to hypertension. Past treatments include diuretics and beta blockers. The current treatment provides significant improvement. Compliance problems include exercise. Chronic back pain, right upper back neck pain with numbness in the right arm. Left knee pain.    Current Outpatient Medications on File Prior to Visit   Medication Sig Dispense Refill    traZODone (DESYREL) 100 MG tablet TAKE 1 TABLET BY MOUTH NIGHTLY (SLEEP) 90 tablet 1    SITagliptin (JANUVIA) 50 MG tablet TAKE 1 TABLET BY MOUTH EVERY DAY 90 tablet 1    SENEXON-S 8.6-50 MG per tablet TAKE 1 TABLET BY MOUTH EVERY DAY 30 tablet 5    lansoprazole (PREVACID) 30 MG delayed release capsule Take 1 capsule by mouth daily 60 capsule 5    ACETAMINOPHEN EXTRA STRENGTH 500 MG tablet TAKE 1 TABLET BY MOUTH EVERY 6 HOURS AS NEEDED FOR PAIN 120 tablet 3    zinc oxide (DESITIN) 13 % CREA Apply topically 2 times daily as needed for Rash 454 g 2    spironolactone (ALDACTONE) 25 MG tablet TAKE 1 TABLET BY MOUTH TWICE A  tablet 2    sertraline (ZOLOFT) 50 MG tablet Take 1 tablet by mouth daily 90 tablet 3    traMADol (ULTRAM) 50 MG tablet TAKE 2 TABLETS BY MOUTH 3 TIMES A DAY      metoprolol tartrate (LOPRESSOR) 50 MG tablet Take 1 tablet by mouth 2 times daily 180 tablet 3    vitamin B-6 (PYRIDOXINE) 50 MG tablet TAKE 1 TABLET BY MOUTH EVERY DAY 30 tablet 5    Biotin (SUPER BIOTIN) 5 MG TABS Take 1 tablet by mouth daily 30 tablet 5    glucose monitoring kit (FREESTYLE) monitoring kit 1 kit by Does not apply route daily 1 kit 0    blood glucose test strips (FREESTYLE TEST STRIPS) strip 1 each by In Vitro route 2 times daily 100 each 3    Lancets MISC 1 each by Does not apply route daily 300 each 1    Cholecalciferol (VITAMIN D3) 400 units CAPS Take by mouth daily      Blood Glucose Monitoring Suppl KIT Tests daily 1 kit 0     No current facility-administered medications on file prior to visit. Allergies   Allergen Reactions    Percocet [Oxycodone-Acetaminophen] Anaphylaxis     Reported that she has swelling, rash and difficulty breathing.      Coumadin [Warfarin Sodium]     Dalteparin Sodium     Vaibhav Beans [Vaibhav Beans]     Heparin     Lovenox [Enoxaparin Sodium]     Other      Almonds    Peanut-Containing Drug Products     Penicillins     Plavix [Clopidogrel Bisulfate]     Spinach     Sulfa Antibiotics     Vicodin [Hydrocodone-Acetaminophen] Other (See Comments)     Vomit and diarrhea    Aspirin Nausea And Vomiting       Past Medical History:   Diagnosis Date    Arthritis     Chronic back pain     Diabetes mellitus ([address]     Esophagitis     Essential hypertension 2016    Fatty liver     G6P deficiency (glucose-6-phosphatase deficiency) (HCC)     G6PD deficiency     Gastric polyps     GERD (gastroesophageal reflux disease)     Hemorrhoids     History of blood transfusion     Hypothyroidism     Kidney stone     Mediterranean anemia     Normal cardiac stress test 3/2014    Sickle cell anemia (HCC)     UTI (urinary tract infection) 3/2014    Ecoli - R to Cipro and ampicillin     Past Surgical History:   Procedure Laterality Date    APPENDECTOMY       SECTION      X1    CYSTOSCOPY  2015    urethral dilitation    HYSTERECTOMY      OTHER SURGICAL HISTORY  2017    Ear cleaning under anesthesia    [place] LAP,CHOLECYSTECTOMY/GRAPH N/A 2018    LAPAROSCOPIC CHOLECYSTECTOMY WITH CHOLANGIOGRAM performed by Antoni Baker MD at [facility] N/A 2018    EGD BIOPSY performed by Fatimah Mitchell MD at [facility] History     Socioeconomic History    Marital status:       Spouse name: Not on file    Number of children: Not on file    Years of education: Not on file    Highest education level: Not on file   Occupational History    Not on file   Tobacco Use    Smoking status: Former Smoker     Quit date: 3/3/1970     Years since quittin.1    Smokeless tobacco: Never Used   Vaping Use    Vaping Use: Never used   Substance and Sexual Activity    Alcohol use: No     Alcohol/week: 0.0 standard drinks    Drug use: No    Sexual activity: Never   Other Topics Concern    Not on file   Social History Narrative    Not on file     Social Determinants of Health     Financial Resource Strain: Low Risk     Difficulty of Paying Living Expenses: Not hard at all   Food Insecurity: No Food Insecurity    Worried About [facility] in the Last Year: Never true    Tiffany of Food in the Last Year: Never true   Transportation Needs:     Lack of Transportation (Medical): Not on file    Lack of Transportation (Non-Medical):  Not on file   Physical Activity:     Days of Exercise per Week: Not on file    Minutes of Exercise per Session: Not on file   Stress:     Feeling of Stress : Not on file   Social Connections:     Frequency of Communication with Friends and Family: Not on file    Frequency of Social Gatherings with Friends and Family: Not on file    Attends Mosque Services: Not on file    Active Member of [address] [place] [address] or Organizations: Not on file    Attends Club or Organization Meetings: Not on file    Marital Status: Not on file   Intimate Partner Violence:     Fear of Current or Ex-Partner: Not on file    Emotionally Abused: Not on file    Physically Abused: Not on file    Sexually Abused: Not on file   Housing Stability:     Unable to Pay for Housing in the Last Year: Not on file    Number of [place] in the Last Year: Not on file    Unstable Housing in the Last Year: Not on file     Family History   Problem Relation Age of Onset    Diabetes Mother     Heart Disease Mother         heart congestion    Cancer Maternal Grandmother      Immunization History   Administered Date(s) Administered    COVID-19, Pfizer Purple top, DILUTE for use, 12+ yrs, 30mcg/0.3mL dose 03/03/2021, 03/31/2021    Influenza Vaccine, unspecified formulation 10/05/2013, 09/29/2014, 09/16/2015, 10/07/2016    Influenza Virus Vaccine 10/05/2013, 09/29/2014, 09/16/2015, 10/07/2016    Influenza Whole 09/09/2010    Influenza, High Dose (Fluzone 65 yrs and older) 09/18/2012, 09/29/2014, 09/16/2015, 10/07/2016, 09/25/2017    Influenza, Quadv, 6 mo and older, IM, PF (Flulaval, Fluarix) 10/27/2018    Influenza, Quadv, adjuvanted, 65 yrs +, IM, PF (Fluad) 10/23/2020, 12/30/2021    Influenza, Triv, inactivated, subunit, adjuvanted, IM (Fluad 65 yrs and older) 11/19/2019    Pneumococcal Conjugate 13-valent ([id number]) 09/16/2015    Pneumococcal Polysaccharide ([id number]) 04/11/2012       Review of Systems   Constitutional: Negative for fatigue, fever and unexpected weight change. HENT: Negative for congestion and hearing loss. Eyes: Negative for redness and visual disturbance. Respiratory: Negative for cough, chest tightness and shortness of breath. Cardiovascular: Negative for chest pain and leg swelling. Gastrointestinal: Negative for abdominal pain and nausea. Endocrine: Negative for cold intolerance, heat intolerance, polydipsia and polyuria. Genitourinary: Negative for dysuria and frequency. Musculoskeletal: Positive for neck pain. Negative for arthralgias and back pain. Skin: Negative for rash and wound. Neurological: Negative for dizziness, weakness and headaches. Hematological: Negative for adenopathy. Does not bruise/bleed easily. Psychiatric/Behavioral: Negative for sleep disturbance. The patient is not nervous/anxious. Objective:    Physical Exam  Constitutional:       Appearance: She is well-developed. She is obese. Neck:      Comments: Right neck stiffness   Cardiovascular:      Rate and Rhythm: Normal rate and regular rhythm. Heart sounds: No murmur heard. Pulmonary:      Effort: Pulmonary effort is normal.      Breath sounds: Normal breath sounds. Musculoskeletal:      Right lower leg: Edema present. Left lower leg: Edema present. Skin:     General: Skin is warm and dry. Findings: No rash. Neurological:      Mental Status: She is alert and oriented to person, place, and time. Vitals:    02/23/22 1607   BP: 130/70   Pulse: 60   Temp: 97 °F (36.1 °C)   SpO2: 97%       Assessment and plan       1. Chronic neck pain  Chronic neck pain. Heat, home exercise, tylenol, tramadol. 2. Urinary incontinence, unspecified type  Chronic. Referral to Dr Eliezer Webster MD, Urology, [facility]    3. Pulmonary hypertension (HCC)  Stable. Continue Rx. 4. Recurrent major depressive disorder in partial remission (HCC)  Grief reaction to loss of . 5. Diabetes mellitus type 2 in obese (HCC)  Stable.      6. 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 Obesity, Class III, BMI 40-49.9 (morbid obesity) ([facility] [address]

## 2022-02-24 VITALS
OXYGEN SATURATION: 97 % | SYSTOLIC BLOOD PRESSURE: 130 MMHG | DIASTOLIC BLOOD PRESSURE: 70 MMHG | HEIGHT: 63 IN | HEART RATE: 60 BPM | BODY MASS INDEX: 43.23 KG/M2 | TEMPERATURE: 97 F | WEIGHT: 244 LBS

## 2022-02-25 ENCOUNTER — TELEPHONE (OUTPATIENT)
Dept: INTERNAL MEDICINE CLINIC | Age: 87
End: 2022-02-25

## 2022-02-25 NOTE — TELEPHONE ENCOUNTER
Patient needs refill of traMADol (ULTRAM) 50 MG tablet     She also wants a blood glucose monitoring supply kit without needles. Please call and advise    Pharmacy:  [facility], [address] - [place] [phone] - F [phone]

## 2022-03-02 ENCOUNTER — TELEPHONE (OUTPATIENT)
Dept: INTERNAL MEDICINE CLINIC | Age: 87
End: 2022-03-02

## 2022-03-02 DIAGNOSIS — R11.0 NAUSEA: ICD-10-CM

## 2022-03-02 NOTE — TELEPHONE ENCOUNTER
----- Message from Mansi Park sent at 3/2/2022  1:55 PM EST -----  Subject: Message to Provider    QUESTIONS  Information for Provider? Pt has not heard from PT or about her medical   bed orders. What are next steps?  ---------------------------------------------------------------------------  --------------  CALL BACK INFO  What is the best way for the office to contact you? Do not leave any   message, patient will call back for answer  Preferred Call Back Phone Number? [phone]  ---------------------------------------------------------------------------  --------------  SCRIPT ANSWERS  Relationship to Patient?  Self

## 2022-03-04 ENCOUNTER — TELEPHONE (OUTPATIENT)
Dept: INTERNAL MEDICINE CLINIC | Age: 87
End: 2022-03-04

## 2022-03-04 RX ORDER — PROMETHAZINE HYDROCHLORIDE 12.5 MG/1
12.5 TABLET ORAL DAILY PRN
Qty: 30 TABLET | Refills: 0 | Status: SHIPPED | OUTPATIENT
Start: 2022-03-04 | End: 2022-04-05 | Stop reason: SDUPTHER

## 2022-03-04 NOTE — TELEPHONE ENCOUNTER
[facility] called and needs orders to start care on Monday.  Can also call with a verbal.      Please call and advise

## 2022-03-07 DIAGNOSIS — E66.01 OBESITY, CLASS III, BMI 40-49.9 (MORBID OBESITY) (HCC): Primary | ICD-10-CM

## 2022-03-07 DIAGNOSIS — M54.9 BACK PAIN, UNSPECIFIED BACK LOCATION, UNSPECIFIED BACK PAIN LATERALITY, UNSPECIFIED CHRONICITY: ICD-10-CM

## 2022-03-08 ENCOUNTER — TELEPHONE (OUTPATIENT)
Dept: INTERNAL MEDICINE CLINIC | Age: 87
End: 2022-03-08

## 2022-03-08 NOTE — TELEPHONE ENCOUNTER
----- Message from Romelia Cantrell sent at 3/8/2022 12:52 PM EST -----  Subject: Message to Provider    QUESTIONS  Information for Provider? PT calling in and wanted all her medication send   now to [facility] [address]   [phone] Adriano Hay [phone]  ---------------------------------------------------------------------------  --------------  Latrell JAEGER  What is the best way for the office to contact you? OK to leave message on   voicemail  Preferred Call Back Phone Number? [phone]  ---------------------------------------------------------------------------  --------------  SCRIPT ANSWERS  Relationship to Patient?  Self

## 2022-03-10 ENCOUNTER — TELEPHONE (OUTPATIENT)
Dept: INTERNAL MEDICINE CLINIC | Age: 87
End: 2022-03-10

## 2022-03-10 NOTE — TELEPHONE ENCOUNTER
Patient home care started today. She was having extreme and very sharp pain radiating from neck to down her right arm. Also having numbness in her right hand. Wonder if an MRI would be appropriate. Please call Elsa Siu to discuss.

## 2022-03-11 ENCOUNTER — TELEPHONE (OUTPATIENT)
Dept: INTERNAL MEDICINE CLINIC | Age: 87
End: 2022-03-11

## 2022-03-11 DIAGNOSIS — M54.50 CHRONIC MIDLINE LOW BACK PAIN WITHOUT SCIATICA: Primary | ICD-10-CM

## 2022-03-11 DIAGNOSIS — I10 ESSENTIAL HYPERTENSION: ICD-10-CM

## 2022-03-11 DIAGNOSIS — G89.29 CHRONIC MIDLINE LOW BACK PAIN WITHOUT SCIATICA: Primary | ICD-10-CM

## 2022-03-11 RX ORDER — HYDROCHLOROTHIAZIDE 25 MG/1
TABLET ORAL
Qty: 90 TABLET | Refills: 3 | Status: SHIPPED | OUTPATIENT
Start: 2022-03-11

## 2022-03-11 RX ORDER — LIDOCAINE 4 G/G
1 PATCH TOPICAL DAILY
Qty: 30 PATCH | Refills: 0 | Status: SHIPPED | OUTPATIENT
Start: 2022-03-11 | End: 2022-04-10

## 2022-03-11 NOTE — TELEPHONE ENCOUNTER
Pt would like to know if she can get a lidocaine patch And also needs a refill for the following medication:     hydroCHLOROthiazide (HYDRODIURIL) 25 MG tablet         [facility], [facility] - [address]  [address] [address] [place] [phone] - F [phone]      Please call and advise

## 2022-03-11 NOTE — TELEPHONE ENCOUNTER
Orders Placed This Encounter   Medications    hydroCHLOROthiazide (HYDRODIURIL) 25 MG tablet     Sig: TAKE 1 TABLET BY MOUTH EVERY DAY     Dispense:  90 tablet     Refill:  3    lidocaine 4 % external patch     Sig: Place 1 patch onto the skin daily     Dispense:  30 patch     Refill:  0

## 2022-03-15 ENCOUNTER — TELEPHONE (OUTPATIENT)
Dept: INTERNAL MEDICINE CLINIC | Age: 87
End: 2022-03-15

## 2022-03-15 NOTE — TELEPHONE ENCOUNTER
Pt called and stated that she is having pain in her right shoulder all the way down to her ankle.  Pt would like to know if she can go get a MRI or what dr would like to suggest.         Please call and advise

## 2022-03-18 ENCOUNTER — TELEPHONE (OUTPATIENT)
Dept: INTERNAL MEDICINE CLINIC | Age: 87
End: 2022-03-18

## 2022-03-18 NOTE — TELEPHONE ENCOUNTER
Milvia from [address] [place], [place] [address] called in stating that the patient missed her visit with the nurse today. She refused them to come due to her having too much going on. Pls advise.

## 2022-03-29 ENCOUNTER — TELEMEDICINE (OUTPATIENT)
Dept: INTERNAL MEDICINE CLINIC | Age: 87
End: 2022-03-29
Payer: COMMERCIAL

## 2022-03-29 DIAGNOSIS — E66.01 MORBIDLY OBESE (HCC): ICD-10-CM

## 2022-03-29 DIAGNOSIS — G89.29 CHRONIC RIGHT-SIDED LOW BACK PAIN WITH RIGHT-SIDED SCIATICA: Primary | ICD-10-CM

## 2022-03-29 DIAGNOSIS — M54.41 CHRONIC RIGHT-SIDED LOW BACK PAIN WITH RIGHT-SIDED SCIATICA: Primary | ICD-10-CM

## 2022-03-29 DIAGNOSIS — M47.892 OTHER OSTEOARTHRITIS OF SPINE, CERVICAL REGION: ICD-10-CM

## 2022-03-29 PROCEDURE — 4040F PNEUMOC VAC/ADMIN/RCVD: CPT | Performed by: INTERNAL MEDICINE

## 2022-03-29 PROCEDURE — 1123F ACP DISCUSS/DSCN MKR DOCD: CPT | Performed by: INTERNAL MEDICINE

## 2022-03-29 PROCEDURE — 99214 OFFICE O/P EST MOD 30 MIN: CPT | Performed by: INTERNAL MEDICINE

## 2022-03-29 PROCEDURE — 1090F PRES/ABSN URINE INCON ASSESS: CPT | Performed by: INTERNAL MEDICINE

## 2022-03-29 PROCEDURE — G8427 DOCREV CUR MEDS BY ELIG CLIN: HCPCS | Performed by: INTERNAL MEDICINE

## 2022-03-29 NOTE — PROGRESS NOTES
Subjective:      Patient ID: Gustavo Gandara is a 80 y.o. female    Chief Complaint   Patient presents with    Back Pain     right upper back, neck, shoulder blade     Neck Pain    Hip Pain     lower back, rates her pain as an 8,      VV    Neck Pain   This is a chronic problem. The problem occurs constantly. The problem has been unchanged. The pain is present in the right side. The quality of the pain is described as aching and shooting. The pain is at a severity of 5/10. The pain is moderate. Exacerbated by: neck movement  The pain is worse during the day. Stiffness is present all day. Associated symptoms include weakness. Pertinent negatives include no chest pain, fever or headaches. She has tried acetaminophen, ice, heat and muscle relaxants for the symptoms. The treatment provided no relief. Back Pain  This is a chronic problem. The current episode started more than 1 year ago. The problem is unchanged. The pain is present in the lumbar spine. The quality of the pain is described as aching. The pain radiates to the right foot and right thigh. The pain is at a severity of 5/10. The pain is moderate. The pain is worse during the day. Associated symptoms include weakness. Pertinent negatives include no abdominal pain, chest pain, dysuria, fever or headaches. She has tried heat, ice, analgesics and home exercises for the symptoms. The treatment provided mild relief.        Current Outpatient Medications on File Prior to Visit   Medication Sig Dispense Refill    hydroCHLOROthiazide (HYDRODIURIL) 25 MG tablet TAKE 1 TABLET BY MOUTH EVERY DAY 90 tablet 3    lidocaine 4 % external patch Place 1 patch onto the skin daily 30 patch 0    promethazine (PHENERGAN) 12.5 MG tablet Take 1 tablet by mouth daily as needed for Nausea 30 tablet 0    traZODone (DESYREL) 100 MG tablet TAKE 1 TABLET BY MOUTH NIGHTLY (SLEEP) 90 tablet 1    SITagliptin (JANUVIA) 50 MG tablet TAKE 1 TABLET BY MOUTH EVERY DAY 90 tablet 1    lansoprazole (PREVACID) 30 MG delayed release capsule Take 1 capsule by mouth daily 60 capsule 5    ACETAMINOPHEN EXTRA STRENGTH 500 MG tablet TAKE 1 TABLET BY MOUTH EVERY 6 HOURS AS NEEDED FOR PAIN 120 tablet 3    levothyroxine (SYNTHROID) 100 MCG tablet TAKE 1 TABLET BY MOUTH EVERY DAY 90 tablet 3    spironolactone (ALDACTONE) 25 MG tablet TAKE 1 TABLET BY MOUTH TWICE A  tablet 2    sertraline (ZOLOFT) 50 MG tablet Take 1 tablet by mouth daily 90 tablet 3    traMADol (ULTRAM) 50 MG tablet TAKE 2 TABLETS BY MOUTH 3 TIMES A DAY      metoprolol tartrate (LOPRESSOR) 50 MG tablet Take 1 tablet by mouth 2 times daily 180 tablet 3    vitamin B-6 (PYRIDOXINE) 50 MG tablet TAKE 1 TABLET BY MOUTH EVERY DAY 30 tablet 5    Biotin (SUPER BIOTIN) 5 MG TABS Take 1 tablet by mouth daily 30 tablet 5    Cholecalciferol (VITAMIN D3) 400 units CAPS Take by mouth daily     [address] by Does not apply route Use as directed 1 each 0    SENEXON-S 8.6-50 MG per tablet TAKE 1 TABLET BY MOUTH EVERY DAY 30 tablet 5    zinc oxide (DESITIN) 13 % CREA Apply topically 2 times daily as needed for Rash 454 g 2    glucose monitoring kit (FREESTYLE) monitoring kit 1 kit by Does not apply route daily 1 kit 0    blood glucose test strips (FREESTYLE TEST STRIPS) strip 1 each by In Vitro route 2 times daily 100 each 3    Lancets MISC 1 each by Does not apply route daily 300 each 1    Blood Glucose Monitoring Suppl KIT Tests daily 1 kit 0     No current facility-administered medications on file prior to visit. Allergies   Allergen Reactions    Percocet [Oxycodone-Acetaminophen] Anaphylaxis     Reported that she has swelling, rash and difficulty breathing.      Coumadin [Warfarin Sodium]     Dalteparin Sodium     Vaibhav Beans [Vaibhav Beans]     Heparin     Lovenox [Enoxaparin Sodium]     Other      Almonds    Peanut-Containing Drug Products     Penicillins     Plavix [Clopidogrel Bisulfate]     Spinach     Sulfa Antibiotics     Vicodin [Hydrocodone-Acetaminophen] Other (See Comments)     Vomit and diarrhea    Aspirin Nausea And Vomiting       Review of Systems   Constitutional: Negative for fatigue, fever and unexpected weight change. HENT: Negative for congestion and hearing loss. Eyes: Negative for redness and visual disturbance. Respiratory: Negative for cough, chest tightness and shortness of breath. Cardiovascular: Negative for chest pain and leg swelling. Gastrointestinal: Negative for abdominal pain and nausea. Genitourinary: Negative for dysuria and frequency. Musculoskeletal: Positive for arthralgias, back pain, myalgias, neck pain and neck stiffness. Skin: Negative for rash and wound. Neurological: Positive for weakness. Negative for dizziness and headaches. Hematological: Negative for adenopathy. Does not bruise/bleed easily. Psychiatric/Behavioral: Negative for sleep disturbance. The patient is not nervous/anxious. Objective:   Physical Exam  Constitutional:       Appearance: She is obese. Cardiovascular:      Rate and Rhythm: Normal rate and regular rhythm. Pulmonary:      Effort: Pulmonary effort is normal.      Breath sounds: No wheezing or rales. Musculoskeletal:      Right lower leg: Edema present. Left lower leg: Edema present. Comments: Stiff neck and back, general weakness, difficulty moving, standing, walks with a walker. Prone to falls. Skin:     General: Skin is warm and dry. Neurological:      Mental Status: She is alert and oriented to person, place, and time. Assessment and plan       1. Chronic right-sided low back pain with right-sided sciatica  Home PT and OT , heat, Tylenol, walker. 2. Other osteoarthritis of spine, cervical region  Home PT and OT, heat, Tylenol. 3. Morbidly obese ([address]  Continue to work on diet. Alise Ahmadi, was evaluated through a synchronous (real-time) audio-video encounter.  The patient (or guardian if applicable) is aware that this is a billable service, which includes applicable co-pays. This Virtual Visit was conducted with patient's (and/or legal guardian's) consent. The visit was conducted pursuant to the emergency declaration under the [facility], [address] [place] [address] authority and the [employer] Act. Patient identification was verified, and a caregiver was present when appropriate. The patient was located at home in a state where the provider was licensed to provide care. Total time spent for this encounter: Not billed by time    --Ihsa Couch MD on 3/31/2022 at 8:08 PM    An electronic signature was used to authenticate this note.

## 2022-03-31 ENCOUNTER — TELEPHONE (OUTPATIENT)
Dept: INTERNAL MEDICINE CLINIC | Age: 87
End: 2022-03-31

## 2022-03-31 ASSESSMENT — ENCOUNTER SYMPTOMS
COUGH: 0
ABDOMINAL PAIN: 0
SHORTNESS OF BREATH: 0
CHEST TIGHTNESS: 0
NAUSEA: 0
BACK PAIN: 1
EYE REDNESS: 0

## 2022-03-31 NOTE — TELEPHONE ENCOUNTER
Justin Jones from [address] [place], [place] [address] called in needing the patient's last visit notes sent to them. The last office note is still unsigned. Fax number [phone]      Shawn shepherd.

## 2022-04-05 ENCOUNTER — TELEPHONE (OUTPATIENT)
Dept: INTERNAL MEDICINE CLINIC | Age: 87
End: 2022-04-05

## 2022-04-05 DIAGNOSIS — R11.0 NAUSEA: ICD-10-CM

## 2022-04-05 RX ORDER — PROMETHAZINE HYDROCHLORIDE 12.5 MG/1
12.5 TABLET ORAL DAILY PRN
Qty: 30 TABLET | Refills: 0 | Status: SHIPPED | OUTPATIENT
Start: 2022-04-05 | End: 2022-05-02

## 2022-04-05 NOTE — TELEPHONE ENCOUNTER
Pt  Needs a refill on promethazine (PHENERGAN) 12.5 MG tablet         [address] - [place] [phone] -  [phone]        Please advise

## 2022-04-20 ENCOUNTER — HOSPITAL ENCOUNTER (OUTPATIENT)
Dept: GENERAL RADIOLOGY | Age: 87
Discharge: HOME | End: 2022-04-20
Payer: MEDICARE

## 2022-04-20 ENCOUNTER — OFFICE VISIT (OUTPATIENT)
Dept: INTERNAL MEDICINE CLINIC | Age: 87
End: 2022-04-20
Payer: MEDICARE

## 2022-04-20 ENCOUNTER — HOSPITAL ENCOUNTER (OUTPATIENT)
Age: 87
Discharge: HOME | End: 2022-04-20
Payer: MEDICARE

## 2022-04-20 VITALS
SYSTOLIC BLOOD PRESSURE: 124 MMHG | TEMPERATURE: 97.1 F | BODY MASS INDEX: 42.87 KG/M2 | DIASTOLIC BLOOD PRESSURE: 84 MMHG | WEIGHT: 242 LBS

## 2022-04-20 DIAGNOSIS — M54.2 NECK PAIN: Primary | ICD-10-CM

## 2022-04-20 DIAGNOSIS — E03.9 ACQUIRED HYPOTHYROIDISM: Chronic | ICD-10-CM

## 2022-04-20 DIAGNOSIS — M79.601 RIGHT ARM PAIN: ICD-10-CM

## 2022-04-20 DIAGNOSIS — L30.9 DERMATITIS: ICD-10-CM

## 2022-04-20 DIAGNOSIS — M54.2 NECK PAIN: ICD-10-CM

## 2022-04-20 PROCEDURE — 1090F PRES/ABSN URINE INCON ASSESS: CPT | Performed by: INTERNAL MEDICINE

## 2022-04-20 PROCEDURE — 1036F TOBACCO NON-USER: CPT | Performed by: INTERNAL MEDICINE

## 2022-04-20 PROCEDURE — G8427 DOCREV CUR MEDS BY ELIG CLIN: HCPCS | Performed by: INTERNAL MEDICINE

## 2022-04-20 PROCEDURE — G8417 CALC BMI ABV UP PARAM F/U: HCPCS | Performed by: INTERNAL MEDICINE

## 2022-04-20 PROCEDURE — 4040F PNEUMOC VAC/ADMIN/RCVD: CPT | Performed by: INTERNAL MEDICINE

## 2022-04-20 PROCEDURE — 1123F ACP DISCUSS/DSCN MKR DOCD: CPT | Performed by: INTERNAL MEDICINE

## 2022-04-20 PROCEDURE — 72040 X-RAY EXAM NECK SPINE 2-3 VW: CPT

## 2022-04-20 PROCEDURE — 99214 OFFICE O/P EST MOD 30 MIN: CPT | Performed by: INTERNAL MEDICINE

## 2022-04-20 RX ORDER — TRAMADOL HYDROCHLORIDE 50 MG/1
TABLET ORAL
Status: CANCELLED | OUTPATIENT
Start: 2022-04-20

## 2022-04-20 RX ORDER — LEVOTHYROXINE SODIUM 0.1 MG/1
TABLET ORAL
Qty: 90 TABLET | Refills: 3 | Status: SHIPPED | OUTPATIENT
Start: 2022-04-20

## 2022-04-20 ASSESSMENT — PATIENT HEALTH QUESTIONNAIRE - PHQ9
4. FEELING TIRED OR HAVING LITTLE ENERGY: 1
9. THOUGHTS THAT YOU WOULD BE BETTER OFF DEAD, OR OF HURTING YOURSELF: 0
7. TROUBLE CONCENTRATING ON THINGS, SUCH AS READING THE NEWSPAPER OR WATCHING TELEVISION: 0
SUM OF ALL RESPONSES TO PHQ QUESTIONS 1-9: 6
2. FEELING DOWN, DEPRESSED OR HOPELESS: 1
SUM OF ALL RESPONSES TO PHQ QUESTIONS 1-9: 6
10. IF YOU CHECKED OFF ANY PROBLEMS, HOW DIFFICULT HAVE THESE PROBLEMS MADE IT FOR YOU TO DO YOUR WORK, TAKE CARE OF THINGS AT HOME, OR GET ALONG WITH OTHER PEOPLE: 0
6. FEELING BAD ABOUT YOURSELF - OR THAT YOU ARE A FAILURE OR HAVE LET YOURSELF OR YOUR FAMILY DOWN: 1
8. MOVING OR SPEAKING SO SLOWLY THAT OTHER PEOPLE COULD HAVE NOTICED. OR THE OPPOSITE, BEING SO FIGETY OR RESTLESS THAT YOU HAVE BEEN MOVING AROUND A LOT MORE THAN USUAL: 0
1. LITTLE INTEREST OR PLEASURE IN DOING THINGS: 1
SUM OF ALL RESPONSES TO PHQ QUESTIONS 1-9: 6
5. POOR APPETITE OR OVEREATING: 1
3. TROUBLE FALLING OR STAYING ASLEEP: 1
SUM OF ALL RESPONSES TO PHQ QUESTIONS 1-9: 6
SUM OF ALL RESPONSES TO PHQ9 QUESTIONS 1 & 2: 2

## 2022-04-20 NOTE — PROGRESS NOTES
Subjective:      Patient ID: Martha Najjar is a 80 y.o. female    Chief Complaint   Patient presents with    Shoulder Pain     (R) shoulder pain x 4 mos. (sharp )/ legs are swelling  with blisters getting worse,request lo sodium diet     Physical Therapy     request care connection/therapy  stops soon       Neck Pain   This is a chronic problem. The current episode started more than 1 year ago. The pain is present in the right side. The quality of the pain is described as aching. The pain is at a severity of 5/10. The pain is moderate. The symptoms are aggravated by bending and twisting. Pertinent negatives include no chest pain, fever, headaches or weakness. She has tried home exercises, acetaminophen, neck support and oral narcotics for the symptoms. The treatment provided mild relief.        Current Outpatient Medications on File Prior to Visit   Medication Sig Dispense Refill    promethazine (PHENERGAN) 12.5 MG tablet Take 1 tablet by mouth daily as needed for Nausea 30 tablet 0    hydroCHLOROthiazide (HYDRODIURIL) 25 MG tablet TAKE 1 TABLET BY MOUTH EVERY DAY 90 tablet 3   [address] by Does not apply route Use as directed 1 each 0    traZODone (DESYREL) 100 MG tablet TAKE 1 TABLET BY MOUTH NIGHTLY (SLEEP) 90 tablet 1    SITagliptin (JANUVIA) 50 MG tablet TAKE 1 TABLET BY MOUTH EVERY DAY 90 tablet 1    SENEXON-S 8.6-50 MG per tablet TAKE 1 TABLET BY MOUTH EVERY DAY 30 tablet 5    lansoprazole (PREVACID) 30 MG delayed release capsule Take 1 capsule by mouth daily 60 capsule 5    ACETAMINOPHEN EXTRA STRENGTH 500 MG tablet TAKE 1 TABLET BY MOUTH EVERY 6 HOURS AS NEEDED FOR PAIN 120 tablet 3    zinc oxide (DESITIN) 13 % CREA Apply topically 2 times daily as needed for Rash 454 g 2    spironolactone (ALDACTONE) 25 MG tablet TAKE 1 TABLET BY MOUTH TWICE A  tablet 2    sertraline (ZOLOFT) 50 MG tablet Take 1 tablet by mouth daily 90 tablet 3    traMADol (ULTRAM) 50 MG tablet TAKE 2 TABLETS BY MOUTH 3 TIMES A DAY      metoprolol tartrate (LOPRESSOR) 50 MG tablet Take 1 tablet by mouth 2 times daily 180 tablet 3    vitamin B-6 (PYRIDOXINE) 50 MG tablet TAKE 1 TABLET BY MOUTH EVERY DAY 30 tablet 5    Biotin (SUPER BIOTIN) 5 MG TABS Take 1 tablet by mouth daily 30 tablet 5    glucose monitoring kit (FREESTYLE) monitoring kit 1 kit by Does not apply route daily 1 kit 0    blood glucose test strips (FREESTYLE TEST STRIPS) strip 1 each by In Vitro route 2 times daily 100 each 3    Lancets MISC 1 each by Does not apply route daily 300 each 1    Cholecalciferol (VITAMIN D3) 400 units CAPS Take by mouth daily      Blood Glucose Monitoring Suppl KIT Tests daily 1 kit 0     No current facility-administered medications on file prior to visit. Allergies   Allergen Reactions    Percocet [Oxycodone-Acetaminophen] Anaphylaxis     Reported that she has swelling, rash and difficulty breathing.  Coumadin [Warfarin Sodium]     Dalteparin Sodium     Vaibhav Beans [Vaibhav Beans]     Heparin     Lovenox [Enoxaparin Sodium]     Other      Almonds    Peanut-Containing Drug Products     Penicillins     Plavix [Clopidogrel Bisulfate]     Spinach     Sulfa Antibiotics     Vicodin [Hydrocodone-Acetaminophen] Other (See Comments)     Vomit and diarrhea    Aspirin Nausea And Vomiting       Review of Systems   Constitutional: Negative for fatigue, fever and unexpected weight change. HENT: Negative for congestion and hearing loss. Eyes: Negative for redness and visual disturbance. Respiratory: Negative for cough, chest tightness and shortness of breath. Cardiovascular: Negative for chest pain and leg swelling. Gastrointestinal: Negative for abdominal pain and nausea. Endocrine: Negative for cold intolerance, heat intolerance, polydipsia and polyuria. Genitourinary: Negative for dysuria and frequency. Musculoskeletal: Positive for neck pain. Negative for arthralgias and back pain. Skin: Negative for rash and wound. Neurological: Negative for dizziness, weakness and headaches. Hematological: Negative for adenopathy. Does not bruise/bleed easily. Psychiatric/Behavioral: Negative for sleep disturbance. The patient is not nervous/anxious. Objective:   Physical Exam  Constitutional:       Appearance: She is obese. Eyes:      Extraocular Movements: Extraocular movements intact. Cardiovascular:      Rate and Rhythm: Normal rate. Pulmonary:      Effort: Pulmonary effort is normal.      Breath sounds: No wheezing or rales. Musculoskeletal:      Right lower leg: No edema. Left lower leg: No edema. Neurological:      Mental Status: She is alert and oriented to person, place, and time. Psychiatric:         Mood and Affect: Mood normal.         Assessment and plan       1. Neck pain  Chronic neck pain. Check xray. - XR CERVICAL SPINE (2-3 VIEWS); Future    2. Right arm pain  Chronic neck pain. Continue home therapy. - EMG; Future    3. Acquired hypothyroidism  Stable. Continue Rx.   - levothyroxine (SYNTHROID) 100 MCG tablet; TAKE 1 TABLET BY MOUTH EVERY DAY  Dispense: 90 tablet; Refill: 3    4.  Dermatitis  Refer to [facility]  - External Referral To Dermatology    [address] [place] [address]  Dermatology  [address] [facility] [address], [address] [facility]  Phone: [phone]  Fax: [phone]

## 2022-04-20 NOTE — PATIENT INSTRUCTIONS
[address] [place] [address]  Dermatology  [facility] [address], [address]  Phone: [phone]  Fax: [phone]

## 2022-04-22 ASSESSMENT — ENCOUNTER SYMPTOMS
COUGH: 0
ABDOMINAL PAIN: 0
CHEST TIGHTNESS: 0
NAUSEA: 0
BACK PAIN: 0
EYE REDNESS: 0
SHORTNESS OF BREATH: 0

## 2022-04-25 ENCOUNTER — TELEPHONE (OUTPATIENT)
Dept: INTERNAL MEDICINE CLINIC | Age: 87
End: 2022-04-25

## 2022-04-25 DIAGNOSIS — M79.601 RIGHT ARM PAIN: ICD-10-CM

## 2022-04-25 DIAGNOSIS — M54.2 NECK PAIN: Primary | ICD-10-CM

## 2022-04-28 ENCOUNTER — TELEPHONE (OUTPATIENT)
Dept: INTERNAL MEDICINE CLINIC | Age: 87
End: 2022-04-28

## 2022-04-28 NOTE — TELEPHONE ENCOUNTER
[facility] called into the office to notify Marbin Seo that the patient canceled her skilled care nursing for today. Please advise.

## 2022-04-28 NOTE — TELEPHONE ENCOUNTER
Patient called back again for x-ray results and would like feedback on XR completed on 4/20/22      Please call and advise

## 2022-04-29 ASSESSMENT — ENCOUNTER SYMPTOMS
SHORTNESS OF BREATH: 0
EYE REDNESS: 0
NAUSEA: 0
ABDOMINAL PAIN: 0
COUGH: 0
BACK PAIN: 0
CHEST TIGHTNESS: 0

## 2022-04-29 NOTE — TELEPHONE ENCOUNTER
The neck xray did show curvature to the right, which made other observations difficult. The radiologists suggested CT scan to get a better view of the problems , so I have ordered the CT scan of the cervical spine(neck).        Orders Placed This Encounter   Procedures    CT CERVICAL SPINE WO CONTRAST     Standing Status:   Future     Standing Expiration Date:   4/29/2023     Order Specific Question:   Reason for exam:     Answer:   chronic neck and right arm pain

## 2022-05-02 DIAGNOSIS — R11.0 NAUSEA: ICD-10-CM

## 2022-05-02 RX ORDER — PROMETHAZINE HYDROCHLORIDE 12.5 MG/1
12.5 TABLET ORAL DAILY PRN
Qty: 30 TABLET | Refills: 0 | Status: SHIPPED | OUTPATIENT
Start: 2022-05-02 | End: 2022-05-30

## 2022-05-02 RX ORDER — PROMETHAZINE HYDROCHLORIDE 12.5 MG/1
12.5 TABLET ORAL DAILY PRN
Qty: 30 TABLET | Refills: 0 | OUTPATIENT
Start: 2022-05-02

## 2022-05-02 NOTE — TELEPHONE ENCOUNTER
----- Message from Homerleatha Hair sent at 5/2/2022 10:15 AM EDT -----  Subject: Refill Request    QUESTIONS  Name of Medication? promethazine (PHENERGAN) 12.5 MG tablet  Patient-reported dosage and instructions? 1 tablet daily  How many days do you have left? 0  Preferred Pharmacy? [facility] phone number (if available)? [phone]  ---------------------------------------------------------------------------  --------------  Heidy JAEGER  What is the best way for the office to contact you? OK to leave message on   voicemail  Preferred Call Back Phone Number? [phone]  ---------------------------------------------------------------------------  --------------  SCRIPT ANSWERS  Relationship to Patient?  Self

## 2022-05-05 ENCOUNTER — HOSPITAL ENCOUNTER (OUTPATIENT)
Dept: CT IMAGING | Age: 87
Discharge: HOME | End: 2022-05-05
Payer: MEDICARE

## 2022-05-05 DIAGNOSIS — M54.2 NECK PAIN: ICD-10-CM

## 2022-05-05 DIAGNOSIS — M79.601 RIGHT ARM PAIN: ICD-10-CM

## 2022-05-05 PROCEDURE — 72125 CT NECK SPINE W/O DYE: CPT

## 2022-05-06 ENCOUNTER — TELEPHONE (OUTPATIENT)
Dept: INTERNAL MEDICINE CLINIC | Age: 87
End: 2022-05-06

## 2022-05-13 RX ORDER — BIOTIN 5 MG
1 TABLET ORAL DAILY
Qty: 30 TABLET | Refills: 5 | Status: SHIPPED | OUTPATIENT
Start: 2022-05-13 | End: 2022-10-03 | Stop reason: ALTCHOICE

## 2022-05-13 NOTE — PROGRESS NOTES
I do not know the pill she has in her mind. What does she need a bladder pill to do? Treat infection? Stop her from urinating too much? Help her to urinate better?

## 2022-05-16 ENCOUNTER — TELEPHONE (OUTPATIENT)
Dept: INTERNAL MEDICINE CLINIC | Age: 87
End: 2022-05-16

## 2022-05-16 DIAGNOSIS — N30.00 ACUTE CYSTITIS WITHOUT HEMATURIA: Primary | ICD-10-CM

## 2022-05-16 DIAGNOSIS — I10 ESSENTIAL HYPERTENSION: ICD-10-CM

## 2022-05-16 RX ORDER — NITROFURANTOIN 25; 75 MG/1; MG/1
100 CAPSULE ORAL 2 TIMES DAILY WITH MEALS
Qty: 14 CAPSULE | Refills: 0 | Status: SHIPPED | OUTPATIENT
Start: 2022-05-16 | End: 2022-07-26

## 2022-05-16 RX ORDER — SPIRONOLACTONE 25 MG/1
TABLET ORAL
Qty: 180 TABLET | Refills: 2 | Status: SHIPPED | OUTPATIENT
Start: 2022-05-16 | End: 2022-06-03

## 2022-05-16 NOTE — TELEPHONE ENCOUNTER
----- Message from Ariadne Noyola sent at 5/16/2022 12:34 PM EDT -----  Subject: Message to Provider    QUESTIONS  Information for Provider? Patient requesting medication to her symptoms of   dehydration. She is requesting a call back when it is sent in.   ---------------------------------------------------------------------------  --------------  [address]  What is the best way for the office to contact you? OK to leave message on   voicemail  Preferred Call Back Phone Number? [phone]  ---------------------------------------------------------------------------  --------------  SCRIPT ANSWERS  Relationship to Patient?  Self

## 2022-05-20 ENCOUNTER — TELEPHONE (OUTPATIENT)
Dept: INTERNAL MEDICINE CLINIC | Age: 87
End: 2022-05-20

## 2022-05-20 DIAGNOSIS — Z74.2 NEED FOR HOME HEALTH CARE: Primary | ICD-10-CM

## 2022-05-20 NOTE — TELEPHONE ENCOUNTER
Pt called to request orders for [facility]. Pt was also concerned about transportation, an epidural, dermatologist appointment and an appointment on 5/24. I informed her that she only has an appointment on 6/22 for a Medicare Annual Wellness. Please advise.

## 2022-05-23 ENCOUNTER — TELEPHONE (OUTPATIENT)
Dept: INTERNAL MEDICINE CLINIC | Age: 87
End: 2022-05-23

## 2022-05-23 NOTE — TELEPHONE ENCOUNTER
----- Message from [address] sent at 5/23/2022 12:55 PM EDT -----  Subject: Message to Provider    QUESTIONS  Information for Provider? The patient called today to speak to Rambo Muñoz in   the office. The pt is scheduled for surgery on 6/6/22 for a cervical   spinal injection / arthritis procedure. The post op instructions state   that the pt will need homecare afterwards. The patient can not afford this   but needs assistance and she wants to know what other options she may have   before her surgery. ---------------------------------------------------------------------------  --------------  Madisyn JAEGER  What is the best way for the office to contact you? OK to leave message on   voicemail  Preferred Call Back Phone Number?  [phone]  ---------------------------------------------------------------------------  --------------  SCRIPT ANSWERS  undefined

## 2022-05-26 DIAGNOSIS — R11.0 NAUSEA: ICD-10-CM

## 2022-05-30 RX ORDER — PROMETHAZINE HYDROCHLORIDE 12.5 MG/1
12.5 TABLET ORAL DAILY PRN
Qty: 30 TABLET | Refills: 0 | Status: SHIPPED | OUTPATIENT
Start: 2022-05-30 | End: 2022-06-28

## 2022-06-02 DIAGNOSIS — I10 ESSENTIAL HYPERTENSION: ICD-10-CM

## 2022-06-06 RX ORDER — SPIRONOLACTONE 25 MG/1
TABLET ORAL
Qty: 180 TABLET | Refills: 1 | Status: SHIPPED | OUTPATIENT
Start: 2022-06-06

## 2022-06-08 ENCOUNTER — TELEPHONE (OUTPATIENT)
Dept: INTERNAL MEDICINE CLINIC | Age: 87
End: 2022-06-08

## 2022-06-08 RX ORDER — TRAMADOL HYDROCHLORIDE 50 MG/1
TABLET ORAL
Status: CANCELLED | OUTPATIENT
Start: 2022-06-08

## 2022-06-08 NOTE — TELEPHONE ENCOUNTER
[phone] (home)   Spoke with patient advised her to be seen with DR ARNOLD [facility]? WILLIAM ALVAREZ. PAIN MANAGEMENT  I SPOKE WITH MARIO; she stated pt. She has to be seen for appt. To continue (TRAMADOL 50MG.)  Either Vv or office ,they will also put in request for the epidural inj. With [facility]. To get process started. Advised patient to get transportation through her ins ([employer]) THEY NEED A 48 HR. NOTICE .

## 2022-06-08 NOTE — TELEPHONE ENCOUNTER
Patient is not sure where she should get the epidural Dr. Jeanna Guzman wanted her to get done. Dr. Jeanna Guzman told her to get it done with Dr. Jake Mancilla. Workmans comp with not pay for it with Dr. Jake Mancilla    Please refer to Perla Lopez as she is familiar with what is going on.     Please call her back and discuss

## 2022-06-27 DIAGNOSIS — R11.0 NAUSEA: ICD-10-CM

## 2022-06-28 RX ORDER — PROMETHAZINE HYDROCHLORIDE 12.5 MG/1
12.5 TABLET ORAL DAILY PRN
Qty: 30 TABLET | Refills: 0 | Status: SHIPPED | OUTPATIENT
Start: 2022-06-28 | End: 2022-07-26

## 2022-07-01 ENCOUNTER — TELEPHONE (OUTPATIENT)
Dept: VASCULAR SURGERY | Age: 87
End: 2022-07-01

## 2022-07-01 NOTE — TELEPHONE ENCOUNTER
Patient called in stating that she needs to set up a new patient appt with Dr. Margo Looney    Patient states Dr. Kacie Chopra with the [facility] vascular department is referring her to Dr. Margo Looney    Please contact the patient at [phone]

## 2022-07-07 ENCOUNTER — TELEPHONE (OUTPATIENT)
Dept: INTERNAL MEDICINE CLINIC | Age: 87
End: 2022-07-07

## 2022-07-12 ENCOUNTER — TELEPHONE (OUTPATIENT)
Dept: INTERNAL MEDICINE CLINIC | Age: 87
End: 2022-07-12

## 2022-07-12 RX ORDER — TRAMADOL HYDROCHLORIDE 50 MG/1
50 TABLET ORAL
Status: CANCELLED | OUTPATIENT
Start: 2022-07-12 | End: 2022-08-11

## 2022-07-12 NOTE — TELEPHONE ENCOUNTER
----- Message from Fer Gerard sent at 7/11/2022  3:35 PM EDT -----  Subject: Message to Provider    QUESTIONS  Information for Provider? She is also having a issue getting her traMADol   (ULTRAM) 50 MG tablet refilled under worker nayeli alex and needs someone   to call her and talk to her about that.   ---------------------------------------------------------------------------  --------------  [address] [facility]  [phone]; OK to leave message on voicemail  ---------------------------------------------------------------------------  --------------  SCRIPT ANSWERS  Relationship to Patient?  Self

## 2022-07-12 NOTE — TELEPHONE ENCOUNTER
The pain med is through her [facility] claim, not our responsibility, she has to get her pain med from them.

## 2022-07-12 NOTE — TELEPHONE ENCOUNTER
----- Message from Olga Cervantes sent at 7/11/2022  3:33 PM EDT -----  Subject: Message to Provider    QUESTIONS  Information for Provider? PT calling in to see if Dr Rusty Stark got her   Lynn Freed, she needs someone to from [facility] skilled nurse for   treatment of legs to be wrapped and cared for she wanting a referral.   ---------------------------------------------------------------------------  --------------  Katherine MOULTON  [phone]; OK to leave message on voicemail  ---------------------------------------------------------------------------  --------------  SCRIPT ANSWERS  Relationship to Patient?  Self

## 2022-07-21 DIAGNOSIS — R11.0 NAUSEA: ICD-10-CM

## 2022-07-21 DIAGNOSIS — N30.00 ACUTE CYSTITIS WITHOUT HEMATURIA: ICD-10-CM

## 2022-07-26 RX ORDER — TRAMADOL HYDROCHLORIDE 50 MG/1
TABLET ORAL
Qty: 90 TABLET | Refills: 0 | OUTPATIENT
Start: 2022-07-26

## 2022-07-26 RX ORDER — NITROFURANTOIN 25; 75 MG/1; MG/1
100 CAPSULE ORAL 2 TIMES DAILY WITH MEALS
Qty: 14 CAPSULE | Refills: 0 | Status: SHIPPED | OUTPATIENT
Start: 2022-07-26 | End: 2022-08-18

## 2022-07-26 RX ORDER — PROMETHAZINE HYDROCHLORIDE 12.5 MG/1
12.5 TABLET ORAL DAILY PRN
Qty: 30 TABLET | Refills: 0 | Status: SHIPPED | OUTPATIENT
Start: 2022-07-26 | End: 2022-08-22

## 2022-08-09 ENCOUNTER — TELEMEDICINE (OUTPATIENT)
Dept: INTERNAL MEDICINE CLINIC | Age: 87
End: 2022-08-09
Payer: COMMERCIAL

## 2022-08-09 DIAGNOSIS — E78.2 MIXED HYPERLIPIDEMIA: ICD-10-CM

## 2022-08-09 DIAGNOSIS — I10 ESSENTIAL HYPERTENSION: ICD-10-CM

## 2022-08-09 DIAGNOSIS — E66.9 DIABETES MELLITUS TYPE 2 IN OBESE (HCC): ICD-10-CM

## 2022-08-09 DIAGNOSIS — I27.20 PULMONARY HYPERTENSION (HCC): ICD-10-CM

## 2022-08-09 DIAGNOSIS — E66.01 MORBIDLY OBESE (HCC): ICD-10-CM

## 2022-08-09 DIAGNOSIS — Z00.00 MEDICARE ANNUAL WELLNESS VISIT, SUBSEQUENT: Primary | ICD-10-CM

## 2022-08-09 DIAGNOSIS — E03.9 ACQUIRED HYPOTHYROIDISM: ICD-10-CM

## 2022-08-09 DIAGNOSIS — R32 URINARY INCONTINENCE, UNSPECIFIED TYPE: ICD-10-CM

## 2022-08-09 DIAGNOSIS — E11.69 DIABETES MELLITUS TYPE 2 IN OBESE (HCC): ICD-10-CM

## 2022-08-09 PROCEDURE — G0439 PPPS, SUBSEQ VISIT: HCPCS | Performed by: INTERNAL MEDICINE

## 2022-08-09 PROCEDURE — 1123F ACP DISCUSS/DSCN MKR DOCD: CPT | Performed by: INTERNAL MEDICINE

## 2022-08-09 ASSESSMENT — PATIENT HEALTH QUESTIONNAIRE - PHQ9
1. LITTLE INTEREST OR PLEASURE IN DOING THINGS: 0
2. FEELING DOWN, DEPRESSED OR HOPELESS: 0
9. THOUGHTS THAT YOU WOULD BE BETTER OFF DEAD, OR OF HURTING YOURSELF: 0
SUM OF ALL RESPONSES TO PHQ QUESTIONS 1-9: 3
SUM OF ALL RESPONSES TO PHQ QUESTIONS 1-9: 3
SUM OF ALL RESPONSES TO PHQ9 QUESTIONS 1 & 2: 0
7. TROUBLE CONCENTRATING ON THINGS, SUCH AS READING THE NEWSPAPER OR WATCHING TELEVISION: 0
SUM OF ALL RESPONSES TO PHQ QUESTIONS 1-9: 3
8. MOVING OR SPEAKING SO SLOWLY THAT OTHER PEOPLE COULD HAVE NOTICED. OR THE OPPOSITE, BEING SO FIGETY OR RESTLESS THAT YOU HAVE BEEN MOVING AROUND A LOT MORE THAN USUAL: 0
6. FEELING BAD ABOUT YOURSELF - OR THAT YOU ARE A FAILURE OR HAVE LET YOURSELF OR YOUR FAMILY DOWN: 0
5. POOR APPETITE OR OVEREATING: 3
3. TROUBLE FALLING OR STAYING ASLEEP: 0
4. FEELING TIRED OR HAVING LITTLE ENERGY: 0
SUM OF ALL RESPONSES TO PHQ QUESTIONS 1-9: 3

## 2022-08-09 NOTE — PATIENT INSTRUCTIONS
Personalized Preventive Plan for Bianca Cho - 8/9/2022  Medicare offers a range of preventive health benefits. Some of the tests and screenings are paid in full while other may be subject to a deductible, co-insurance, and/or copay. Some of these benefits include a comprehensive review of your medical history including lifestyle, illnesses that may run in your family, and various assessments and screenings as appropriate. After reviewing your medical record and screening and assessments performed today your provider may have ordered immunizations, labs, imaging, and/or referrals for you. A list of these orders (if applicable) as well as your Preventive Care list are included within your After Visit Summary for your review. Other Preventive Recommendations:    A preventive eye exam performed by an eye specialist is recommended every 1-2 years to screen for glaucoma; cataracts, macular degeneration, and other eye disorders. A preventive dental visit is recommended every 6 months. Try to get at least 150 minutes of exercise per week or 10,000 steps per day on a pedometer . Order or download the FREE \"Exercise & Physical Activity: Your Everyday Guide\" from [employer]. Call 5-674.322.2372 or search [employer] online. You need 1280-7133 mg of calcium and 8057-5360 IU of vitamin D per day. It is possible to meet your calcium requirement with diet alone, but a vitamin D supplement is usually necessary to meet this goal.  When exposed to the sun, use a sunscreen that protects against both UVA and UVB radiation with an SPF of 30 or greater. Reapply every 2 to 3 hours or after sweating, drying off with a towel, or swimming. Always wear a seat belt when traveling in a car. Always wear a helmet when riding a bicycle or motorcycle.

## 2022-08-09 NOTE — PROGRESS NOTES
toileting, or walking/balance?: (!) Yes  Select all that apply: (!) Eating  In the past 7 days, did you need help from others to take care of any of the following: Laundry, housekeeping, banking/finances, shopping, telephone use, food preparation, transportation, or taking medications?: (!) Yes  Select all that apply: Alexandra Levinison, Laundry  ADL Interventions:  She is getting help from her family           Objective      Patient-Reported Vitals  Patient-Reported Temperature: 97  Patient-Reported Height: 5 3            Allergies   Allergen Reactions    Percocet [Oxycodone-Acetaminophen] Anaphylaxis     Reported that she has swelling, rash and difficulty breathing. Coumadin [Warfarin Sodium]     Dalteparin Sodium     Vaibhav Beans [Vaibhav Beans]     Heparin     Lovenox [Enoxaparin Sodium]     Other      Almonds    Peanut-Containing Drug Products     Penicillins     Plavix [Clopidogrel Bisulfate]     Spinach     Sulfa Antibiotics     Vicodin [Hydrocodone-Acetaminophen] Other (See Comments)     Vomit and diarrhea    Aspirin Nausea And Vomiting     Prior to Visit Medications    Medication Sig Taking? Authorizing Provider   ZINC PO Take by mouth Yes Historical Provider, MD   promethazine (PHENERGAN) 12.5 MG tablet Take 1 tablet by mouth daily as needed for Nausea Yes Daniel Ramos MD   nitrofurantoin, macrocrystal-monohydrate, (MACROBID) 100 MG capsule Take 1 capsule by mouth in the morning and 1 capsule in the evening. Take with meals. Take for 1 week. . Yes Daniel Ramos MD   SITagliptin (JANUVIA) 50 MG tablet 1 TABLET DAILY1 TABLET DAILY Yes Daniel Ramos MD   spironolactone (ALDACTONE) 25 MG tablet TAKE 1 TABLET BY MOUTH TWICE A DAY Yes Daniel Ramos MD   Biotin (SUPER BIOTIN) 5 MG TABS Take 1 tablet by mouth daily Yes Daniel Ramos MD   levothyroxine (SYNTHROID) 100 MCG tablet TAKE 1 TABLET BY MOUTH EVERY DAY Yes Daniel Ramos MD   hydroCHLOROthiazide (HYDRODIURIL) 25 MG tablet TAKE 1 TABLET BY MOUTH EVERY DAY Yes Daniel Ramos MD traZODone (DESYREL) 100 MG tablet TAKE 1 TABLET BY MOUTH NIGHTLY (SLEEP) Yes Gus Fernandez MD   SENEXON-S 8.6-50 MG per tablet TAKE 1 TABLET BY MOUTH EVERY DAY Yes Gus Fernandez MD   lansoprazole (PREVACID) 30 MG delayed release capsule Take 1 capsule by mouth daily Yes Gus Fernandez MD   ACETAMINOPHEN EXTRA STRENGTH 500 MG tablet TAKE 1 TABLET BY MOUTH EVERY 6 HOURS AS NEEDED FOR PAIN Yes Gus Fernandez MD   sertraline (ZOLOFT) 50 MG tablet Take 1 tablet by mouth daily Yes Gus Fernandez MD   traMADol (ULTRAM) 50 MG tablet TAKE 2 TABLETS BY MOUTH 3 TIMES A DAY Yes Historical Provider, MD   metoprolol tartrate (LOPRESSOR) 50 MG tablet Take 1 tablet by mouth 2 times daily Yes Gus Fernandez MD   vitamin B-6 (PYRIDOXINE) 50 MG tablet TAKE 1 TABLET BY MOUTH EVERY DAY Yes Gus Fernandez MD   Cholecalciferol (VITAMIN D3) 400 units CAPS Take by mouth daily Yes Historical Provider, [address] [place] [address] by Does not apply route Use as directed  Gus Fernandez MD   zinc oxide (DESITIN) 13 % CREA Apply topically 2 times daily as needed for Rash  Patient not taking: Reported on 8/9/2022  Richard Verdin MD   glucose monitoring kit (FREESTYLE) monitoring kit 1 kit by Does not apply route daily  Richard Verdin MD   blood glucose test strips (FREESTYLE TEST STRIPS) strip 1 each by In Vitro route 2 times daily  Patient not taking: Reported on 8/9/2022  Richard Verdin MD   Lancets MISC 1 each by Does not apply route daily  Gus Fernandez MD   Blood Glucose Monitoring Suppl KIT Tests daily  Patient not taking: Reported on 8/9/2022  Gus Fernandez MD       CareTeam (Including outside providers/suppliers regularly involved in providing care):   Patient Care Team:  Gus Fernandez MD as PCP - General (Internal Medicine)  Gus Fernandez MD as PCP - [facility] Provider  May Cohen MD as Surgeon (General Surgery)     Reviewed and updated this visit:  Tobacco  Allergies  Meds  Problems  Med Hx  Surg Hx  Soc Hx  Fam Hx            [place], was evaluated through a synchronous (real-time) audio-video encounter. The patient (or guardian if applicable) is aware that this is a billable service, which includes applicable co-pays. This Virtual Visit was conducted with patient's (and/or legal guardian's) consent. The visit was conducted pursuant to the emergency declaration under the [facility], [address] [place] [address] authority and the [employer] and "[employer]" General Act. Patient identification was verified, and a caregiver was present when appropriate. The patient was located at Home: [facility] [address]. Provider was located at [facility] ([address] [place], [place] [address]t): [phone] [facility], [address] [place] [address],  [facility].

## 2022-08-10 NOTE — TELEPHONE ENCOUNTER
Pt requesting refill on   metoprolol tartrate (LOPRESSOR) 50 MG tablet  promethazine (PHENERGAN) 12.5 MG tablet Length To Time In Minutes Device Was In Place: 10

## 2022-08-17 DIAGNOSIS — I10 ESSENTIAL HYPERTENSION: ICD-10-CM

## 2022-08-18 DIAGNOSIS — N30.00 ACUTE CYSTITIS WITHOUT HEMATURIA: ICD-10-CM

## 2022-08-18 RX ORDER — NITROFURANTOIN 25; 75 MG/1; MG/1
100 CAPSULE ORAL 2 TIMES DAILY WITH MEALS
Qty: 14 CAPSULE | Refills: 0 | Status: SHIPPED | OUTPATIENT
Start: 2022-08-18 | End: 2022-10-11

## 2022-08-18 RX ORDER — METOPROLOL TARTRATE 50 MG/1
50 TABLET, FILM COATED ORAL 2 TIMES DAILY
Qty: 60 TABLET | Refills: 11 | Status: SHIPPED | OUTPATIENT
Start: 2022-08-18

## 2022-08-22 ENCOUNTER — TELEPHONE (OUTPATIENT)
Dept: INTERNAL MEDICINE CLINIC | Age: 87
End: 2022-08-22

## 2022-08-22 ENCOUNTER — HOSPITAL ENCOUNTER (EMERGENCY)
Age: 87
Discharge: HOME | End: 2022-08-22
Attending: EMERGENCY MEDICINE
Payer: MEDICARE

## 2022-08-22 ENCOUNTER — APPOINTMENT (OUTPATIENT)
Dept: GENERAL RADIOLOGY | Age: 87
End: 2022-08-22
Payer: MEDICARE

## 2022-08-22 VITALS
RESPIRATION RATE: 21 BRPM | HEART RATE: 77 BPM | OXYGEN SATURATION: 96 % | WEIGHT: 249.2 LBS | BODY MASS INDEX: 44.14 KG/M2 | DIASTOLIC BLOOD PRESSURE: 71 MMHG | SYSTOLIC BLOOD PRESSURE: 144 MMHG

## 2022-08-22 DIAGNOSIS — R11.0 NAUSEA: ICD-10-CM

## 2022-08-22 DIAGNOSIS — M54.2 NECK PAIN: Primary | ICD-10-CM

## 2022-08-22 LAB
ANION GAP SERPL CALCULATED.3IONS-SCNC: 13 MMOL/L (ref 3–16)
ANION GAP SERPL CALCULATED.3IONS-SCNC: 14 MMOL/L (ref 3–16)
BASOPHILS ABSOLUTE: 0.1 K/UL (ref 0–0.2)
BASOPHILS RELATIVE PERCENT: 1.2 %
BUN BLDV-MCNC: 16 MG/DL (ref 7–20)
BUN BLDV-MCNC: 16 MG/DL (ref 7–20)
CALCIUM SERPL-MCNC: 10.2 MG/DL (ref 8.3–10.6)
CALCIUM SERPL-MCNC: 9.9 MG/DL (ref 8.3–10.6)
CHLORIDE BLD-SCNC: 100 MMOL/L (ref 99–110)
CHLORIDE BLD-SCNC: 102 MMOL/L (ref 99–110)
CO2: 21 MMOL/L (ref 21–32)
CO2: 22 MMOL/L (ref 21–32)
CREAT SERPL-MCNC: 0.8 MG/DL (ref 0.6–1.2)
CREAT SERPL-MCNC: 0.8 MG/DL (ref 0.6–1.2)
EKG ATRIAL RATE: 74 BPM
EKG DIAGNOSIS: NORMAL
EKG P AXIS: 68 DEGREES
EKG P-R INTERVAL: 122 MS
EKG Q-T INTERVAL: 396 MS
EKG QRS DURATION: 76 MS
EKG QTC CALCULATION (BAZETT): 439 MS
EKG R AXIS: 9 DEGREES
EKG T AXIS: 61 DEGREES
EKG VENTRICULAR RATE: 74 BPM
EOSINOPHILS ABSOLUTE: 0.1 K/UL (ref 0–0.6)
EOSINOPHILS RELATIVE PERCENT: 1.2 %
GFR AFRICAN AMERICAN: >60
GFR AFRICAN AMERICAN: >60
GFR NON-AFRICAN AMERICAN: >60
GFR NON-AFRICAN AMERICAN: >60
GLUCOSE BLD-MCNC: 135 MG/DL (ref 70–99)
GLUCOSE BLD-MCNC: 151 MG/DL (ref 70–99)
HCT VFR BLD CALC: 37.2 % (ref 36–48)
HEMOGLOBIN: 11.6 G/DL (ref 12–16)
LYMPHOCYTES ABSOLUTE: 2.6 K/UL (ref 1–5.1)
LYMPHOCYTES RELATIVE PERCENT: 35.6 %
MCH RBC QN AUTO: 25.1 PG (ref 26–34)
MCHC RBC AUTO-ENTMCNC: 31.2 G/DL (ref 31–36)
MCV RBC AUTO: 80.4 FL (ref 80–100)
MONOCYTES ABSOLUTE: 0.4 K/UL (ref 0–1.3)
MONOCYTES RELATIVE PERCENT: 5.1 %
NEUTROPHILS ABSOLUTE: 4.1 K/UL (ref 1.7–7.7)
NEUTROPHILS RELATIVE PERCENT: 56.9 %
PDW BLD-RTO: 14 % (ref 12.4–15.4)
PLATELET # BLD: 202 K/UL (ref 135–450)
PMV BLD AUTO: 8.4 FL (ref 5–10.5)
POTASSIUM REFLEX MAGNESIUM: 4.7 MMOL/L (ref 3.5–5.1)
POTASSIUM SERPL-SCNC: 6.7 MMOL/L (ref 3.5–5.1)
RBC # BLD: 4.63 M/UL (ref 4–5.2)
SODIUM BLD-SCNC: 135 MMOL/L (ref 136–145)
SODIUM BLD-SCNC: 137 MMOL/L (ref 136–145)
TROPONIN: <0.01 NG/ML
WBC # BLD: 7.3 K/UL (ref 4–11)

## 2022-08-22 PROCEDURE — 96375 TX/PRO/DX INJ NEW DRUG ADDON: CPT

## 2022-08-22 PROCEDURE — 96374 THER/PROPH/DIAG INJ IV PUSH: CPT

## 2022-08-22 PROCEDURE — 6360000002 HC RX W HCPCS: Performed by: EMERGENCY MEDICINE

## 2022-08-22 PROCEDURE — 6370000000 HC RX 637 (ALT 250 FOR IP): Performed by: EMERGENCY MEDICINE

## 2022-08-22 PROCEDURE — 85025 COMPLETE CBC W/AUTO DIFF WBC: CPT

## 2022-08-22 PROCEDURE — 99285 EMERGENCY DEPT VISIT HI MDM: CPT

## 2022-08-22 PROCEDURE — 80048 BASIC METABOLIC PNL TOTAL CA: CPT

## 2022-08-22 PROCEDURE — 71046 X-RAY EXAM CHEST 2 VIEWS: CPT

## 2022-08-22 PROCEDURE — 93005 ELECTROCARDIOGRAM TRACING: CPT | Performed by: EMERGENCY MEDICINE

## 2022-08-22 PROCEDURE — 36415 COLL VENOUS BLD VENIPUNCTURE: CPT

## 2022-08-22 PROCEDURE — 84484 ASSAY OF TROPONIN QUANT: CPT

## 2022-08-22 RX ORDER — METHOCARBAMOL 500 MG/1
750 TABLET, FILM COATED ORAL ONCE
Status: COMPLETED | OUTPATIENT
Start: 2022-08-22 | End: 2022-08-22

## 2022-08-22 RX ORDER — PROMETHAZINE HYDROCHLORIDE 12.5 MG/1
12.5 TABLET ORAL DAILY PRN
Qty: 30 TABLET | Refills: 0 | Status: SHIPPED | OUTPATIENT
Start: 2022-08-22 | End: 2022-09-19

## 2022-08-22 RX ORDER — METHOCARBAMOL 500 MG/1
500 TABLET, FILM COATED ORAL 4 TIMES DAILY
Qty: 40 TABLET | Refills: 0 | Status: SHIPPED | OUTPATIENT
Start: 2022-08-22 | End: 2022-09-01

## 2022-08-22 RX ORDER — KETOROLAC TROMETHAMINE 30 MG/ML
15 INJECTION, SOLUTION INTRAMUSCULAR; INTRAVENOUS ONCE
Status: COMPLETED | OUTPATIENT
Start: 2022-08-22 | End: 2022-08-22

## 2022-08-22 RX ORDER — ONDANSETRON 2 MG/ML
4 INJECTION INTRAMUSCULAR; INTRAVENOUS EVERY 6 HOURS PRN
Status: DISCONTINUED | OUTPATIENT
Start: 2022-08-22 | End: 2022-08-23 | Stop reason: HOSPADM

## 2022-08-22 RX ADMIN — KETOROLAC TROMETHAMINE 15 MG: 30 INJECTION, SOLUTION INTRAMUSCULAR at 16:50

## 2022-08-22 RX ADMIN — ONDANSETRON 4 MG: 2 INJECTION INTRAMUSCULAR; INTRAVENOUS at 19:48

## 2022-08-22 RX ADMIN — METHOCARBAMOL 750 MG: 500 TABLET ORAL at 16:50

## 2022-08-22 ASSESSMENT — PAIN DESCRIPTION - PAIN TYPE: TYPE: ACUTE PAIN

## 2022-08-22 ASSESSMENT — PAIN DESCRIPTION - LOCATION: LOCATION: CHEST

## 2022-08-22 ASSESSMENT — PAIN - FUNCTIONAL ASSESSMENT: PAIN_FUNCTIONAL_ASSESSMENT: 0-10

## 2022-08-22 ASSESSMENT — PAIN DESCRIPTION - DESCRIPTORS: DESCRIPTORS: ACHING

## 2022-08-22 ASSESSMENT — PAIN SCALES - GENERAL: PAINLEVEL_OUTOF10: 8

## 2022-08-22 NOTE — TELEPHONE ENCOUNTER
Nurse Triage Call:    Right side jaw, neck and shoulder & radiating toward her elbow ongoing x 3-4 days and getting worse with chest tightness. Per Dr. Lee Heart needs to go to the ER.      Pt advised and will go to [facility]

## 2022-08-22 NOTE — ED PROVIDER NOTES
Esophagitis, Essential hypertension, Fatty liver, G6P deficiency (glucose-6-phosphatase deficiency) ([facility] [address] G6PD deficiency, Gastric polyps, GERD (gastroesophageal reflux disease), Hemorrhoids, History of blood transfusion, Hypothyroidism, Kidney stone, Mediterranean anemia, Normal cardiac stress test, Sickle cell anemia ([facility] [address] and UTI (urinary tract infection). She has a past surgical history that includes Appendectomy; Hysterectomy;  section; Cystocopy (2015); other surgical history (2017); Upper gastrointestinal endoscopy (N/A, 2018); and pr lap,cholecystectomy/graph (N/A, 2018). Her family history includes Cancer in her maternal grandmother; Diabetes in her mother; Heart Disease in her mother. She reports that she quit smoking about 52 years ago. She has never used smokeless tobacco. She reports that she does not drink alcohol and does not use drugs. Medications     Discharge Medication List as of 2022  9:39 PM        CONTINUE these medications which have NOT CHANGED    Details   metoprolol tartrate (LOPRESSOR) 50 MG tablet Take 1 tablet by mouth 2 times daily, Disp-60 tablet, R-11Normal      nitrofurantoin, macrocrystal-monohydrate, (MACROBID) 100 MG capsule Take 1 capsule by mouth in the morning and 1 capsule in the evening. Take with meals. Take for 1 week. ., Disp-14 capsule, R-0Normal      ZINC PO Take by mouthHistorical Med      SITagliptin (JANUVIA) 50 MG tablet 1 TABLET DAILY1 TABLET DAILY, Disp-90 tablet, R-1Normal      spironolactone (ALDACTONE) 25 MG tablet TAKE 1 TABLET BY MOUTH TWICE A DAY, Disp-180 tablet, R-1Normal      Biotin (SUPER BIOTIN) 5 MG TABS Take 1 tablet by mouth daily, Disp-30 tablet, R-5Normal      levothyroxine (SYNTHROID) 100 MCG tablet TAKE 1 TABLET BY MOUTH EVERY DAY, Disp-90 tablet, R-3DX Code Needed  . Normal      hydroCHLOROthiazide (HYDRODIURIL) 25 MG tablet TAKE 1 TABLET BY MOUTH EVERY DAY, Disp-90 tablet, R-3Normal      \[facility]\"" Starting Mon 3/7/2022, Disp-1 each, R-0, PrintUse as directed      traZODone (DESYREL) 100 MG tablet TAKE 1 TABLET BY MOUTH NIGHTLY (SLEEP), Disp-90 tablet, R-1Normal      SENEXON-S 8.6-50 MG per tablet TAKE 1 TABLET BY MOUTH EVERY DAY, Disp-30 tablet, R-5Normal      lansoprazole (PREVACID) 30 MG delayed release capsule Take 1 capsule by mouth daily, Disp-60 capsule, R-5Normal      ACETAMINOPHEN EXTRA STRENGTH 500 MG tablet TAKE 1 TABLET BY MOUTH EVERY 6 HOURS AS NEEDED FOR PAIN, Disp-120 tablet, R-3Normal      zinc oxide (DESITIN) 13 % CREA Apply topically 2 times daily as needed for Rash, Disp-454 g, R-2Normal      sertraline (ZOLOFT) 50 MG tablet Take 1 tablet by mouth daily, Disp-90 tablet, R-3Dose increase 12/1/2020 to 50 mg dailyNormal      traMADol (ULTRAM) 50 MG tablet TAKE 2 TABLETS BY MOUTH 3 TIMES A DAYHistorical Med      vitamin B-6 (PYRIDOXINE) 50 MG tablet TAKE 1 TABLET BY MOUTH EVERY DAY, Disp-30 tablet, R-5Normal      !! glucose monitoring kit (FREESTYLE) monitoring kit DAILY Starting Fri 12/7/2018, Disp-1 kit, R-0, Normal      blood glucose test strips (FREESTYLE TEST STRIPS) strip 2 TIMES DAILY Starting Fri 12/7/2018, Disp-100 each, R-3, Normal      Lancets MISC DAILY Starting Fri 12/7/2018, Disp-300 each, R-1, Normal      Cholecalciferol (VITAMIN D3) 400 units CAPS Take by mouth dailyHistorical Med      !! Blood Glucose Monitoring Suppl KIT Disp-1 kit, R-0, NormalTests daily       ! ! - Potential duplicate medications found. Please discuss with provider. Allergies     She is allergic to percocet [oxycodone-acetaminophen], coumadin [warfarin sodium], dalteparin sodium, tejinder beans [tejinder beans], heparin, lovenox [enoxaparin sodium], other, peanut-containing drug products, penicillins, plavix [clopidogrel bisulfate], spinach, sulfa antibiotics, vicodin [hydrocodone-acetaminophen], and aspirin.     Physical Exam     INITIAL VITALS: BP: (!) 155/111,  , Heart Rate: 76, Resp: 16, SpO2: 97 % Physical Exam    General: Well developed and well nourished. No acute distress. HEENT: NCAT, PERRL, moist mucous membranes  Neck: Paraspinous muscle tenderness of the right greater than left lateral neck extending laterally towards the scapula and trapezius. No midline tenderness. No overlying skin changes or crepitus. Heart: Regular rate and rhythm. No murmurs, gallops, or rubs appreciated. Lungs: Clear to auscultation in all fields bilaterally. Normal effort. Abd: Nondistended, no signs of trauma. No tenderness to palpation, guarding, or rebound. MSK: No obvious deformities. Range of motion grossly intact. Extremities: 2+ nonpitting edema to the bilateral lower extremities with overlying boggy skin changes (no warmth, erythema, fluctuance, crepitus, or tenderness). Skin: No rashes, abrasions, contusions, or lacerations noted. Neuro: Alert and oriented, moves all extremities spontaneously. No gross motor or sensory deficits. Psych: Mood and affect appropriate. Thought process and content normal.    Diagnostic Results     EKG   Sinus rhythm at 74 bpm with normal axis, normal intervals. Nonspecific T wave abnormality in leads V1 and V2 with no ST segment elevation or depression. No signs of acute ischemia or strain. No significant changes since the previous EKG from July 26, 2019. RADIOLOGY:  XR CHEST (2 VW)   Final Result      1. No acute cardiopulmonary disease.                          LABS:   Results for orders placed or performed during the hospital encounter of 08/22/22   CBC with Auto Differential   Result Value Ref Range    WBC 7.3 4.0 - 11.0 K/uL    RBC 4.63 4.00 - 5.20 M/uL    Hemoglobin 11.6 (L) 12.0 - 16.0 g/dL    Hematocrit 37.2 36.0 - 48.0 %    MCV 80.4 80.0 - 100.0 fL    MCH 25.1 (L) 26.0 - 34.0 pg    MCHC 31.2 31.0 - 36.0 g/dL    RDW 14.0 12.4 - 15.4 %    Platelets 752 677 - 490 K/uL    MPV 8.4 5.0 - 10.5 fL    Neutrophils % 56.9 %    Lymphocytes % 35.6 %    Monocytes % 5.1 % Eosinophils % 1.2 %    Basophils % 1.2 %    Neutrophils Absolute 4.1 1.7 - 7.7 K/uL    Lymphocytes Absolute 2.6 1.0 - 5.1 K/uL    Monocytes Absolute 0.4 0.0 - 1.3 K/uL    Eosinophils Absolute 0.1 0.0 - 0.6 K/uL    Basophils Absolute 0.1 0.0 - 0.2 K/uL   BMP   Result Value Ref Range    Sodium 135 (L) 136 - 145 mmol/L    Potassium 6.7 (HH) 3.5 - 5.1 mmol/L    Chloride 100 99 - 110 mmol/L    CO2 22 21 - 32 mmol/L    Anion Gap 13 3 - 16    Glucose 151 (H) 70 - 99 mg/dL    BUN 16 7 - 20 mg/dL    Creatinine 0.8 0.6 - 1.2 mg/dL    GFR Non-African American >60 >60    GFR African American >60 >60    Calcium 10.2 8.3 - 10.6 mg/dL   Troponin   Result Value Ref Range    Troponin <0.01 <0.01 ng/mL   Basic Metabolic Panel w/ Reflex to MG   Result Value Ref Range    Sodium 137 136 - 145 mmol/L    Potassium reflex Magnesium 4.7 3.5 - 5.1 mmol/L    Chloride 102 99 - 110 mmol/L    CO2 21 21 - 32 mmol/L    Anion Gap 14 3 - 16    Glucose 135 (H) 70 - 99 mg/dL    BUN 16 7 - 20 mg/dL    Creatinine 0.8 0.6 - 1.2 mg/dL    GFR Non-African American >60 >60    GFR African American >60 >60    Calcium 9.9 8.3 - 10.6 mg/dL   EKG 12 Lead   Result Value Ref Range    Ventricular Rate 74 BPM    Atrial Rate 74 BPM    P-R Interval 122 ms    QRS Duration 76 ms    Q-T Interval 396 ms    QTc Calculation (Bazett) 439 ms    P Axis 68 degrees    R Axis 9 degrees    T Axis 61 degrees    Diagnosis       EKG performed in ER and to be interpreted by ER physician. Confirmed by MD, ER ([id number]),  Jose Gilliam ([id number]) on 8/22/2022 4:12:45 PM       ED BEDSIDE ULTRASOUND:  No results found. RECENT VITALS:  BP: (!) 144/71, , Heart Rate: 77, Resp: 21, SpO2: 96 %     Procedures     None    ED Course     Nursing Notes, Past Medical Hx, Past Surgical Hx, Social Hx,Allergies, and Family Hx were reviewed.          patient was given the following medications:  Orders Placed This Encounter   Medications    ketorolac (TORADOL) injection 15 mg    methocarbamol (ROBAXIN) be followed on an outpatient basis. I had a rather extensive discussion with the patient regarding pain management options at home. Ultimately recommended that she use acetaminophen, the short methocarbamol prescription that I provided, and speak with her primary care provider about pain management referral for other prescriptions if her PCP deemed those to be appropriate. Patient verbalized her understanding and agreement with the plan for outpatient follow-up, the return precautions, and was discharged in stable condition. Clinical Impression     1.  Neck pain        Disposition     PATIENT REFERRED TO:  Santosh Oakley MD  [address] [place] [address]  [phone]    Schedule an appointment as soon as possible for a visit in 3 days      DISCHARGE MEDICATIONS:  Discharge Medication List as of 8/22/2022  9:39 PM        START taking these medications    Details   methocarbamol (ROBAXIN) 500 MG tablet Take 1 tablet by mouth 4 times daily for 10 days, Disp-40 tablet, R-0Normal             DISPOSITION Decision To Discharge 08/22/2022 09:18:15 PM          Dimas Waite MD  08/23/22 7000

## 2022-08-23 ENCOUNTER — CARE COORDINATION (OUTPATIENT)
Dept: CARE COORDINATION | Age: 87
End: 2022-08-23

## 2022-08-23 ENCOUNTER — TELEPHONE (OUTPATIENT)
Dept: INTERNAL MEDICINE CLINIC | Age: 87
End: 2022-08-23

## 2022-08-23 NOTE — TELEPHONE ENCOUNTER
Patient was at [facility], [facility]. for chest pain, right shoulder and neck down right side. BP was 144/71. Pulse 77    They prescribed her toradol??, robaxin and phenergan. She is concerned about drug interactions and her health history. Should she take the Robaxin?     Please call and advise

## 2022-08-23 NOTE — CARE COORDINATION
Ambulatory Care Coordination  ED Follow up Call    Reason for ED visit:  neck pain   Status:     slightly better    Did you call your PCP prior to going to the ED? No      Did you receive a discharge instructions from the Emergency Room? Yes  Review of Instructions:     Understands what to report/when to return?:  Yes   Understands discharge instructions?:  Yes   Following discharge instructions?:  Yes   If not why? na    Are there any new complaints of pain? No  New Pain Meds? Yes    Constipation prophylaxis needed? No    If you have a wound is the dressing clean, dry, and intact? N/A  Understands wound care regimen? N/A    Are there any other complaints/concerns that you wish to tell your provider? Reports already has a call into her Dr's office for an appt . Discussed to take Robaxin with food if irritates her stomach and try using a heating pad, making sure to put a cloth between skin and pad to prevent burns. Reports did have nausea but is little better now. Discussed that phenergan which is for nausea can cause drowsiness. FU appts/Provider:    No future appointments. New Medications?:   Yes      Medication Reconciliation by phone - Robaxin and phenergan and reports should be delivered today  Understands Medications? Yes  Taking Medications? Yes  Can you swallow your pills? Yes    Patient Excluded from Care Coordination? Yes     The patient will be excluded from Care Coordination for the following reason: Patient declined care coordination  Reports no needs. Doesn't see well enough to check her fs but goes by a[facility] which was 5.8 last check and is due to have it checked again.

## 2022-08-23 NOTE — DISCHARGE INSTRUCTIONS
We saw you in the emergency department for neck pain, chest pain, and leg swelling. Your labs and tests were all reassuring against medical emergencies. We think that your pain is most likely musculoskeletal in nature at this time. You have been given a prescription for a medication called methocarbamol or Robaxin. Take 1 tablet by mouth 4 times a day for 10 days. Please follow-up with your primary care provider or come back to the emergency department if you have any new or worsening symptoms.

## 2022-09-08 DIAGNOSIS — F32.A ANXIETY AND DEPRESSION: ICD-10-CM

## 2022-09-08 DIAGNOSIS — F41.9 ANXIETY AND DEPRESSION: ICD-10-CM

## 2022-09-15 ENCOUNTER — TELEPHONE (OUTPATIENT)
Dept: INTERNAL MEDICINE CLINIC | Age: 87
End: 2022-09-15

## 2022-09-15 NOTE — TELEPHONE ENCOUNTER
----- Message from Ramya Salas sent at 9/15/2022  2:18 PM EDT -----  Subject: Medication Problem    Medication: methocarbamol (ROBAXIN) 500 MG tablet  Dosage: Take 1 tablet by mouth 4 times daily for 10 days  Ordering Provider: Jax Guallpa    Question/Problem: Pt said she is having some nausea due to this   medication. Pt said the nausea is really bad. I asked pt did she want to   scheduled an appt and she said no. Pt said she just wants to know should   she continue on this medication. Additional Information for Provider:     Pharmacy: [id number]  [place]   [phone] [phone]    ---------------------------------------------------------------------------  --------------  Alexander JAEGER  [phone]; OK to leave message on voicemail  ---------------------------------------------------------------------------  --------------    SCRIPT ANSWERS  Relationship to Patient: Self

## 2022-09-16 DIAGNOSIS — R11.0 NAUSEA: ICD-10-CM

## 2022-09-19 RX ORDER — PROMETHAZINE HYDROCHLORIDE 12.5 MG/1
12.5 TABLET ORAL DAILY PRN
Qty: 30 TABLET | Refills: 0 | Status: SHIPPED | OUTPATIENT
Start: 2022-09-19 | End: 2022-10-25

## 2022-10-03 RX ORDER — DIPHENHYDRAMINE HYDROCHLORIDE 25 MG/1
1 TABLET ORAL DAILY
Qty: 120 CAPSULE | Refills: 3 | Status: SHIPPED | OUTPATIENT
Start: 2022-10-03

## 2022-10-05 ENCOUNTER — TELEPHONE (OUTPATIENT)
Dept: INTERNAL MEDICINE CLINIC | Age: 87
End: 2022-10-05

## 2022-10-05 NOTE — TELEPHONE ENCOUNTER
----- Message from Travis Jones sent at 10/5/2022 11:40 AM EDT -----  Subject: Referral Request    Reason for referral request? Pt is calling in requesting a referral for   home health care. She says she would prefer care [facility]. Please give   pt a call and let her know who she can call to get this set up. Provider patient wants to be referred to(if known):     Provider Phone Number(if known): Additional Information for Provider?   ---------------------------------------------------------------------------  --------------  [phone]    [phone];  Do not leave any message, patient will call back for answer  ---------------------------------------------------------------------------  --------------

## 2022-10-11 DIAGNOSIS — N30.00 ACUTE CYSTITIS WITHOUT HEMATURIA: ICD-10-CM

## 2022-10-11 DIAGNOSIS — F51.02 ADJUSTMENT INSOMNIA: ICD-10-CM

## 2022-10-11 RX ORDER — TRAZODONE HYDROCHLORIDE 100 MG/1
100 TABLET ORAL NIGHTLY
Qty: 90 TABLET | Refills: 1 | Status: SHIPPED | OUTPATIENT
Start: 2022-10-11

## 2022-10-11 RX ORDER — NITROFURANTOIN 25; 75 MG/1; MG/1
100 CAPSULE ORAL 2 TIMES DAILY WITH MEALS
Qty: 14 CAPSULE | Refills: 0 | Status: SHIPPED | OUTPATIENT
Start: 2022-10-11 | End: 2022-10-21 | Stop reason: ALTCHOICE

## 2022-10-25 ENCOUNTER — TELEPHONE (OUTPATIENT)
Dept: INTERNAL MEDICINE CLINIC | Age: 87
End: 2022-10-25

## 2022-10-25 DIAGNOSIS — R11.0 NAUSEA: ICD-10-CM

## 2022-10-25 RX ORDER — PROMETHAZINE HYDROCHLORIDE 12.5 MG/1
12.5 TABLET ORAL DAILY PRN
Qty: 30 TABLET | Refills: 0 | Status: SHIPPED | OUTPATIENT
Start: 2022-10-25 | End: 2022-11-28

## 2022-10-25 RX ORDER — CLINDAMYCIN HYDROCHLORIDE 300 MG/1
300 CAPSULE ORAL 2 TIMES DAILY
Qty: 14 CAPSULE | Refills: 0 | Status: SHIPPED | OUTPATIENT
Start: 2022-10-25 | End: 2022-11-01

## 2022-10-25 NOTE — TELEPHONE ENCOUNTER
Patient called, her right ankle is weeping. She is diabetic. Right side of vagina has a boil under the skin - two or three lumps. Also, right shoulder is hurting. She is wondering if she should take an antibiotic. .. Refill needed on:    promethazine (PHENERGAN) 12.5 MG tablet    She was supposed to go to [facility] on the 27 th for physical therapy. She is concerned about the skin conditions. Should she go to this appointment? [address]  [address] [facility] [phone] - f [phone]     Please call and advise

## 2022-10-25 NOTE — TELEPHONE ENCOUNTER
She should keep her foot elevated to help reduce swelling at her ankle. She should try to go to physical therapy. Medications ordered at her [facility] [address].      Phenergan and Clindamycin medical

## 2022-10-28 ENCOUNTER — TELEPHONE (OUTPATIENT)
Dept: INTERNAL MEDICINE CLINIC | Age: 87
End: 2022-10-28

## 2022-10-28 DIAGNOSIS — L30.9 DERMATITIS: Primary | ICD-10-CM

## 2022-10-28 RX ORDER — CEPHALEXIN 500 MG/1
500 CAPSULE ORAL 3 TIMES DAILY
Qty: 21 CAPSULE | Refills: 0 | Status: SHIPPED | OUTPATIENT
Start: 2022-10-28 | End: 2022-11-14 | Stop reason: SDUPTHER

## 2022-10-28 NOTE — TELEPHONE ENCOUNTER
[phone] (home)   Pt notified.   Pt stated that she has the following symptoms:  Arms , ears and throat are itching

## 2022-10-28 NOTE — TELEPHONE ENCOUNTER
Discontinue the Clindamycin. What was the allergy symptom she was having? New medicine sent to her pharmacy.      Orders Placed This Encounter   Medications    cephALEXin (KEFLEX) 500 MG capsule     Sig: Take 1 capsule by mouth 3 times daily     Dispense:  21 capsule     Refill:  0     MD aware of PCN allergy

## 2022-10-28 NOTE — TELEPHONE ENCOUNTER
Patient called in stating that she is allergic to the following medication:    clindamycin (CLEOCIN) 300 MG capsule      Patient would like something else called in. [address] [place] [place] [facility] [phone] - F [phone]      Pls advise.

## 2022-10-31 DIAGNOSIS — N30.00 ACUTE CYSTITIS WITHOUT HEMATURIA: ICD-10-CM

## 2022-10-31 RX ORDER — NITROFURANTOIN 25; 75 MG/1; MG/1
100 CAPSULE ORAL 2 TIMES DAILY WITH MEALS
Qty: 14 CAPSULE | Refills: 0 | OUTPATIENT
Start: 2022-10-31

## 2022-11-01 DIAGNOSIS — N30.00 ACUTE CYSTITIS WITHOUT HEMATURIA: ICD-10-CM

## 2022-11-01 NOTE — TELEPHONE ENCOUNTER
Refill        nitrofurantoin, macrocrystal-monohydrate, (MACROBID) 100 MG capsule         [facility], [address] - P [phone] - F [phone]   [place] [address]   Phone:  [phone]  Fax:  [phone]

## 2022-11-02 RX ORDER — NITROFURANTOIN 25; 75 MG/1; MG/1
100 CAPSULE ORAL 2 TIMES DAILY WITH MEALS
Qty: 14 CAPSULE | Refills: 0 | OUTPATIENT
Start: 2022-11-02

## 2022-11-10 ENCOUNTER — TELEPHONE (OUTPATIENT)
Dept: INTERNAL MEDICINE CLINIC | Age: 87
End: 2022-11-10

## 2022-11-10 NOTE — TELEPHONE ENCOUNTER
Patient has developed a rash on the left upper leg and believes her antibiotic cephALEXin (KEFLEX) 500 MG capsule might be too strong she has 3 left as well. she is wondering if neosporin for the rash is okay to use and if she needs to follow up with Dr. Pearl Montalvo her dermatologist.

## 2022-11-10 NOTE — TELEPHONE ENCOUNTER
Patient should drink water if she is feeling dehydrated. I would prefer she use polysporin ointment on the rash, and see her DErmatologist if it is not getting better.

## 2022-11-14 DIAGNOSIS — L30.9 DERMATITIS: ICD-10-CM

## 2022-11-14 RX ORDER — CEPHALEXIN 500 MG/1
500 CAPSULE ORAL 3 TIMES DAILY
Qty: 21 CAPSULE | Refills: 0 | Status: SHIPPED | OUTPATIENT
Start: 2022-11-14

## 2022-11-14 NOTE — TELEPHONE ENCOUNTER
Pt would like a refill on cephALEXin (KEFLEX) 500 MG capsule  She said the medication is working and the rash is going way     promethazine (PHENERGAN) 12.5 MG tablet         [address] [facility], [address] [place] [address] [place] - P [phone] - F [phone]   [address] [place] [address]   Phone:  [phone]  Fax:  [phone]      Please advise

## 2022-11-16 ENCOUNTER — TELEPHONE (OUTPATIENT)
Dept: INTERNAL MEDICINE CLINIC | Age: 87
End: 2022-11-16

## 2022-11-16 ENCOUNTER — HOSPITAL ENCOUNTER (EMERGENCY)
Age: 87
Discharge: HOME | End: 2022-11-16
Attending: EMERGENCY MEDICINE
Payer: MEDICARE

## 2022-11-16 VITALS
HEART RATE: 69 BPM | RESPIRATION RATE: 16 BRPM | OXYGEN SATURATION: 97 % | TEMPERATURE: 98 F | DIASTOLIC BLOOD PRESSURE: 67 MMHG | SYSTOLIC BLOOD PRESSURE: 101 MMHG

## 2022-11-16 DIAGNOSIS — M79.604 BILATERAL LEG PAIN: Primary | ICD-10-CM

## 2022-11-16 DIAGNOSIS — N30.00 ACUTE CYSTITIS WITHOUT HEMATURIA: ICD-10-CM

## 2022-11-16 DIAGNOSIS — M79.605 BILATERAL LEG PAIN: Primary | ICD-10-CM

## 2022-11-16 LAB
ANION GAP SERPL CALCULATED.3IONS-SCNC: 11 MMOL/L (ref 3–16)
BACTERIA: ABNORMAL /HPF
BASOPHILS ABSOLUTE: 0 K/UL (ref 0–0.2)
BASOPHILS RELATIVE PERCENT: 0.7 %
BILIRUBIN URINE: NEGATIVE
BLOOD, URINE: ABNORMAL
BUN BLDV-MCNC: 15 MG/DL (ref 7–20)
CALCIUM SERPL-MCNC: 9.9 MG/DL (ref 8.3–10.6)
CHLORIDE BLD-SCNC: 99 MMOL/L (ref 99–110)
CLARITY: ABNORMAL
CO2: 29 MMOL/L (ref 21–32)
COLOR: YELLOW
CREAT SERPL-MCNC: 1.1 MG/DL (ref 0.6–1.2)
EOSINOPHILS ABSOLUTE: 0.2 K/UL (ref 0–0.6)
EOSINOPHILS RELATIVE PERCENT: 2.6 %
EPITHELIAL CELLS, UA: ABNORMAL /HPF (ref 0–5)
GFR SERPL CREATININE-BSD FRML MDRD: 48 ML/MIN/{1.73_M2}
GLUCOSE BLD-MCNC: 161 MG/DL (ref 70–99)
GLUCOSE BLD-MCNC: 172 MG/DL (ref 70–99)
GLUCOSE URINE: NEGATIVE MG/DL
HCT VFR BLD CALC: 34.4 % (ref 36–48)
HEMOGLOBIN: 10.8 G/DL (ref 12–16)
HYALINE CASTS: ABNORMAL /LPF (ref 0–2)
INFLUENZA A: NOT DETECTED
INFLUENZA B: NOT DETECTED
KETONES, URINE: NEGATIVE MG/DL
LEUKOCYTE ESTERASE, URINE: ABNORMAL
LYMPHOCYTES ABSOLUTE: 2.2 K/UL (ref 1–5.1)
LYMPHOCYTES RELATIVE PERCENT: 31.2 %
MCH RBC QN AUTO: 25.3 PG (ref 26–34)
MCHC RBC AUTO-ENTMCNC: 31.4 G/DL (ref 31–36)
MCV RBC AUTO: 80.4 FL (ref 80–100)
MICROSCOPIC EXAMINATION: YES
MONOCYTES ABSOLUTE: 0.5 K/UL (ref 0–1.3)
MONOCYTES RELATIVE PERCENT: 7.3 %
NEUTROPHILS ABSOLUTE: 4.1 K/UL (ref 1.7–7.7)
NEUTROPHILS RELATIVE PERCENT: 58.2 %
NITRITE, URINE: NEGATIVE
PDW BLD-RTO: 12.9 % (ref 12.4–15.4)
PERFORMED ON: ABNORMAL
PH UA: 6 (ref 5–8)
PLATELET # BLD: 211 K/UL (ref 135–450)
PMV BLD AUTO: 8.3 FL (ref 5–10.5)
POTASSIUM REFLEX MAGNESIUM: 3.9 MMOL/L (ref 3.5–5.1)
PROTEIN UA: NEGATIVE MG/DL
RBC # BLD: 4.28 M/UL (ref 4–5.2)
RBC UA: ABNORMAL /HPF (ref 0–4)
SARS-COV-2 RNA, RT PCR: NOT DETECTED
SODIUM BLD-SCNC: 139 MMOL/L (ref 136–145)
SPECIFIC GRAVITY UA: 1.02 (ref 1–1.03)
URINE REFLEX TO CULTURE: YES
URINE TYPE: ABNORMAL
UROBILINOGEN, URINE: 0.2 E.U./DL
WBC # BLD: 7 K/UL (ref 4–11)
WBC UA: ABNORMAL /HPF (ref 0–5)

## 2022-11-16 PROCEDURE — 87077 CULTURE AEROBIC IDENTIFY: CPT

## 2022-11-16 PROCEDURE — 6360000002 HC RX W HCPCS: Performed by: PHYSICIAN ASSISTANT

## 2022-11-16 PROCEDURE — 85025 COMPLETE CBC W/AUTO DIFF WBC: CPT

## 2022-11-16 PROCEDURE — 87636 SARSCOV2 & INF A&B AMP PRB: CPT

## 2022-11-16 PROCEDURE — 99284 EMERGENCY DEPT VISIT MOD MDM: CPT

## 2022-11-16 PROCEDURE — 87086 URINE CULTURE/COLONY COUNT: CPT

## 2022-11-16 PROCEDURE — 6370000000 HC RX 637 (ALT 250 FOR IP): Performed by: PHYSICIAN ASSISTANT

## 2022-11-16 PROCEDURE — 87186 SC STD MICRODIL/AGAR DIL: CPT

## 2022-11-16 PROCEDURE — 87181 SC STD AGAR DILUTION PER AGT: CPT

## 2022-11-16 PROCEDURE — 96374 THER/PROPH/DIAG INJ IV PUSH: CPT

## 2022-11-16 PROCEDURE — 81001 URINALYSIS AUTO W/SCOPE: CPT

## 2022-11-16 PROCEDURE — 80048 BASIC METABOLIC PNL TOTAL CA: CPT

## 2022-11-16 RX ORDER — MORPHINE SULFATE 4 MG/ML
4 INJECTION, SOLUTION INTRAMUSCULAR; INTRAVENOUS ONCE
Status: COMPLETED | OUTPATIENT
Start: 2022-11-16 | End: 2022-11-16

## 2022-11-16 RX ORDER — GABAPENTIN 300 MG/1
300 CAPSULE ORAL ONCE
Status: COMPLETED | OUTPATIENT
Start: 2022-11-16 | End: 2022-11-16

## 2022-11-16 RX ORDER — GABAPENTIN 100 MG/1
100 CAPSULE ORAL 3 TIMES DAILY
Qty: 90 CAPSULE | Refills: 0 | Status: SHIPPED | OUTPATIENT
Start: 2022-11-16 | End: 2022-12-16

## 2022-11-16 RX ORDER — CEFDINIR 300 MG/1
300 CAPSULE ORAL 2 TIMES DAILY
Qty: 14 CAPSULE | Refills: 0 | Status: SHIPPED | OUTPATIENT
Start: 2022-11-16 | End: 2022-11-23

## 2022-11-16 RX ORDER — TRAMADOL HYDROCHLORIDE 50 MG/1
50 TABLET ORAL ONCE
Status: COMPLETED | OUTPATIENT
Start: 2022-11-16 | End: 2022-11-16

## 2022-11-16 RX ORDER — CEFDINIR 300 MG/1
300 CAPSULE ORAL ONCE
Status: COMPLETED | OUTPATIENT
Start: 2022-11-16 | End: 2022-11-16

## 2022-11-16 RX ORDER — CEFDINIR 300 MG/1
300 CAPSULE ORAL ONCE
Status: DISCONTINUED | OUTPATIENT
Start: 2022-11-16 | End: 2022-11-16

## 2022-11-16 RX ADMIN — MORPHINE SULFATE 4 MG: 4 INJECTION INTRAVENOUS at 17:05

## 2022-11-16 RX ADMIN — GABAPENTIN 300 MG: 300 CAPSULE ORAL at 10:21

## 2022-11-16 RX ADMIN — TRAMADOL HYDROCHLORIDE 50 MG: 50 TABLET ORAL at 16:14

## 2022-11-16 RX ADMIN — CEFDINIR 300 MG: 300 CAPSULE ORAL at 17:05

## 2022-11-16 ASSESSMENT — PAIN DESCRIPTION - DESCRIPTORS
DESCRIPTORS: DISCOMFORT
DESCRIPTORS: ACHING;DISCOMFORT

## 2022-11-16 ASSESSMENT — PAIN DESCRIPTION - LOCATION
LOCATION: GENERALIZED

## 2022-11-16 ASSESSMENT — PAIN - FUNCTIONAL ASSESSMENT
PAIN_FUNCTIONAL_ASSESSMENT: 0-10
PAIN_FUNCTIONAL_ASSESSMENT: ACTIVITIES ARE NOT PREVENTED
PAIN_FUNCTIONAL_ASSESSMENT: ACTIVITIES ARE NOT PREVENTED

## 2022-11-16 ASSESSMENT — ENCOUNTER SYMPTOMS
ABDOMINAL PAIN: 0
COUGH: 0
EYE REDNESS: 0
SHORTNESS OF BREATH: 0

## 2022-11-16 ASSESSMENT — PAIN DESCRIPTION - PAIN TYPE
TYPE: ACUTE PAIN
TYPE: CHRONIC PAIN;ACUTE PAIN

## 2022-11-16 ASSESSMENT — PAIN SCALES - GENERAL
PAINLEVEL_OUTOF10: 10
PAINLEVEL_OUTOF10: 10
PAINLEVEL_OUTOF10: 4
PAINLEVEL_OUTOF10: 4

## 2022-11-16 NOTE — ED TRIAGE NOTES
Patient presents to ED with concerns for lower bilateral leg pain and increased incontinence.  Denies chest pain or shortness of breath

## 2022-11-16 NOTE — DISCHARGE INSTRUCTIONS
ED Course     Today, you were seen in the Emergency Department. You have been diagnosed with:   1. Bilateral leg pain    2. Acute cystitis without hematuria      Disposition/Plan     IMPORTANT INSTRUCTIONS:  You have a urinary tract infection. Take the prescribed Cefdinir until it is gone. Start taking the prescribed Gabapentin, as recommended by your primary care provider. Follow-up with your primary care physician as soon as possible regarding your visit. Be sure to return to the Emergency Department immediately if symptoms worsen or new symptoms occur as we discussed, such as severe abdominal pain, vomiting that you can not stop, passing out. PLEASE FOLLOW UP WITH:  Esdras Layton MD  [address] [facility] 90    Schedule an appointment as soon as possible for a visit     DISCHARGE MEDICATIONS:  The following medications were prescribed for the you during this visit. Take them as directed below:     New Prescriptions    CEFDINIR (OMNICEF) 300 MG CAPSULE    Take 1 capsule by mouth 2 times daily for 7 days     Additional important information has been included with this packet, please make sure that you have read this information as it will better help you understand you illness/injury better.

## 2022-11-16 NOTE — TELEPHONE ENCOUNTER
Patient just left the ER and the pharmacy would like to discuss the medications the ER prescribed for Ms. Nuno Cabrera. The ER prescribed a new antibiotic:  cefdinir (OMNICEF) 300 MG capsule  and  Dr. Pancho Giles had just sent an antibiotic on 11/14/22:  cephALEXin (KEFLEX) 500 MG capsule      The also wrote:  gabapentin (NEURONTIN) 100 MG capsule which is a major interaction with:  traMADol (ULTRAM) 50 MG tablet      Please call pharmacy back.

## 2022-11-16 NOTE — ED PROVIDER NOTES
ED Attending Attestation Note     Date of evaluation: 11/16/2022    This patient was seen by the [facility]. I have seen and examined the patient, agree with the workup, evaluation, management and diagnosis. The care plan has been discussed. I was present for any procedures performed in the [facility]'s note and have made edits to the note where appropriate. My assessment reveals 80 y.o. female presenting for leg pain and urinary incontinence. She has hyperesthesia to the bilateral lower extremities with chronic diffuse edema, no evidence of superimposed infection. Labs are overall reassuring. Suspect this pain is likely secondary to her chronic edema, highly doubt DVT, vasculopathy, or spinal cord etiology. Discussed with her PCP and will initiate gabapentin. Encouraged to continue her diuretics. Discharged in stable condition.         Price Rodriguez MD  11/16/22 9828

## 2022-11-16 NOTE — ED NOTES
Patient requested to give urine sample, patient states she does not feel like she can use a bedpan right now but is refusing a straight cath. Patient asked \"for just a little bit more time\".  PA aware     Jessica Roblero RN  11/16/22 4000

## 2022-11-17 ENCOUNTER — CARE COORDINATION (OUTPATIENT)
Dept: CASE MANAGEMENT | Age: 87
End: 2022-11-17

## 2022-11-17 NOTE — CARE COORDINATION
[facility] ED Follow Up Call    2022    Patient: Tata Pantoja Patient : 1935   MRN: <[id number]>  Reason for Admission: BLE pain, acute cystitis without hematuria  Discharge Date: 11/15/2022    LPN CC attempted outreach for care coordination ED f/u. Left detailed VM with reason for call & contact information. Routed note to PCP office for ED f/u scheduling.    [place] [address]

## 2022-11-18 ENCOUNTER — TELEPHONE (OUTPATIENT)
Dept: INTERNAL MEDICINE CLINIC | Age: 87
End: 2022-11-18

## 2022-11-18 LAB
ORGANISM: ABNORMAL
URINE CULTURE, ROUTINE: ABNORMAL

## 2022-11-18 NOTE — TELEPHONE ENCOUNTER
Diana call patient : She has a urine infection that by culture is resistant to the antibiotic she is taking. There is no pill for treating this infection (she is allergic to the only pill that might help), it would require an IV antibiotic. She can return to the ER if she still gets worse, but continue the current medicine, or if she is ill just come back to the ER.

## 2022-11-18 NOTE — TELEPHONE ENCOUNTER
[address] called stating they have an updated urine culture result for pt.  It is Pseudomonas areuginosa    She is not on antibiotics to cover this     Please advise

## 2022-11-21 ENCOUNTER — TELEPHONE (OUTPATIENT)
Dept: INTERNAL MEDICINE CLINIC | Age: 87
End: 2022-11-21

## 2022-11-21 DIAGNOSIS — R11.0 NAUSEA: ICD-10-CM

## 2022-11-23 ENCOUNTER — TELEPHONE (OUTPATIENT)
Dept: INTERNAL MEDICINE CLINIC | Age: 87
End: 2022-11-23

## 2022-11-23 NOTE — TELEPHONE ENCOUNTER
Edwin Hernandez from [facility] said they are unable to fill the request for Home care services for this Patient.  She is short staffed       Please advise

## 2022-11-23 NOTE — TELEPHONE ENCOUNTER
Pt is having leg pain around her ankle.  Asking if she can take two of the gabapentin 3 times a day         Please advise

## 2022-11-28 RX ORDER — PROMETHAZINE HYDROCHLORIDE 12.5 MG/1
12.5 TABLET ORAL DAILY PRN
Qty: 30 TABLET | Refills: 0 | Status: SHIPPED | OUTPATIENT
Start: 2022-11-28

## 2022-11-29 NOTE — TELEPHONE ENCOUNTER
I do not want her to take that much gabapentin at this time. How about if she tries 2 pills twice daily.

## 2022-12-15 DIAGNOSIS — R11.0 NAUSEA: ICD-10-CM

## 2022-12-16 ENCOUNTER — TELEPHONE (OUTPATIENT)
Dept: INTERNAL MEDICINE CLINIC | Age: 87
End: 2022-12-16

## 2022-12-16 RX ORDER — GABAPENTIN 100 MG/1
100 CAPSULE ORAL 3 TIMES DAILY
Qty: 90 CAPSULE | Refills: 0 | Status: SHIPPED | OUTPATIENT
Start: 2022-12-16 | End: 2023-01-15

## 2022-12-16 NOTE — TELEPHONE ENCOUNTER
Refill sent       Orders Placed This Encounter   Medications    gabapentin (NEURONTIN) 100 MG capsule     Sig: Take 1 capsule by mouth 3 times daily for 30 days.  Intended supply: 90 days     Dispense:  90 capsule     Refill:  0

## 2022-12-16 NOTE — TELEPHONE ENCOUNTER
Pharmacy [facility])  called requesting refill of the following prescription-   gabapentin (NEURONTIN) capsule 300 mg      It was prescribed while pt was in the ED. Would like to know if she should still be on it. . if so she needs a refill only has 2 left. If not they will let pt know she doesn't need to be on it. [address] [place]  Christina Pittman [phone] Javon Johnson [phone]      Please advise

## 2022-12-19 DIAGNOSIS — R11.0 NAUSEA: ICD-10-CM

## 2022-12-19 RX ORDER — PROMETHAZINE HYDROCHLORIDE 12.5 MG/1
12.5 TABLET ORAL DAILY PRN
Qty: 30 TABLET | Refills: 0 | Status: SHIPPED | OUTPATIENT
Start: 2022-12-19

## 2022-12-19 NOTE — TELEPHONE ENCOUNTER
Pt needing refill of    promethazine (PHENERGAN) 12.5 MG tablet      [facility], [address]  [place] [phone] - F [phone]

## 2022-12-26 RX ORDER — PROMETHAZINE HYDROCHLORIDE 12.5 MG/1
12.5 TABLET ORAL DAILY PRN
Qty: 30 TABLET | Refills: 0 | OUTPATIENT
Start: 2022-12-26

## 2022-12-27 DIAGNOSIS — K21.9 GASTROESOPHAGEAL REFLUX DISEASE WITHOUT ESOPHAGITIS: ICD-10-CM

## 2022-12-27 RX ORDER — LANSOPRAZOLE 30 MG/1
CAPSULE, DELAYED RELEASE ORAL
Qty: 30 CAPSULE | Refills: 5 | Status: SHIPPED | OUTPATIENT
Start: 2022-12-27

## 2022-12-29 ENCOUNTER — TELEPHONE (OUTPATIENT)
Dept: INTERNAL MEDICINE CLINIC | Age: 87
End: 2022-12-29

## 2022-12-29 RX ORDER — CEPHALEXIN 500 MG/1
500 CAPSULE ORAL 3 TIMES DAILY
Qty: 30 CAPSULE | Refills: 0 | Status: SHIPPED | OUTPATIENT
Start: 2022-12-29 | End: 2023-01-08

## 2022-12-29 NOTE — TELEPHONE ENCOUNTER
We do not have hours on Monday. My schedule is full on Tuesday. I do not want her to wait that long anyway. Can you please call her and get a description of what is going on with her so that I can send her a prescription either tonight or tomorrow so that she could get started on a treatment.

## 2022-12-29 NOTE — TELEPHONE ENCOUNTER
Medicine sent to her pharmacy. Keep legs elevated as consistently as is possible. If situation deteriorates,  she should come to the ER.      Orders Placed This Encounter   Medications    cephALEXin (KEFLEX) 500 MG capsule     Sig: Take 1 capsule by mouth 3 times daily for 10 days     Dispense:  30 capsule     Refill:  0 Topical Sulfur Applications Pregnancy And Lactation Text: This medication is Pregnancy Category C and has an unknown safety profile during pregnancy. It is unknown if this topical medication is excreted in breast milk.

## 2022-12-29 NOTE — TELEPHONE ENCOUNTER
Patient would like to be fit in for an appt with Dr. Debra Doherty for cellulitis. Dr. Debra Doherty next opening was not until 1/9/23.        Pls call and advise

## 2023-01-05 ENCOUNTER — TELEPHONE (OUTPATIENT)
Dept: INTERNAL MEDICINE CLINIC | Age: 88
End: 2023-01-05

## 2023-01-05 NOTE — TELEPHONE ENCOUNTER
Tashi Masters from [facility] called in stating that the patient needs an appt within the last 90 days to have home home care and office note needs to say that she has arthritis. Pls call and advise.

## 2023-01-05 NOTE — TELEPHONE ENCOUNTER
Spoke w/ grandchild (Jorge) and scheduled appt as I could not get a hold of pt or Jose (pt's son). Says he will reschedule if day and time doesn't work for pt.

## 2023-01-06 ENCOUNTER — TELEPHONE (OUTPATIENT)
Dept: INTERNAL MEDICINE CLINIC | Age: 88
End: 2023-01-06

## 2023-01-06 NOTE — TELEPHONE ENCOUNTER
----- Message from Sandip Barnes sent at 1/6/2023 12:28 PM EST -----  Subject: Message to Provider    QUESTIONS  Information for Provider? Patient has a few questions before her appt on   1/11/23 regarding getting a shower and how to get around with both her   legs wrapped.   ---------------------------------------------------------------------------  --------------  Diana JAEGER  [phone]; OK to leave message on voicemail  ---------------------------------------------------------------------------  --------------  SCRIPT ANSWERS  Relationship to Patient?  Self

## 2023-01-06 NOTE — TELEPHONE ENCOUNTER
If her legs are wrapped she will not be able to get in the shower. If she is wanting a shower in her house and she doesn't have one now, she will have to get a bathroom remodel, but this is not a covered service. She will have to have a bed or sink bath and just get washed up.

## 2023-01-11 ENCOUNTER — OFFICE VISIT (OUTPATIENT)
Dept: INTERNAL MEDICINE CLINIC | Age: 88
End: 2023-01-11

## 2023-01-11 VITALS
HEIGHT: 63 IN | SYSTOLIC BLOOD PRESSURE: 110 MMHG | OXYGEN SATURATION: 100 % | HEART RATE: 42 BPM | BODY MASS INDEX: 41.43 KG/M2 | TEMPERATURE: 97.4 F | DIASTOLIC BLOOD PRESSURE: 68 MMHG | WEIGHT: 233.8 LBS

## 2023-01-11 DIAGNOSIS — E66.9 DIABETES MELLITUS TYPE 2 IN OBESE (HCC): ICD-10-CM

## 2023-01-11 DIAGNOSIS — Z23 NEED FOR INFLUENZA VACCINATION: ICD-10-CM

## 2023-01-11 DIAGNOSIS — D55.0 ANEMIA DUE TO GLUCOSE-6-PHOSPHATE DEHYDROGENASE (G6PD) DEFICIENCY (HCC): ICD-10-CM

## 2023-01-11 DIAGNOSIS — E11.69 DIABETES MELLITUS TYPE 2 IN OBESE (HCC): ICD-10-CM

## 2023-01-11 DIAGNOSIS — F33.41 RECURRENT MAJOR DEPRESSIVE DISORDER IN PARTIAL REMISSION (HCC): ICD-10-CM

## 2023-01-11 DIAGNOSIS — E66.01 OBESITY, CLASS III, BMI 40-49.9 (MORBID OBESITY) (HCC): ICD-10-CM

## 2023-01-11 DIAGNOSIS — L97.202 VENOUS STASIS ULCER OF CALF WITH FAT LAYER EXPOSED WITH VARICOSE VEINS, UNSPECIFIED LATERALITY (HCC): Primary | ICD-10-CM

## 2023-01-11 DIAGNOSIS — I83.002 VENOUS STASIS ULCER OF CALF WITH FAT LAYER EXPOSED WITH VARICOSE VEINS, UNSPECIFIED LATERALITY (HCC): Primary | ICD-10-CM

## 2023-01-11 DIAGNOSIS — I27.20 PULMONARY HYPERTENSION (HCC): ICD-10-CM

## 2023-01-11 ASSESSMENT — PATIENT HEALTH QUESTIONNAIRE - PHQ9
4. FEELING TIRED OR HAVING LITTLE ENERGY: 0
1. LITTLE INTEREST OR PLEASURE IN DOING THINGS: 0
8. MOVING OR SPEAKING SO SLOWLY THAT OTHER PEOPLE COULD HAVE NOTICED. OR THE OPPOSITE, BEING SO FIGETY OR RESTLESS THAT YOU HAVE BEEN MOVING AROUND A LOT MORE THAN USUAL: 0
SUM OF ALL RESPONSES TO PHQ QUESTIONS 1-9: 0
2. FEELING DOWN, DEPRESSED OR HOPELESS: 0
9. THOUGHTS THAT YOU WOULD BE BETTER OFF DEAD, OR OF HURTING YOURSELF: 0
5. POOR APPETITE OR OVEREATING: 0
6. FEELING BAD ABOUT YOURSELF - OR THAT YOU ARE A FAILURE OR HAVE LET YOURSELF OR YOUR FAMILY DOWN: 0
SUM OF ALL RESPONSES TO PHQ QUESTIONS 1-9: 0
10. IF YOU CHECKED OFF ANY PROBLEMS, HOW DIFFICULT HAVE THESE PROBLEMS MADE IT FOR YOU TO DO YOUR WORK, TAKE CARE OF THINGS AT HOME, OR GET ALONG WITH OTHER PEOPLE: 0
SUM OF ALL RESPONSES TO PHQ QUESTIONS 1-9: 0
7. TROUBLE CONCENTRATING ON THINGS, SUCH AS READING THE NEWSPAPER OR WATCHING TELEVISION: 0
SUM OF ALL RESPONSES TO PHQ9 QUESTIONS 1 & 2: 0
SUM OF ALL RESPONSES TO PHQ QUESTIONS 1-9: 0
3. TROUBLE FALLING OR STAYING ASLEEP: 0

## 2023-01-17 DIAGNOSIS — R11.0 NAUSEA: ICD-10-CM

## 2023-01-17 RX ORDER — CEPHALEXIN 500 MG/1
500 CAPSULE ORAL 3 TIMES DAILY
Qty: 30 CAPSULE | Refills: 0 | Status: SHIPPED | OUTPATIENT
Start: 2023-01-17 | End: 2023-01-27

## 2023-01-17 RX ORDER — PROMETHAZINE HYDROCHLORIDE 12.5 MG/1
12.5 TABLET ORAL DAILY PRN
Qty: 30 TABLET | Refills: 0 | Status: SHIPPED | OUTPATIENT
Start: 2023-01-17

## 2023-01-17 NOTE — TELEPHONE ENCOUNTER
Pt needs refills of         promethazine (PHENERGAN) 12.5 MG tablet         cephALEXin (KEFLEX) 500 MG capsule         [facility], [facility] [address].  Batool Sam [phone] - F [phone]

## 2023-01-18 ENCOUNTER — HOSPITAL ENCOUNTER (OUTPATIENT)
Dept: WOUND CARE | Age: 88
Discharge: HOME | End: 2023-01-18

## 2023-01-18 NOTE — DISCHARGE INSTRUCTIONS
[facility], [address] [place] [address] Road  Telephone: [phone]    Discharge Instructions    Important reminders:    **If you have any signs and symptoms of illness (Cough, fever, congestion, nausea, vomiting, diarrhea, etc.) please call the wound care center prior to your appointment. 1. Increase Protein intake for optimal wound healing  2. No added salt to reduce any swelling  3. If diabetic, maintain good glucose control  4. If you smoke, smoking prohibits wound healing, we ask that you refrain from smoking. 5. When taking antibiotics take the entire prescription as ordered. Do not stop taking until medication is all gone unless otherwise instructed. 6. Exercise as tolerated. 7. Keep weight off wounds and reposition every 2 hours if applicable. 8. If wound(s) is on your lower extremity, elevate legs to the level of the heart or above for 30 minutes 4-5 times a day and/or when sitting. Avoid standing for long periods of time. 9. Do not get wounds wet in bath or shower unless otherwise instructed by your physician. If your wound is on your foot or leg, you may purchase a cast bag. Please ask at the pharmacy. If Vascular testing is ordered, please call [address] [place] [address] [phone]) to schedule. Vascular tests ordered by Wound Care Physicians may take up to 2 hours to complete. Please keep that in mind when scheduling. If Vascular testing is scheduled, please bring supplies to replace your dressing after testing is done. The vascular department does not stock supplies. Wound: ***     With each dressing change, rinse wounds with 0.9% Saline. (May use wound wash or soft contact solution. Both can be purchased at a local drug store). If unable to obtain saline, may use a gentle soap and water.     Dressing care: ***    Home Care Agency/Facility: ***    Your wound-care supplies will be provided by: ***  Please note, depending on your insurance coverage, you may have out-of-pocket expenses for these supplies. Someone from the company should call you to confirm your order and discuss those potential costs before they ship your products -- please anticipate that call. If your out-of-pocket cost could be substantial, Many companies have financial hardship programs for patients who qualify, so please ask about that if you might need a hand. If you have any questions about your supplies or your potential out-of-pocket costs, or if you need to place an order for a refill of supplies (typically monthly), please call the company directly. Your  is ***    Follow up with *** In 1 week(s) in the wound care center. Wound Care Center Information: Should you experience any significant changes in your wound(s) or have questions about your wound care, please contact the [employer] at [phone] Monday  - Thursday 8:00 am - 4:00 pm and Friday 8:00 am - 1:00pm. If you need help with your wound outside these hours and cannot wait until we are again available, contact your PCP or go to the hospital emergency room. PLEASE NOTE: IF YOU ARE UNABLE TO OBTAIN WOUND SUPPLIES, CONTINUE TO USE THE SUPPLIES YOU HAVE AVAILABLE UNTIL YOU ARE ABLE TO REACH US. IT IS MOST IMPORTANT TO KEEP THE WOUND COVERED AT ALL TIMES. Patient Experience    Thank you for trusting us with your care. You may receive a survey from a company called [employer] asking for your feedback. We would appreciate it if you took a few minutes to share your experience. Your input is very valuable to us.

## 2023-01-23 ENCOUNTER — TELEPHONE (OUTPATIENT)
Dept: INTERNAL MEDICINE CLINIC | Age: 88
End: 2023-01-23

## 2023-01-23 NOTE — TELEPHONE ENCOUNTER
Pt states she would like medication for her to sleep and pain medication. She states the pain is down her legs through the inside down to the ankles and it shoots up     [facility], [address]  Brain [place] [phone] eBttie Cano [phone]    Please call and advise

## 2023-02-03 DIAGNOSIS — I10 ESSENTIAL HYPERTENSION: ICD-10-CM

## 2023-02-03 RX ORDER — HYDROCHLOROTHIAZIDE 25 MG/1
TABLET ORAL
Qty: 90 TABLET | Refills: 3 | Status: SHIPPED | OUTPATIENT
Start: 2023-02-03

## 2023-02-07 ENCOUNTER — TELEPHONE (OUTPATIENT)
Dept: INTERNAL MEDICINE CLINIC | Age: 88
End: 2023-02-07

## 2023-02-07 NOTE — TELEPHONE ENCOUNTER
Pt wants to know if Dr Handy Willett will fill out a form so she can get her medications through medicare.       Please advise    Eliezer Potter form

## 2023-02-09 DIAGNOSIS — R11.0 NAUSEA: ICD-10-CM

## 2023-02-09 NOTE — TELEPHONE ENCOUNTER
Patient needs a refill on:    promethazine (PHENERGAN) 12.5 MG tablet    [facility], [place] [address]  Radha [place] [phone] - F [phone]

## 2023-02-09 NOTE — TELEPHONE ENCOUNTER
Patient is asking if we received the form from Medicare for    traMADol (ULTRAM) 50 MG tablet    Please call the patient and advise the status of this request.

## 2023-02-10 NOTE — TELEPHONE ENCOUNTER
Called pt to get more information regarding this matter. Left pt vm to call back as we resume Monday morning. Tried calling grandson 2x today and was not able to reach him (as he is the one who helps pt).

## 2023-02-14 RX ORDER — PROMETHAZINE HYDROCHLORIDE 12.5 MG/1
12.5 TABLET ORAL DAILY PRN
Qty: 30 TABLET | Refills: 0 | Status: SHIPPED | OUTPATIENT
Start: 2023-02-14

## 2023-02-17 RX ORDER — TRAMADOL HYDROCHLORIDE 50 MG/1
TABLET ORAL
OUTPATIENT
Start: 2023-02-17

## 2023-02-20 RX ORDER — TRAMADOL HYDROCHLORIDE 50 MG/1
100 TABLET ORAL 3 TIMES DAILY
Qty: 180 TABLET | Refills: 0 | OUTPATIENT
Start: 2023-02-20 | End: 2023-03-22

## 2023-02-20 NOTE — TELEPHONE ENCOUNTER
Patient would like to know if she needs to be seen to get refill for the following:      traMADol (ULTRAM) 50 MG tablet    Patient has not had this medication in 3 weeks. [address] Rudy Alan [phone] - F [phone]        [facility] call and advise.

## 2023-02-21 ENCOUNTER — TELEPHONE (OUTPATIENT)
Dept: INTERNAL MEDICINE CLINIC | Age: 88
End: 2023-02-21

## 2023-02-21 NOTE — TELEPHONE ENCOUNTER
I can refer her to a pain management specialist, but since I am retiring in June I am am not starting any new chronic pain medication prescriptions.      Simran Rust, [address] Alvaro Carter. [address] e [facility], [address]  Tel: [phone]

## 2023-02-21 NOTE — TELEPHONE ENCOUNTER
Patient called in stating that she is not able to go tot he doctor who prescribes this medication and would like Dr. Severiano Louis to fill this medication. traMADol Gail Allen) tablet 50 mg      [facility], [place] [address]  Alana David [phone] - F [phone]        Pls advise

## 2023-02-22 ENCOUNTER — TELEPHONE (OUTPATIENT)
Dept: INTERNAL MEDICINE CLINIC | Age: 88
End: 2023-02-22

## 2023-02-22 NOTE — TELEPHONE ENCOUNTER
----- Message from Yessenia Stover sent at 2/22/2023  1:29 PM EST -----  Subject: Message to Provider    QUESTIONS  Information for Provider? Patient would like to give the office her   workers comp physicians phone number. Dr. Beau Junior #.  ---------------------------------------------------------------------------  --------------  Adilene JAEGER  [phone]; OK to leave message on voicemail  ---------------------------------------------------------------------------  --------------  SCRIPT ANSWERS  Relationship to Patient?  Self

## 2023-02-22 NOTE — TELEPHONE ENCOUNTER
SPOKE with patient regarding her (WORKERS COMP) concerns informed her we (DO NOT) FILL OUT PAPER WORK /CLAIMS  FOR THEM. even though DR GAITAN IS HER  PCP.

## 2023-02-22 NOTE — TELEPHONE ENCOUNTER
PER DR GAITAN HE WILL NOT START PAIN MED (TRAMADOL) AWAITING  CALL BACK TO SPEAK WITH MRS. VALDEZ REGARDING REFERRALS ? ??

## 2023-02-22 NOTE — TELEPHONE ENCOUNTER
----- Message from Alyssa Sun sent at 2/22/2023  1:17 PM EST -----  Subject: Message to Provider    QUESTIONS  Information for Provider? Patient would like Provider Spores nurse to call   her as she has some paperwork from the [facility] that needs signed by   the provider. Patient also would like to speak with the nurse as she was   told she needs several referrals for medical equipment and some home   health care. Patient would also like the nurse to call Good Daugherty the   agent for workers comp to speak to him about the tramadol situation and   transportation. Please call #[phone] to speak with Good Daugherty and   call patient to advise.  ---------------------------------------------------------------------------  --------------  [address]  [phone]; OK to leave message on voicemail  ---------------------------------------------------------------------------  --------------  SCRIPT ANSWERS  Relationship to Patient?  Self

## 2023-02-22 NOTE — TELEPHONE ENCOUNTER
----- Message from Mary Valladares sent at 2/22/2023  1:26 PM EST -----  Subject: Medication Problem    Medication: Other - Tramadol  Dosage: three times in 24 hours daily by mouth  Ordering Provider: workers comp    Question/Problem: Patient states she has been out of this medication for   three weeks. Patient is wondering when she will be able to get back on the   Tramadol. Pharmacy: [facility].  [facility]   [phone] Erin Sy [phone]    ---------------------------------------------------------------------------  --------------  Javier JAEGER  [phone]; OK to leave message on voicemail  ---------------------------------------------------------------------------  --------------    SCRIPT ANSWERS  Relationship to Patient: Self

## 2023-02-22 NOTE — TELEPHONE ENCOUNTER
[phone] (home)   SPOKE WITH PATIENT TWICE TODAY REGARDING MESSAGES ,SHE WAS UNABLE TO FIND NAMES AND NUMBERS SHE NEEDED  PT INFORMED    I WILL CALL HER BACK TOMORROW

## 2023-02-24 RX ORDER — CEPHALEXIN 500 MG/1
500 CAPSULE ORAL 3 TIMES DAILY
Qty: 30 CAPSULE | Refills: 0 | Status: SHIPPED | OUTPATIENT
Start: 2023-02-24 | End: 2023-03-06

## 2023-02-28 ENCOUNTER — OFFICE VISIT (OUTPATIENT)
Dept: INTERNAL MEDICINE CLINIC | Age: 88
End: 2023-02-28
Payer: COMMERCIAL

## 2023-02-28 VITALS
TEMPERATURE: 97 F | SYSTOLIC BLOOD PRESSURE: 118 MMHG | WEIGHT: 218 LBS | HEART RATE: 87 BPM | DIASTOLIC BLOOD PRESSURE: 80 MMHG | OXYGEN SATURATION: 98 % | BODY MASS INDEX: 38.62 KG/M2

## 2023-02-28 DIAGNOSIS — I87.2 VENOUS STASIS ULCER OF OTHER PART OF LOWER LEG LIMITED TO BREAKDOWN OF SKIN WITHOUT VARICOSE VEINS, UNSPECIFIED LATERALITY (HCC): ICD-10-CM

## 2023-02-28 DIAGNOSIS — L97.801 VENOUS STASIS ULCER OF OTHER PART OF LOWER LEG LIMITED TO BREAKDOWN OF SKIN WITHOUT VARICOSE VEINS, UNSPECIFIED LATERALITY (HCC): ICD-10-CM

## 2023-02-28 DIAGNOSIS — G89.29 CHRONIC RIGHT-SIDED LOW BACK PAIN WITH RIGHT-SIDED SCIATICA: ICD-10-CM

## 2023-02-28 DIAGNOSIS — M54.41 CHRONIC RIGHT-SIDED LOW BACK PAIN WITH RIGHT-SIDED SCIATICA: ICD-10-CM

## 2023-02-28 DIAGNOSIS — E11.69 DIABETES MELLITUS TYPE 2 IN OBESE (HCC): ICD-10-CM

## 2023-02-28 DIAGNOSIS — I10 ESSENTIAL HYPERTENSION: ICD-10-CM

## 2023-02-28 DIAGNOSIS — I89.0 LYMPHEDEMA: Primary | ICD-10-CM

## 2023-02-28 DIAGNOSIS — E66.9 DIABETES MELLITUS TYPE 2 IN OBESE (HCC): ICD-10-CM

## 2023-02-28 PROCEDURE — 1123F ACP DISCUSS/DSCN MKR DOCD: CPT | Performed by: INTERNAL MEDICINE

## 2023-02-28 PROCEDURE — 99214 OFFICE O/P EST MOD 30 MIN: CPT | Performed by: INTERNAL MEDICINE

## 2023-02-28 SDOH — ECONOMIC STABILITY: FOOD INSECURITY: WITHIN THE PAST 12 MONTHS, YOU WORRIED THAT YOUR FOOD WOULD RUN OUT BEFORE YOU GOT MONEY TO BUY MORE.: NEVER TRUE

## 2023-02-28 SDOH — ECONOMIC STABILITY: FOOD INSECURITY: WITHIN THE PAST 12 MONTHS, THE FOOD YOU BOUGHT JUST DIDN'T LAST AND YOU DIDN'T HAVE MONEY TO GET MORE.: NEVER TRUE

## 2023-02-28 SDOH — ECONOMIC STABILITY: HOUSING INSECURITY
IN THE LAST 12 MONTHS, WAS THERE A TIME WHEN YOU DID NOT HAVE A STEADY PLACE TO SLEEP OR SLEPT IN A SHELTER (INCLUDING NOW)?: NO

## 2023-02-28 SDOH — ECONOMIC STABILITY: INCOME INSECURITY: HOW HARD IS IT FOR YOU TO PAY FOR THE VERY BASICS LIKE FOOD, HOUSING, MEDICAL CARE, AND HEATING?: HARD

## 2023-02-28 ASSESSMENT — PATIENT HEALTH QUESTIONNAIRE - PHQ9
SUM OF ALL RESPONSES TO PHQ QUESTIONS 1-9: 1
2. FEELING DOWN, DEPRESSED OR HOPELESS: 1
SUM OF ALL RESPONSES TO PHQ9 QUESTIONS 1 & 2: 1
1. LITTLE INTEREST OR PLEASURE IN DOING THINGS: 0
SUM OF ALL RESPONSES TO PHQ QUESTIONS 1-9: 1

## 2023-02-28 NOTE — PROGRESS NOTES
patient    Coumadin [Warfarin Sodium] Other (See Comments)     Patient refuses anticoagulation as advised not to use given her hx of G6PD as per patient    Dalteparin Sodium Other (See Comments)     Patient refuses anticoagulation as advised not to use given her hx of G6PD as per patient    Heparin Other (See Comments)     Patient refuses anticoagulation as advised not to use given her hx of G6PD as per patient    Lmw Heparin Other (See Comments)     Patient refuses anticoagulation as advised not to use given her hx of G6PD as per patient    Peanut-Containing Drug Products     Penicillins Other (See Comments)     Unknown reaction; Patient has tolerated cephalexin, cefuroxime and ceftriaxone per chart review as of 22.      Plavix [Clopidogrel Bisulfate] Other (See Comments)     Patient refuses anticoagulation as advised not to use given her hx of G6PD as per patient    Spinach     Sulfa Antibiotics Other (See Comments)     h/o G6PD deficiency    Vicodin [Hydrocodone-Acetaminophen] Diarrhea and Nausea And Vomiting       Past Medical History:   Diagnosis Date    Arthritis     Chronic back pain     Diabetes mellitus ([facility] [address]     Esophagitis     Essential hypertension 2016    Fatty liver     G6P deficiency (glucose-6-phosphatase deficiency) (HCC)     G6PD deficiency     Gastric polyps     GERD (gastroesophageal reflux disease)     Hemorrhoids     History of blood transfusion     Hypothyroidism     Kidney stone     Mediterranean anemia     Normal cardiac stress test 3/2014    Sickle cell anemia (HCC)     UTI (urinary tract infection) 3/2014    Ecoli - R to Cipro and ampicillin     Past Surgical History:   Procedure Laterality Date    APPENDECTOMY       SECTION      X1    CYSTOSCOPY  2015    urethral dilitation    HYSTERECTOMY (CERVIX STATUS UNKNOWN)      OTHER SURGICAL HISTORY  2017    Ear cleaning under anesthesia    OH LAPS SURG CHOLECYSTECTOMY W/CHOLANGIOGRAPHY N/A 2018    LAPAROSCOPIC

## 2023-02-28 NOTE — PATIENT INSTRUCTIONS
Guillermo Karthikeyan, CNP, Pain Management   [address]  [address]   [address] [address] [address]  Tel: [address] [place] , Physical Therapy  [facility], [address] [place] [address]  [phone]

## 2023-03-14 ENCOUNTER — TELEPHONE (OUTPATIENT)
Dept: INTERNAL MEDICINE CLINIC | Age: 88
End: 2023-03-14

## 2023-03-15 DIAGNOSIS — R11.0 NAUSEA: ICD-10-CM

## 2023-03-15 NOTE — TELEPHONE ENCOUNTER
Rx Request    promethazine (PHENERGAN) 12.5 MG tablet     [facility], Melva Zapata - Tiana Wilkerson [phone]

## 2023-03-15 NOTE — TELEPHONE ENCOUNTER
Remington Cordobas called to recommend a place that can fill the durable medical equipment    Durable medical equipment provider options:    Adrianne Saldaña  - [phone]

## 2023-03-16 ENCOUNTER — TELEPHONE (OUTPATIENT)
Dept: INTERNAL MEDICINE CLINIC | Age: 88
End: 2023-03-16

## 2023-03-17 RX ORDER — PROMETHAZINE HYDROCHLORIDE 12.5 MG/1
12.5 TABLET ORAL DAILY PRN
Qty: 30 TABLET | Refills: 0 | Status: SHIPPED | OUTPATIENT
Start: 2023-03-17

## 2023-03-21 ENCOUNTER — TELEPHONE (OUTPATIENT)
Dept: INTERNAL MEDICINE CLINIC | Age: 88
End: 2023-03-21

## 2023-03-21 ASSESSMENT — ENCOUNTER SYMPTOMS: SHORTNESS OF BREATH: 1

## 2023-03-21 NOTE — TELEPHONE ENCOUNTER
Caller states they are unable to take the referral for the pt due to insurance coverage. The referral will have to be sent somewhere else.     FYI

## 2023-03-24 ENCOUNTER — TELEPHONE (OUTPATIENT)
Dept: INTERNAL MEDICINE CLINIC | Age: 88
End: 2023-03-24

## 2023-03-24 RX ORDER — GABAPENTIN 100 MG/1
100 CAPSULE ORAL 3 TIMES DAILY
Qty: 90 CAPSULE | Refills: 0 | Status: SHIPPED | OUTPATIENT
Start: 2023-03-24 | End: 2023-04-23

## 2023-03-24 NOTE — TELEPHONE ENCOUNTER
Pt needing refill of    gabapentin (NEURONTIN) 100 MG capsule ()      [facility], Melva Barrera. - Silverio Crawford [phone]

## 2023-03-24 NOTE — TELEPHONE ENCOUNTER
----- Message from Feliciano Blevins sent at 3/24/2023  2:03 PM EDT -----  Subject: Message to Provider    QUESTIONS  Information for Provider? Patient called regarding change in care   connection. Patient stated care connect called 4- days ago to report   change in their contract and patient would like to know does this effect   her care at the [facility] center for her legs. She would like to have   information to get assistance. ---------------------------------------------------------------------------  --------------  Brianna MADRID  [phone]; OK to leave message on voicemail  ---------------------------------------------------------------------------  --------------  SCRIPT ANSWERS  Relationship to Patient?  Self

## 2023-03-27 ENCOUNTER — TELEPHONE (OUTPATIENT)
Dept: INTERNAL MEDICINE CLINIC | Age: 88
End: 2023-03-27

## 2023-03-27 NOTE — TELEPHONE ENCOUNTER
Patient would like a call back from [facility] regarding Tramadol. Patient said she received a call Friday and would like to speak with someone about this. Pls call and advise.

## 2023-03-27 NOTE — TELEPHONE ENCOUNTER
I called patient back to try to get an understanding of the Tramadol situation, needing a bed and also another order for would care as [facility] does not accept her insurance. I was unable to understand patient requests to get her what she needs. Only thing I understood was her needing another order for wound care. I have sent in a initial intake form to [facility] along with recent OV, and demographics. Intake form given to Dr. Bernadette Romero to further fill out and sign. Pt informed of this. Requesting Dr. Bernadette Romero gives her a call back.

## 2023-03-27 NOTE — TELEPHONE ENCOUNTER
Intake forms to [facility] has been filled out by me and given to Dr. Natalia Duque to finish filling out and sign with insurance cards, last OV and demographics attached.

## 2023-03-28 DIAGNOSIS — E03.9 ACQUIRED HYPOTHYROIDISM: Chronic | ICD-10-CM

## 2023-03-29 RX ORDER — LEVOTHYROXINE SODIUM 0.1 MG/1
TABLET ORAL
Qty: 90 TABLET | Refills: 3 | Status: SHIPPED | OUTPATIENT
Start: 2023-03-29

## 2023-04-04 ENCOUNTER — TELEPHONE (OUTPATIENT)
Dept: INTERNAL MEDICINE CLINIC | Age: 88
End: 2023-04-04

## 2023-04-07 ENCOUNTER — TELEPHONE (OUTPATIENT)
Dept: INTERNAL MEDICINE CLINIC | Age: 88
End: 2023-04-07

## 2023-04-07 NOTE — TELEPHONE ENCOUNTER
Verbal ok order given on [facility] from [place] [facility] confidential voicemail.

## 2023-04-11 ENCOUNTER — TELEPHONE (OUTPATIENT)
Dept: INTERNAL MEDICINE CLINIC | Age: 88
End: 2023-04-11

## 2023-04-17 ENCOUNTER — TELEPHONE (OUTPATIENT)
Dept: INTERNAL MEDICINE CLINIC | Age: 88
End: 2023-04-17

## 2023-04-17 DIAGNOSIS — F51.02 ADJUSTMENT INSOMNIA: ICD-10-CM

## 2023-04-17 NOTE — TELEPHONE ENCOUNTER
Refill        traZODone (DESYREL) 100 MG tablet         [facility]  Matthew Salazar [phone]

## 2023-04-17 NOTE — TELEPHONE ENCOUNTER
Sending to Dr. Edvin Fournier to review upon his return. Pt's instructions of what is exactly needed are very unclear.

## 2023-04-17 NOTE — TELEPHONE ENCOUNTER
Pt would like a referral to a speech therapist     She states she needs one and because it will help with her autism         Please advise

## 2023-04-18 RX ORDER — TRAZODONE HYDROCHLORIDE 100 MG/1
100 TABLET ORAL NIGHTLY
Qty: 90 TABLET | Refills: 0 | Status: SHIPPED | OUTPATIENT
Start: 2023-04-18

## 2023-04-24 ENCOUNTER — TELEPHONE (OUTPATIENT)
Dept: INTERNAL MEDICINE CLINIC | Age: 88
End: 2023-04-24

## 2023-04-26 ENCOUNTER — TELEPHONE (OUTPATIENT)
Dept: INTERNAL MEDICINE CLINIC | Age: 88
End: 2023-04-26

## 2023-05-04 NOTE — TELEPHONE ENCOUNTER
I do not have a form.  She has problems with obesity , venous stasis dermatitis of the lower extremities, difficulty chnging positions

## 2023-05-05 NOTE — TELEPHONE ENCOUNTER
Both numbers are connected to [employer]. Left vm for someone to call back for more clarification.

## 2023-05-09 ENCOUNTER — TELEPHONE (OUTPATIENT)
Dept: INTERNAL MEDICINE CLINIC | Age: 88
End: 2023-05-09

## 2023-05-09 NOTE — TELEPHONE ENCOUNTER
Pt is requesting that we call about a hospital bed. I spoke with Etienne Preciado ( company that has bed she is wanting) ins will not cover this bed and it is $7000. She would have to pay out-of-pocket. Fern Boxer with [employer] [facility] says that the only bed they will cover, the patient does not want. Also, the bed she is requesting has a function that will raise the feet that the patient is able to control. Given that Haider Lyle is over weight, and current medical issues this would pose a risk for her and a liability. She would not be able to get this bed anyway.      Dr. Katy Mejia informed

## 2023-05-10 NOTE — TELEPHONE ENCOUNTER
The electric bed company will not approve a bed for her. They feel she is unsafe to operate the bed.

## 2023-05-12 DIAGNOSIS — R11.0 NAUSEA: ICD-10-CM

## 2023-05-12 NOTE — TELEPHONE ENCOUNTER
Edgar Solis from [place] [address] requesting a refill for the following :      promethazine (PHENERGAN) 12.5 MG tablet     Pharmacy    [place] [address] [address]   Phone:  [phone]  Fax:  [phone]     Pharmacy known as [facility] recently had a name change to [facility] pharmacy

## 2023-05-13 RX ORDER — PROMETHAZINE HYDROCHLORIDE 12.5 MG/1
12.5 TABLET ORAL DAILY PRN
Qty: 30 TABLET | Refills: 0 | Status: SHIPPED | OUTPATIENT
Start: 2023-05-13

## 2023-05-22 NOTE — TELEPHONE ENCOUNTER
Patient needs refill:    ondansetron TELECARE [facility])         Could not find in chart    [address], [address]    Pls advise

## 2023-05-23 ENCOUNTER — TELEPHONE (OUTPATIENT)
Dept: INTERNAL MEDICINE CLINIC | Age: 88
End: 2023-05-23

## 2023-05-23 RX ORDER — ONDANSETRON 4 MG/1
4 TABLET, FILM COATED ORAL 3 TIMES DAILY PRN
Qty: 30 TABLET | Refills: 0 | Status: SHIPPED | OUTPATIENT
Start: 2023-05-23

## 2023-05-23 NOTE — TELEPHONE ENCOUNTER
[facility] called and stated that pt's wound is about pinpoint size and has closed up. They are wondering if they can stop wound care at this point? Pt is also being discharged on 5/26. Please call and advise.

## 2023-06-08 ENCOUNTER — TELEPHONE (OUTPATIENT)
Dept: INTERNAL MEDICINE CLINIC | Age: 88
End: 2023-06-08

## 2023-06-14 DIAGNOSIS — I10 ESSENTIAL HYPERTENSION: ICD-10-CM

## 2023-06-14 RX ORDER — SPIRONOLACTONE 25 MG/1
TABLET ORAL
Qty: 180 TABLET | Refills: 1 | Status: SHIPPED | OUTPATIENT
Start: 2023-06-14 | End: 2023-07-12 | Stop reason: SDUPTHER

## 2023-06-19 ENCOUNTER — CARE COORDINATION (OUTPATIENT)
Dept: CARE COORDINATION | Age: 88
End: 2023-06-19

## 2023-06-19 NOTE — CARE COORDINATION
ACSALVATORE/RN updated by Cristobal with [facility] LE[facility], pt needing help with DME hospital bed. Chart reviewed and PETRA notes prior conversations as well. \"Spoke to Ryan Chan the office will fax over a new script for what bed is covered by her insurance, pt will need to speak with insurance company if she wants something different or if there is additional cost, Ryan Chan also asked about sending script to veterans???? I told her we will send to insurance on file, Ms Cliff Monroe acknowledged and stated she understood, appreciative for any help she can get\"       ACSALVATORE/RN reached out to Allen today to help coordinate delivery of DME/hospital bed through the [facility]. Graciela stating that she has a bed currently but the [facility] is working with pt to provide a  more adequate bet that is more appropriate for her needs. PETRA was updated by Graciela to reach out to [facility] with, [facility] [phone]. Writer called and no answer, no VM set up and today is a federal holiday. Will try outreach again next day or two. Call placed back to Allen with updates as well.

## 2023-06-20 ENCOUNTER — CARE COORDINATION (OUTPATIENT)
Dept: CARE COORDINATION | Age: 88
End: 2023-06-20

## 2023-06-20 NOTE — CARE COORDINATION
ACM/RN made outreach to [address] [place] [address] No answer, voice mail left with contact information and requesting a return call. Nacho LINTON, RN  Ambulatory Care Manager  [email or web address]  [phone]

## 2023-06-21 ENCOUNTER — CARE COORDINATION (OUTPATIENT)
Dept: CARE COORDINATION | Age: 88
End: 2023-06-21

## 2023-06-21 NOTE — CARE COORDINATION
ACSALVATORE/RN called and spoke to [facility] with Stephanie, [phone]. Updated by Jean Marie Becerra that pt is wanting a bed that is $6,000 and can move from laying down position to sitting up. Khushbu stating that she is assisting Zhao Zapien with attempting to get  spouse severance. However spouse was not service connected as he was not injured in war. Khushbu is willing to look at DME order from PCP and discuss with Zhao Zapien and ACM bed that VA can provide versus bed that Zhao Curry wants/needs and what PCP recommends as well. ACM will ask Debra NICOLAS/PCP to fax DME order to [phone] today and instructed by Ganesh Wakefield spoke to Zhao Foleyenne and discussed updates with DME/Order and VA. Per Zhao Zapien she did reach out to Costa maguire as well and will have someone call me back with the outcome. ACM following. Abhay LINTON, RN  Ambulatory Care Manager  [email or web address]  [phone]

## 2023-06-21 NOTE — CARE COORDINATION
Addendum [phone]: ACSALVATORE received VM from friend Aaron and asked to return call. ACM called Haider Lyle while friend Aaron with her. ACM [facility] to [facility]. Aaron is calling [employer] now to discuss coverage and if they need DME order, form provider or company and fax number. Aaron stating that she will call writer back with outcome of conversation. Addendum 7430: ACM received VM from Mounika's friend Aaron and updated that she did speak with [facility] [phone], Option 2. [employer] [facility] does pay up to 90% for DME and patient is responsible for 10% of cost after $350 deductible is met. There is NO fax number for DME order. AC will need to call and provided DME/Hospital Bed Codes to payor. Mounika's Member ID # is [id number]. Pearls Contact # [phone]. Addendum [phone]: AC returned call and spoke to [place]. Confirmed. Aaron will call with Haider Lyle, [address] [place] [address] that is providing hospital bed to obtain codes asked by Payor and will update AC as well. Francisca calling VA to check on OOP expense and VA coverage. Following. ADDENDUM 6/22/23 at 1702: AC received VM from Mounika's friend Aaron. [facility] updated that Francisca called Etienne Preciado with DME/[facility]ospital [facility] [phone] and obtained price of two hospital beds and billing codes as well, Rhianna Garcia this information. Per Aaron she will forward email to Word[employer]. Etienne Preciado stating to Aaron that Bed has to be paid in full prior to delivery. AC will reach back out to Aaron to assist in anyway. ADDENDUM: 6/23/22 at 1314: Three way call placed with Haider Lyle, quique Walker and Etienne Preciado with [employer]. Per Yamilka Alejo they do NOT work directly with [employer] and they are mostly focused on hospital beds for clients at home. The Bed that Haider Lyle is interested in is $9182, 39 inch Bed, mattress and installation. Nehae Mireyas will have to pay up front with credit cared or check and then ask to be re-imbursed  from payor.     Call placed to [employer] spoke to Shama with [place] Dariela and Aaron on

## 2023-06-26 ENCOUNTER — CARE COORDINATION (OUTPATIENT)
Dept: CARE COORDINATION | Age: 88
End: 2023-06-26

## 2023-06-26 SDOH — ECONOMIC STABILITY: INCOME INSECURITY: IN THE LAST 12 MONTHS, WAS THERE A TIME WHEN YOU WERE NOT ABLE TO PAY THE MORTGAGE OR RENT ON TIME?: NO

## 2023-06-26 SDOH — HEALTH STABILITY: PHYSICAL HEALTH: ON AVERAGE, HOW MANY DAYS PER WEEK DO YOU ENGAGE IN MODERATE TO STRENUOUS EXERCISE (LIKE A BRISK WALK)?: 4 DAYS

## 2023-06-26 SDOH — ECONOMIC STABILITY: HOUSING INSECURITY: IN THE LAST 12 MONTHS, HOW MANY PLACES HAVE YOU LIVED?: 1

## 2023-06-26 SDOH — HEALTH STABILITY: PHYSICAL HEALTH: ON AVERAGE, HOW MANY MINUTES DO YOU ENGAGE IN EXERCISE AT THIS LEVEL?: 40 MIN

## 2023-06-26 ASSESSMENT — SOCIAL DETERMINANTS OF HEALTH (SDOH)
HOW OFTEN DO YOU ATTEND CHURCH OR RELIGIOUS SERVICES?: MORE THAN 4 TIMES PER YEAR
HOW OFTEN DO YOU ATTENT MEETINGS OF THE CLUB OR ORGANIZATION YOU BELONG TO?: MORE THAN 4 TIMES PER YEAR
HOW OFTEN DO YOU GET TOGETHER WITH FRIENDS OR RELATIVES?: THREE TIMES A WEEK
HOW HARD IS IT FOR YOU TO PAY FOR THE VERY BASICS LIKE FOOD, HOUSING, MEDICAL CARE, AND HEATING?: NOT HARD AT ALL
DO YOU BELONG TO ANY CLUBS OR ORGANIZATIONS SUCH AS CHURCH GROUPS UNIONS, FRATERNAL OR ATHLETIC GROUPS, OR SCHOOL GROUPS?: YES
IN A TYPICAL WEEK, HOW MANY TIMES DO YOU TALK ON THE PHONE WITH FAMILY, FRIENDS, OR NEIGHBORS?: THREE TIMES A WEEK

## 2023-06-27 ENCOUNTER — CARE COORDINATION (OUTPATIENT)
Dept: CARE COORDINATION | Age: 88
End: 2023-06-27

## 2023-06-27 ENCOUNTER — TELEPHONE (OUTPATIENT)
Dept: INTERNAL MEDICINE CLINIC | Age: 88
End: 2023-06-27

## 2023-06-28 ENCOUNTER — CARE COORDINATION (OUTPATIENT)
Dept: CARE COORDINATION | Age: 88
End: 2023-06-28

## 2023-06-29 ASSESSMENT — ENCOUNTER SYMPTOMS
BACK PAIN: 1
ABDOMINAL PAIN: 0
NAUSEA: 0
EYE REDNESS: 0
COUGH: 0
SHORTNESS OF BREATH: 0
CHEST TIGHTNESS: 0

## 2023-07-05 ENCOUNTER — CARE COORDINATION (OUTPATIENT)
Dept: CARE COORDINATION | Age: 88
End: 2023-07-05

## 2023-07-05 NOTE — CARE COORDINATION
ACM/RN made outreach to [employer], spoke to [facility]. Updated that DME order, last PCP note and demographics needs to be placed through [employer]. Once initiated through [employer] pt profile can begin and Merline Marion with [employer] can begin to process Hospital Bed with payor and delivery to [place] at home. Writer jered BALDOMERO Araujo MA with [employer]  to please send above paper work through [employer]. Per Debra all will be sent through [employer] today. ACM following. Evens LINTON, RN  Ambulatory Care Manager  [email or web address]  [phone]      RSEOZSTF 1027: ACM/RN LVM with [employer], to confirm receipt of DME order. Updated [place] and friend Adriano Karen as well. ADDENDUM 1626: Call received back from [facility] with [facility], no orders or notes received at this time. Per [facility], she reached out to Merline Marion to call writer with updates on DME orders.

## 2023-07-06 ENCOUNTER — CARE COORDINATION (OUTPATIENT)
Dept: CARE COORDINATION | Age: 88
End: 2023-07-06

## 2023-07-06 NOTE — CARE COORDINATION
ACSALVATORE/RN made call this A. M. to Feng following message from BALDOMERO Araujo MA orders for DME and clinicals with face sheet placed through [address] [place] [address]. No answer LVM. PETRA/RN received message from MA, stating: i got a response from gilma. @Paulo Araujo      We have attempted to contact your patient for payment due today of $__70.82_________ with subsequent payments of $___7.08_______ monthly. We were unable to reach the patient for payment. Delivery will occur after payment can be collected. Patient or family member can call [phone] to provide us with information needed. Thank you. Outreach to Darnell and updated her on contact information to [facility] and steps that she needs to take to have bed delivered to her house. Darnell verbalized understanding. All questions answered with no other needs at this time. Sarah LINTON, RN  Ambulatory Care Manager  [email or web address]  [phone]

## 2023-07-07 ENCOUNTER — TELEPHONE (OUTPATIENT)
Dept: INTERNAL MEDICINE CLINIC | Age: 88
End: 2023-07-07

## 2023-07-07 NOTE — TELEPHONE ENCOUNTER
Abdulaziz Mercado states she has incontinence issues that's why she takes cephALEXin (KEFLEX) 500 MG capsule, she is requesting a refill    Please advise

## 2023-07-07 NOTE — TELEPHONE ENCOUNTER
Pharmacist called and asked for a refill of her cephalexin.       cephALEXin (KEFLEX) 500 MG capsule      [facility], [address] [facility] [phone]

## 2023-07-10 ENCOUNTER — TELEPHONE (OUTPATIENT)
Dept: INTERNAL MEDICINE CLINIC | Age: 88
End: 2023-07-10

## 2023-07-10 NOTE — TELEPHONE ENCOUNTER
Pt is requesting a refill on the following:    promethazine (PHENERGAN) 12.5 MG tablet     cephALEXin (KEFLEX) 500 MG capsule     Pharmacy    [address] [place] [id number]   Phone:  [phone]  Fax:  [phone]       Please advise

## 2023-07-11 NOTE — TELEPHONE ENCOUNTER
Pharmacy informed and Chemo Humzade will call pt and reiterate to her what I told Sosa Downing last week appt on 7/12/23 also pt asked for Vti D???  Chemo Lance stated that was new to her and that she hadn't filled that before however wanted office to know pt requested

## 2023-07-12 ENCOUNTER — OFFICE VISIT (OUTPATIENT)
Dept: INTERNAL MEDICINE CLINIC | Age: 88
End: 2023-07-12

## 2023-07-12 VITALS
OXYGEN SATURATION: 95 % | HEIGHT: 63 IN | TEMPERATURE: 97.2 F | SYSTOLIC BLOOD PRESSURE: 126 MMHG | HEART RATE: 70 BPM | WEIGHT: 213 LBS | BODY MASS INDEX: 37.74 KG/M2 | DIASTOLIC BLOOD PRESSURE: 80 MMHG

## 2023-07-12 DIAGNOSIS — F32.A ANXIETY AND DEPRESSION: ICD-10-CM

## 2023-07-12 DIAGNOSIS — I10 ESSENTIAL HYPERTENSION: ICD-10-CM

## 2023-07-12 DIAGNOSIS — F41.9 ANXIETY AND DEPRESSION: ICD-10-CM

## 2023-07-12 DIAGNOSIS — G89.29 CHRONIC RIGHT-SIDED LOW BACK PAIN WITH RIGHT-SIDED SCIATICA: Primary | ICD-10-CM

## 2023-07-12 DIAGNOSIS — E11.9 TYPE 2 DIABETES MELLITUS WITHOUT COMPLICATION, WITHOUT LONG-TERM CURRENT USE OF INSULIN (HCC): ICD-10-CM

## 2023-07-12 DIAGNOSIS — R32 URINARY INCONTINENCE, UNSPECIFIED TYPE: ICD-10-CM

## 2023-07-12 DIAGNOSIS — E03.9 ACQUIRED HYPOTHYROIDISM: Chronic | ICD-10-CM

## 2023-07-12 DIAGNOSIS — M54.41 CHRONIC RIGHT-SIDED LOW BACK PAIN WITH RIGHT-SIDED SCIATICA: Primary | ICD-10-CM

## 2023-07-12 DIAGNOSIS — G89.29 OTHER CHRONIC PAIN: Chronic | ICD-10-CM

## 2023-07-12 DIAGNOSIS — R11.0 NAUSEA: ICD-10-CM

## 2023-07-12 LAB
ALBUMIN SERPL-MCNC: 4.3 G/DL (ref 3.4–5)
ALBUMIN/GLOB SERPL: 1.3 {RATIO} (ref 1.1–2.2)
ALP SERPL-CCNC: 78 U/L (ref 40–129)
ALT SERPL-CCNC: 27 U/L (ref 10–40)
ANION GAP SERPL CALCULATED.3IONS-SCNC: 13 MMOL/L (ref 3–16)
AST SERPL-CCNC: 40 U/L (ref 15–37)
BASOPHILS # BLD: 0.1 K/UL (ref 0–0.2)
BASOPHILS NFR BLD: 1 %
BILIRUB SERPL-MCNC: 0.5 MG/DL (ref 0–1)
BUN SERPL-MCNC: 17 MG/DL (ref 7–20)
CALCIUM SERPL-MCNC: 10 MG/DL (ref 8.3–10.6)
CHLORIDE SERPL-SCNC: 102 MMOL/L (ref 99–110)
CHOLEST SERPL-MCNC: 134 MG/DL (ref 0–199)
CO2 SERPL-SCNC: 26 MMOL/L (ref 21–32)
CREAT SERPL-MCNC: 1.1 MG/DL (ref 0.6–1.2)
DEPRECATED RDW RBC AUTO: 13.7 % (ref 12.4–15.4)
EOSINOPHIL # BLD: 0.2 K/UL (ref 0–0.6)
EOSINOPHIL NFR BLD: 3 %
GFR SERPLBLD CREATININE-BSD FMLA CKD-EPI: 48 ML/MIN/{1.73_M2}
GLUCOSE SERPL-MCNC: 125 MG/DL (ref 70–99)
HCT VFR BLD AUTO: 36.8 % (ref 36–48)
HDLC SERPL-MCNC: 41 MG/DL (ref 40–60)
HGB BLD-MCNC: 11.4 G/DL (ref 12–16)
LDL CHOLESTEROL CALCULATED: 64 MG/DL
LYMPHOCYTES # BLD: 2.6 K/UL (ref 1–5.1)
LYMPHOCYTES NFR BLD: 39.2 %
MCH RBC QN AUTO: 25.3 PG (ref 26–34)
MCHC RBC AUTO-ENTMCNC: 31.1 G/DL (ref 31–36)
MCV RBC AUTO: 81.5 FL (ref 80–100)
MONOCYTES # BLD: 0.3 K/UL (ref 0–1.3)
MONOCYTES NFR BLD: 5.3 %
NEUTROPHILS # BLD: 3.4 K/UL (ref 1.7–7.7)
NEUTROPHILS NFR BLD: 51.5 %
PLATELET # BLD AUTO: 195 K/UL (ref 135–450)
PMV BLD AUTO: 9.5 FL (ref 5–10.5)
POTASSIUM SERPL-SCNC: 4 MMOL/L (ref 3.5–5.1)
PROT SERPL-MCNC: 7.6 G/DL (ref 6.4–8.2)
RBC # BLD AUTO: 4.52 M/UL (ref 4–5.2)
SODIUM SERPL-SCNC: 141 MMOL/L (ref 136–145)
TRIGL SERPL-MCNC: 147 MG/DL (ref 0–150)
TSH SERPL DL<=0.005 MIU/L-ACNC: 3.26 UIU/ML (ref 0.27–4.2)
VLDLC SERPL CALC-MCNC: 29 MG/DL
WBC # BLD AUTO: 6.6 K/UL (ref 4–11)

## 2023-07-12 RX ORDER — PROMETHAZINE HYDROCHLORIDE 12.5 MG/1
12.5 TABLET ORAL DAILY PRN
Qty: 30 TABLET | Refills: 0 | Status: SHIPPED | OUTPATIENT
Start: 2023-07-12

## 2023-07-12 RX ORDER — CEPHALEXIN 500 MG/1
500 CAPSULE ORAL 3 TIMES DAILY
Qty: 30 CAPSULE | Refills: 0 | Status: SHIPPED | OUTPATIENT
Start: 2023-07-12 | End: 2023-07-12

## 2023-07-12 RX ORDER — SPIRONOLACTONE 25 MG/1
25 TABLET ORAL 2 TIMES DAILY
Qty: 180 TABLET | Refills: 1 | Status: SHIPPED | OUTPATIENT
Start: 2023-07-12

## 2023-07-12 RX ORDER — CEPHALEXIN 500 MG/1
500 CAPSULE ORAL 3 TIMES DAILY
Qty: 12 CAPSULE | Refills: 0 | Status: SHIPPED | OUTPATIENT
Start: 2023-07-12 | End: 2023-07-16

## 2023-07-12 RX ORDER — ZINC GLUCONATE 50 MG
50 TABLET ORAL DAILY
COMMUNITY

## 2023-07-12 RX ORDER — TRAMADOL HYDROCHLORIDE 50 MG/1
50 TABLET ORAL EVERY 8 HOURS PRN
Qty: 90 TABLET | Refills: 1 | Status: SHIPPED | OUTPATIENT
Start: 2023-07-12 | End: 2023-08-11

## 2023-07-12 RX ORDER — LEVOTHYROXINE SODIUM 0.1 MG/1
100 TABLET ORAL DAILY
Qty: 90 TABLET | Refills: 3 | Status: SHIPPED | OUTPATIENT
Start: 2023-07-12

## 2023-07-12 RX ORDER — GABAPENTIN 100 MG/1
100 CAPSULE ORAL 3 TIMES DAILY
Qty: 90 CAPSULE | Refills: 0 | Status: SHIPPED | OUTPATIENT
Start: 2023-07-12 | End: 2023-08-11

## 2023-07-12 RX ORDER — HYDROCHLOROTHIAZIDE 25 MG/1
25 TABLET ORAL DAILY
Qty: 90 TABLET | Refills: 3 | Status: SHIPPED | OUTPATIENT
Start: 2023-07-12

## 2023-07-12 ASSESSMENT — ENCOUNTER SYMPTOMS
CHOKING: 0
COUGH: 0
CONSTIPATION: 0
PHOTOPHOBIA: 0
ABDOMINAL DISTENTION: 0
NAUSEA: 0
APNEA: 0
WHEEZING: 0
STRIDOR: 0
DIARRHEA: 0
CHEST TIGHTNESS: 0
SHORTNESS OF BREATH: 0
VOICE CHANGE: 0
ABDOMINAL PAIN: 0
TROUBLE SWALLOWING: 0
VOMITING: 0

## 2023-07-12 NOTE — ASSESSMENT & PLAN NOTE
Chronic issue, she does wear pads daily  Was seen by urology many years ago. Recently have increased urgency, not able to completely empty her bladder but no dysuria. Attempted to get urine sample in the office. Will currently treat with Keflex for 4 days. Will have patient evaluated by urogyn.

## 2023-07-12 NOTE — PROGRESS NOTES
Palpations: Abdomen is soft. Tenderness: There is no abdominal tenderness. There is no guarding. Musculoskeletal:         General: No tenderness or deformity. Normal range of motion. Cervical back: Normal range of motion and neck supple. Skin:     General: Skin is warm and dry. Neurological:      Mental Status: She is alert and oriented to person, place, and time. Cranial Nerves: No cranial nerve deficit. Sensory: No sensory deficit. Psychiatric:         Behavior: Behavior normal.            Please note that this chart was generated using dragon dictation software. Although every effort was made to ensure the accuracy of this automated transcription, some errors in transcription may have occurred.        --Lida Roman MD

## 2023-07-13 LAB
EST. AVERAGE GLUCOSE BLD GHB EST-MCNC: 96.8 MG/DL
HBA1C MFR BLD: 5 %

## 2023-07-15 LAB
BACTERIA UR CULT: ABNORMAL
ORGANISM: ABNORMAL

## 2023-07-17 ENCOUNTER — CARE COORDINATION (OUTPATIENT)
Dept: CARE COORDINATION | Age: 88
End: 2023-07-17

## 2023-07-17 NOTE — CARE COORDINATION
referral to community agency [facility] for assistance. Barriers: fear of failure, lack of motivation, lack of support, overwhelmed by complexity of regimen, stress, and lack of education  Plan for overcoming my barriers: Work with [facility]  Confidence: 8/10  Anticipated Goal Completion Date:  8/26/23       Reduce Falls    On track     I will reduce my risk of falls by the following: Remove rugs or use non slip rugs  Install grab bars in bathroom  Use walking aids like cane or walker  Follow through on orders for PT    Barriers: lack of motivation, lack of support, overwhelmed by complexity of regimen, stress, and lack of education  Plan for overcoming my barriers: work with [facility]  Confidence: 8/10  Anticipated Goal Completion Date: 8/26/23    [facility] 6/26/23              No future appointments. ,   Diabetes Assessment    Medic Alert ID: No  Meal Planning: None   How often do you test your blood sugar?: No Testing   Do you have barriers with adherence to non-pharmacologic self-management interventions?  (Nutrition/Exercise/Self-Monitoring): No   Have you ever had to go to the ED for symptoms of low blood sugar?: Yes       Do you have hyperglycemia symptoms?: No   Do you have hypoglycemia symptoms?: No   Blood Sugar Monitoring Regimen: Not Testing        ,   General Assessment    Do you have any symptoms that are causing concern?: No     , and Care Coordination Episodes    Type: Amb Care Management  Episode: complex care  Noted: 6/19/2023

## 2023-07-26 ENCOUNTER — CARE COORDINATION (OUTPATIENT)
Dept: CARE COORDINATION | Age: 88
End: 2023-07-26

## 2023-07-26 DIAGNOSIS — F51.02 ADJUSTMENT INSOMNIA: ICD-10-CM

## 2023-07-26 RX ORDER — TRAZODONE HYDROCHLORIDE 100 MG/1
TABLET ORAL
Qty: 90 TABLET | Refills: 0 | Status: SHIPPED | OUTPATIENT
Start: 2023-07-26

## 2023-07-26 NOTE — CARE COORDINATION
ACM made outreach today. Spoke to Darnell and updated that [facility] had NOT delivered DME/hospital bed. Call placed to [facility] [phone], spoke to Marbella (Intake) ACM Confirmed that Feng has all needed documentation/DME order correctly for delivery of hospital bed. Three way call placed confirmed delivery of Bed with Marbella [facility]) and Darnell for Tuesday August 1st. Darnell verbalized understanding and Marbella Confirmed delivery. Jayne LLOYDN, RN  Ambulatory Care Manager  [email or web address]  [phone]

## 2023-07-27 ENCOUNTER — TELEPHONE (OUTPATIENT)
Dept: INTERNAL MEDICINE CLINIC | Age: 88
End: 2023-07-27

## 2023-07-27 NOTE — TELEPHONE ENCOUNTER
Pt states that her gums are swollen and she has 8 tabs of Cephalexin 500mg and wants to know if she can take it? She was seen at the dentist yesterday, I advised that she call the dentist but she wants you to advise if she could take the med or not.

## 2023-07-27 NOTE — TELEPHONE ENCOUNTER
Yes please advise her again that I would recommend to call the dentist, especially since they saw her yesterday. Otherwise I need to see her gum in order to give advice regarding Cephalexin.

## 2023-08-03 DIAGNOSIS — K21.9 GASTROESOPHAGEAL REFLUX DISEASE WITHOUT ESOPHAGITIS: ICD-10-CM

## 2023-08-03 RX ORDER — LANSOPRAZOLE 30 MG/1
30 CAPSULE, DELAYED RELEASE ORAL DAILY
Qty: 90 CAPSULE | Refills: 1 | Status: SHIPPED | OUTPATIENT
Start: 2023-08-03

## 2023-08-03 NOTE — TELEPHONE ENCOUNTER
[facility] from [facility] is requesting a refill on medication listed below for the patient. States they have tried to fax over a request and no response.          lansoprazole (PREVACID) 30 MG delayed release capsule [[id number]]    [address] [place] [address], [address] [facility] [phone]

## 2023-08-04 DIAGNOSIS — R11.0 NAUSEA: ICD-10-CM

## 2023-08-04 RX ORDER — PROMETHAZINE HYDROCHLORIDE 12.5 MG/1
TABLET ORAL
Qty: 30 TABLET | Refills: 0 | Status: SHIPPED | OUTPATIENT
Start: 2023-08-04

## 2023-08-08 ENCOUNTER — TELEPHONE (OUTPATIENT)
Dept: INTERNAL MEDICINE CLINIC | Age: 88
End: 2023-08-08

## 2023-08-08 DIAGNOSIS — R11.0 NAUSEA: ICD-10-CM

## 2023-08-08 NOTE — TELEPHONE ENCOUNTER
[id number]     Order Class:    Order Class   Normal [1]     Destination    This Order   E-PRESCRIBING INTERFACE [[id number]]     Action Summary    Action User: --            Warnings Override History    No Interaction Warnings Shown      promethazine (PHENERGAN) 12.5 MG tablet  Date: 8/4/2023 Department:  [address] Ordering/Authorizing: Hilda Dan MD     Outpatient Medication Detail     Disp Refills Start End    promethazine (PHENERGAN) 12.5 MG tablet 30 tablet 0 8/4/2023     Sig: TAKE ONE TABLET BY MOUTH EVERY DAY AS NEEDED FOR NAUSEA    Sent to pharmacy as: Promethazine HCl 12.5 MG Oral Tablet (PHENERGAN)    E-Prescribing Status: Receipt confirmed by pharmacy (8/4/2023  5:11 PM EDT)      Intervention summary for promethazine (PHENERGAN) 12.5 MG tablet (Ordered on 08/04/23)    Open Interventions    Type Subtype   Status Opened By Opened On Response Outcome   None       Closed Interventions    Type Subtype   Status Closed By Closed On Response Outcome   None       Tracking Links    Cosign Tracking   PA NEEDED PLEASE

## 2023-08-09 ENCOUNTER — CARE COORDINATION (OUTPATIENT)
Dept: CASE MANAGEMENT | Age: 88
End: 2023-08-09

## 2023-08-09 ENCOUNTER — TELEPHONE (OUTPATIENT)
Dept: INTERNAL MEDICINE CLINIC | Age: 88
End: 2023-08-09

## 2023-08-09 DIAGNOSIS — R53.81 PHYSICAL DECONDITIONING: ICD-10-CM

## 2023-08-09 DIAGNOSIS — H61.22 HEARING LOSS OF LEFT EAR DUE TO CERUMEN IMPACTION: ICD-10-CM

## 2023-08-09 DIAGNOSIS — R53.1 WEAKNESS: ICD-10-CM

## 2023-08-09 DIAGNOSIS — K21.00 GASTROESOPHAGEAL REFLUX DISEASE WITH ESOPHAGITIS, UNSPECIFIED WHETHER HEMORRHAGE: ICD-10-CM

## 2023-08-09 DIAGNOSIS — M19.90 ARTHRITIS: Primary | Chronic | ICD-10-CM

## 2023-08-09 NOTE — CARE COORDINATION
Ambulatory Care Coordination Note  2023    Patient Current Location:  Home: [address] [place] [id number]     LPN CC contacted the patient by telephone. Verified name and  with patient as identifiers. Provided introduction to self, and explanation of the LPN CC role. Challenges to be reviewed by the provider   Additional needs identified to be addressed with provider: No  none               Method of communication with provider: none. ACM: Lena Guerrier LPN  Patient reports she is feeling better. She was able to attend her grandsons wedding. Denies cp, sob, cough, fever or chills. She has swelling in her legs and nausea at times. Appetite and fluid intake is good. No bladder or bowel problems. She has received her hospital bed. Patient is thankful for the help she has received. Patient doesn't check BP or BS. I discussed the importance of checking readings. She stated she will talk with her doctor to see if she needs to do this. No questions at this time. Plan:  F/U in a week  F/U with [facility]  Discuss AD's  Discuss ABX and Urogyn, needs apt. Assess needs      Offered patient enrollment in the Remote Patient Monitoring (RPM) program for in-home monitoring: NA. Lab Results       None            Care Coordination Interventions    Referral from Primary Care Provider: No  Suggested Interventions and Community Resources  Diabetes Education: In Process  Fall Risk Prevention: In Process  Other Services or Interventions: Fall safety, DM and HTN mailed on 23          Goals Addressed    None         No future appointments. ,   Diabetes Assessment    Medic Alert ID: No  Meal Planning: None   How often do you test your blood sugar?: No Testing   Do you have barriers with adherence to non-pharmacologic self-management interventions?  (Nutrition/Exercise/Self-Monitoring): No   Have you ever had to go to the ED for symptoms of low blood sugar?: Yes            , and   General Assessment

## 2023-08-10 NOTE — TELEPHONE ENCOUNTER
Submitted PA for Promethazine HCl 12. 5MG tablets  [facility]ia [facility] Key: [id number] STATUS: PENDING. Follow up done daily; if no response in three days we will refax for status check. If another three days goes by with no response we will call the insurance for status.

## 2023-08-11 NOTE — TELEPHONE ENCOUNTER
Received APPROVAL for Promethazine HCl 12. 5MG tablets from 01/01/23 - 12/31/23; approval letter attached. If this requires a response please respond to the pool ( P [phone]). Thank you please advise patient.

## 2023-08-16 ENCOUNTER — TELEPHONE (OUTPATIENT)
Dept: INTERNAL MEDICINE CLINIC | Age: 88
End: 2023-08-16

## 2023-08-16 ENCOUNTER — CARE COORDINATION (OUTPATIENT)
Dept: CARE COORDINATION | Age: 88
End: 2023-08-16

## 2023-08-16 NOTE — TELEPHONE ENCOUNTER
Jazmyne Navarro is calling and requesting a order for a medical social worker for the patient. States the patient is already active with them and would like to include a .     Jazmyne Navarro [phone]

## 2023-08-16 NOTE — CARE COORDINATION
ACM/RN made outreach today to Dung Negron, spoke briefly as she was stating that she was working with PT at the time. [place] Migdalia stating that she does have hospital bed at the house now too. ACM instructed Dung Negron to work with PT and ACM will reach back out in a couple of days. Dung Migdalia verbalized understanding. Kervin LINTON, RN  Ambulatory Care Manager  [email or web address]  [phone]

## 2023-08-18 ENCOUNTER — CARE COORDINATION (OUTPATIENT)
Dept: CARE COORDINATION | Age: 88
End: 2023-08-18

## 2023-08-18 NOTE — CARE COORDINATION
time, days, and/or other contact (spouse,child, caregiver)- MWF, 11-2    Other pertinent information- [employer] helps to prepared food.     Alban Sanchez RN

## 2023-08-23 ENCOUNTER — CARE COORDINATION (OUTPATIENT)
Dept: CARE COORDINATION | Age: 88
End: 2023-08-23

## 2023-08-23 RX ORDER — GABAPENTIN 100 MG/1
CAPSULE ORAL
Qty: 90 CAPSULE | Refills: 0 | Status: SHIPPED | OUTPATIENT
Start: 2023-08-23 | End: 2023-09-22

## 2023-08-23 RX ORDER — CEPHALEXIN 500 MG/1
CAPSULE ORAL
Qty: 12 CAPSULE | Refills: 0 | OUTPATIENT
Start: 2023-08-23

## 2023-08-24 NOTE — CARE COORDINATION
Klaus Hernandez  August 23, 2023    Initial Referral Reason: Soft Diet Nutrition Therapy    Patient Care Team:  Colette Gregorio MD as PCP - General (Internal Medicine)  Colette Gregorio MD as PCP - Empaneled Provider  Kami Fleming MD as Surgeon (General Surgery)  Anastasiya Sanchez RN as Ambulatory Care Manager  Prisca Chavez RD as Dietitian (Dietitian Registered)    Past Medical History:    Current Outpatient Medications   Medication Sig Dispense Refill    gabapentin (NEURONTIN) 100 MG capsule TAKE ONE CAPSULE BY MOUTH THREE TIMES DAILY 90 capsule 0    promethazine (PHENERGAN) 12.5 MG tablet TAKE ONE TABLET BY MOUTH EVERY DAY AS NEEDED FOR NAUSEA 30 tablet 0    lansoprazole (PREVACID) 30 MG delayed release capsule Take 1 capsule by mouth daily 90 capsule 1    traZODone (DESYREL) 100 MG tablet TAKE ONE TABLET BY MOUTH AT BEDTIME 90 tablet 0    zinc gluconate 50 MG tablet Take 1 tablet by mouth daily      spironolactone (ALDACTONE) 25 MG tablet Take 1 tablet by mouth 2 times daily 180 tablet 1    levothyroxine (SYNTHROID) 100 MCG tablet Take 1 tablet by mouth daily 90 tablet 3    hydroCHLOROthiazide (HYDRODIURIL) 25 MG tablet Take 1 tablet by mouth daily 90 tablet 3    SITagliptin (JANUVIA) 50 MG tablet TAKE 1 TABLET BY MOUTH EVERY DAY 90 tablet 1    sertraline (ZOLOFT) 50 MG tablet Take 1 tablet by mouth daily 90 tablet 1    Hospital Bed MISC by Does not apply route 1 each 0    ondansetron (ZOFRAN) 4 MG tablet Take 1 tablet by mouth 3 times daily as needed for Nausea or Vomiting 30 tablet 0    Biotin (BIOTIN 5000) 5 MG CAPS Take 1 capsule by mouth daily 120 capsule 3    metoprolol tartrate (LOPRESSOR) 50 MG tablet Take 1 tablet by mouth 2 times daily 60 tablet 11    ZINC PO Take by mouth      Hospital Bed MISC by Does not apply route Use as directed 1 each 0    SENEXON-S 8.6-50 MG per tablet TAKE 1 TABLET BY MOUTH EVERY DAY 30 tablet 5    ACETAMINOPHEN EXTRA STRENGTH 500 MG tablet TAKE 1 TABLET BY MOUTH EVERY

## 2023-08-26 ENCOUNTER — ENROLLMENT (OUTPATIENT)
Dept: CARE COORDINATION | Age: 88
End: 2023-08-26

## 2023-08-29 ENCOUNTER — TELEPHONE (OUTPATIENT)
Dept: INTERNAL MEDICINE CLINIC | Age: 88
End: 2023-08-29

## 2023-08-29 RX ORDER — CEPHALEXIN 500 MG/1
CAPSULE ORAL
Qty: 12 CAPSULE | Refills: 0 | OUTPATIENT
Start: 2023-08-29

## 2023-08-31 NOTE — TELEPHONE ENCOUNTER
Patient is requesting a refill on:    cephALEXin (KEFLEX) 500 MG capsule    Dr. Alex Cates, urologist told her to take the Keflex for the rest of her life. Either she had an operation on her bladder/kidney or take this for her incontinence. She does not want surgery!!    Dr. Nancy Barry would always refill this for her. She cannot take any blood thinners (see chart).     [facility], [address] [facility] [phone]     Please call and advise

## 2023-09-06 NOTE — TELEPHONE ENCOUNTER
Okay to send refill today. I will need to review the urologist's note. Usually for prophylaxis she will have to take it daily not episodic.

## 2023-09-07 RX ORDER — CEPHALEXIN 500 MG/1
CAPSULE ORAL
Qty: 30 CAPSULE | Refills: 0 | Status: SHIPPED | OUTPATIENT
Start: 2023-09-07

## 2023-09-07 NOTE — TELEPHONE ENCOUNTER
Patient would like to know why this medication has not been called in. Would like a call back ASAP. Pls call and advise.

## 2023-09-12 ENCOUNTER — TELEPHONE (OUTPATIENT)
Dept: INTERNAL MEDICINE CLINIC | Age: 88
End: 2023-09-12

## 2023-09-12 NOTE — TELEPHONE ENCOUNTER
They sent (07/27/23) a clearance form for Ms. Leahy to get some teeth removed. They have not heard anything back from us. Please call and advise the status of this form. I have asked her to resend the form today in case we never received it. ..     Please call and advise

## 2023-09-14 ENCOUNTER — CARE COORDINATION (OUTPATIENT)
Dept: CASE MANAGEMENT | Age: 88
End: 2023-09-14

## 2023-09-14 ENCOUNTER — CARE COORDINATION (OUTPATIENT)
Dept: CARE COORDINATION | Age: 88
End: 2023-09-14

## 2023-09-14 ENCOUNTER — TELEPHONE (OUTPATIENT)
Dept: INTERNAL MEDICINE CLINIC | Age: 88
End: 2023-09-14

## 2023-09-14 NOTE — CARE COORDINATION
Ambulatory Care Coordination Note  2023    Patient Current Location:  Home: [address] [place] [id number]     LPN CC contacted the patient by telephone. Verified name and  with patient as identifiers. Provided introduction to self, and explanation of the LPN CC role. Challenges to be reviewed by the provider   Additional needs identified to be addressed with provider: No  none               Method of communication with provider: none. ACM: Kenton Olivarez LPN  Patient reports everything is going well. She feels pretty good. Went to the dentist yesterday and she will be getting her dentures soon. Appetite and fluid intake is good. She is sticking to soft foods. Home care is active. A nurse comes 2 times a week for her leg treatment. Denies cp, sob, swelling, falls or soreness. Uses a walker for ambulation. Patient is pleased with calls. Offered patient enrollment in the Remote Patient Monitoring (RPM) program for in-home monitoring: Patient is not eligible for RPM program      Lab Results       None                 Goals Addressed    None         No future appointments. ,   Diabetes Assessment    Medic Alert ID: No  Meal Planning: None   How often do you test your blood sugar?: No Testing   Do you have barriers with adherence to non-pharmacologic self-management interventions?  (Nutrition/Exercise/Self-Monitoring): No   Have you ever had to go to the ED for symptoms of low blood sugar?: Yes       Do you have hyperglycemia symptoms?: No   Do you have hypoglycemia symptoms?: No   Blood Sugar Monitoring Regimen: Not Testing        , and   General Assessment    Do you have any symptoms that are causing concern?: No         [address] [facility] [address] Coordinator  [phone]

## 2023-09-14 NOTE — TELEPHONE ENCOUNTER
Patient states she is having a dental procedure for extraction of her teeth and states she needs the approval letter from Dr. Andi Ayala to be sent to her dentists. Patient did not have enough information to provide as to when it is happening, where and who was doing procedure states the lady who assist her has all of the information but was not with her during time of call.     Please call and advise [phone]

## 2023-09-15 NOTE — CARE COORDINATION
Contacted [facility] regarding Dietitian follow up. Patient answered, RD explained reason for call and role in care. Patient reports that she received the handouts and Ensure coupons RD sent in the mail. Patient was appreciative of information, and did not have any questions/concerns. Patient reports that she continues to follow a mechanical soft diet due to chewing difficulty 2/2 poor dentition. Patient continues to drink one Ensure daily to help meet estimated nutrition needs. Patient reports that it has been \"hard\" finding foods that are soft, but align with diabetic, low sodium diet, but has been making it work. Patient has no further nutrition-related questions/concerns. RD will sign off at this time. RD encouraged patient to reach out should any nutrition-related questions/concerns arise. Patient verbalized understanding and thanked RD.     Calista Alvarado, [address] [place] [address],    [phone]

## 2023-09-20 DIAGNOSIS — R11.0 NAUSEA: ICD-10-CM

## 2023-09-20 RX ORDER — PROMETHAZINE HYDROCHLORIDE 12.5 MG/1
TABLET ORAL
Qty: 30 TABLET | Refills: 0 | Status: SHIPPED | OUTPATIENT
Start: 2023-09-20

## 2023-09-21 ENCOUNTER — TELEPHONE (OUTPATIENT)
Dept: INTERNAL MEDICINE CLINIC | Age: 88
End: 2023-09-21

## 2023-09-21 NOTE — TELEPHONE ENCOUNTER
Bella Byrd is calling and looking for the OT and PT  orders that were faxed over on 9/14/23 and would like them signed and faxed back.      Please advise [phone]

## 2023-09-21 NOTE — TELEPHONE ENCOUNTER
----- Message from Jeffery Mcknight sent at 9/21/2023 12:51 PM EDT -----  Subject: Referral Request    Reason for referral request? Patient is requesting a letter of   authorization for dental work to be sent to Royer Pritchett for getting   extractions   Provider patient wants to be referred to(if known):     Provider Phone Number(if known): Additional Information for Provider?  [address]   ---------------------------------------------------------------------------  --------------  Sonia Chappell INFO    [phone]; OK to leave message on voicemail  ---------------------------------------------------------------------------  --------------

## 2023-09-21 NOTE — TELEPHONE ENCOUNTER
I did fill out a clearance form for her dental procedure a few weeks ago. Please review. Otherwise, please request them to send another form so we can complete for the patient.

## 2023-10-02 ENCOUNTER — CARE COORDINATION (OUTPATIENT)
Dept: CARE COORDINATION | Age: 88
End: 2023-10-02

## 2023-10-02 NOTE — CARE COORDINATION
Patient goals, education have been completed. Patient has verbalized understanding of Instructions/ plan. Patient has been provided with the contact information in the event the need to make an appointment. Patient knows when to call or seek emergency services. Patient was provided ACM contact information in the even further needs arise. Received education handouts and verbalized understanding. Denies any questions, concerns, additional needs. Cc graduation completed. Sarah LINTON, RN  Ambulatory Care Manager  [email or web address]  [phone]

## 2023-10-20 RX ORDER — CEPHALEXIN 500 MG/1
CAPSULE ORAL
Qty: 30 CAPSULE | Refills: 0 | Status: SHIPPED | OUTPATIENT
Start: 2023-10-20

## 2023-10-24 ENCOUNTER — TELEPHONE (OUTPATIENT)
Dept: INTERNAL MEDICINE CLINIC | Age: 88
End: 2023-10-24

## 2023-10-24 NOTE — TELEPHONE ENCOUNTER
Patient states she is having cramps in her feet that is traveling up into her legs and would like to know what she can do for this or if Dr. Jessie Quintana will send her in a medication to help.          Please call and advise [phone]

## 2023-10-25 DIAGNOSIS — R25.2 LEG CRAMPING: Primary | ICD-10-CM

## 2023-10-25 DIAGNOSIS — F51.02 ADJUSTMENT INSOMNIA: ICD-10-CM

## 2023-10-25 NOTE — TELEPHONE ENCOUNTER
[phone] (home)   SPOKE WITH PATIENT MESSAGE GIVEN PATIENT WITH START MAGNESIUM /GET LABS DONE ADVISE HER GABAPENTIN @ NIGHT DUE TO THE EFFECTS

## 2023-10-25 NOTE — TELEPHONE ENCOUNTER
Low electrolyte could cause leg cramp. I will put in orders to repeat labs to check for her electrolytes. In the meantime she can take magnesium over-the-counter. And if tolerates can increase gabapentin to 200 mg or 300 mg at nighttime. Watch for drowsiness, confusion related to gabapentin. If worsen, best thing is to come in the office so I can evaluate.

## 2023-10-26 DIAGNOSIS — I10 ESSENTIAL HYPERTENSION: ICD-10-CM

## 2023-10-26 RX ORDER — TRAZODONE HYDROCHLORIDE 100 MG/1
TABLET ORAL
Qty: 90 TABLET | Refills: 0 | Status: SHIPPED | OUTPATIENT
Start: 2023-10-26

## 2023-10-26 RX ORDER — METOPROLOL TARTRATE 50 MG/1
50 TABLET, FILM COATED ORAL 2 TIMES DAILY
Qty: 60 TABLET | Refills: 11 | Status: SHIPPED | OUTPATIENT
Start: 2023-10-26

## 2023-10-26 NOTE — TELEPHONE ENCOUNTER
REFILL:    metoprolol tartrate (LOPRESSOR) 50 MG tablet      [facility], [address] [facility] [phone]

## 2023-11-03 ENCOUNTER — TELEPHONE (OUTPATIENT)
Dept: INTERNAL MEDICINE CLINIC | Age: 88
End: 2023-11-03

## 2023-11-03 DIAGNOSIS — G89.4 CHRONIC PAIN SYNDROME: Primary | Chronic | ICD-10-CM

## 2023-11-03 NOTE — TELEPHONE ENCOUNTER
Patient called in wanting a referral for home care. Would like to know more info about \"[facility]\". Patient states that they help come and clean your house. Was told to call insurance to see if they cover that. Pls call and advise.

## 2023-11-08 DIAGNOSIS — R11.0 NAUSEA: ICD-10-CM

## 2023-11-09 PROBLEM — N18.30 CHRONIC RENAL DISEASE, STAGE III (HCC): Status: ACTIVE | Noted: 2023-11-09

## 2023-11-10 RX ORDER — PROMETHAZINE HYDROCHLORIDE 12.5 MG/1
TABLET ORAL
Qty: 30 TABLET | Refills: 0 | Status: SHIPPED | OUTPATIENT
Start: 2023-11-10

## 2023-11-10 NOTE — TELEPHONE ENCOUNTER
Medication:   Requested Prescriptions     Pending Prescriptions Disp Refills    promethazine (PHENERGAN) 12.5 MG tablet [Pharmacy Med Name: promethazine 12.5 mg tablet] 30 tablet 0     Sig: TAKE ONE TABLET BY MOUTH EVERY DAY AS NEEDED FOR NAUSEA        Last Filled:      Patient Phone Number: [phone] (home)     Last appt: 7/12/2023   Next appt: Visit date not found    Last OARRS:       1/11/2023     4:29 PM   RX Monitoring   Periodic Controlled Substance Monitoring No signs of potential drug abuse or diversion identified. Chronic Pain > 50 MEDD Re-evaluated the status of the patient's underlying condition causing pain.

## 2023-11-13 ENCOUNTER — TELEPHONE (OUTPATIENT)
Dept: INTERNAL MEDICINE CLINIC | Age: 88
End: 2023-11-13

## 2023-11-13 NOTE — TELEPHONE ENCOUNTER
CALLED DIANA REGARDING PHYSICIAN ORDER TO BE SIGNED  ADVISED HER TO REFAX TODAY / WILL HAVE IT SIGNED AND FAXED BACK.       FAXED [phone] [phone]

## 2023-11-13 NOTE — TELEPHONE ENCOUNTER
Sabine Escobar From [facility] is calling back  about the physician order she was supposed to receive by the end of the day on 11/8/23 for the patient, states she never got it and this has been an ongoing matter since August that still has not been done and states she will have to escalate to a supervisor if she doesn't get a callback today.     Please call and advise [phone]

## 2023-11-22 RX ORDER — GABAPENTIN 100 MG/1
CAPSULE ORAL
Qty: 90 CAPSULE | Refills: 0 | Status: SHIPPED | OUTPATIENT
Start: 2023-11-22 | End: 2023-12-22

## 2023-12-06 DIAGNOSIS — R11.0 NAUSEA: ICD-10-CM

## 2023-12-07 RX ORDER — PROMETHAZINE HYDROCHLORIDE 12.5 MG/1
TABLET ORAL
Qty: 30 TABLET | Refills: 0 | Status: SHIPPED | OUTPATIENT
Start: 2023-12-07

## 2024-01-02 ENCOUNTER — TELEPHONE (OUTPATIENT)
Dept: INTERNAL MEDICINE CLINIC | Age: 89
End: 2024-01-02

## 2024-01-02 DIAGNOSIS — R11.0 NAUSEA: ICD-10-CM

## 2024-01-02 RX ORDER — CEPHALEXIN 500 MG/1
CAPSULE ORAL
Qty: 30 CAPSULE | Refills: 0 | Status: SHIPPED | OUTPATIENT
Start: 2024-01-02

## 2024-01-02 RX ORDER — PROMETHAZINE HYDROCHLORIDE 12.5 MG/1
TABLET ORAL
Qty: 30 TABLET | Refills: 0 | Status: SHIPPED | OUTPATIENT
Start: 2024-01-02

## 2024-01-02 NOTE — TELEPHONE ENCOUNTER
Pt would like to know if she should go back to treatments with Dr. Soliz due to her leg.     Please call and advise

## 2024-01-04 NOTE — TELEPHONE ENCOUNTER
[phone] (home)   Spoke with patient she is having some leaking (L) lower leg ankle area, and right outer leg using Aquaphor not helping please call with advise

## 2024-01-08 ENCOUNTER — TELEMEDICINE (OUTPATIENT)
Dept: INTERNAL MEDICINE CLINIC | Age: 89
End: 2024-01-08
Payer: COMMERCIAL

## 2024-01-08 ENCOUNTER — TELEPHONE (OUTPATIENT)
Dept: INTERNAL MEDICINE CLINIC | Age: 89
End: 2024-01-08

## 2024-01-08 DIAGNOSIS — I89.0 LYMPHEDEMA: Primary | ICD-10-CM

## 2024-01-08 DIAGNOSIS — I87.8 VENOUS STASIS: ICD-10-CM

## 2024-01-08 PROCEDURE — 1123F ACP DISCUSS/DSCN MKR DOCD: CPT | Performed by: STUDENT IN AN ORGANIZED HEALTH CARE EDUCATION/TRAINING PROGRAM

## 2024-01-08 PROCEDURE — 99213 OFFICE O/P EST LOW 20 MIN: CPT | Performed by: STUDENT IN AN ORGANIZED HEALTH CARE EDUCATION/TRAINING PROGRAM

## 2024-01-08 RX ORDER — ONDANSETRON 4 MG/1
4 TABLET, FILM COATED ORAL 3 TIMES DAILY PRN
Qty: 30 TABLET | Refills: 0 | Status: SHIPPED | OUTPATIENT
Start: 2024-01-08

## 2024-01-08 ASSESSMENT — PATIENT HEALTH QUESTIONNAIRE - PHQ9
1. LITTLE INTEREST OR PLEASURE IN DOING THINGS: 1
SUM OF ALL RESPONSES TO PHQ9 QUESTIONS 1 & 2: 2
SUM OF ALL RESPONSES TO PHQ QUESTIONS 1-9: 2
9. THOUGHTS THAT YOU WOULD BE BETTER OFF DEAD, OR OF HURTING YOURSELF: 0
4. FEELING TIRED OR HAVING LITTLE ENERGY: 0
10. IF YOU CHECKED OFF ANY PROBLEMS, HOW DIFFICULT HAVE THESE PROBLEMS MADE IT FOR YOU TO DO YOUR WORK, TAKE CARE OF THINGS AT HOME, OR GET ALONG WITH OTHER PEOPLE: 0
SUM OF ALL RESPONSES TO PHQ QUESTIONS 1-9: 2
7. TROUBLE CONCENTRATING ON THINGS, SUCH AS READING THE NEWSPAPER OR WATCHING TELEVISION: 0
2. FEELING DOWN, DEPRESSED OR HOPELESS: 1
5. POOR APPETITE OR OVEREATING: 0
8. MOVING OR SPEAKING SO SLOWLY THAT OTHER PEOPLE COULD HAVE NOTICED. OR THE OPPOSITE, BEING SO FIGETY OR RESTLESS THAT YOU HAVE BEEN MOVING AROUND A LOT MORE THAN USUAL: 0
6. FEELING BAD ABOUT YOURSELF - OR THAT YOU ARE A FAILURE OR HAVE LET YOURSELF OR YOUR FAMILY DOWN: 0
3. TROUBLE FALLING OR STAYING ASLEEP: 0

## 2024-01-08 NOTE — TELEPHONE ENCOUNTER
[facility] is requesting a new script on medication below:        ondansetron (ZOFRAN) 4 MG tablet [[id number]]     [facility] [place], [place] - [facility] [address] - P [phone] - F [phone]

## 2024-01-08 NOTE — PROGRESS NOTES
Mounika Leahy, was evaluated through a synchronous (real-time) audio-video encounter. The patient (or guardian if applicable) is aware that this is a billable service, which includes applicable co-pays. This Virtual Visit was conducted with patient's (and/or legal guardian's) consent. Patient identification was verified, and a caregiver was present when appropriate.   The patient was located at Home: [address] [place] [id number]  Provider was located at Facility (Appt Dept): [address] [place] [address], [address]  [place] [address]      Mounika Leahy (:  1935) is a Established patient, presenting virtually for evaluation of the following:    Assessment & Plan   Below is the assessment and plan developed based on review of pertinent history, physical exam, labs, studies, and medications.  1. Lymphedema  Assessment & Plan:   This is a chronic issue, recently has been worsening.  However no open ulcer.  She was previously seen at the lymphedema clinic at [facility].  She is also wheelchair bound, lack of transportation.   Compression stocking, ace wrap. Can use the previously prescribed soap given by Plastic [facility] - pt will check rx and let me know   Restart home health care and home physical therapy.    2. Venous stasis  Assessment & Plan:  This is a chronic issue, recently has been worsening.  However no open ulcer.  She was previously seen at the lymphedema clinic at [facility].  Will need home health care and physical therapy.    Return if symptoms worsen or fail to improve.       Yobany Ribera presented today virtually for follow-up visit for worsening of leg swelling and oozing in bilateral lower extremity.  She was not able to come to the office due to lack of transportation.  She reports that for the past week, she started noticing a lot of \"weeping\" from both legs.  Denies any open ulcers.  She reports that she used to use a different day of soap after seeing plastic surgery for

## 2024-01-09 DIAGNOSIS — G89.29 OTHER CHRONIC PAIN: Chronic | ICD-10-CM

## 2024-01-09 DIAGNOSIS — R53.1 WEAKNESS: ICD-10-CM

## 2024-01-09 DIAGNOSIS — R32 URINARY INCONTINENCE, UNSPECIFIED TYPE: Primary | ICD-10-CM

## 2024-01-09 DIAGNOSIS — M19.90 ARTHRITIS: Chronic | ICD-10-CM

## 2024-01-09 NOTE — TELEPHONE ENCOUNTER
Noted.  Please see if we can send AloeVesta to the pharmacy.  Otherwise what question does she has regarding a vaccine?

## 2024-01-09 NOTE — TELEPHONE ENCOUNTER
Pt has some questions about vaccinations and her legs.      Pt was calling to tell Dr. De she uses Aloevesta #1 is the soap used on pt's legs.    Please call and advise

## 2024-01-10 RX ORDER — ALOE VERA/PETROLATUM
OINTMENT (GRAM) TOPICAL
Qty: 56 G | Refills: 1 | Status: SHIPPED | OUTPATIENT
Start: 2024-01-10 | End: 2024-01-10 | Stop reason: CLARIF

## 2024-01-10 RX ORDER — ALOE VERA/COLLAGEN
FOAM (ML) TOPICAL
Qty: 236 ML | Refills: 3 | Status: SHIPPED | OUTPATIENT
Start: 2024-01-10

## 2024-01-10 ASSESSMENT — ENCOUNTER SYMPTOMS
COUGH: 0
VOMITING: 0
CHEST TIGHTNESS: 0
ABDOMINAL DISTENTION: 0
SHORTNESS OF BREATH: 0
NAUSEA: 0
VOICE CHANGE: 0
CONSTIPATION: 0
CHOKING: 0
WHEEZING: 0
ABDOMINAL PAIN: 0
APNEA: 0
STRIDOR: 0
TROUBLE SWALLOWING: 0
PHOTOPHOBIA: 0
DIARRHEA: 0

## 2024-01-10 NOTE — TELEPHONE ENCOUNTER
[phone] (home)   SPOKE WITH  PATIENT SHE NEEDED ALOE VESTA  WASH, ALSO INFORMED PATIENT THAT THERE IS NO HOME SERVICES FOR FLU VACCINES

## 2024-01-22 ENCOUNTER — TELEPHONE (OUTPATIENT)
Dept: INTERNAL MEDICINE CLINIC | Age: 89
End: 2024-01-22

## 2024-01-22 DIAGNOSIS — R11.0 NAUSEA: ICD-10-CM

## 2024-01-22 RX ORDER — PROMETHAZINE HYDROCHLORIDE 12.5 MG/1
TABLET ORAL
Qty: 30 TABLET | Refills: 0 | Status: SHIPPED | OUTPATIENT
Start: 2024-01-22

## 2024-01-22 NOTE — TELEPHONE ENCOUNTER
Please contact [facility] again to get home healthy care. Need to eval her leg soon given her history.   Yes can apply both. Monitor for ulcer. Increase hydration. Elevate leg.

## 2024-01-22 NOTE — TELEPHONE ENCOUNTER
Called pt asked her make an appointment as previously  requested first she was going to make an appointment than she requested an order for [facility] nursing     Please advise

## 2024-01-22 NOTE — TELEPHONE ENCOUNTER
Pt is requesting a written order to be sent to [facility] to have a nurse come to her home and evaluate her current issues she is having itching and also feels dehydrated. Pt states she has transportation issues and its hard to get to the office.     Pt is also asking if she can apply both Aloezesta lotion and Aquaphor to her legs for dryness.       please call and advise [phone]

## 2024-01-22 NOTE — TELEPHONE ENCOUNTER
Last night she became really really dry.  She had a lot of itching all over her body (mostly legs).  She is concerned because she has DM too.  Her skin is scaling.  Been going on for three days.    Please advise what she should be doing.

## 2024-01-23 DIAGNOSIS — F51.02 ADJUSTMENT INSOMNIA: ICD-10-CM

## 2024-01-24 RX ORDER — TRAZODONE HYDROCHLORIDE 100 MG/1
TABLET ORAL
Qty: 90 TABLET | Refills: 1 | Status: SHIPPED | OUTPATIENT
Start: 2024-01-24

## 2024-01-25 ENCOUNTER — TELEPHONE (OUTPATIENT)
Dept: INTERNAL MEDICINE CLINIC | Age: 89
End: 2024-01-25

## 2024-01-26 DIAGNOSIS — K21.9 GASTROESOPHAGEAL REFLUX DISEASE WITHOUT ESOPHAGITIS: ICD-10-CM

## 2024-01-26 RX ORDER — LANSOPRAZOLE 30 MG/1
CAPSULE, DELAYED RELEASE ORAL
Qty: 90 CAPSULE | Refills: 1 | Status: SHIPPED | OUTPATIENT
Start: 2024-01-26

## 2024-01-29 ENCOUNTER — TELEPHONE (OUTPATIENT)
Dept: INTERNAL MEDICINE CLINIC | Age: 89
End: 2024-01-29

## 2024-01-29 NOTE — TELEPHONE ENCOUNTER
----- Message from [facility] sent at 1/29/2024 10:18 AM EST -----  Subject: Message to Provider    QUESTIONS  Information for Provider? Genny from the Pt's home health care called   today stating she called last week to get a verbal approval for a social   worker for the Pt and she has not heard back from the Pt's PCP yet about   this so she is calling again today to get an update. Please reach out to   Genny if there are any additional questions at [phone]. Thank you.  ---------------------------------------------------------------------------  --------------  CALL BACK INFO  [phone]; OK to leave message on voicemail  ---------------------------------------------------------------------------  --------------  SCRIPT ANSWERS  Relationship to Patient? Covered Entity  Covered Entity Type? Home Health Care?  Representative Name? Genny

## 2024-02-15 DIAGNOSIS — F41.9 ANXIETY AND DEPRESSION: ICD-10-CM

## 2024-02-15 DIAGNOSIS — F32.A ANXIETY AND DEPRESSION: ICD-10-CM

## 2024-02-27 DIAGNOSIS — R11.0 NAUSEA: ICD-10-CM

## 2024-02-28 RX ORDER — PROMETHAZINE HYDROCHLORIDE 12.5 MG/1
TABLET ORAL
Qty: 30 TABLET | Refills: 0 | Status: SHIPPED | OUTPATIENT
Start: 2024-02-28

## 2024-03-04 ENCOUNTER — TELEPHONE (OUTPATIENT)
Dept: INTERNAL MEDICINE CLINIC | Age: 89
End: 2024-03-04

## 2024-03-04 NOTE — TELEPHONE ENCOUNTER
Pt states she's having diarrhea, dehydration that started yesterday and would like a medication for the diarrhea. Please call and advise at [phone].

## 2024-03-05 NOTE — TELEPHONE ENCOUNTER
Does the patient still have diarrhea?  Any abdominal pain fever or chills?  Any foul smell?  When the last time she took some antibiotics.?

## 2024-03-06 ENCOUNTER — TELEMEDICINE (OUTPATIENT)
Dept: INTERNAL MEDICINE CLINIC | Age: 89
End: 2024-03-06
Payer: COMMERCIAL

## 2024-03-06 DIAGNOSIS — R19.7 DIARRHEA, UNSPECIFIED TYPE: Primary | ICD-10-CM

## 2024-03-06 DIAGNOSIS — M54.41 CHRONIC RIGHT-SIDED LOW BACK PAIN WITH RIGHT-SIDED SCIATICA: ICD-10-CM

## 2024-03-06 DIAGNOSIS — G89.29 CHRONIC RIGHT-SIDED LOW BACK PAIN WITH RIGHT-SIDED SCIATICA: ICD-10-CM

## 2024-03-06 PROCEDURE — 1123F ACP DISCUSS/DSCN MKR DOCD: CPT | Performed by: STUDENT IN AN ORGANIZED HEALTH CARE EDUCATION/TRAINING PROGRAM

## 2024-03-06 PROCEDURE — 99213 OFFICE O/P EST LOW 20 MIN: CPT | Performed by: STUDENT IN AN ORGANIZED HEALTH CARE EDUCATION/TRAINING PROGRAM

## 2024-03-06 RX ORDER — TRAMADOL HYDROCHLORIDE 50 MG/1
50 TABLET ORAL EVERY 4 HOURS PRN
Qty: 18 TABLET | Refills: 0 | Status: SHIPPED | OUTPATIENT
Start: 2024-03-06 | End: 2024-03-09

## 2024-03-06 SDOH — ECONOMIC STABILITY: INCOME INSECURITY: HOW HARD IS IT FOR YOU TO PAY FOR THE VERY BASICS LIKE FOOD, HOUSING, MEDICAL CARE, AND HEATING?: NOT HARD AT ALL

## 2024-03-06 SDOH — ECONOMIC STABILITY: FOOD INSECURITY: WITHIN THE PAST 12 MONTHS, THE FOOD YOU BOUGHT JUST DIDN'T LAST AND YOU DIDN'T HAVE MONEY TO GET MORE.: NEVER TRUE

## 2024-03-06 SDOH — ECONOMIC STABILITY: FOOD INSECURITY: WITHIN THE PAST 12 MONTHS, YOU WORRIED THAT YOUR FOOD WOULD RUN OUT BEFORE YOU GOT MONEY TO BUY MORE.: NEVER TRUE

## 2024-03-06 ASSESSMENT — ENCOUNTER SYMPTOMS
VOMITING: 0
APNEA: 0
ABDOMINAL DISTENTION: 0
CONSTIPATION: 0
COUGH: 0
DIARRHEA: 1
NAUSEA: 0
PHOTOPHOBIA: 0
CHOKING: 0
VOICE CHANGE: 0
SHORTNESS OF BREATH: 0
ABDOMINAL PAIN: 0
WHEEZING: 0
CHEST TIGHTNESS: 0
TROUBLE SWALLOWING: 0
STRIDOR: 0

## 2024-03-06 NOTE — TELEPHONE ENCOUNTER
Pt has to cancel her appt for today due to her having the diarrhea again. Pt states she has no abdominal pain but does feel like she could have a fever just has no way of checking and also has chills and feels cold. Pt states she is very fatigued and had to take Cephalexin at 2am this morning. Pt would like to know if there is another medication she can try to help with this ?    Please call and advise [phone]

## 2024-03-06 NOTE — PROGRESS NOTES
Mounika Leahy, was evaluated through a synchronous (real-time) audio-video encounter. The patient (or guardian if applicable) is aware that this is a billable service, which includes applicable co-pays. This Virtual Visit was conducted with patient's (and/or legal guardian's) consent. Patient identification was verified, and a caregiver was present when appropriate.   The patient was located at Home: [address] [place] [address]  Provider was located at Facility (Appt Dept): [address] [place] [address], [address]  [place] [address]      Mounika Leahy (:  1935) is a Established patient, presenting virtually for evaluation of the following:    Assessment & Plan   Below is the assessment and plan developed based on review of pertinent history, physical exam, labs, studies, and medications.  1. Diarrhea, unspecified type  Assessment & Plan:  Stop Keflex.  Check stool study specially for C. difficile.  Stay well-hydrated    Orders:  -     C DIFF TOXIN/ANTIGEN; Future  -     GI Bacterial Pathogens By PCR; Future  2. Chronic right-sided low back pain with right-sided sciatica  -     traMADol (ULTRAM) 50 MG tablet; Take 1 tablet by mouth every 4 hours as needed for Pain for up to 3 days. Intended supply: 3 days. Take lowest dose possible to manage pain Max Daily Amount: 300 mg, Disp-18 tablet, R-0Normal    Mounika reports that she has issues with transportation.  Not able to go to lab to get stool study done.  However she does have home health care, will see if they be able to help obtain stool sample for lab.    Return if symptoms worsen or fail to improve.       Subjective   Mounika presented today virtually for diarrhea.  She reports that over the past 3 days she has continuous loose watery bowel movement.  Multiple episodes throughout the day.  Some abdominal discomfort but denies any pain.  Reports subjective fever, however does not check her temperature at home.  Denies any nausea vomiting

## 2024-03-14 ENCOUNTER — TELEPHONE (OUTPATIENT)
Dept: INTERNAL MEDICINE CLINIC | Age: 89
End: 2024-03-14

## 2024-03-14 DIAGNOSIS — G89.4 CHRONIC PAIN SYNDROME: Primary | Chronic | ICD-10-CM

## 2024-03-14 RX ORDER — TRAMADOL HYDROCHLORIDE 300 MG/1
300 TABLET, EXTENDED RELEASE ORAL DAILY
Qty: 30 TABLET | Refills: 1 | Status: SHIPPED | OUTPATIENT
Start: 2024-03-14 | End: 2024-03-21 | Stop reason: ALTCHOICE

## 2024-03-14 NOTE — TELEPHONE ENCOUNTER
Pt would like her tramadol medication sent to pharmacy below:        traMADol (ULTRAM) 50 MG tablet; Take 1 tablet by mouth every 4 hours as needed for Pain for up to 3 days. Intended supply: 3 days       [facility] - [place], [place] - [facility] [address] - P [phone] - F [phone]

## 2024-03-15 ENCOUNTER — TELEPHONE (OUTPATIENT)
Dept: INTERNAL MEDICINE CLINIC | Age: 89
End: 2024-03-15

## 2024-03-15 NOTE — TELEPHONE ENCOUNTER
Pt called in to get instructions on how she is supposed to go get labs done for GI issues. Pt edu on needing to go to the lab building to get a specimen cup and provide sample then bring it back. Pt edu on asking home care provider on collecting cup for her and along with sample then bringing it back. Pt understood. Pt also wanted to see if medication (traMADol (ULTRAM ER) 300 MG extended release tablet ) was called in, which it was to her preferred pharmacy. No call back required.

## 2024-03-15 NOTE — TELEPHONE ENCOUNTER
Cardiology Consultation      Jayro Elder MRN# [id number]   YOB: 1950 Age: 69 year old   Date of Admission: 7/5/2019     Reason for consult: Exertional chest discomfort, history of CAD           Assessment and Plan:     69-year-old gentleman with an ischemic cardiomyopathy, status post PCI to LAD and most recently to the obtuse marginal in June 2017, chronic persistent atrial fibrillation, history of CVA, and poorly controlled diabetes who presented to [facility] with complaints of exertional chest discomfort over the past 3 to 4 days.      1. Exertional chest discomfort over the past 3 to 4 days with associated nausea/diaphoresis/shortness of breath.  The clinical history is highly suggestive of accelerating angina.  2. Coronary artery disease status post multiple previous percutaneous interventions to the LAD and most recently stenting of the third obtuse marginal in June 2017  3. Ischemic cardiomyopathy with an LVEF of 35 to 40% on an echocardiogram from January 2019  4. Chronic persistent atrial fibrillation  5. History of CVA in the past  6. Poorly controlled diabetes  7. HTN    PLAN  -I discussed the options for further evaluation with the patient in detail.  Both noninvasive evaluation with stress perfusion imaging as well as proceeding directly to coronary angiography were discussed.  Given high pretest likelihood of significant coronary artery disease, my personal recommendation is to proceed directly to coronary angiography on Monday.  I have explained the indications, risks and benefits of coronary angiography in the setting to the patient in detail who is agreeable to proceeding.  -Given plans for coronary angiography, I will hold his Eliquis and initiate heparin therapy.             Chief Complaint:   Chest Pain           History of Present Illness:   This patient is a 69 year old male who was previously followed by Dr. Mckeon in our cardiology clinic.  He has a  Melinda, Pharmacist from [facility] calling regarding the Tramadol script for the pt. The pharmacy can only give her 7 tablets to start.  They have her on an \"opiod naive\"  list.  They really need to speak with Dr De.     Please call Melinda at:   [phone]   "history of coronary disease and is status post anterior myocardial infarction in  and multiple previous percutaneous interventions to the LAD.  Most recently he underwent coronary angiography and stenting of the third obtuse marginal in 2017.  He has a known ischemic cardiomyopathy with an LVEF of 35 to 40% on last evaluated on echocardiography in January of this year.  He also has a history of chronic persistent atrial fibrillation and has had a CVA in the past.  He is on chronic Eliquis therapy.  He also has poorly controlled diabetes and had a last A1c of 9.0 in May of this year.  His diabetes has led to lower extremity complications, specifically foot ulcers that have led to toe amputations.  He presented to [facility] yesterday with complaints of exertional chest discomfort over the past 3 to 4 days.  His main activity is babysitting his grandchildren and he noted substernal chest tightness with radiation to the jaw and back with any strenuous activity during this time.  He did not take any nitroglycerin at home but states that the episodes resolved after approximately 30 minutes of rest.   Since presentation he had one episode of substernal chest discomfort this morning for which he received sublingual nitroglycerin.  Cardiac troponins have been negative.         Physical Exam:   Vitals were reviewed  Blood pressure (!) 171/99, pulse 70, temperature 96.5  F (35.8  C), temperature source Oral, resp. rate 16, height 1.93 m (6' 4\"), weight 110.7 kg (244 lb 1.6 oz), SpO2 98 %.  Temperatures:  Current - Temp: 96.5  F (35.8  C); Max - Temp  Av.4  F (35.8  C)  Min: 95.4  F (35.2  C)  Max: 97.7  F (36.5  C)  Respiration range: Resp  Avg: 15  Min: 11  Max: 21  Pulse range: Pulse  Av.4  Min: 54  Max: 77  Blood pressure range: Systolic (24hrs), Av , Min:125 , Max:179   ; Diastolic (24hrs), Av, Min:76, Max:117    Pulse oximetry range: SpO2  Av.3 %  Min: 94 %  Max: 99 " %    Intake/Output Summary (Last 24 hours) at 7/6/2019 0948  Last data filed at 7/6/2019 0907  Gross per 24 hour   Intake 360 ml   Output --   Net 360 ml     Constitutional:   awake, alert, cooperative, no apparent distress, and appears stated age     Eyes:   Lids and lashes normal, pupils equal, round and reactive to light, extra ocular muscles intact, sclera clear, conjunctiva normal     Neck:   supple, symmetrical, trachea midline, no JVD     Back:   symmetric     Lungs:   No increased work of breathing, good air exchange, clear to auscultation bilaterally, no crackles or wheezing       Cardiovascular:   Irregularly irregular     Abdomen:   non-tender     Musculoskeletal:   motor strength is 5 out of 5 all extremities bilaterally     Neurologic:   Grossly nonfocal     Skin:   UE ecchymoses     Additional findings:   +2 BLE edema               Past Medical History:   I have reviewed this patient's past medical history  Past Medical History:   Diagnosis Date     CAD (coronary artery disease) 6/29/05    anterior MI,  PTCA and stent placed in mid LAD     Cancer (H)     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     Cardiomyopathy (H)      Cellulitis of left lower extremity 3/13/2018     Cerebral infarction (H)     eye sight decreased in peripheral of right side and blurry     Diabetic ulcer of left midfoot associated with type 2 diabetes mellitus, with fat layer exposed (H) 3/13/2018     Essential hypertension, benign 11/13/2002     Generalized osteoarthrosis, unspecified site 11/13/2002     Microalbuminuria 3/13/2018    X1     Myocardial infarction (H)      Neuropathy      Sepsis (H)      Sleep apnea     doesn't use it     Substance abuse (H)      Syncope, unspecified syncope type 3/13/2018     Thyroid disease     takes medicine     Tobacco use disorder 11/13/2002     Type 2 diabetes mellitus with diabetic peripheral angiopathy without gangrene, unspecified long term insulin use status 2005             Past Surgical History:    I have reviewed this patient's past surgical history  Past Surgical History:   Procedure Laterality Date     AMPUTATE TOE(S) Right 1/6/2019    Procedure: Right open second toe partial amputation;  Surgeon: Sabino Garcia DPM;  Location: [facility] OR     AMPUTATE TOE(S) Right 1/8/2019    Procedure: 1.  Revision right second toe amputation with resection of distal half of proximal phalanx with full closure.    2.  Debridement of callus/preulcerative lesion, distal left second toe and plantar left first metatarsophalangeal joint.;  Surgeon: Ryan Bhagat DPM;  Location: [facility] OR     AMPUTATE TOE(S) Right 3/12/2019    Procedure: Partial right fourth toe amputation.;  Surgeon: Ryan Bhagat DPM;  Location: [facility] OR     AMPUTATE TOE(S) Right 5/6/2019    Procedure: Right partial third toe amputation;  Surgeon: Татьяна Mckeon DPM, Podiatry/Foot and Ankle Surgery;  Location: [facility] OR     ANGIOGRAM  6/29/05    subtotal occ.mid LAD,SUSAN mid LAD     ANGIOGRAM  7/05    mild CAD,patent stent,no flow-limiting lesions,sev.LV dysf.LAD enlarged     ANGIOGRAM  2/09    Sev.single vessel disease,Mod LV dysf.distal LAD 90%,70-75% mid lad just before prev stent,SUSAN to prox.mid LAD, endeavor to distal LAD     ANGIOGRAM  11/13/13    restenosis, stent LAD     BACK SURGERY      back surgery x3     C NONSPECIFIC PROCEDURE      Laminectomy x 3 - (1983 x 2 & 1990)     C NONSPECIFIC PROCEDURE Bilateral 1998    Bunionectomy     C NONSPECIFIC PROCEDURE  1959    Gingival surgery at age 9     HEART CATH LEFT HEART CATH  06/13/2017    SUSAN to OM3     INCISION AND DRAINAGE TOE, COMBINED Bilateral 9/11/2018    Procedure: COMBINED INCISION AND DRAINAGE TOE;  1) Irrigation and debridement ulcer left great toe  2) Amputation 3rd toe right foot at distal interphalangeal joint level;  Surgeon: Miki Harp MD;  Location: [facility] OR     IRRIGATION AND DEBRIDEMENT FOOT, COMBINED Left 3/12/2019    Procedure: Debridement of left foot ulcer sub first  MPJ 2 x 2.5 cm down to and including subcutaneous tissue, callus debridement for preulcerative lesion, left second toe.;  Surgeon: Ryan Bhagat DPM;  Location: [facility] OR     ORTHOPEDIC SURGERY      bunion surgery both feet     ORTHOPEDIC SURGERY      left shoulder     SOFT TISSUE SURGERY      debridement of toe numerous time     VASCULAR SURGERY      7 stents in heart               Social History:   I have reviewed this patient's social history  Social History     Tobacco Use     Smoking status: Former Smoker     Packs/day: 1.00     Years: 25.00     Pack years: 25.00     Types: Cigarettes     Last attempt to quit: 2005     Years since quittin.9     Smokeless tobacco: Never Used   Substance Use Topics     Alcohol use: No     Alcohol/week: 2.4 oz     Types: 4 Shots of liquor per week     Frequency: Never     Comment: almost every day             Family History:   I have reviewed this patient's family history  Family History   Problem Relation Age of Onset     Cancer - colorectal Sister      Thyroid Disease Brother      Cardiovascular Brother      Diabetes Brother      Cancer Father         tumor in chest and throat     Arthritis Mother      Thyroid Disease Mother      Diabetes Mother      Glaucoma No family hx of      Macular Degeneration No family hx of              Allergies:     Allergies   Allergen Reactions     No Known Allergies              Medications:   I have reviewed this patient's current medications  Medications Prior to Admission   Medication Sig Dispense Refill Last Dose     apixaban ANTICOAGULANT (ELIQUIS) 5 MG tablet Take 1 tablet (5 mg) by mouth 2 times daily   2019 at am     aspirin 81 MG EC tablet Take 81 mg by mouth daily   2019 at am     atorvastatin (LIPITOR) 40 MG tablet Take 1 tablet (40 mg) by mouth every evening 90 tablet 3 2019 at pm     carvedilol (COREG) 25 MG tablet Take 1 tablet (25 mg) by mouth 2 times daily (with meals) 180 tablet 3 2019 at am      Cholecalciferol (VITAMIN D3) 2000 units TABS Take 1 tablet by mouth daily   7/5/2019 at am     furosemide (LASIX) 80 MG tablet Take 1 tablet (80 mg) by mouth daily 90 tablet 1 7/4/2019 at am     insulin aspart (NOVOLOG FLEXPEN) 100 UNIT/ML pen Inject 8 Units Subcutaneous daily (with lunch)   7/4/2019 at noon     insulin aspart (NOVOLOG FLEXPEN) 100 UNIT/ML pen Inject 10 Units Subcutaneous daily (with dinner)   7/4/2019 at Unknown time     insulin aspart (NOVOLOG FLEXPEN) 100 UNIT/ML pen Inject Subcutaneous 3 times daily (with meals) Sliding Scale  Do not give sliding scale insulin if Pre-Meal BG less than 140.   For Pre-Meal:   - 200 give 2 units.    - 250 give 4 units.    - 300 give 6 units.    - 350 give 8 units.    - 400 give 10 units.   7/4/2019 at Unknown time     insulin aspart (NOVOLOG PEN) 100 UNIT/ML pen Inject 8 Units Subcutaneous daily (with breakfast)    7/5/2019 at only 4 units     insulin glargine (LANTUS SOLOSTAR PEN) 100 UNIT/ML pen Inject 36 Units Subcutaneous every morning   7/5/2019 at am     isosorbide mononitrate (IMDUR) 30 MG 24 hr tablet Take 1 tablet (30 mg) by mouth daily 90 tablet 1 7/5/2019 at am     levothyroxine (SYNTHROID, LEVOTHROID) 137 MCG tablet Take 137 mcg by mouth daily  90 tablet 3 7/5/2019 at am     lisinopril (PRINIVIL/ZESTRIL) 5 MG tablet Take 1 tablet (5 mg) by mouth daily 90 tablet 3 7/5/2019 at am     NONFORMULARY Topical patch for diabetes (blood glucose control) - changes every 4 days or so   Past Week at does not have on now     pantoprazole (PROTONIX) 40 MG enteric coated tablet Take 1 tablet (40 mg) by mouth every morning (before breakfast) 30 tablet 0 7/5/2019 at am     potassium chloride ER (K-DUR/KLOR-CON M) 10 MEQ CR tablet Take 1 tablet (10 mEq) by mouth 2 times daily 180 tablet 0 7/5/2019 at am     insulin pen needle 31G X 6 MM Use  as directed. 100 each prn Unknown at Unknown time     nitroglycerin (NITROSTAT) 0.4 MG sublingual  tablet Place 1 tablet (0.4 mg) under the tongue every 5 minutes as needed for chest pain 50 tablet 0 Unknown at Unknown time     order for DME Equipment being ordered: Other: surgical shoe male XL  Treatment Diagnosis: sp right 2nd toe amputaion 1 Device 0 Unknown at Unknown time     order for DME Equipment being ordered: Walker Wheels () and Walker ()  Treatment Diagnosis: Impaired gait, heel WB bilaterally. 1 each 0 Unknown at Unknown time     Current Facility-Administered Medications Ordered in Epic   Medication Dose Route Frequency Last Rate Last Dose     acetaminophen (TYLENOL) tablet 650 mg  650 mg Oral Q4H PRN   650 mg at 07/05/19 2133     alum & mag hydroxide-simethicone (MYLANTA ES/MAALOX  ES) suspension 30 mL  30 mL Oral Q4H PRN         apixaban ANTICOAGULANT (ELIQUIS) tablet 5 mg  5 mg Oral BID   5 mg at 07/06/19 0839     aspirin EC tablet 81 mg  81 mg Oral Daily   81 mg at 07/06/19 0839     atorvastatin (LIPITOR) tablet 40 mg  40 mg Oral QPM   40 mg at 07/05/19 1936     carvedilol (COREG) tablet 25 mg  25 mg Oral BID w/meals   25 mg at 07/06/19 0839     glucose gel 15-30 g  15-30 g Oral Q15 Min PRN        Or     dextrose 50 % injection 25-50 mL  25-50 mL Intravenous Q15 Min PRN        Or     glucagon injection 1 mg  1 mg Subcutaneous Q15 Min PRN         furosemide (LASIX) tablet 80 mg  80 mg Oral Daily   80 mg at 07/06/19 0838     insulin glargine (LANTUS) injection 36 Units  36 Units Subcutaneous QAM   36 Units at 07/06/19 0839     isosorbide mononitrate (IMDUR) 24 hr tablet 30 mg  30 mg Oral Daily   30 mg at 07/06/19 0839     levothyroxine (SYNTHROID/LEVOTHROID) tablet 137 mcg  137 mcg Oral Daily   137 mcg at 07/06/19 0838     lidocaine (LMX4) cream   Topical Q1H PRN         lidocaine 1 % 0.1-1 mL  0.1-1 mL Other Q1H PRN         lisinopril (PRINIVIL/ZESTRIL) tablet 5 mg  5 mg Oral Daily   5 mg at 07/06/19 0838     naloxone (NARCAN) injection 0.1-0.4 mg  0.1-0.4 mg Intravenous Q2 Min PRN          nitroGLYcerin (NITROSTAT) sublingual tablet 0.4 mg  0.4 mg Sublingual Q5 Min PRN   0.4 mg at 07/06/19 0830     pantoprazole (PROTONIX) EC tablet 40 mg  40 mg Oral QAM AC   40 mg at 07/06/19 0839     potassium chloride ER (K-TAB/KLOR-CON) CR tablet 10 mEq  10 mEq Oral BID   10 mEq at 07/06/19 0839     sodium chloride (PF) 0.9% PF flush 3 mL  3 mL Intracatheter q1 min prn         sodium chloride (PF) 0.9% PF flush 3 mL  3 mL Intracatheter Q8H   3 mL at 07/06/19 0839     No current Epic-ordered outpatient medications on file.             Review of Systems:   The 10 point Review of Systems is negative other than noted in the HPI            Data:   All laboratory data reviewed  Results for orders placed or performed during the hospital encounter of 07/05/19 (from the past 24 hour(s))   EKG 12-lead, tracing only   Result Value Ref Range    Interpretation ECG Click View Image link to view waveform and result    CBC with platelets differential   Result Value Ref Range    WBC 6.8 4.0 - 11.0 10e9/L    RBC Count 4.36 (L) 4.4 - 5.9 10e12/L    Hemoglobin 12.5 (L) 13.3 - 17.7 g/dL    Hematocrit 38.2 (L) 40.0 - 53.0 %    MCV 88 78 - 100 fl    MCH 28.7 26.5 - 33.0 pg    MCHC 32.7 31.5 - 36.5 g/dL    RDW 13.3 10.0 - 15.0 %    Platelet Count 246 150 - 450 10e9/L    Diff Method Automated Method     % Neutrophils 73.2 %    % Lymphocytes 16.8 %    % Monocytes 6.9 %    % Eosinophils 2.4 %    % Basophils 0.6 %    % Immature Granulocytes 0.1 %    Nucleated RBCs 0 0 /100    Absolute Neutrophil 5.0 1.6 - 8.3 10e9/L    Absolute Lymphocytes 1.1 0.8 - 5.3 10e9/L    Absolute Monocytes 0.5 0.0 - 1.3 10e9/L    Absolute Eosinophils 0.2 0.0 - 0.7 10e9/L    Absolute Basophils 0.0 0.0 - 0.2 10e9/L    Abs Immature Granulocytes 0.0 0 - 0.4 10e9/L    Absolute Nucleated RBC 0.0    Basic metabolic panel   Result Value Ref Range    Sodium 135 133 - 144 mmol/L    Potassium 4.4 3.4 - 5.3 mmol/L    Chloride 104 94 - 109 mmol/L    Carbon Dioxide 24 20 - 32  mmol/L    Anion Gap 7 3 - 14 mmol/L    Glucose 240 (H) 70 - 99 mg/dL    Urea Nitrogen 17 7 - 30 mg/dL    Creatinine 0.99 0.66 - 1.25 mg/dL    GFR Estimate 77 >60 mL/min/[1.73_m2]    GFR Estimate If Black 90 >60 mL/min/[1.73_m2]    Calcium 8.8 8.5 - 10.1 mg/dL   Troponin I   Result Value Ref Range    Troponin I ES <0.015 0.000 - 0.045 ug/L   XR Chest 2 Views    Narrative    XR CHEST 2 VW 7/5/2019 1:44 PM    HISTORY: Pain.    COMPARISON: June 21, 2019.       Impression    IMPRESSION: The lungs are clear. No focal pulmonary opacities. No  pleural effusions or pneumothorax. Heart size is upper limits of  normal as before.     MAHI COLE MD   CT Head w/o Contrast    Narrative    CT SCAN OF THE HEAD WITHOUT CONTRAST   7/5/2019 1:53 PM     HISTORY: L>R arm wakness    TECHNIQUE:  Axial images of the head and coronal reformations without  IV contrast material. Radiation dose for this scan was reduced using  automated exposure control, adjustment of the mA and/or kV according  to patient size, or iterative reconstruction technique.    COMPARISON: Scan dated 9/6/2018    FINDINGS:     Intracranial contents: There is diffuse parenchymal volume loss.   White matter changes are present in the cerebral hemispheres that are  consistent with small vessel ischemic disease in this age patient.  There is a chronic area of encephalomalacia in the left occipital  region. This is unchanged. There is no evidence of intracranial  hemorrhage, mass, acute infarct or anomaly.    Visualized orbits/sinuses/mastoids:  The visualized portions of the  sinuses and mastoids appear normal.    Osseous structures/soft tissues:  There is no evidence of trauma.      Impression    IMPRESSION:   1. No acute pathology is identified. No bleed, mass, or acute infarcts  are seen.  2. Chronic encephalomalacia in the left occipital lobe.      REINA PAEZ MD   Troponin I - Now then in 4 hours x 2   Result Value Ref Range    Troponin I ES <0.015 0.000 - 0.045 ug/L    Glucose by meter   Result Value Ref Range    Glucose 212 (H) 70 - 99 mg/dL   Troponin I - Now then in 4 hours x 2   Result Value Ref Range    Troponin I ES <0.015 0.000 - 0.045 ug/L   Glucose by meter   Result Value Ref Range    Glucose 151 (H) 70 - 99 mg/dL     EKG results: AF, RBBB, LAHB, evidence of prior anteroseptal MI

## 2024-03-15 NOTE — TELEPHONE ENCOUNTER
[phone] (home)   Spoke with patient she was confused about how to receive container for stool specimen explained where and how to get supply needed.

## 2024-03-18 ENCOUNTER — TELEPHONE (OUTPATIENT)
Dept: INTERNAL MEDICINE CLINIC | Age: 89
End: 2024-03-18

## 2024-03-18 DIAGNOSIS — R19.7 DIARRHEA, UNSPECIFIED TYPE: ICD-10-CM

## 2024-03-18 LAB — C DIFF TOX A+B STL QL IA: NORMAL

## 2024-03-18 NOTE — TELEPHONE ENCOUNTER
Pt noticed after her bath this morning that both her legs are swollen and she is having some pain in them. Pt was told to tell the doctor she has PT/02. Pt is asking Dr. De what she should do for this issue.    Please call and advise [phone]

## 2024-03-19 LAB — GI PATHOGENS PNL STL NAA+PROBE: NORMAL

## 2024-03-19 NOTE — TELEPHONE ENCOUNTER
[phone] (home)   SPOKE WITH PATIENT SHE WILL TRY TO GET A RIDE TO OFFICE SHE WILL LET ME KNOW WHEN SHE CALLS BACK.

## 2024-03-19 NOTE — TELEPHONE ENCOUNTER
Any ulcers? It is very difficult to evaluate without seeing her. I know transportation is an issue. Can she come in the office to an in person evaluation?

## 2024-03-21 ENCOUNTER — OFFICE VISIT (OUTPATIENT)
Dept: INTERNAL MEDICINE CLINIC | Age: 89
End: 2024-03-21

## 2024-03-21 VITALS
SYSTOLIC BLOOD PRESSURE: 120 MMHG | WEIGHT: 213 LBS | BODY MASS INDEX: 37.73 KG/M2 | DIASTOLIC BLOOD PRESSURE: 70 MMHG | OXYGEN SATURATION: 96 % | TEMPERATURE: 98 F

## 2024-03-21 DIAGNOSIS — R11.0 NAUSEA: ICD-10-CM

## 2024-03-21 DIAGNOSIS — Z23 NEED FOR INFLUENZA VACCINATION: ICD-10-CM

## 2024-03-21 DIAGNOSIS — I89.0 LYMPHEDEMA: Primary | ICD-10-CM

## 2024-03-21 RX ORDER — GABAPENTIN 100 MG/1
100 CAPSULE ORAL 2 TIMES DAILY
Qty: 60 CAPSULE | Refills: 0 | Status: SHIPPED | OUTPATIENT
Start: 2024-03-21 | End: 2024-03-22

## 2024-03-21 RX ORDER — CEPHALEXIN 500 MG/1
500 CAPSULE ORAL 3 TIMES DAILY
Qty: 21 CAPSULE | Refills: 0 | Status: SHIPPED | OUTPATIENT
Start: 2024-03-21 | End: 2024-03-22

## 2024-03-21 RX ORDER — PROMETHAZINE HYDROCHLORIDE 12.5 MG/1
12.5 TABLET ORAL DAILY PRN
Qty: 30 TABLET | Refills: 0 | Status: SHIPPED | OUTPATIENT
Start: 2024-03-21 | End: 2024-03-22

## 2024-03-21 NOTE — ASSESSMENT & PLAN NOTE
This is a chronic issue, recently has been worsening. She was previously seen at the lymphedema clinic and plastic surgery at [facility].   She is also wheelchair bound, lack of transportation.   - Does have lots of redness and swelling, will empirically treat with Keflex   - Need to  follow up with [facility] again, pt was requested to call them to make an appointment ASAP  - Ace wrap, applied in office today   - Need home health care, wound care  - She requested pain med, was previously on Tramadol. Also has Gabapentin. I discussed with patient to avoid opioid. Will refill her Gabapentin.

## 2024-03-21 NOTE — PROGRESS NOTES
Mounika Leahy (:  1935) is a 88 y.o. female here for evaluation of the following chief complaint(s):  Leg Swelling (Drainage from legs)         ASSESSMENT/PLAN:  1. Lymphedema  Assessment & Plan:  This is a chronic issue, recently has been worsening. She was previously seen at the lymphedema clinic and plastic surgery at [facility].   She is also wheelchair bound, lack of transportation.   - Does have lots of redness and swelling, will empirically treat with Keflex   - Need to  follow up with [facility] again, pt was requested to call them to make an appointment ASAP  - Ace wrap, applied in office today   - Need home health care, wound care  - She requested pain med, was previously on Tramadol. Also has Gabapentin. I discussed with patient to avoid opioid. Will refill her Gabapentin.       Return in about 3 months (around 2024) for Routine follow-up.         Subjective   SUBJECTIVE/OBJECTIVE:  HPI  Presented today with worsening lymphedema both legs. Has some abrasion and ulcer. Legs are red, swollen.   She is wheel chair bound.     Review of Systems:   Constitutional:  Denies fever or chills   Eyes:  Denies change in visual acuity   HENT:  Denies nasal congestion or sore throat   Respiratory:  Denies cough or shortness of breath   Cardiovascular:  Denies chest pain or edema   GI:  Denies abdominal pain, nausea, vomiting, bloody stools or diarrhea   :  Denies dysuria   Musculoskeletal:  Denies back pain or joint pain   Integument:  Denies rash   Neurologic:  Denies headache, focal weakness or sensory changes   Endocrine:  Denies polyuria or polydipsia   Lymphatic:  Denies swollen glands   Psychiatric:  Denies depression or anxiety        Current Outpatient Medications   Medication Sig Dispense Refill    cephALEXin (KEFLEX) 500 MG capsule Take 1 capsule by mouth 3 times daily for 7 days 21 capsule 0    gabapentin (NEURONTIN) 100 MG capsule Take 1 capsule by mouth 2 times daily for 30 days. Intended supply: 30

## 2024-03-22 ENCOUNTER — TELEPHONE (OUTPATIENT)
Dept: INTERNAL MEDICINE CLINIC | Age: 89
End: 2024-03-22

## 2024-03-22 DIAGNOSIS — R11.0 NAUSEA: ICD-10-CM

## 2024-03-22 RX ORDER — GABAPENTIN 100 MG/1
100 CAPSULE ORAL 2 TIMES DAILY
Qty: 60 CAPSULE | Refills: 0 | Status: SHIPPED | OUTPATIENT
Start: 2024-03-22 | End: 2024-04-21

## 2024-03-22 RX ORDER — CEPHALEXIN 500 MG/1
500 CAPSULE ORAL 3 TIMES DAILY
Qty: 21 CAPSULE | Refills: 0 | Status: SHIPPED | OUTPATIENT
Start: 2024-03-22 | End: 2024-03-29

## 2024-03-22 RX ORDER — PROMETHAZINE HYDROCHLORIDE 12.5 MG/1
12.5 TABLET ORAL DAILY PRN
Qty: 30 TABLET | Refills: 0 | Status: SHIPPED | OUTPATIENT
Start: 2024-03-22

## 2024-03-22 NOTE — TELEPHONE ENCOUNTER
Sarah from [facility] states the patient called them crying because she was confused on where her medications were being sent to. Pt is upset that we sent her medications to [facility] and Sarah is requesting we send yesterdays medications to [facility] pharmacy below.    Sarah is requesting we reach out to the patient and let her know we are sending them to [facility] pharmacy instead.     [phone] Sarah Ribera call back [phone]    gabapentin (NEURONTIN) 100 MG capsule [[id number]]       cephALEXin (KEFLEX) 500 MG capsule [[id number]]       promethazine (PHENERGAN) 12.5 MG tablet [[id number]]         [facility] - [place], [place] - [facility] [address] - P [phone] - F [phone]

## 2024-03-26 ENCOUNTER — HOSPITAL ENCOUNTER (EMERGENCY)
Age: 89
Discharge: HOME | End: 2024-03-27
Attending: EMERGENCY MEDICINE
Payer: COMMERCIAL

## 2024-03-26 ENCOUNTER — TELEPHONE (OUTPATIENT)
Dept: INTERNAL MEDICINE CLINIC | Age: 89
End: 2024-03-26

## 2024-03-26 ENCOUNTER — APPOINTMENT (OUTPATIENT)
Dept: GENERAL RADIOLOGY | Age: 89
End: 2024-03-26
Payer: COMMERCIAL

## 2024-03-26 DIAGNOSIS — I89.0 LYMPHEDEMA DUE TO VENOUS INSUFFICIENCY: ICD-10-CM

## 2024-03-26 DIAGNOSIS — I89.0 LYMPHEDEMA OF BOTH LOWER EXTREMITIES: ICD-10-CM

## 2024-03-26 DIAGNOSIS — M79.605 BILATERAL LEG PAIN: Primary | ICD-10-CM

## 2024-03-26 DIAGNOSIS — S81.801A OPEN WOUND OF BOTH LOWER EXTREMITIES WITH COMPLICATION, INITIAL ENCOUNTER: Primary | ICD-10-CM

## 2024-03-26 DIAGNOSIS — S81.802A OPEN WOUND OF BOTH LOWER EXTREMITIES WITH COMPLICATION, INITIAL ENCOUNTER: Primary | ICD-10-CM

## 2024-03-26 DIAGNOSIS — I87.2 LYMPHEDEMA DUE TO VENOUS INSUFFICIENCY: ICD-10-CM

## 2024-03-26 DIAGNOSIS — M79.604 BILATERAL LEG PAIN: Primary | ICD-10-CM

## 2024-03-26 PROCEDURE — 83880 ASSAY OF NATRIURETIC PEPTIDE: CPT

## 2024-03-26 PROCEDURE — 84484 ASSAY OF TROPONIN QUANT: CPT

## 2024-03-26 PROCEDURE — 93005 ELECTROCARDIOGRAM TRACING: CPT | Performed by: PHYSICIAN ASSISTANT

## 2024-03-26 PROCEDURE — 80048 BASIC METABOLIC PNL TOTAL CA: CPT

## 2024-03-26 PROCEDURE — 99285 EMERGENCY DEPT VISIT HI MDM: CPT

## 2024-03-26 PROCEDURE — 71046 X-RAY EXAM CHEST 2 VIEWS: CPT

## 2024-03-26 PROCEDURE — 85025 COMPLETE CBC W/AUTO DIFF WBC: CPT

## 2024-03-26 NOTE — TELEPHONE ENCOUNTER
Pt is calling in to talk to Elizabet about a referral to  lymphedema. No further information given. Please call and advise at [phone].

## 2024-03-26 NOTE — TELEPHONE ENCOUNTER
Leg swelling, severe lymphedema. She was treated for this previously at [facility] (). We can put in another referral for her to go back to their clinic.

## 2024-03-27 VITALS
TEMPERATURE: 98.8 F | RESPIRATION RATE: 18 BRPM | WEIGHT: 221 LBS | DIASTOLIC BLOOD PRESSURE: 56 MMHG | HEIGHT: 63 IN | BODY MASS INDEX: 39.16 KG/M2 | OXYGEN SATURATION: 94 % | SYSTOLIC BLOOD PRESSURE: 106 MMHG | HEART RATE: 69 BPM

## 2024-03-27 DIAGNOSIS — M79.604 PAIN IN BOTH LOWER EXTREMITIES: ICD-10-CM

## 2024-03-27 DIAGNOSIS — I89.0 LYMPHEDEMA: Primary | ICD-10-CM

## 2024-03-27 DIAGNOSIS — M79.605 PAIN IN BOTH LOWER EXTREMITIES: ICD-10-CM

## 2024-03-27 DIAGNOSIS — R32 URINARY INCONTINENCE, UNSPECIFIED TYPE: ICD-10-CM

## 2024-03-27 DIAGNOSIS — R26.2 UNABLE TO AMBULATE: ICD-10-CM

## 2024-03-27 LAB
ANION GAP SERPL CALCULATED.3IONS-SCNC: 11 MMOL/L (ref 3–16)
BASOPHILS # BLD: 0 K/UL (ref 0–0.2)
BASOPHILS NFR BLD: 0.8 %
BUN SERPL-MCNC: 17 MG/DL (ref 7–20)
CALCIUM SERPL-MCNC: 9.7 MG/DL (ref 8.3–10.6)
CHLORIDE SERPL-SCNC: 102 MMOL/L (ref 99–110)
CO2 SERPL-SCNC: 25 MMOL/L (ref 21–32)
CREAT SERPL-MCNC: 0.9 MG/DL (ref 0.6–1.2)
DEPRECATED RDW RBC AUTO: 14 % (ref 12.4–15.4)
EKG ATRIAL RATE: 64 BPM
EKG DIAGNOSIS: NORMAL
EKG P AXIS: 60 DEGREES
EKG P-R INTERVAL: 136 MS
EKG Q-T INTERVAL: 406 MS
EKG QRS DURATION: 66 MS
EKG QTC CALCULATION (BAZETT): 418 MS
EKG R AXIS: 4 DEGREES
EKG T AXIS: 42 DEGREES
EKG VENTRICULAR RATE: 64 BPM
EOSINOPHIL # BLD: 0.1 K/UL (ref 0–0.6)
EOSINOPHIL NFR BLD: 2.6 %
GFR SERPLBLD CREATININE-BSD FMLA CKD-EPI: 61 ML/MIN/{1.73_M2}
GLUCOSE SERPL-MCNC: 100 MG/DL (ref 70–99)
HCT VFR BLD AUTO: 31.1 % (ref 36–48)
HGB BLD-MCNC: 9.8 G/DL (ref 12–16)
LYMPHOCYTES # BLD: 2.5 K/UL (ref 1–5.1)
LYMPHOCYTES NFR BLD: 45.8 %
MCH RBC QN AUTO: 25.4 PG (ref 26–34)
MCHC RBC AUTO-ENTMCNC: 31.6 G/DL (ref 31–36)
MCV RBC AUTO: 80.4 FL (ref 80–100)
MONOCYTES # BLD: 0.4 K/UL (ref 0–1.3)
MONOCYTES NFR BLD: 7.9 %
NEUTROPHILS # BLD: 2.4 K/UL (ref 1.7–7.7)
NEUTROPHILS NFR BLD: 42.9 %
NT-PROBNP SERPL-MCNC: 72 PG/ML (ref 0–449)
PLATELET # BLD AUTO: 276 K/UL (ref 135–450)
PMV BLD AUTO: 9.8 FL (ref 5–10.5)
POTASSIUM SERPL-SCNC: 4 MMOL/L (ref 3.5–5.1)
RBC # BLD AUTO: 3.87 M/UL (ref 4–5.2)
SODIUM SERPL-SCNC: 138 MMOL/L (ref 136–145)
TROPONIN, HIGH SENSITIVITY: 11 NG/L (ref 0–14)
TROPONIN, HIGH SENSITIVITY: 15 NG/L (ref 0–14)
WBC # BLD AUTO: 5.5 K/UL (ref 4–11)

## 2024-03-27 PROCEDURE — 84484 ASSAY OF TROPONIN QUANT: CPT

## 2024-03-27 PROCEDURE — 80048 BASIC METABOLIC PNL TOTAL CA: CPT

## 2024-03-27 PROCEDURE — 6370000000 HC RX 637 (ALT 250 FOR IP): Performed by: PHYSICIAN ASSISTANT

## 2024-03-27 RX ORDER — TRAMADOL HYDROCHLORIDE 50 MG/1
50 TABLET ORAL ONCE
Status: COMPLETED | OUTPATIENT
Start: 2024-03-27 | End: 2024-03-27

## 2024-03-27 RX ORDER — TRAMADOL HYDROCHLORIDE 50 MG/1
50 TABLET ORAL 2 TIMES DAILY PRN
Qty: 10 TABLET | Refills: 0 | Status: SHIPPED | OUTPATIENT
Start: 2024-03-27 | End: 2024-04-01

## 2024-03-27 RX ORDER — TRAMADOL HYDROCHLORIDE 50 MG/1
50 TABLET ORAL EVERY 6 HOURS PRN
Qty: 20 TABLET | Refills: 0 | Status: CANCELLED | OUTPATIENT
Start: 2024-03-27 | End: 2024-04-01

## 2024-03-27 RX ADMIN — TRAMADOL HYDROCHLORIDE 50 MG: 50 TABLET, COATED ORAL at 01:05

## 2024-03-27 ASSESSMENT — ENCOUNTER SYMPTOMS
EYE PAIN: 0
ABDOMINAL PAIN: 0
NAUSEA: 1
BACK PAIN: 0
COUGH: 0
DIARRHEA: 0
SHORTNESS OF BREATH: 0
VOMITING: 0
PHOTOPHOBIA: 0

## 2024-03-27 NOTE — ED NOTES
Multiple attempts at contacting family for patient to be discharged back home. Patient has family member named Jayne coming at 0800 to pcik up the patient     Reji Hicks, RN  03/27/24 2878

## 2024-03-27 NOTE — ED PROVIDER NOTES
THE [facility]  EMERGENCY DEPARTMENT ENCOUNTER          PHYSICIAN ASSISTANT NOTE       Date of evaluation: 3/26/2024    Chief Complaint     Leg Pain (Pt presents from home w/ BLE swelling and increased pain. Hx CHF.)      History of Present Illness     Mounika Leahy is a 88 y.o. female who presents to the emerged part with bilateral lower extremity pain and swelling.  Patient states that for the last week she has had increased pain and swelling to bilateral lower extremities.  She followed up with her primary care provider on 3/21/2024 who had concern for lymphedema.  She was started on Keflex prophylactically given some mild erythema along with a prescription for gabapentin for pain.  They also gave her referral to follow-up with the lymphedema clinic at , however patient has been unable to make that appointment yet.  The patient states that her pain and swelling have continued to get worse, therefore she came to the emergency department today for evaluation.  When asked about shortness of breath, she states that she feels like she has to focus on her breathing more, however denies any overt shortness of breath.  She also states that she has some chronic pain to the right side of her chest and feels that it may have gotten worse in the last week or so.  Denies any shortness of breath when lying flat.  She has some mild nausea without vomiting.  Denies any abdominal pain.  Denies other concerning symptoms.    ASSESSMENT / PLAN  (MEDICAL DECISION MAKING)     INITIAL VITALS: BP: (!) 116/96, Temp: 98.8 °F (37.1 °C), Pulse: 71, Respirations: 18, SpO2: 97 %    Mounika Leahy is a 88 y.o. female who presents to the [facility] from with complaints of bilateral lower extremity pain and swelling.  Vital signs stable on presentation remained stable throughout her stay.  Thorough history and physical exam performed.    Patient presents the emergency department with complaints of bilateral lower extremity pain and

## 2024-03-27 NOTE — DISCHARGE INSTRUCTIONS
The pain and swelling in your legs is due to lymphedema.  They are no indications from your testing in the emergency department for any complications of this.  Please continue using compression stockings.  Continue the antibiotics written by your primary care provider.  Follow-up with the lymphedema clinic at  as instructed.  Follow-up with your primary care provider for any further pain management.

## 2024-03-27 NOTE — ED PROVIDER NOTES
ED Attending Attestation Note     Date of evaluation: 3/26/2024    This patient was seen by the advance practice provider.  I have seen and examined the patient, agree with the workup, evaluation, management and diagnosis. The care plan has been discussed.  I have reviewed the ECG and concur with the [facility]'s interpretation.  My assessment reveals complaints of lower extremity pain and swelling.  Patient has a history lymphedema for which she has been seen by her primary care provider several days ago and placed on antibiotics for concern for possible secondary cellulitis.  Patient states she is continue to have pain in her legs.  On arrival, patient is hemodynamically stable.  She does have lower extremity edema present bilaterally with venous stasis changes but no obvious signs of infection, specifically no warmth, induration, or fluctuance.  Laboratory studies are unremarkable with a normal white blood cell count and normal troponins.  BNP is unremarkable.  I did discuss with the patient that her symptoms are most likely secondary to lymphedema.  I did recommend that she start using compression stockings again until she can get in with the lymphedema clinic at .  She was given a dose of tramadol in the emergency department but I did recommend that for any further pain control that the patient should be seen by her primary care provider.    Medical Decision Making  Problems Addressed:  Bilateral leg pain: chronic illness or injury  Lymphedema due to venous insufficiency: chronic illness or injury    Amount and/or Complexity of Data Reviewed  Labs: ordered. Decision-making details documented in ED Course.  Radiology: ordered. Decision-making details documented in ED Course.  ECG/medicine tests: ordered. Decision-making details documented in ED Course.    Risk  Prescription drug management.          Samir العلي MD  03/27/24 0145

## 2024-03-27 NOTE — TELEPHONE ENCOUNTER
REFERRAL SENT TO ([facility]  DX LYMPHEDEMA BILAT LOWER EXTERIMES       [phone] OFFICE  [phone] FAX

## 2024-03-28 ENCOUNTER — TELEPHONE (OUTPATIENT)
Dept: INTERNAL MEDICINE CLINIC | Age: 89
End: 2024-03-28

## 2024-03-28 NOTE — TELEPHONE ENCOUNTER
flakita from [facility] wants to let physician know that the patient has wounds on her lower legs and would like a written/verbal order for wound care treatment on legs, verbal given.. no call back required .. If any further questions please call at [phone]

## 2024-03-29 ENCOUNTER — TELEPHONE (OUTPATIENT)
Dept: INTERNAL MEDICINE CLINIC | Age: 89
End: 2024-03-29

## 2024-03-29 NOTE — TELEPHONE ENCOUNTER
Lourdes from [facility] called to get a verbal ok for patient to receive a bath once a week.  Gave the ok, no need for call back

## 2024-04-04 ENCOUNTER — TELEPHONE (OUTPATIENT)
Dept: INTERNAL MEDICINE CLINIC | Age: 89
End: 2024-04-04

## 2024-04-04 DIAGNOSIS — G89.4 CHRONIC PAIN SYNDROME: Primary | Chronic | ICD-10-CM

## 2024-04-04 NOTE — TELEPHONE ENCOUNTER
Pt is requesting a medication to help woth her diarrhea she has been having today. She states it is uncontrollable and has gone more then 3 times day. She thinks this was caused by her food she has ate today.     Please call  and advise [phone]

## 2024-04-04 NOTE — TELEPHONE ENCOUNTER
[phone] (home)   CALLED Left message on machine  WILL CALL BACK TOMORROW  MESSAGE BELOW WAS ALSO LEFT ON MACHINE.

## 2024-04-04 NOTE — TELEPHONE ENCOUNTER
Pt is requesting a refill on medication below and they may need a new script.        ACETAMINOPHEN EXTRA STRENGTH 500 MG tablet [[id number]]   Pt  states she is in a lot of pain in her back and shoulders     traMADol (ULTRAM) 50 MG tablet [[id number]]  [facility] - [place], [place] - [facility]note 2[facility] [address] - P [phone] - F [phone]

## 2024-04-04 NOTE — TELEPHONE ENCOUNTER
Patient has any abdominal pain, fever or chills nausea vomiting?  She was recently on Keflex for her leg.  If continues to have diffuse diarrhea, will will need to recheck C. difficile again.  Will try 1 dose of Imodium.

## 2024-04-05 RX ORDER — PSEUDOEPHED/ACETAMINOPH/DIPHEN 30MG-500MG
TABLET ORAL
Qty: 120 TABLET | Refills: 3 | Status: SHIPPED | OUTPATIENT
Start: 2024-04-05

## 2024-04-05 RX ORDER — TRAMADOL HYDROCHLORIDE 300 MG/1
300 TABLET, EXTENDED RELEASE ORAL DAILY
Qty: 7 TABLET | Refills: 0 | Status: SHIPPED | OUTPATIENT
Start: 2024-04-05 | End: 2024-04-12

## 2024-04-10 DIAGNOSIS — G89.4 CHRONIC PAIN SYNDROME: Chronic | ICD-10-CM

## 2024-04-11 DIAGNOSIS — G89.4 CHRONIC PAIN SYNDROME: Primary | Chronic | ICD-10-CM

## 2024-04-11 RX ORDER — TRAMADOL HYDROCHLORIDE 300 MG/1
300 TABLET, EXTENDED RELEASE ORAL DAILY PRN
Qty: 14 TABLET | Refills: 0 | Status: SHIPPED | OUTPATIENT
Start: 2024-04-11 | End: 2024-04-11 | Stop reason: DRUGHIGH

## 2024-04-11 RX ORDER — TRAMADOL HYDROCHLORIDE 50 MG/1
50 TABLET ORAL EVERY 8 HOURS PRN
Qty: 21 TABLET | Refills: 0 | Status: SHIPPED | OUTPATIENT
Start: 2024-04-11 | End: 2024-04-18

## 2024-04-22 DIAGNOSIS — R11.0 NAUSEA: ICD-10-CM

## 2024-04-23 DIAGNOSIS — R11.0 NAUSEA: ICD-10-CM

## 2024-04-23 RX ORDER — PROMETHAZINE HYDROCHLORIDE 12.5 MG/1
12.5 TABLET ORAL DAILY PRN
Qty: 30 TABLET | Refills: 0 | Status: SHIPPED | OUTPATIENT
Start: 2024-04-23

## 2024-04-23 RX ORDER — PROMETHAZINE HYDROCHLORIDE 12.5 MG/1
TABLET ORAL
Qty: 30 TABLET | Refills: 0 | OUTPATIENT
Start: 2024-04-23

## 2024-04-23 NOTE — TELEPHONE ENCOUNTER
[facility] is calling for refills on the medication listed below:     promethazine (PHENERGAN) 12.5 MG tablet     Please send to:     [facility] - [facility] [place], [place] - [address] - P [phone] - F [phone]  [address], [facility] [address]  Phone: [phone]  Fax: [phone]     Please advise the Pharmacy at:  [phone]

## 2024-04-30 DIAGNOSIS — G89.4 CHRONIC PAIN SYNDROME: Chronic | ICD-10-CM

## 2024-04-30 RX ORDER — TRAMADOL HYDROCHLORIDE 50 MG/1
50 TABLET ORAL EVERY 6 HOURS
Qty: 28 TABLET | Refills: 0 | Status: SHIPPED | OUTPATIENT
Start: 2024-04-30 | End: 2024-05-07

## 2024-05-13 ENCOUNTER — TELEPHONE (OUTPATIENT)
Dept: INTERNAL MEDICINE CLINIC | Age: 89
End: 2024-05-13

## 2024-05-13 DIAGNOSIS — I89.0 LYMPHEDEMA: Primary | ICD-10-CM

## 2024-05-13 DIAGNOSIS — R32 URINARY INCONTINENCE, UNSPECIFIED TYPE: ICD-10-CM

## 2024-05-13 DIAGNOSIS — R26.2 UNABLE TO AMBULATE: ICD-10-CM

## 2024-05-13 NOTE — TELEPHONE ENCOUNTER
Pt is requesting a referral for in home nursing care. [facility] has been going out to assist the patient and she just spoke with Miesha from the facility.  They can no longer continue to see the patient to wrap her legs.  She is in need of someone going to her home until she goes to the Lymphedema Clinic at [place].     Please advise pt at:   [phone]

## 2024-05-15 NOTE — TELEPHONE ENCOUNTER
[phone] ([place])   [facility] REFERRAL SENT FOR WOUND CARE( LEGS) [facility]    1)PT  2)OT  3)SKILLED NURSING

## 2024-05-17 ENCOUNTER — TELEPHONE (OUTPATIENT)
Dept: INTERNAL MEDICINE CLINIC | Age: 88
End: 2024-05-17

## 2024-05-17 DIAGNOSIS — G89.4 CHRONIC PAIN SYNDROME: Chronic | ICD-10-CM

## 2024-05-17 DIAGNOSIS — R11.0 NAUSEA: ICD-10-CM

## 2024-05-20 RX ORDER — PROMETHAZINE HYDROCHLORIDE 12.5 MG/1
TABLET ORAL
Qty: 30 TABLET | Refills: 0 | Status: SHIPPED | OUTPATIENT
Start: 2024-05-20

## 2024-05-21 DIAGNOSIS — G89.4 CHRONIC PAIN SYNDROME: Chronic | ICD-10-CM

## 2024-05-21 RX ORDER — ONDANSETRON 4 MG/1
4 TABLET, FILM COATED ORAL 3 TIMES DAILY PRN
Qty: 30 TABLET | Refills: 0 | Status: CANCELLED | OUTPATIENT
Start: 2024-05-21

## 2024-05-21 RX ORDER — ONDANSETRON 4 MG/1
TABLET, FILM COATED ORAL
Qty: 30 TABLET | Refills: 0 | OUTPATIENT
Start: 2024-05-21

## 2024-05-21 RX ORDER — TRAMADOL HYDROCHLORIDE 50 MG/1
TABLET ORAL
Qty: 28 TABLET | Refills: 0 | OUTPATIENT
Start: 2024-05-21

## 2024-05-21 RX ORDER — TRAMADOL HYDROCHLORIDE 300 MG/1
300 TABLET, EXTENDED RELEASE ORAL DAILY
Qty: 7 TABLET | Refills: 0 | Status: CANCELLED | OUTPATIENT
Start: 2024-05-21 | End: 2024-05-28

## 2024-05-21 RX ORDER — PROMETHAZINE HYDROCHLORIDE 12.5 MG/1
TABLET ORAL
Qty: 30 TABLET | Refills: 0 | Status: CANCELLED | OUTPATIENT
Start: 2024-05-21

## 2024-06-12 DIAGNOSIS — R11.0 NAUSEA: ICD-10-CM

## 2024-06-12 RX ORDER — PROMETHAZINE HYDROCHLORIDE 12.5 MG/1
TABLET ORAL
Qty: 30 TABLET | Refills: 3 | Status: SHIPPED | OUTPATIENT
Start: 2024-06-12

## 2024-06-13 DIAGNOSIS — G89.4 CHRONIC PAIN SYNDROME: Primary | Chronic | ICD-10-CM

## 2024-06-13 RX ORDER — TRAMADOL HYDROCHLORIDE 50 MG/1
50 TABLET ORAL EVERY 6 HOURS PRN
Qty: 30 TABLET | Refills: 0 | Status: SHIPPED | OUTPATIENT
Start: 2024-06-13 | End: 2024-07-13

## 2024-06-14 ENCOUNTER — TELEPHONE (OUTPATIENT)
Dept: INTERNAL MEDICINE CLINIC | Age: 89
End: 2024-06-14

## 2024-06-14 DIAGNOSIS — R53.1 WEAKNESS: Primary | ICD-10-CM

## 2024-06-14 DIAGNOSIS — R26.9 GAIT DISTURBANCE: ICD-10-CM

## 2024-06-14 NOTE — TELEPHONE ENCOUNTER
[facility] family member is requesting a script be faxed to the [facility] in [place] for a standard wheelchair.     Please call and advise when faxed [phone]    Fax number is [phone]

## 2024-06-17 RX ORDER — GABAPENTIN 100 MG/1
CAPSULE ORAL
Qty: 60 CAPSULE | Refills: 2 | Status: SHIPPED | OUTPATIENT
Start: 2024-06-17 | End: 2024-07-17

## 2024-06-17 NOTE — TELEPHONE ENCOUNTER
Mounika Leahy is calling in with the information listed below:     Wheelchair is called [facility],  SEAT SIZE IS 18    [phone]    Please advise pt at:   [phone]

## 2024-06-25 DIAGNOSIS — F32.A ANXIETY AND DEPRESSION: ICD-10-CM

## 2024-06-25 DIAGNOSIS — F41.9 ANXIETY AND DEPRESSION: ICD-10-CM

## 2024-06-26 ENCOUNTER — TELEPHONE (OUTPATIENT)
Dept: INTERNAL MEDICINE CLINIC | Age: 89
End: 2024-06-26

## 2024-06-26 DIAGNOSIS — F32.A ANXIETY AND DEPRESSION: ICD-10-CM

## 2024-06-26 DIAGNOSIS — F41.9 ANXIETY AND DEPRESSION: ICD-10-CM

## 2024-06-26 NOTE — TELEPHONE ENCOUNTER
They are trying to get a wheelchair for Ms. Leahy and they need additional information.    They need notes that mention the patient needs a wheelchair and why it is needed.  We faxed over notes last week and they did not include this information.  Zay faxed a request back to us last Friday (06/21/24) requesting additional information.    Please refax addended notes to:     [phone]

## 2024-06-27 ENCOUNTER — TELEPHONE (OUTPATIENT)
Dept: INTERNAL MEDICINE CLINIC | Age: 89
End: 2024-06-27

## 2024-06-27 RX ORDER — ONDANSETRON 4 MG/1
TABLET, FILM COATED ORAL
Qty: 30 TABLET | Refills: 0 | Status: SHIPPED | OUTPATIENT
Start: 2024-06-27

## 2024-06-27 NOTE — TELEPHONE ENCOUNTER
Pharmacy calling on behalf of pt for refill on ondansetron (ZOFRAN) 4 MG tablet [[id number]]          [facility] - [facility], [place] - [facility] [address] - P [phone] - F [phone] [[id number]]

## 2024-07-09 DIAGNOSIS — G89.4 CHRONIC PAIN SYNDROME: Chronic | ICD-10-CM

## 2024-07-09 DIAGNOSIS — I10 ESSENTIAL HYPERTENSION: ICD-10-CM

## 2024-07-10 ENCOUNTER — TELEPHONE (OUTPATIENT)
Dept: INTERNAL MEDICINE CLINIC | Age: 89
End: 2024-07-10

## 2024-07-10 RX ORDER — SPIRONOLACTONE 25 MG/1
25 TABLET ORAL 2 TIMES DAILY
Qty: 180 TABLET | Refills: 1 | Status: SHIPPED | OUTPATIENT
Start: 2024-07-10

## 2024-07-10 RX ORDER — TRAMADOL HYDROCHLORIDE 50 MG/1
50 TABLET ORAL EVERY 6 HOURS PRN
Qty: 30 TABLET | Refills: 0 | Status: SHIPPED | OUTPATIENT
Start: 2024-07-10 | End: 2024-08-09

## 2024-07-10 NOTE — TELEPHONE ENCOUNTER
Calling about refill sent for tramadol; it came over as denied due to pt needing paper script, but pt is basically homebound. Pharmacist wanting to know what to do.       Please call and advise.

## 2024-07-19 DIAGNOSIS — F51.02 ADJUSTMENT INSOMNIA: ICD-10-CM

## 2024-07-22 RX ORDER — TRAZODONE HYDROCHLORIDE 100 MG/1
TABLET ORAL
Qty: 90 TABLET | Refills: 1 | Status: SHIPPED | OUTPATIENT
Start: 2024-07-22

## 2024-07-26 ENCOUNTER — TELEPHONE (OUTPATIENT)
Dept: INTERNAL MEDICINE CLINIC | Age: 89
End: 2024-07-26

## 2024-07-26 NOTE — TELEPHONE ENCOUNTER
Pt called [facility] requesting Fiber and Calcium.  Is there a specific brand or prescription type that Dr De would like her to have       Please advise Tresa at [facility] :    [phone]

## 2024-07-29 RX ORDER — PSYLLIUM HUSK/CALCIUM CARB 1 G-60 MG
1 CAPSULE ORAL DAILY
Qty: 120 CAPSULE | Refills: 11 | Status: SHIPPED | OUTPATIENT
Start: 2024-07-29

## 2024-08-01 DIAGNOSIS — G89.4 CHRONIC PAIN SYNDROME: Chronic | ICD-10-CM

## 2024-08-01 RX ORDER — TRAMADOL HYDROCHLORIDE 50 MG/1
50 TABLET ORAL EVERY 6 HOURS PRN
Qty: 30 TABLET | Refills: 0 | Status: SHIPPED | OUTPATIENT
Start: 2024-08-01 | End: 2024-08-31

## 2024-08-02 RX ORDER — CEPHALEXIN 500 MG/1
CAPSULE ORAL
Qty: 21 CAPSULE | Refills: 0 | Status: SHIPPED | OUTPATIENT
Start: 2024-08-02

## 2024-08-12 DIAGNOSIS — I10 ESSENTIAL HYPERTENSION: ICD-10-CM

## 2024-08-13 RX ORDER — PSEUDOEPHED/ACETAMINOPH/DIPHEN 30MG-500MG
TABLET ORAL
Qty: 120 TABLET | Refills: 3 | Status: SHIPPED | OUTPATIENT
Start: 2024-08-13

## 2024-08-13 RX ORDER — HYDROCHLOROTHIAZIDE 25 MG/1
25 TABLET ORAL DAILY
Qty: 90 TABLET | Refills: 3 | Status: SHIPPED | OUTPATIENT
Start: 2024-08-13

## 2024-08-22 ENCOUNTER — TELEPHONE (OUTPATIENT)
Dept: INTERNAL MEDICINE CLINIC | Age: 89
End: 2024-08-22

## 2024-08-22 DIAGNOSIS — M25.519 ACUTE SHOULDER PAIN, UNSPECIFIED LATERALITY: Primary | ICD-10-CM

## 2024-08-22 NOTE — TELEPHONE ENCOUNTER
Pt has called asking for Heating Pad and Bio-Freeze due to her right shoulder pain and sling for should.    [phone]    Pls call and advise    [facility] - [place], [place] - [facility] [address] - P [phone] - F [phone]

## 2024-08-23 NOTE — TELEPHONE ENCOUNTER
Approved all  pended scripts.   I believe the dme one may have to be printed off and signed by a provider and faxed to the dme store.

## 2024-08-26 DIAGNOSIS — G89.4 CHRONIC PAIN SYNDROME: Chronic | ICD-10-CM

## 2024-08-27 RX ORDER — TRAMADOL HYDROCHLORIDE 50 MG/1
50 TABLET ORAL EVERY 6 HOURS PRN
Qty: 30 TABLET | Refills: 0 | Status: SHIPPED | OUTPATIENT
Start: 2024-08-27 | End: 2024-09-26

## 2024-09-03 DIAGNOSIS — K21.9 GASTROESOPHAGEAL REFLUX DISEASE WITHOUT ESOPHAGITIS: ICD-10-CM

## 2024-09-03 RX ORDER — LANSOPRAZOLE 30 MG/1
CAPSULE, DELAYED RELEASE ORAL
Qty: 30 CAPSULE | Refills: 0 | Status: SHIPPED | OUTPATIENT
Start: 2024-09-03

## 2024-09-03 NOTE — TELEPHONE ENCOUNTER
Last appointment: 3/21/2024  Next appointment: Visit date not found  Last refill: 1/26/24    Requested Prescriptions     Pending Prescriptions Disp Refills    lansoprazole (PREVACID) 30 MG delayed release capsule [Pharmacy Med Name: lansoprazole 30 mg capsule,delayed release] 90 capsule 1     Sig: TAKE ONE CAPSULE EVERY DAY

## 2024-09-09 DIAGNOSIS — E03.9 ACQUIRED HYPOTHYROIDISM: Chronic | ICD-10-CM

## 2024-09-09 RX ORDER — LEVOTHYROXINE SODIUM 100 UG/1
100 TABLET ORAL DAILY
Qty: 90 TABLET | Refills: 0 | Status: SHIPPED | OUTPATIENT
Start: 2024-09-09

## 2024-09-13 NOTE — TELEPHONE ENCOUNTER
Last 2 OV notes, Demographics, new order and recent labs faxed to [facility].     Fax number is [phone]
Pt needs a new order for home care with [facility].  Pt could not get to the fax number where to fax order         Please advise
Tonsillitis

## 2024-09-19 DIAGNOSIS — R11.0 NAUSEA: ICD-10-CM

## 2024-09-20 DIAGNOSIS — G89.4 CHRONIC PAIN SYNDROME: Chronic | ICD-10-CM

## 2024-09-20 RX ORDER — TRAMADOL HYDROCHLORIDE 50 MG/1
50 TABLET ORAL EVERY 6 HOURS PRN
Qty: 30 TABLET | Refills: 0 | Status: SHIPPED | OUTPATIENT
Start: 2024-09-20 | End: 2024-10-20

## 2024-09-20 RX ORDER — PROMETHAZINE HYDROCHLORIDE 12.5 MG/1
TABLET ORAL
Qty: 30 TABLET | Refills: 0 | Status: SHIPPED | OUTPATIENT
Start: 2024-09-20

## 2024-09-23 ENCOUNTER — TELEPHONE (OUTPATIENT)
Dept: INTERNAL MEDICINE CLINIC | Age: 89
End: 2024-09-23

## 2024-09-23 DIAGNOSIS — I89.0 LYMPHEDEMA: Primary | ICD-10-CM

## 2024-09-24 ENCOUNTER — TELEPHONE (OUTPATIENT)
Dept: INTERNAL MEDICINE CLINIC | Age: 89
End: 2024-09-24

## 2024-10-17 DIAGNOSIS — G89.4 CHRONIC PAIN SYNDROME: Chronic | ICD-10-CM

## 2024-10-17 RX ORDER — TRAMADOL HYDROCHLORIDE 50 MG/1
TABLET ORAL
Qty: 30 TABLET | Refills: 0 | OUTPATIENT
Start: 2024-10-17

## 2024-10-18 DIAGNOSIS — G89.4 CHRONIC PAIN SYNDROME: Chronic | ICD-10-CM

## 2024-10-18 NOTE — TELEPHONE ENCOUNTER
Last OV 3/21/24  No Follow up scheduled.   Pt called stating that she is aware that she is past due for an appointment. Her son is her ride and she has no way of coming in for an appointment at this time because she is in the hospital. Se said she is 89 years old and does not need to be in pain.

## 2024-10-21 ENCOUNTER — TELEPHONE (OUTPATIENT)
Dept: INTERNAL MEDICINE CLINIC | Age: 88
End: 2024-10-21

## 2024-10-21 RX ORDER — TRAMADOL HYDROCHLORIDE 50 MG/1
50 TABLET ORAL EVERY 6 HOURS PRN
Qty: 30 TABLET | Refills: 0 | OUTPATIENT
Start: 2024-10-21 | End: 2024-11-20

## 2024-10-21 NOTE — TELEPHONE ENCOUNTER
Patient is requesting refill on:    traMADol (ULTRAM) 50 MG tablet [[id number]]  ENDED     She is unable to come in for an appointment because her son is being admitted to the hospital who typically transports her.    Pharmacy:    [facility] - [facility], [place] - [address] - P [phone] - F [phone]     Please call patient if there are any issues or questions: [phone]

## 2024-10-21 NOTE — TELEPHONE ENCOUNTER
Per DR De pt needs to be seen for refill. Virtual is ok.   Appt scheduled with pt for 10/24 at 2:45.

## 2024-10-24 ENCOUNTER — TELEMEDICINE (OUTPATIENT)
Dept: INTERNAL MEDICINE CLINIC | Age: 89
End: 2024-10-24
Payer: COMMERCIAL

## 2024-10-24 DIAGNOSIS — I89.0 LYMPHEDEMA: ICD-10-CM

## 2024-10-24 DIAGNOSIS — G89.4 CHRONIC PAIN SYNDROME: Primary | Chronic | ICD-10-CM

## 2024-10-24 PROCEDURE — 99443 PR PHYS/QHP TELEPHONE EVALUATION 21-30 MIN: CPT | Performed by: STUDENT IN AN ORGANIZED HEALTH CARE EDUCATION/TRAINING PROGRAM

## 2024-10-24 RX ORDER — ZINC GLUCONATE 50 MG
50 TABLET ORAL DAILY
Qty: 90 TABLET | Refills: 1 | Status: SHIPPED | OUTPATIENT
Start: 2024-10-24

## 2024-10-24 RX ORDER — ONDANSETRON 4 MG/1
4 TABLET, FILM COATED ORAL EVERY 8 HOURS PRN
Qty: 30 TABLET | Refills: 0 | Status: SHIPPED | OUTPATIENT
Start: 2024-10-24

## 2024-10-24 RX ORDER — TRAMADOL HYDROCHLORIDE 50 MG/1
50 TABLET ORAL EVERY 8 HOURS PRN
Qty: 18 TABLET | Refills: 0 | Status: SHIPPED | OUTPATIENT
Start: 2024-10-24 | End: 2024-10-24 | Stop reason: ALTCHOICE

## 2024-10-24 RX ORDER — TRAMADOL HYDROCHLORIDE 50 MG/1
50 TABLET ORAL EVERY 8 HOURS PRN
Qty: 18 TABLET | Refills: 0 | Status: SHIPPED | OUTPATIENT
Start: 2024-10-24 | End: 2024-10-31

## 2024-10-24 ASSESSMENT — ENCOUNTER SYMPTOMS
ABDOMINAL DISTENTION: 0
VOMITING: 0
PHOTOPHOBIA: 0
CHEST TIGHTNESS: 0
ABDOMINAL PAIN: 0
SHORTNESS OF BREATH: 0
TROUBLE SWALLOWING: 0
VOICE CHANGE: 0
NAUSEA: 0
CHOKING: 0
DIARRHEA: 0
WHEEZING: 0
APNEA: 0
COUGH: 0
CONSTIPATION: 0
STRIDOR: 0

## 2024-10-24 NOTE — ASSESSMENT & PLAN NOTE
This is a chronic issue, recently has been worsening. She was previously seen at the lymphedema clinic and plastic surgery at [facility].   She is also wheelchair bound, lack of transportation.   Has been wheelchair bound for many years due to severe leg swelling 2/2 lymphedema, physical deconditioning.   -not able to eval today due to virtual visit  - Need to  follow up with [facility] again, pt was requested to call them to make an appointment ASAP  - Ace wrap, applied in office today   - Need home health care, wound care  - She requested pain med, on chronic tramadol. Working on weaning as renata

## 2024-10-24 NOTE — ASSESSMENT & PLAN NOTE
Chronic issue. Back, neck, shoulder, legs   She has been on Tramadol for many years.   I discussed w patient about opioid therapy, complications and side effects. Will start weaning as patient tolerate   If pain is not under control might need a referral to pain management.     Orders:    traMADol (ULTRAM) 50 MG tablet; Take 1 tablet by mouth every 8 hours as needed for Pain for up to 7 days. Intended supply: 3 days. Take lowest dose possible to manage pain Max Daily Amount: 150 mg

## 2024-10-24 NOTE — PROGRESS NOTES
Mounika Leahy, was evaluated through a synchronous (real-time) audio-video encounter. The patient (or guardian if applicable) is aware that this is a billable service, which includes applicable co-pays. This Virtual Visit was conducted with patient's (and/or legal guardian's) consent. Patient identification was verified, and a caregiver was present when appropriate.   The patient was located at Home: [address] [place] [address]  Provider was located at Facility (Appt Dept): [address] [place] [address], [address]  [place] [address]  Confirm you are appropriately licensed, registered, or certified to deliver care in the state where the patient is located as indicated above. If you are not or unsure, please re-schedule the visit: Yes, I confirm.     Mounika Leahy (:  1935) is a Established patient, presenting virtually for evaluation of the following:      Below is the assessment and plan developed based on review of pertinent history, physical exam, labs, studies, and medications.     Assessment & Plan  Chronic pain syndrome   Chronic issue. Back, neck, shoulder, legs   She has been on Tramadol for many years.   I discussed w patient about opioid therapy, complications and side effects. Will start weaning as patient tolerate   If pain is not under control might need a referral to pain management.     Orders:    traMADol (ULTRAM) 50 MG tablet; Take 1 tablet by mouth every 8 hours as needed for Pain for up to 7 days. Intended supply: 3 days. Take lowest dose possible to manage pain Max Daily Amount: 150 mg    Lymphedema  This is a chronic issue, recently has been worsening. She was previously seen at the lymphedema clinic and plastic surgery at [facility].   She is also wheelchair bound, lack of transportation.   Has been wheelchair bound for many years due to severe leg swelling 2/2 lymphedema, physical deconditioning.   -not able to eval today due to virtual visit  - Need to  follow up

## 2024-10-31 DIAGNOSIS — R11.0 NAUSEA: ICD-10-CM

## 2024-11-01 RX ORDER — PROMETHAZINE HYDROCHLORIDE 12.5 MG/1
TABLET ORAL
Qty: 30 TABLET | Refills: 0 | Status: SHIPPED | OUTPATIENT
Start: 2024-11-01

## 2024-11-08 DIAGNOSIS — G89.4 CHRONIC PAIN SYNDROME: Chronic | ICD-10-CM

## 2024-11-08 RX ORDER — TRAMADOL HYDROCHLORIDE 50 MG/1
50 TABLET ORAL EVERY 8 HOURS PRN
Qty: 18 TABLET | Refills: 0 | Status: SHIPPED | OUTPATIENT
Start: 2024-11-08 | End: 2024-11-15

## 2024-11-12 DIAGNOSIS — E66.01 MORBIDLY OBESE: Chronic | ICD-10-CM

## 2024-11-12 DIAGNOSIS — R53.81 PHYSICAL DECONDITIONING: Primary | ICD-10-CM

## 2024-11-12 DIAGNOSIS — I89.0 LYMPHEDEMA: ICD-10-CM

## 2024-11-12 RX ORDER — CEPHALEXIN 500 MG/1
CAPSULE ORAL
Qty: 21 CAPSULE | Refills: 0 | OUTPATIENT
Start: 2024-11-12

## 2024-11-14 ENCOUNTER — CLINICAL DOCUMENTATION (OUTPATIENT)
Dept: INTERNAL MEDICINE CLINIC | Age: 89
End: 2024-11-14

## 2024-11-14 NOTE — PROGRESS NOTES
Based on pre-visit chart review, 7 potential HCCs evaluated and added to CDI review for provider review.

## 2024-11-19 ENCOUNTER — TELEMEDICINE (OUTPATIENT)
Dept: INTERNAL MEDICINE CLINIC | Age: 89
End: 2024-11-19

## 2024-11-19 DIAGNOSIS — I89.0 LYMPHEDEMA: ICD-10-CM

## 2024-11-19 DIAGNOSIS — N39.0 RECURRENT UTI: ICD-10-CM

## 2024-11-19 DIAGNOSIS — N30.00 ACUTE CYSTITIS WITHOUT HEMATURIA: Primary | ICD-10-CM

## 2024-11-19 RX ORDER — CEPHALEXIN 500 MG/1
500 CAPSULE ORAL 3 TIMES DAILY
Qty: 21 CAPSULE | Refills: 0 | Status: SHIPPED | OUTPATIENT
Start: 2024-11-19 | End: 2024-11-26

## 2024-11-19 ASSESSMENT — ENCOUNTER SYMPTOMS
PHOTOPHOBIA: 0
STRIDOR: 0
DIARRHEA: 0
WHEEZING: 0
VOICE CHANGE: 0
VOMITING: 0
APNEA: 0
NAUSEA: 0
CHOKING: 0
CONSTIPATION: 0
TROUBLE SWALLOWING: 0
CHEST TIGHTNESS: 0
ABDOMINAL PAIN: 0
ABDOMINAL DISTENTION: 0
SHORTNESS OF BREATH: 0
COUGH: 0

## 2024-11-19 NOTE — ASSESSMENT & PLAN NOTE
This is a chronic issue, recently has been worsening. She was previously seen at the lymphedema clinic and plastic surgery at [facility].   She is also wheelchair bound, lack of transportation.   Has been wheelchair bound for many years due to severe leg swelling 2/2 lymphedema, physical deconditioning.   -not able to eval today due to virtual visit  - Need to  follow up with [facility] again, pt was requested to call them to make an appointment ASAP  - this has been affecting her ADL. She needs help with most of her ADL. She lives at home. Sister passed away recently. Son is at SNF.   - She will need HHC and home aid.

## 2024-11-19 NOTE — PROGRESS NOTES
Mounika Leahy, was evaluated through a synchronous (real-time) audio-video encounter. The patient (or guardian if applicable) is aware that this is a billable service, which includes applicable co-pays. This Virtual Visit was conducted with patient's (and/or legal guardian's) consent. Patient identification was verified, and a caregiver was present when appropriate.   The patient was located at Home: [address] [place] [address]  Provider was located at Facility (Appt Dept): [address] [place] [address], [address]  [place] [address]  Confirm you are appropriately licensed, registered, or certified to deliver care in the state where the patient is located as indicated above. If you are not or unsure, please re-schedule the visit: Yes, I confirm.     Mounika Leahy (:  1935) is a Established patient, presenting virtually for evaluation of the following:      Below is the assessment and plan developed based on review of pertinent history, physical exam, labs, studies, and medications.     Assessment & Plan  Acute cystitis without hematuria    Recurrent UTI  Need UA and urine culture but pt has limited access for transportation     Orders:    cephALEXin (KEFLEX) 500 MG capsule; Take 1 capsule by mouth 3 times daily for 7 days    Urinalysis with Reflex to Culture    Recurrent UTI  Recurrent   Several episodes   Also has mixed incontinence   Another episode     Orders:    Urinalysis with Reflex to Culture    Lymphedema  This is a chronic issue, recently has been worsening. She was previously seen at the lymphedema clinic and plastic surgery at [facility].   She is also wheelchair bound, lack of transportation.   Has been wheelchair bound for many years due to severe leg swelling 2/2 lymphedema, physical deconditioning.   -not able to eval today due to virtual visit  - Need to  follow up with [facility] again, pt was requested to call them to make an appointment ASAP  - this has been affecting her

## 2024-11-25 DIAGNOSIS — R11.0 NAUSEA: ICD-10-CM

## 2024-11-25 RX ORDER — PROMETHAZINE HYDROCHLORIDE 12.5 MG/1
TABLET ORAL
Qty: 30 TABLET | Refills: 0 | Status: ON HOLD | OUTPATIENT
Start: 2024-11-25

## 2024-11-27 ENCOUNTER — HOSPITAL ENCOUNTER (INPATIENT)
Age: 88
LOS: 11 days | Discharge: NURSING, REHAB OR LONG-TERM CARE | DRG: 074 | End: 2024-12-09
Attending: EMERGENCY MEDICINE | Admitting: HOSPITALIST
Payer: MEDICARE

## 2024-11-27 ENCOUNTER — TELEPHONE (OUTPATIENT)
Dept: FAMILY MEDICINE CLINIC | Age: 88
End: 2024-11-27

## 2024-11-27 ENCOUNTER — APPOINTMENT (OUTPATIENT)
Dept: GENERAL RADIOLOGY | Age: 88
DRG: 074 | End: 2024-11-27
Payer: MEDICARE

## 2024-11-27 DIAGNOSIS — N39.0 UTI (URINARY TRACT INFECTION), BACTERIAL: Primary | ICD-10-CM

## 2024-11-27 DIAGNOSIS — L03.116 CELLULITIS OF LEFT LOWER EXTREMITY: ICD-10-CM

## 2024-11-27 DIAGNOSIS — A49.9 UTI (URINARY TRACT INFECTION), BACTERIAL: Primary | ICD-10-CM

## 2024-11-27 DIAGNOSIS — R53.1 GENERALIZED WEAKNESS: ICD-10-CM

## 2024-11-27 PROCEDURE — 73590 X-RAY EXAM OF LOWER LEG: CPT

## 2024-11-27 PROCEDURE — 99285 EMERGENCY DEPT VISIT HI MDM: CPT

## 2024-11-27 PROCEDURE — 6370000000 HC RX 637 (ALT 250 FOR IP)

## 2024-11-27 RX ORDER — ACETAMINOPHEN 500 MG
1000 TABLET ORAL
Status: COMPLETED | OUTPATIENT
Start: 2024-11-27 | End: 2024-11-27

## 2024-11-27 RX ORDER — KETOROLAC TROMETHAMINE 30 MG/ML
15 INJECTION, SOLUTION INTRAMUSCULAR; INTRAVENOUS ONCE
Status: COMPLETED | OUTPATIENT
Start: 2024-11-27 | End: 2024-11-28

## 2024-11-27 RX ORDER — LIDOCAINE 4 G/G
1 PATCH TOPICAL ONCE
Status: COMPLETED | OUTPATIENT
Start: 2024-11-27 | End: 2024-11-28

## 2024-11-27 RX ORDER — GABAPENTIN 100 MG/1
200 CAPSULE ORAL ONCE
Status: COMPLETED | OUTPATIENT
Start: 2024-11-27 | End: 2024-11-27

## 2024-11-27 RX ADMIN — GABAPENTIN 200 MG: 100 CAPSULE ORAL at 23:38

## 2024-11-27 RX ADMIN — ACETAMINOPHEN 1000 MG: 500 TABLET ORAL at 23:38

## 2024-11-27 ASSESSMENT — PAIN SCALES - GENERAL: PAINLEVEL_OUTOF10: 4

## 2024-11-27 ASSESSMENT — LIFESTYLE VARIABLES
HOW MANY STANDARD DRINKS CONTAINING ALCOHOL DO YOU HAVE ON A TYPICAL DAY: PATIENT DOES NOT DRINK
HOW OFTEN DO YOU HAVE A DRINK CONTAINING ALCOHOL: NEVER

## 2024-11-28 PROBLEM — M79.604 BILATERAL LEG PAIN: Status: ACTIVE | Noted: 2024-11-28

## 2024-11-28 PROBLEM — L03.90 CELLULITIS: Status: ACTIVE | Noted: 2024-11-28

## 2024-11-28 PROBLEM — M79.605 BILATERAL LEG PAIN: Status: ACTIVE | Noted: 2024-11-28

## 2024-11-28 PROBLEM — A49.9 UTI (URINARY TRACT INFECTION), BACTERIAL: Status: ACTIVE | Noted: 2018-06-02

## 2024-11-28 LAB
ANION GAP SERPL CALCULATED.3IONS-SCNC: 11 MMOL/L (ref 3–16)
ANION GAP SERPL CALCULATED.3IONS-SCNC: 12 MMOL/L (ref 3–16)
BACTERIA URNS QL MICRO: ABNORMAL /HPF
BASOPHILS # BLD: 0 K/UL (ref 0–0.2)
BASOPHILS NFR BLD: 0.5 %
BILIRUB UR QL STRIP.AUTO: NEGATIVE
BUN SERPL-MCNC: 17 MG/DL (ref 7–20)
BUN SERPL-MCNC: 18 MG/DL (ref 7–20)
CALCIUM SERPL-MCNC: 9.3 MG/DL (ref 8.3–10.6)
CALCIUM SERPL-MCNC: 9.7 MG/DL (ref 8.3–10.6)
CHLORIDE SERPL-SCNC: 104 MMOL/L (ref 99–110)
CHLORIDE SERPL-SCNC: 105 MMOL/L (ref 99–110)
CLARITY UR: CLEAR
CO2 SERPL-SCNC: 24 MMOL/L (ref 21–32)
CO2 SERPL-SCNC: 24 MMOL/L (ref 21–32)
COLOR UR: YELLOW
CREAT SERPL-MCNC: 0.7 MG/DL (ref 0.6–1.2)
CREAT SERPL-MCNC: 0.9 MG/DL (ref 0.6–1.2)
DEPRECATED RDW RBC AUTO: 13.4 % (ref 12.4–15.4)
EOSINOPHIL # BLD: 0.2 K/UL (ref 0–0.6)
EOSINOPHIL NFR BLD: 2.8 %
GFR SERPLBLD CREATININE-BSD FMLA CKD-EPI: 61 ML/MIN/{1.73_M2}
GFR SERPLBLD CREATININE-BSD FMLA CKD-EPI: 82 ML/MIN/{1.73_M2}
GLUCOSE BLD-MCNC: 147 MG/DL (ref 70–99)
GLUCOSE BLD-MCNC: 156 MG/DL (ref 70–99)
GLUCOSE BLD-MCNC: 156 MG/DL (ref 70–99)
GLUCOSE BLD-MCNC: 168 MG/DL (ref 70–99)
GLUCOSE SERPL-MCNC: 104 MG/DL (ref 70–99)
GLUCOSE SERPL-MCNC: 106 MG/DL (ref 70–99)
GLUCOSE UR STRIP.AUTO-MCNC: NEGATIVE MG/DL
HCT VFR BLD AUTO: 34.4 % (ref 36–48)
HGB BLD-MCNC: 10.7 G/DL (ref 12–16)
HGB UR QL STRIP.AUTO: ABNORMAL
KETONES UR STRIP.AUTO-MCNC: NEGATIVE MG/DL
LEUKOCYTE ESTERASE UR QL STRIP.AUTO: ABNORMAL
LYMPHOCYTES # BLD: 2.8 K/UL (ref 1–5.1)
LYMPHOCYTES NFR BLD: 50.3 %
MCH RBC QN AUTO: 25 PG (ref 26–34)
MCHC RBC AUTO-ENTMCNC: 31.1 G/DL (ref 31–36)
MCV RBC AUTO: 80.5 FL (ref 80–100)
MONOCYTES # BLD: 0.3 K/UL (ref 0–1.3)
MONOCYTES NFR BLD: 5.1 %
NEUTROPHILS # BLD: 2.3 K/UL (ref 1.7–7.7)
NEUTROPHILS NFR BLD: 41.3 %
NITRITE UR QL STRIP.AUTO: POSITIVE
PERFORMED ON: ABNORMAL
PH UR STRIP.AUTO: 6 [PH] (ref 5–8)
PLATELET # BLD AUTO: 201 K/UL (ref 135–450)
PMV BLD AUTO: 9.2 FL (ref 5–10.5)
POTASSIUM SERPL-SCNC: 4.1 MMOL/L (ref 3.5–5.1)
POTASSIUM SERPL-SCNC: ABNORMAL MMOL/L (ref 3.5–5.1)
PROT UR STRIP.AUTO-MCNC: ABNORMAL MG/DL
RBC # BLD AUTO: 4.27 M/UL (ref 4–5.2)
RBC #/AREA URNS HPF: ABNORMAL /HPF (ref 0–4)
REASON FOR REJECTION: NORMAL
REJECTED TEST: NORMAL
SODIUM SERPL-SCNC: 140 MMOL/L (ref 136–145)
SODIUM SERPL-SCNC: 140 MMOL/L (ref 136–145)
SP GR UR STRIP.AUTO: 1.02 (ref 1–1.03)
UA COMPLETE W REFLEX CULTURE PNL UR: YES
UA DIPSTICK W REFLEX MICRO PNL UR: YES
URN SPEC COLLECT METH UR: ABNORMAL
UROBILINOGEN UR STRIP-ACNC: 0.2 E.U./DL
WBC # BLD AUTO: 5.6 K/UL (ref 4–11)
WBC #/AREA URNS HPF: >100 /HPF (ref 0–5)

## 2024-11-28 PROCEDURE — 6370000000 HC RX 637 (ALT 250 FOR IP): Performed by: EMERGENCY MEDICINE

## 2024-11-28 PROCEDURE — 6360000002 HC RX W HCPCS

## 2024-11-28 PROCEDURE — 85025 COMPLETE CBC W/AUTO DIFF WBC: CPT

## 2024-11-28 PROCEDURE — 87077 CULTURE AEROBIC IDENTIFY: CPT

## 2024-11-28 PROCEDURE — 81001 URINALYSIS AUTO W/SCOPE: CPT

## 2024-11-28 PROCEDURE — 6370000000 HC RX 637 (ALT 250 FOR IP)

## 2024-11-28 PROCEDURE — 1200000000 HC SEMI PRIVATE

## 2024-11-28 PROCEDURE — 96374 THER/PROPH/DIAG INJ IV PUSH: CPT

## 2024-11-28 PROCEDURE — 36415 COLL VENOUS BLD VENIPUNCTURE: CPT

## 2024-11-28 PROCEDURE — 2580000003 HC RX 258

## 2024-11-28 PROCEDURE — 87086 URINE CULTURE/COLONY COUNT: CPT

## 2024-11-28 PROCEDURE — 87186 SC STD MICRODIL/AGAR DIL: CPT

## 2024-11-28 PROCEDURE — 80048 BASIC METABOLIC PNL TOTAL CA: CPT

## 2024-11-28 PROCEDURE — 96375 TX/PRO/DX INJ NEW DRUG ADDON: CPT

## 2024-11-28 PROCEDURE — 99222 1ST HOSP IP/OBS MODERATE 55: CPT | Performed by: HOSPITALIST

## 2024-11-28 RX ORDER — LEVOTHYROXINE SODIUM 100 UG/1
100 TABLET ORAL DAILY
Status: DISCONTINUED | OUTPATIENT
Start: 2024-11-28 | End: 2024-12-09 | Stop reason: HOSPADM

## 2024-11-28 RX ORDER — SODIUM CHLORIDE 0.9 % (FLUSH) 0.9 %
5-40 SYRINGE (ML) INJECTION EVERY 12 HOURS SCHEDULED
Status: DISCONTINUED | OUTPATIENT
Start: 2024-11-28 | End: 2024-12-09 | Stop reason: HOSPADM

## 2024-11-28 RX ORDER — INSULIN LISPRO 100 [IU]/ML
0-4 INJECTION, SOLUTION INTRAVENOUS; SUBCUTANEOUS
Status: DISCONTINUED | OUTPATIENT
Start: 2024-11-28 | End: 2024-12-09 | Stop reason: HOSPADM

## 2024-11-28 RX ORDER — TRAMADOL HYDROCHLORIDE 50 MG/1
50 TABLET ORAL 2 TIMES DAILY PRN
Status: DISCONTINUED | OUTPATIENT
Start: 2024-11-28 | End: 2024-11-30

## 2024-11-28 RX ORDER — TRAZODONE HYDROCHLORIDE 100 MG/1
100 TABLET ORAL NIGHTLY
Status: DISCONTINUED | OUTPATIENT
Start: 2024-11-28 | End: 2024-12-09 | Stop reason: HOSPADM

## 2024-11-28 RX ORDER — ONDANSETRON 4 MG/1
4 TABLET, ORALLY DISINTEGRATING ORAL EVERY 8 HOURS PRN
Status: DISCONTINUED | OUTPATIENT
Start: 2024-11-28 | End: 2024-12-09 | Stop reason: HOSPADM

## 2024-11-28 RX ORDER — LIDOCAINE 4 G/G
1 PATCH TOPICAL DAILY
Qty: 30 PATCH | Refills: 0 | Status: SHIPPED | OUTPATIENT
Start: 2024-11-28 | End: 2024-12-28

## 2024-11-28 RX ORDER — POLYETHYLENE GLYCOL 3350 17 G/17G
17 POWDER, FOR SOLUTION ORAL DAILY PRN
Status: DISCONTINUED | OUTPATIENT
Start: 2024-11-28 | End: 2024-12-09 | Stop reason: HOSPADM

## 2024-11-28 RX ORDER — SODIUM CHLORIDE 9 MG/ML
INJECTION, SOLUTION INTRAVENOUS PRN
Status: DISCONTINUED | OUTPATIENT
Start: 2024-11-28 | End: 2024-12-09 | Stop reason: HOSPADM

## 2024-11-28 RX ORDER — GABAPENTIN 100 MG/1
300 CAPSULE ORAL 2 TIMES DAILY
Qty: 60 CAPSULE | Refills: 2 | Status: SHIPPED | OUTPATIENT
Start: 2024-11-28 | End: 2024-12-09 | Stop reason: HOSPADM

## 2024-11-28 RX ORDER — PANTOPRAZOLE SODIUM 20 MG/1
20 TABLET, DELAYED RELEASE ORAL
Status: DISCONTINUED | OUTPATIENT
Start: 2024-11-29 | End: 2024-12-09 | Stop reason: HOSPADM

## 2024-11-28 RX ORDER — CEPHALEXIN 500 MG/1
500 CAPSULE ORAL 4 TIMES DAILY
Qty: 28 CAPSULE | Refills: 0 | Status: SHIPPED | OUTPATIENT
Start: 2024-11-28 | End: 2024-12-05

## 2024-11-28 RX ORDER — SPIRONOLACTONE 25 MG/1
25 TABLET ORAL 2 TIMES DAILY
Status: DISCONTINUED | OUTPATIENT
Start: 2024-11-28 | End: 2024-12-09 | Stop reason: HOSPADM

## 2024-11-28 RX ORDER — HYDROCHLOROTHIAZIDE 25 MG/1
25 TABLET ORAL DAILY
Status: DISCONTINUED | OUTPATIENT
Start: 2024-11-28 | End: 2024-12-09 | Stop reason: HOSPADM

## 2024-11-28 RX ORDER — GABAPENTIN 300 MG/1
300 CAPSULE ORAL 3 TIMES DAILY
Status: DISCONTINUED | OUTPATIENT
Start: 2024-11-28 | End: 2024-11-29

## 2024-11-28 RX ORDER — GLUCAGON 1 MG/ML
1 KIT INJECTION PRN
Status: DISCONTINUED | OUTPATIENT
Start: 2024-11-28 | End: 2024-12-09 | Stop reason: HOSPADM

## 2024-11-28 RX ORDER — SODIUM CHLORIDE 0.9 % (FLUSH) 0.9 %
5-40 SYRINGE (ML) INJECTION PRN
Status: DISCONTINUED | OUTPATIENT
Start: 2024-11-28 | End: 2024-12-09 | Stop reason: HOSPADM

## 2024-11-28 RX ORDER — TRAMADOL HYDROCHLORIDE 50 MG/1
50 TABLET ORAL EVERY 8 HOURS PRN
COMMUNITY

## 2024-11-28 RX ORDER — METOPROLOL TARTRATE 25 MG/1
50 TABLET, FILM COATED ORAL 2 TIMES DAILY
Status: DISCONTINUED | OUTPATIENT
Start: 2024-11-28 | End: 2024-12-09 | Stop reason: HOSPADM

## 2024-11-28 RX ORDER — ACETAMINOPHEN 500 MG
500 TABLET ORAL EVERY 6 HOURS PRN
Status: DISCONTINUED | OUTPATIENT
Start: 2024-11-28 | End: 2024-11-29

## 2024-11-28 RX ORDER — DEXTROSE MONOHYDRATE 100 MG/ML
INJECTION, SOLUTION INTRAVENOUS CONTINUOUS PRN
Status: DISCONTINUED | OUTPATIENT
Start: 2024-11-28 | End: 2024-12-09 | Stop reason: HOSPADM

## 2024-11-28 RX ORDER — OXYCODONE HYDROCHLORIDE 5 MG/1
2.5 TABLET ORAL ONCE
Status: COMPLETED | OUTPATIENT
Start: 2024-11-28 | End: 2024-11-28

## 2024-11-28 RX ORDER — ONDANSETRON 2 MG/ML
4 INJECTION INTRAMUSCULAR; INTRAVENOUS EVERY 6 HOURS PRN
Status: DISCONTINUED | OUTPATIENT
Start: 2024-11-28 | End: 2024-12-09 | Stop reason: HOSPADM

## 2024-11-28 RX ADMIN — GABAPENTIN 300 MG: 300 CAPSULE ORAL at 12:04

## 2024-11-28 RX ADMIN — GABAPENTIN 300 MG: 300 CAPSULE ORAL at 20:31

## 2024-11-28 RX ADMIN — TRAMADOL HYDROCHLORIDE 50 MG: 50 TABLET, COATED ORAL at 12:04

## 2024-11-28 RX ADMIN — SERTRALINE 50 MG: 50 TABLET, FILM COATED ORAL at 17:54

## 2024-11-28 RX ADMIN — TRAMADOL HYDROCHLORIDE 50 MG: 50 TABLET, COATED ORAL at 23:36

## 2024-11-28 RX ADMIN — ONDANSETRON 4 MG: 2 INJECTION INTRAMUSCULAR; INTRAVENOUS at 14:43

## 2024-11-28 RX ADMIN — SODIUM CHLORIDE, PRESERVATIVE FREE 10 ML: 5 INJECTION INTRAVENOUS at 14:11

## 2024-11-28 RX ADMIN — METOPROLOL TARTRATE 50 MG: 50 TABLET, FILM COATED ORAL at 20:31

## 2024-11-28 RX ADMIN — LEVOTHYROXINE SODIUM 100 MCG: 0.1 TABLET ORAL at 14:44

## 2024-11-28 RX ADMIN — SPIRONOLACTONE 25 MG: 25 TABLET ORAL at 20:31

## 2024-11-28 RX ADMIN — HYDROCHLOROTHIAZIDE 25 MG: 25 TABLET ORAL at 14:44

## 2024-11-28 RX ADMIN — SPIRONOLACTONE 25 MG: 25 TABLET ORAL at 14:43

## 2024-11-28 RX ADMIN — METOPROLOL TARTRATE 50 MG: 50 TABLET, FILM COATED ORAL at 14:44

## 2024-11-28 RX ADMIN — SODIUM CHLORIDE, PRESERVATIVE FREE 10 ML: 5 INJECTION INTRAVENOUS at 20:33

## 2024-11-28 RX ADMIN — KETOROLAC TROMETHAMINE 15 MG: 30 INJECTION INTRAMUSCULAR; INTRAVENOUS at 00:09

## 2024-11-28 RX ADMIN — TRAZODONE HYDROCHLORIDE 100 MG: 100 TABLET ORAL at 20:32

## 2024-11-28 RX ADMIN — CEFEPIME 2000 MG: 2 INJECTION, POWDER, FOR SOLUTION INTRAVENOUS at 13:48

## 2024-11-28 RX ADMIN — ACETAMINOPHEN 500 MG: 500 TABLET ORAL at 17:53

## 2024-11-28 RX ADMIN — CEFAZOLIN 1000 MG: 1 INJECTION, POWDER, FOR SOLUTION INTRAMUSCULAR; INTRAVENOUS at 00:09

## 2024-11-28 RX ADMIN — OXYCODONE 2.5 MG: 5 TABLET ORAL at 07:10

## 2024-11-28 ASSESSMENT — PAIN - FUNCTIONAL ASSESSMENT
PAIN_FUNCTIONAL_ASSESSMENT: PREVENTS OR INTERFERES SOME ACTIVE ACTIVITIES AND ADLS

## 2024-11-28 ASSESSMENT — PAIN DESCRIPTION - PAIN TYPE: TYPE: CHRONIC PAIN

## 2024-11-28 ASSESSMENT — PAIN SCALES - GENERAL
PAINLEVEL_OUTOF10: 0
PAINLEVEL_OUTOF10: 6
PAINLEVEL_OUTOF10: 3
PAINLEVEL_OUTOF10: 10
PAINLEVEL_OUTOF10: 7
PAINLEVEL_OUTOF10: 7

## 2024-11-28 ASSESSMENT — PAIN DESCRIPTION - DESCRIPTORS
DESCRIPTORS: SHOOTING
DESCRIPTORS: ACHING
DESCRIPTORS: ACHING
DESCRIPTORS: CRAMPING;STABBING

## 2024-11-28 ASSESSMENT — PAIN DESCRIPTION - DIRECTION: RADIATING_TOWARDS: NO

## 2024-11-28 ASSESSMENT — PAIN DESCRIPTION - LOCATION
LOCATION: BACK;LEG
LOCATION: BACK
LOCATION: LEG;HIP
LOCATION: LEG

## 2024-11-28 ASSESSMENT — PAIN DESCRIPTION - ORIENTATION
ORIENTATION: LEFT
ORIENTATION: RIGHT

## 2024-11-28 ASSESSMENT — PAIN DESCRIPTION - ONSET: ONSET: GRADUAL

## 2024-11-28 ASSESSMENT — PAIN DESCRIPTION - FREQUENCY: FREQUENCY: INTERMITTENT

## 2024-11-28 NOTE — H&P
[facility] Emergency Department  EDObservation Admission Note   Emergency Physicians       Time Placed in ED Observation: DISPOSITION Ed Observation 11/28/2024 06:56:10 AM         Impression and Plan      In summary, Mounika Leahy is admitted by to the [facility] Observation Unit for physical therapy and social work evaluation.  Dr. Richard is the observation admission attending.    This patient has been risk-stratified based on the available history, physical exam, and related study findings.  Admission to observation status for further diagnosis/treatment/monitoring of generalized weakness is warranted clinically.  This extended period of observation is specifically required to determine the need for hospitalization.      We will observe the patient for the following endpoints:    -Physical therapy and Occupational Therapy evaluation  -Pain management    When met, appropriate disposition will be arranged.    Diagnostic Evaluation      Diagnostic studies and ED interventions germane to this period of clinical observation will include:    X-ray of the left lower extremity and urinalysis       Consultant(s)      None       Patient History      Mounika Leahy is a 89 y.o. female who presented to the Emergency Department for evaluation of left leg pain.  The acute evaluation included laboratory blood work, x-ray of the left lower extremity, urinalysis.      Upon admission to the Observation Unit, Mounika Leahy is stable after being diagnosed with uncomplicated UTI and cellulitis.  Currently pending evaluation by physical therapy and Occupational Therapy.      Past Medical History  Past Medical History:   Diagnosis Date    Arthritis     Chronic back pain     Diabetes mellitus (HCC)     Esophagitis     Essential hypertension 4/6/2016    Fatty liver     G6P deficiency (glucose-6-phosphatase deficiency) (HCC)     G6PD deficiency     Gastric polyps     GERD (gastroesophageal reflux disease)

## 2024-11-28 NOTE — ED NOTES
Spoke with patient regarding ride home. Patient reports she does not have anyone she can call for a ride. Patient reports she is unable to walk into her home from a cab/[employer], but can walk with a walker and does not have a medical reason for ambulance transport home.  Patient informed social work/case management would be here later in the day to speak with her and help to transport her home. Patient acknowledges understanding of this plan.     Tim Duran RN  11/28/24 0590

## 2024-11-28 NOTE — DISCHARGE INSTRUCTIONS
Follow-ups:  - Please be sure to make an appointment and follow-up with your PCP in about one week's time.  - Please be sure to follow-up with Dr Trujillo from Neurosurgery in about 1-2 weeks time after hospital discharge.  - Please continue your current medication regimen aside from your previous gabapentin regimen, will which has been adjusted as per below.    Medications [for pain]:  Please take 1 capsule of duloxetine 30 mg by mouth daily.  Place 1 lidocaine patch onto the skin to the affected area daily.  Please take 2 capsules of gabapentin 300 mg by mouth 3 times daily.    ------  Today you were seen in the emergency department for leg pain.  You have signs of an infection in your left lower leg.  You also have a UTI.  We are prescribing you antibiotics which will treat both infections.  I also think at least some of your leg pain is likely sciatica from your chronic back problems.  I like you to see your regular doctor early next week to make sure your symptoms are improving.    Please seek care or return to emergency department if your symptoms worsen or do not resolve. In addition, return if:  -You have a fever (greater than 101 degrees)  -You have chest pain, shortness of breath, abdominal pain, or uncontrollable vomiting  -You are unable to eat or drink  -You pass out  -You have difficulty moving your arms or legs   -You have difficulty speaking or slurred speech  -Or you have any concern that you feel needs urgent physician evaluation    Follow-Up/Future Appointments:  No follow-up provider specified.  No future appointments.  Discharge Medications:  New Prescriptions    No medications on file       Thank you for allowing me to be a part of your care today - Dr. Youssef

## 2024-11-28 NOTE — H&P
Internal Medicine H&P    Date: 2024   Patient: Mounika Leahy   Patient : 1935     CC: Leg Pain (From home via EMS, L sided leg pain since , off and on pain radiates to her feet, HX diabetic, pt denies injury to leg and blood thinners, )       Subjective     HPI:     Patient is a 89 y.o. female with PMHx sickle cell disease, G6PD, hypothyroidism, HTN, T2DM, chronic pain, lymphedema,who presents with leg pain L > R    Patient states that the pain began 3 days ago. She notes \"sharp\" that radiates from her knee to the toe on the L and bilateral leg pain to palpation. She additionally notes dyuria but unable to recall when this started. Patient denies any fever but notes chills. Endorses nausea. Denies chest pain, SOB, abdominal pain, vomiting, weakness.     Brief Social Hx:  Tobacco: distant history of smoking. Currently non-smoker  Alcohol: none  Other drugs: none      PMHx:      Diagnosis Date    Arthritis     Chronic back pain     Diabetes mellitus (HCC)     Esophagitis     Essential hypertension 2016    Fatty liver     G6P deficiency (glucose-6-phosphatase deficiency) (HCC)     G6PD deficiency     Gastric polyps     GERD (gastroesophageal reflux disease)     Hemorrhoids     History of blood transfusion     Hypothyroidism     Kidney stone     Mediterranean anemia     Normal cardiac stress test 3/2014    Sickle cell anemia (HCC)     UTI (urinary tract infection) 3/2014    Ecoli - R to Cipro and ampicillin       PSurgHx:      Procedure Laterality Date    APPENDECTOMY       SECTION      X1    CYSTOSCOPY  2015    urethral dilitation    HYSTERECTOMY (CERVIX STATUS UNKNOWN)      OTHER SURGICAL HISTORY  2017    Ear cleaning under anesthesia    CA LAPS SURG CHOLECYSTECTOMY W/CHOLANGIOGRAPHY N/A 2018    LAPAROSCOPIC CHOLECYSTECTOMY WITH CHOLANGIOGRAM performed by Regulo Schmidt MD at [facility] [place]    UPPER GASTROINTESTINAL ENDOSCOPY N/A 2018    EGD BIOPSY performed by

## 2024-11-28 NOTE — ED PROVIDER NOTES
ED Attending Attestation Note    Date of service:  11/27/24    This patient was seen by the resident physician.  I have seen and examined the patient, agree with the workup, evaluation, management and diagnosis. The care plan has been discussed and I concur.     My assessment reveals a 89 y.o. female past medical history of diabetes, G6PD D deficiency, lymphedema in the emergency department complaint of left leg pain.  Patient states that she has had worsening left leg pain for the past several days.  On my examination the patient is in no acute distress.  She is afebrile.  Patient does have circumferential erythema, warmth, edema, and tenderness to the touch from just inferior to the left knee down to her foot.  Patient has not tolerated anticoagulation in the past secondary to her G6PD.  Concern is for cellulitis without evidence of sepsis.  Will obtain x-ray imaging of the left lower extremity and give patient antibiotics.    Ferny Richard MD  Emergency Medicine  [facility]     Ferny Richard MD  11/29/24 1450    
THE [facility]  EMERGENCY DEPARTMENT ENCOUNTER          ATTENDING PHYSICIAN NOTE       Date of evaluation: 11/27/2024    ADDENDUM:      Care of this patient was assumed from Dr. Vega.  The patient was seen for Leg Pain (From home via EMS, L sided leg pain since Sunday, off and on pain radiates to her feet, HX diabetic, pt denies injury to leg and blood thinners, )  .  The patient's initial evaluation and plan have been discussed with the prior provider who initially evaluated the patient.  Nursing Notes, Past Medical Hx, Past Surgical Hx, Social Hx, Allergies, and Family Hx were all reviewed.    ASSESSMENT / PLAN  (MEDICAL DECISION MAKING)     Mounika Leahy is a 89 y.o. female who is really seen by the overnight physician, please see their documentation for initial history, exam and medical decision making.  Pending items at time of signout include follow-up PT/OT recommendations as well as social work/case management recommendations.    Unfortunately due to holiday these resources and are available today.  Additionally due to the patient's insurance that she will require multiple overnight stays in order for placement.  As the patient is not safe to return home due to decree strength in setting of urinary tract infection and lower extremity cellulitis I believe that she warrants admission for further monitoring, care and eventual placement needs.  Discussed with hospitalist who accept the patient for admission.    Medical Decision Making  Amount and/or Complexity of Data Reviewed  Labs: ordered.  Radiology: ordered.    Risk  OTC drugs.  Prescription drug management.        Clinical Impression     1. UTI (urinary tract infection), bacterial    2. Cellulitis of left lower extremity    3. Generalized weakness        Disposition     PATIENT REFERRED TO:  Maribel De MD  [address] [id number] [phone]    Schedule an appointment as soon as possible for a visit 
   TAKE ONE TABLET BY MOUTH EVERY 6 HOURS AS NEEDED FOR PAIN    BIOTIN (BIOTIN 5000) 5 MG CAPS    Take 1 capsule by mouth daily    CHOLECALCIFEROL (VITAMIN D3) 400 UNITS CAPS    Take by mouth daily    DIPHENHYDRAMINE HCL (BENADRYL ALLERGY PO)    Take 100 mg by mouth nightly as needed (sleep)    HOSPITAL BED MISC    by Does not apply route Use as directed    HOSPITAL BED MISC    by Does not apply route    HYDROCHLOROTHIAZIDE (HYDRODIURIL) 25 MG TABLET    TAKE ONE TABLET BY MOUTH EVERY DAY    LANSOPRAZOLE (PREVACID) 30 MG DELAYED RELEASE CAPSULE    TAKE ONE CAPSULE EVERY DAY    LEVOTHYROXINE (SYNTHROID) 100 MCG TABLET    TAKE ONE TABLET BY MOUTH EVERY DAY    METOPROLOL TARTRATE (LOPRESSOR) 50 MG TABLET    Take 1 tablet by mouth 2 times daily    ONDANSETRON (ZOFRAN) 4 MG TABLET    Take 1 tablet by mouth every 8 hours as needed for Nausea or Vomiting    PROMETHAZINE (PHENERGAN) 12.5 MG TABLET    TAKE ONE TABLET BY MOUTH EVERY DAY AS NEEDED FOR NAUSEA    SENEXON-S 8.6-50 MG PER TABLET    TAKE 1 TABLET BY MOUTH EVERY DAY    SERTRALINE (ZOLOFT) 50 MG TABLET    Take 1 tablet by mouth daily    SITAGLIPTIN (JANUVIA) 50 MG TABLET    TAKE ONE TABLET BY MOUTH EVERY DAY    SOAP & CLEANSERS (ALOE VESTA BODY WASH/SHAMPOO) LIQD    USE AS DIRECTED  ON LEGS    SPIRONOLACTONE (ALDACTONE) 25 MG TABLET    TAKE ONE TABLET BY MOUTH TWICE DAILY    TRAZODONE (DESYREL) 100 MG TABLET    TAKE ONE TABLET BY MOUTH AT BEDTIME    VITAMIN B-6 (PYRIDOXINE) 50 MG TABLET    TAKE 1 TABLET BY MOUTH EVERY DAY    ZINC GLUCONATE 50 MG TABLET    Take 1 tablet by mouth daily    ZINC OXIDE (DESITIN) 13 % CREA    Apply topically 2 times daily as needed for Rash       Allergies     She is allergic to tejinder beans [tejinder beans], percocet [oxycodone-acetaminophen], quinolones, almond [macadamia nut oil], aspirin, coumadin [warfarin sodium], dalteparin sodium, heparin, lmw heparin, peanut-containing drug products, penicillins, plavix [clopidogrel bisulfate], spinach,

## 2024-11-28 NOTE — CONSULTS
Clinical Pharmacy Consult Note  Medication History     Admit Date: 11/27/2024    Pharmacy consulted to verify home medication list by Dr. Sibley.    List of current medications patient is taking is complete. Home Medication list in EPIC updated to reflect changes noted below.    Source of information: Patient, Dispense report, and OARRS report     Patient's home pharmacy: [facility] ([id number])     Changes made to medication list:   Medications removed: (include reason, ex: therapy completed, patient no longer taking, etc.)  None  Medications added:   Tramadol 50mg tablets  Medication doses adjusted:   Per patient, she takes gabapentin 100mg every morning and evening (not 300mg)   Per patient, she hasn't needed to take her constipation meds since she \"has been watching what she eats\"  Other notes:   Per patient, her tramadol is prescribed as Q8H PRN but she has only been taking it QD PRN.  Patient complaining that one of the reasons why she came to hospital is due to pain and that the tramadol wasn't helping as much.   On file, it states that patient takes 100mg benadryl for sleep/allergy. When discussing with patient, she states that she only takes 1 tablet as needed for sleep/allergy. Does not remember what dose it says on the bottle.     Current Outpatient Medications   Medication Instructions    Acetaminophen Extra Strength 500 MG TABS TAKE ONE TABLET BY MOUTH EVERY 6 HOURS AS NEEDED FOR PAIN    Biotin (BIOTIN 5000) 5 MG CAPS 1 capsule, Oral, DAILY    cephALEXin (KEFLEX) 500 mg, Oral, 4 TIMES DAILY    Cholecalciferol (VITAMIN D) 400 Units, Oral, DAILY    diphenhydrAMINE HCl (BENADRYL ALLERGY PO) Take 1 tablet by mouth daily as needed for allergy or sleep.     gabapentin (NEURONTIN) 300 mg, Oral, 2 times daily    hydroCHLOROthiazide (HYDRODIURIL) 25 mg, Oral, DAILY    lansoprazole (PREVACID) 30 MG delayed release capsule TAKE ONE CAPSULE EVERY DAY    levothyroxine (SYNTHROID) 100 mcg, Oral, DAILY    lidocaine 4

## 2024-11-28 NOTE — ED NOTES
Patient Name: Muonika Leahy  : 1935 89 y.o.  MRN: [id number]  ED Room #: A03/A03-03     Chief complaint:   Chief Complaint   Patient presents with    Leg Pain     From home via EMS, L sided leg pain since , off and on pain radiates to her feet, HX diabetic, pt denies injury to leg and blood thinners,      Hospital Problem/Diagnosis: Principal Problem:    Cellulitis  Resolved Problems:    * No resolved hospital problems. *      O2 Flow Rate:O2 Device: None (Room air)   (if applicable)  Cardiac Rhythm:   (if applicable)  Active LDA's:   Peripheral IV 24 Left Antecubital (Active)            How does patient ambulate? Unknown, did not assess in the Emergency Department    2. How does patient take pills? Whole with Water    3. Is patient alert? Alert    4. Is patient oriented? yes    5.   Patient arrived from:  home  Facility Name: ___________________________________________    6. If patient is disoriented or from a Skill Nursing Facility has family been notified of admission? No    7. Patient belongings? Cell phone purse clothes    Disposition of belongings? Kept with Patient     8. Any specific patient or family belongings/needs/dynamics?   a.     9. Miscellaneous comments/pending orders?  a.      If there are any additional questions please reach out to the Emergency Department.       Anna Kearns RN  [phone]

## 2024-11-29 LAB
ANION GAP SERPL CALCULATED.3IONS-SCNC: 10 MMOL/L (ref 3–16)
BASOPHILS # BLD: 0 K/UL (ref 0–0.2)
BASOPHILS NFR BLD: 0.7 %
BUN SERPL-MCNC: 20 MG/DL (ref 7–20)
CALCIUM SERPL-MCNC: 9.3 MG/DL (ref 8.3–10.6)
CHLORIDE SERPL-SCNC: 106 MMOL/L (ref 99–110)
CO2 SERPL-SCNC: 26 MMOL/L (ref 21–32)
CREAT SERPL-MCNC: 0.9 MG/DL (ref 0.6–1.2)
DEPRECATED RDW RBC AUTO: 12.8 % (ref 12.4–15.4)
EOSINOPHIL # BLD: 0.2 K/UL (ref 0–0.6)
EOSINOPHIL NFR BLD: 3.8 %
GFR SERPLBLD CREATININE-BSD FMLA CKD-EPI: 61 ML/MIN/{1.73_M2}
GLUCOSE BLD-MCNC: 117 MG/DL (ref 70–99)
GLUCOSE BLD-MCNC: 137 MG/DL (ref 70–99)
GLUCOSE BLD-MCNC: 141 MG/DL (ref 70–99)
GLUCOSE BLD-MCNC: 156 MG/DL (ref 70–99)
GLUCOSE SERPL-MCNC: 109 MG/DL (ref 70–99)
HCT VFR BLD AUTO: 32.8 % (ref 36–48)
HGB BLD-MCNC: 10.1 G/DL (ref 12–16)
LYMPHOCYTES # BLD: 2.8 K/UL (ref 1–5.1)
LYMPHOCYTES NFR BLD: 46.1 %
MCH RBC QN AUTO: 25 PG (ref 26–34)
MCHC RBC AUTO-ENTMCNC: 30.8 G/DL (ref 31–36)
MCV RBC AUTO: 81.1 FL (ref 80–100)
MONOCYTES # BLD: 0.5 K/UL (ref 0–1.3)
MONOCYTES NFR BLD: 8.2 %
NEUTROPHILS # BLD: 2.5 K/UL (ref 1.7–7.7)
NEUTROPHILS NFR BLD: 41.2 %
PERFORMED ON: ABNORMAL
PLATELET # BLD AUTO: 205 K/UL (ref 135–450)
PMV BLD AUTO: 8.7 FL (ref 5–10.5)
POTASSIUM SERPL-SCNC: 4.4 MMOL/L (ref 3.5–5.1)
RBC # BLD AUTO: 4.05 M/UL (ref 4–5.2)
SODIUM SERPL-SCNC: 142 MMOL/L (ref 136–145)
WBC # BLD AUTO: 6 K/UL (ref 4–11)

## 2024-11-29 PROCEDURE — 6370000000 HC RX 637 (ALT 250 FOR IP): Performed by: EMERGENCY MEDICINE

## 2024-11-29 PROCEDURE — 99232 SBSQ HOSP IP/OBS MODERATE 35: CPT | Performed by: HOSPITALIST

## 2024-11-29 PROCEDURE — 97166 OT EVAL MOD COMPLEX 45 MIN: CPT

## 2024-11-29 PROCEDURE — 36415 COLL VENOUS BLD VENIPUNCTURE: CPT

## 2024-11-29 PROCEDURE — 97162 PT EVAL MOD COMPLEX 30 MIN: CPT

## 2024-11-29 PROCEDURE — 97116 GAIT TRAINING THERAPY: CPT

## 2024-11-29 PROCEDURE — 97530 THERAPEUTIC ACTIVITIES: CPT

## 2024-11-29 PROCEDURE — 97535 SELF CARE MNGMENT TRAINING: CPT

## 2024-11-29 PROCEDURE — 1200000000 HC SEMI PRIVATE

## 2024-11-29 PROCEDURE — 2580000003 HC RX 258

## 2024-11-29 PROCEDURE — 6360000002 HC RX W HCPCS

## 2024-11-29 PROCEDURE — 6370000000 HC RX 637 (ALT 250 FOR IP)

## 2024-11-29 PROCEDURE — 85025 COMPLETE CBC W/AUTO DIFF WBC: CPT

## 2024-11-29 PROCEDURE — 80048 BASIC METABOLIC PNL TOTAL CA: CPT

## 2024-11-29 RX ORDER — LIDOCAINE 4 G/G
1 PATCH TOPICAL DAILY PRN
Status: DISCONTINUED | OUTPATIENT
Start: 2024-11-29 | End: 2024-12-09 | Stop reason: HOSPADM

## 2024-11-29 RX ORDER — ENOXAPARIN SODIUM 100 MG/ML
40 INJECTION SUBCUTANEOUS DAILY
Status: DISCONTINUED | OUTPATIENT
Start: 2024-11-29 | End: 2024-12-09 | Stop reason: HOSPADM

## 2024-11-29 RX ORDER — GABAPENTIN 400 MG/1
400 CAPSULE ORAL 3 TIMES DAILY
Status: DISCONTINUED | OUTPATIENT
Start: 2024-11-29 | End: 2024-11-30

## 2024-11-29 RX ORDER — ACETAMINOPHEN 500 MG
500 TABLET ORAL EVERY 6 HOURS
Status: DISCONTINUED | OUTPATIENT
Start: 2024-11-29 | End: 2024-12-09 | Stop reason: HOSPADM

## 2024-11-29 RX ADMIN — TRAMADOL HYDROCHLORIDE 50 MG: 50 TABLET, COATED ORAL at 11:05

## 2024-11-29 RX ADMIN — SODIUM CHLORIDE, PRESERVATIVE FREE 10 ML: 5 INJECTION INTRAVENOUS at 21:15

## 2024-11-29 RX ADMIN — HYDROCHLOROTHIAZIDE 25 MG: 25 TABLET ORAL at 08:21

## 2024-11-29 RX ADMIN — ENOXAPARIN SODIUM 40 MG: 100 INJECTION SUBCUTANEOUS at 14:35

## 2024-11-29 RX ADMIN — SERTRALINE 50 MG: 50 TABLET, FILM COATED ORAL at 08:21

## 2024-11-29 RX ADMIN — GABAPENTIN 300 MG: 300 CAPSULE ORAL at 08:21

## 2024-11-29 RX ADMIN — SODIUM CHLORIDE, PRESERVATIVE FREE 10 ML: 5 INJECTION INTRAVENOUS at 08:23

## 2024-11-29 RX ADMIN — PANTOPRAZOLE SODIUM 20 MG: 20 TABLET, DELAYED RELEASE ORAL at 06:15

## 2024-11-29 RX ADMIN — ACETAMINOPHEN 500 MG: 500 TABLET ORAL at 15:31

## 2024-11-29 RX ADMIN — ACETAMINOPHEN 500 MG: 500 TABLET ORAL at 11:07

## 2024-11-29 RX ADMIN — SPIRONOLACTONE 25 MG: 25 TABLET ORAL at 21:14

## 2024-11-29 RX ADMIN — GABAPENTIN 400 MG: 400 CAPSULE ORAL at 14:30

## 2024-11-29 RX ADMIN — GABAPENTIN 400 MG: 400 CAPSULE ORAL at 21:14

## 2024-11-29 RX ADMIN — ACETAMINOPHEN 500 MG: 500 TABLET ORAL at 23:45

## 2024-11-29 RX ADMIN — METOPROLOL TARTRATE 50 MG: 50 TABLET, FILM COATED ORAL at 08:21

## 2024-11-29 RX ADMIN — SPIRONOLACTONE 25 MG: 25 TABLET ORAL at 08:21

## 2024-11-29 RX ADMIN — METOPROLOL TARTRATE 50 MG: 50 TABLET, FILM COATED ORAL at 21:14

## 2024-11-29 RX ADMIN — LEVOTHYROXINE SODIUM 100 MCG: 0.1 TABLET ORAL at 08:21

## 2024-11-29 RX ADMIN — TRAZODONE HYDROCHLORIDE 100 MG: 100 TABLET ORAL at 21:14

## 2024-11-29 RX ADMIN — CEFEPIME 2000 MG: 2 INJECTION, POWDER, FOR SOLUTION INTRAVENOUS at 11:11

## 2024-11-29 ASSESSMENT — PAIN DESCRIPTION - LOCATION
LOCATION: LEG
LOCATION: LEG

## 2024-11-29 ASSESSMENT — PAIN - FUNCTIONAL ASSESSMENT: PAIN_FUNCTIONAL_ASSESSMENT: ACTIVITIES ARE NOT PREVENTED

## 2024-11-29 ASSESSMENT — PAIN DESCRIPTION - PAIN TYPE: TYPE: CHRONIC PAIN

## 2024-11-29 ASSESSMENT — PAIN SCALES - GENERAL
PAINLEVEL_OUTOF10: 0
PAINLEVEL_OUTOF10: 3
PAINLEVEL_OUTOF10: 8

## 2024-11-29 ASSESSMENT — PAIN DESCRIPTION - ONSET: ONSET: ON-GOING

## 2024-11-29 ASSESSMENT — PAIN DESCRIPTION - FREQUENCY: FREQUENCY: CONTINUOUS

## 2024-11-29 ASSESSMENT — PAIN DESCRIPTION - ORIENTATION: ORIENTATION: RIGHT;LEFT

## 2024-11-29 ASSESSMENT — PAIN DESCRIPTION - DESCRIPTORS: DESCRIPTORS: ACHING

## 2024-11-29 NOTE — CARE COORDINATION
Case Management Assessment  Initial Evaluation    Date/Time of Evaluation: 11/29/2024 8:34 AM  Assessment Completed by: Felecia Hanson RN    If patient is discharged prior to next notation, then this note serves as note for discharge by case management.    Patient Name: Mounika Leahy                   YOB: 1935  Diagnosis: Cellulitis [L03.90]  Generalized weakness [R53.1]  Cellulitis of left lower extremity [L03.116]  UTI (urinary tract infection), bacterial [N39.0, A49.9]                   Date / Time: 11/27/2024 10:01 PM    Patient Admission Status: Inpatient   Readmission Risk (Low < 19, Mod (19-27), High > 27): Readmission Risk Score: 12.1    Current PCP: Maribel De MD  PCP verified by CM? Yes    Chart Reviewed: Yes      History Provided by: Patient  Patient Orientation: Alert and Oriented    Patient Cognition: Alert    Hospitalization in the last 30 days (Readmission):  No    If yes, Readmission Assessment in  Navigator will be completed.    Advance Directives:      Code Status: Limited   Patient's Primary Decision Maker is: Legal Next of Kin    Primary Decision Maker: Jorge Leahy - Child - [phone]    Secondary Decision Maker: Jose Leahy - Child - [phone]    Secondary Decision Maker: toñitovandana - Grandchild - [phone]    Secondary Decision Maker: ArnoldJayne - Other - [phone]    Discharge Planning:    Patient lives with: Children (lives with son) Type of Home: House  Primary Care Giver: Self  Patient Support Systems include: Children, Family Members, Friends/Neighbors   Current Financial resources: Other (Comment) (Aetna commercial)  Current community resources: None  Current services prior to admission: Durable Medical Equipment            Current DME: Wheelchair, Walker            Type of Home Care services:  PT, OT, Nursing Services    ADLS  Prior functional level: Assistance with the following:, Mobility  Current functional level: Other (see comment)

## 2024-11-30 LAB
ANION GAP SERPL CALCULATED.3IONS-SCNC: 9 MMOL/L (ref 3–16)
BASOPHILS # BLD: 0 K/UL (ref 0–0.2)
BASOPHILS NFR BLD: 0.5 %
BUN SERPL-MCNC: 15 MG/DL (ref 7–20)
CALCIUM SERPL-MCNC: 9.5 MG/DL (ref 8.3–10.6)
CHLORIDE SERPL-SCNC: 105 MMOL/L (ref 99–110)
CO2 SERPL-SCNC: 27 MMOL/L (ref 21–32)
CREAT SERPL-MCNC: 0.8 MG/DL (ref 0.6–1.2)
DEPRECATED RDW RBC AUTO: 13.1 % (ref 12.4–15.4)
EOSINOPHIL # BLD: 0.2 K/UL (ref 0–0.6)
EOSINOPHIL NFR BLD: 3.7 %
GFR SERPLBLD CREATININE-BSD FMLA CKD-EPI: 70 ML/MIN/{1.73_M2}
GLUCOSE BLD-MCNC: 117 MG/DL (ref 70–99)
GLUCOSE BLD-MCNC: 150 MG/DL (ref 70–99)
GLUCOSE BLD-MCNC: 153 MG/DL (ref 70–99)
GLUCOSE BLD-MCNC: 159 MG/DL (ref 70–99)
GLUCOSE SERPL-MCNC: 104 MG/DL (ref 70–99)
HCT VFR BLD AUTO: 33.8 % (ref 36–48)
HGB BLD-MCNC: 10.5 G/DL (ref 12–16)
LYMPHOCYTES # BLD: 2.5 K/UL (ref 1–5.1)
LYMPHOCYTES NFR BLD: 45.4 %
MCH RBC QN AUTO: 24.9 PG (ref 26–34)
MCHC RBC AUTO-ENTMCNC: 30.9 G/DL (ref 31–36)
MCV RBC AUTO: 80.3 FL (ref 80–100)
MONOCYTES # BLD: 0.5 K/UL (ref 0–1.3)
MONOCYTES NFR BLD: 8.5 %
NEUTROPHILS # BLD: 2.3 K/UL (ref 1.7–7.7)
NEUTROPHILS NFR BLD: 41.9 %
PERFORMED ON: ABNORMAL
PLATELET # BLD AUTO: 204 K/UL (ref 135–450)
PMV BLD AUTO: 8.6 FL (ref 5–10.5)
POTASSIUM SERPL-SCNC: 4.3 MMOL/L (ref 3.5–5.1)
RBC # BLD AUTO: 4.21 M/UL (ref 4–5.2)
SODIUM SERPL-SCNC: 141 MMOL/L (ref 136–145)
WBC # BLD AUTO: 5.4 K/UL (ref 4–11)

## 2024-11-30 PROCEDURE — 6370000000 HC RX 637 (ALT 250 FOR IP): Performed by: EMERGENCY MEDICINE

## 2024-11-30 PROCEDURE — 36415 COLL VENOUS BLD VENIPUNCTURE: CPT

## 2024-11-30 PROCEDURE — 1200000000 HC SEMI PRIVATE

## 2024-11-30 PROCEDURE — 2580000003 HC RX 258

## 2024-11-30 PROCEDURE — 85025 COMPLETE CBC W/AUTO DIFF WBC: CPT

## 2024-11-30 PROCEDURE — 99232 SBSQ HOSP IP/OBS MODERATE 35: CPT | Performed by: HOSPITALIST

## 2024-11-30 PROCEDURE — 6370000000 HC RX 637 (ALT 250 FOR IP)

## 2024-11-30 PROCEDURE — 6360000002 HC RX W HCPCS

## 2024-11-30 PROCEDURE — 80048 BASIC METABOLIC PNL TOTAL CA: CPT

## 2024-11-30 RX ORDER — TRAMADOL HYDROCHLORIDE 50 MG/1
50 TABLET ORAL EVERY 8 HOURS PRN
Status: DISCONTINUED | OUTPATIENT
Start: 2024-11-30 | End: 2024-12-09 | Stop reason: HOSPADM

## 2024-11-30 RX ORDER — GABAPENTIN 300 MG/1
600 CAPSULE ORAL 3 TIMES DAILY
Status: DISCONTINUED | OUTPATIENT
Start: 2024-11-30 | End: 2024-12-09

## 2024-11-30 RX ORDER — PROMETHAZINE HYDROCHLORIDE 12.5 MG/1
12.5 TABLET ORAL DAILY PRN
Status: DISCONTINUED | OUTPATIENT
Start: 2024-11-30 | End: 2024-12-09 | Stop reason: HOSPADM

## 2024-11-30 RX ORDER — TRAMADOL HYDROCHLORIDE 50 MG/1
50 TABLET ORAL EVERY 6 HOURS PRN
Status: DISCONTINUED | OUTPATIENT
Start: 2024-11-30 | End: 2024-11-30

## 2024-11-30 RX ADMIN — GABAPENTIN 600 MG: 300 CAPSULE ORAL at 13:48

## 2024-11-30 RX ADMIN — SODIUM CHLORIDE, PRESERVATIVE FREE 10 ML: 5 INJECTION INTRAVENOUS at 20:09

## 2024-11-30 RX ADMIN — ACETAMINOPHEN 500 MG: 500 TABLET ORAL at 16:07

## 2024-11-30 RX ADMIN — TRAMADOL HYDROCHLORIDE 50 MG: 50 TABLET, COATED ORAL at 05:54

## 2024-11-30 RX ADMIN — ENOXAPARIN SODIUM 40 MG: 100 INJECTION SUBCUTANEOUS at 09:42

## 2024-11-30 RX ADMIN — LEVOTHYROXINE SODIUM 100 MCG: 0.1 TABLET ORAL at 05:54

## 2024-11-30 RX ADMIN — TRAZODONE HYDROCHLORIDE 100 MG: 100 TABLET ORAL at 20:08

## 2024-11-30 RX ADMIN — SERTRALINE 50 MG: 50 TABLET, FILM COATED ORAL at 09:42

## 2024-11-30 RX ADMIN — SPIRONOLACTONE 25 MG: 25 TABLET ORAL at 09:41

## 2024-11-30 RX ADMIN — HYDROCHLOROTHIAZIDE 25 MG: 25 TABLET ORAL at 09:42

## 2024-11-30 RX ADMIN — CEFEPIME 2000 MG: 2 INJECTION, POWDER, FOR SOLUTION INTRAVENOUS at 11:24

## 2024-11-30 RX ADMIN — TRAMADOL HYDROCHLORIDE 50 MG: 50 TABLET, COATED ORAL at 20:08

## 2024-11-30 RX ADMIN — SODIUM CHLORIDE, PRESERVATIVE FREE 10 ML: 5 INJECTION INTRAVENOUS at 09:42

## 2024-11-30 RX ADMIN — ACETAMINOPHEN 500 MG: 500 TABLET ORAL at 04:57

## 2024-11-30 RX ADMIN — ACETAMINOPHEN 500 MG: 500 TABLET ORAL at 22:59

## 2024-11-30 RX ADMIN — METOPROLOL TARTRATE 50 MG: 50 TABLET, FILM COATED ORAL at 09:43

## 2024-11-30 RX ADMIN — GABAPENTIN 600 MG: 300 CAPSULE ORAL at 20:08

## 2024-11-30 RX ADMIN — ACETAMINOPHEN 500 MG: 500 TABLET ORAL at 09:41

## 2024-11-30 RX ADMIN — PROMETHAZINE HYDROCHLORIDE 12.5 MG: 12.5 TABLET ORAL at 12:01

## 2024-11-30 RX ADMIN — PANTOPRAZOLE SODIUM 20 MG: 20 TABLET, DELAYED RELEASE ORAL at 05:54

## 2024-11-30 RX ADMIN — SPIRONOLACTONE 25 MG: 25 TABLET ORAL at 20:08

## 2024-11-30 RX ADMIN — TRAMADOL HYDROCHLORIDE 50 MG: 50 TABLET, COATED ORAL at 12:01

## 2024-11-30 RX ADMIN — METOPROLOL TARTRATE 50 MG: 50 TABLET, FILM COATED ORAL at 20:09

## 2024-11-30 ASSESSMENT — PAIN SCALES - GENERAL
PAINLEVEL_OUTOF10: 0
PAINLEVEL_OUTOF10: 3
PAINLEVEL_OUTOF10: 0
PAINLEVEL_OUTOF10: 7
PAINLEVEL_OUTOF10: 4
PAINLEVEL_OUTOF10: 0
PAINLEVEL_OUTOF10: 0
PAINLEVEL_OUTOF10: 3
PAINLEVEL_OUTOF10: 3
PAINLEVEL_OUTOF10: 0
PAINLEVEL_OUTOF10: 6
PAINLEVEL_OUTOF10: 5

## 2024-11-30 ASSESSMENT — PAIN - FUNCTIONAL ASSESSMENT
PAIN_FUNCTIONAL_ASSESSMENT: ACTIVITIES ARE NOT PREVENTED
PAIN_FUNCTIONAL_ASSESSMENT: ACTIVITIES ARE NOT PREVENTED
PAIN_FUNCTIONAL_ASSESSMENT: PREVENTS OR INTERFERES SOME ACTIVE ACTIVITIES AND ADLS
PAIN_FUNCTIONAL_ASSESSMENT: PREVENTS OR INTERFERES SOME ACTIVE ACTIVITIES AND ADLS

## 2024-11-30 ASSESSMENT — PAIN DESCRIPTION - LOCATION
LOCATION: LEG

## 2024-11-30 ASSESSMENT — PAIN DESCRIPTION - ORIENTATION
ORIENTATION: LEFT
ORIENTATION: LEFT;RIGHT;LOWER
ORIENTATION: LEFT;RIGHT;LOWER
ORIENTATION: LEFT
ORIENTATION: LEFT

## 2024-11-30 ASSESSMENT — PAIN DESCRIPTION - PAIN TYPE
TYPE: CHRONIC PAIN

## 2024-11-30 ASSESSMENT — PAIN DESCRIPTION - ONSET
ONSET: ON-GOING
ONSET: PROGRESSIVE

## 2024-11-30 ASSESSMENT — PAIN DESCRIPTION - FREQUENCY
FREQUENCY: CONTINUOUS

## 2024-11-30 ASSESSMENT — PAIN DESCRIPTION - DESCRIPTORS
DESCRIPTORS: ACHING;SHOOTING
DESCRIPTORS: ACHING;THROBBING
DESCRIPTORS: ACHING;DISCOMFORT;SHARP
DESCRIPTORS: ACHING
DESCRIPTORS: ACHING

## 2024-12-01 ENCOUNTER — APPOINTMENT (OUTPATIENT)
Dept: GENERAL RADIOLOGY | Age: 88
DRG: 074 | End: 2024-12-01
Payer: MEDICARE

## 2024-12-01 LAB
ANION GAP SERPL CALCULATED.3IONS-SCNC: 9 MMOL/L (ref 3–16)
BACTERIA UR CULT: ABNORMAL
BACTERIA UR CULT: ABNORMAL
BASOPHILS # BLD: 0 K/UL (ref 0–0.2)
BASOPHILS NFR BLD: 0.7 %
BUN SERPL-MCNC: 14 MG/DL (ref 7–20)
CALCIUM SERPL-MCNC: 9.4 MG/DL (ref 8.3–10.6)
CHLORIDE SERPL-SCNC: 103 MMOL/L (ref 99–110)
CO2 SERPL-SCNC: 27 MMOL/L (ref 21–32)
CREAT SERPL-MCNC: 0.8 MG/DL (ref 0.6–1.2)
DEPRECATED RDW RBC AUTO: 13 % (ref 12.4–15.4)
EOSINOPHIL # BLD: 0.2 K/UL (ref 0–0.6)
EOSINOPHIL NFR BLD: 3.8 %
GFR SERPLBLD CREATININE-BSD FMLA CKD-EPI: 70 ML/MIN/{1.73_M2}
GLUCOSE BLD-MCNC: 112 MG/DL (ref 70–99)
GLUCOSE BLD-MCNC: 157 MG/DL (ref 70–99)
GLUCOSE BLD-MCNC: 159 MG/DL (ref 70–99)
GLUCOSE BLD-MCNC: 229 MG/DL (ref 70–99)
GLUCOSE SERPL-MCNC: 123 MG/DL (ref 70–99)
HCT VFR BLD AUTO: 32.9 % (ref 36–48)
HGB BLD-MCNC: 10.2 G/DL (ref 12–16)
LYMPHOCYTES # BLD: 2.8 K/UL (ref 1–5.1)
LYMPHOCYTES NFR BLD: 49.1 %
MCH RBC QN AUTO: 24.9 PG (ref 26–34)
MCHC RBC AUTO-ENTMCNC: 30.9 G/DL (ref 31–36)
MCV RBC AUTO: 80.7 FL (ref 80–100)
MONOCYTES # BLD: 0.4 K/UL (ref 0–1.3)
MONOCYTES NFR BLD: 7 %
NEUTROPHILS # BLD: 2.2 K/UL (ref 1.7–7.7)
NEUTROPHILS NFR BLD: 39.4 %
ORGANISM: ABNORMAL
ORGANISM: ABNORMAL
PERFORMED ON: ABNORMAL
PLATELET # BLD AUTO: 205 K/UL (ref 135–450)
PMV BLD AUTO: 8.6 FL (ref 5–10.5)
POTASSIUM SERPL-SCNC: 4.3 MMOL/L (ref 3.5–5.1)
RBC # BLD AUTO: 4.08 M/UL (ref 4–5.2)
SODIUM SERPL-SCNC: 139 MMOL/L (ref 136–145)
WBC # BLD AUTO: 5.6 K/UL (ref 4–11)

## 2024-12-01 PROCEDURE — 72100 X-RAY EXAM L-S SPINE 2/3 VWS: CPT

## 2024-12-01 PROCEDURE — 1200000000 HC SEMI PRIVATE

## 2024-12-01 PROCEDURE — 36415 COLL VENOUS BLD VENIPUNCTURE: CPT

## 2024-12-01 PROCEDURE — 99232 SBSQ HOSP IP/OBS MODERATE 35: CPT | Performed by: HOSPITALIST

## 2024-12-01 PROCEDURE — 6370000000 HC RX 637 (ALT 250 FOR IP)

## 2024-12-01 PROCEDURE — 85025 COMPLETE CBC W/AUTO DIFF WBC: CPT

## 2024-12-01 PROCEDURE — 6360000002 HC RX W HCPCS

## 2024-12-01 PROCEDURE — 2580000003 HC RX 258

## 2024-12-01 PROCEDURE — 80048 BASIC METABOLIC PNL TOTAL CA: CPT

## 2024-12-01 PROCEDURE — 6370000000 HC RX 637 (ALT 250 FOR IP): Performed by: EMERGENCY MEDICINE

## 2024-12-01 RX ORDER — DULOXETIN HYDROCHLORIDE 30 MG/1
30 CAPSULE, DELAYED RELEASE ORAL DAILY
Status: DISCONTINUED | OUTPATIENT
Start: 2024-12-02 | End: 2024-12-09 | Stop reason: HOSPADM

## 2024-12-01 RX ADMIN — PANTOPRAZOLE SODIUM 20 MG: 20 TABLET, DELAYED RELEASE ORAL at 05:50

## 2024-12-01 RX ADMIN — METOPROLOL TARTRATE 50 MG: 50 TABLET, FILM COATED ORAL at 09:04

## 2024-12-01 RX ADMIN — SODIUM CHLORIDE, PRESERVATIVE FREE 10 ML: 5 INJECTION INTRAVENOUS at 21:12

## 2024-12-01 RX ADMIN — SODIUM CHLORIDE, PRESERVATIVE FREE 10 ML: 5 INJECTION INTRAVENOUS at 09:05

## 2024-12-01 RX ADMIN — AMOXICILLIN AND CLAVULANATE POTASSIUM 1 TABLET: 875; 125 TABLET, FILM COATED ORAL at 12:18

## 2024-12-01 RX ADMIN — ACETAMINOPHEN 500 MG: 500 TABLET ORAL at 21:08

## 2024-12-01 RX ADMIN — SPIRONOLACTONE 25 MG: 25 TABLET ORAL at 21:08

## 2024-12-01 RX ADMIN — ACETAMINOPHEN 500 MG: 500 TABLET ORAL at 16:02

## 2024-12-01 RX ADMIN — TRAZODONE HYDROCHLORIDE 100 MG: 100 TABLET ORAL at 21:08

## 2024-12-01 RX ADMIN — GABAPENTIN 600 MG: 300 CAPSULE ORAL at 09:04

## 2024-12-01 RX ADMIN — SERTRALINE 50 MG: 50 TABLET, FILM COATED ORAL at 09:04

## 2024-12-01 RX ADMIN — TRAMADOL HYDROCHLORIDE 50 MG: 50 TABLET, COATED ORAL at 06:01

## 2024-12-01 RX ADMIN — METOPROLOL TARTRATE 50 MG: 50 TABLET, FILM COATED ORAL at 21:07

## 2024-12-01 RX ADMIN — SPIRONOLACTONE 25 MG: 25 TABLET ORAL at 09:04

## 2024-12-01 RX ADMIN — GABAPENTIN 600 MG: 300 CAPSULE ORAL at 12:18

## 2024-12-01 RX ADMIN — INSULIN LISPRO 1 UNITS: 100 INJECTION, SOLUTION INTRAVENOUS; SUBCUTANEOUS at 14:12

## 2024-12-01 RX ADMIN — AMOXICILLIN AND CLAVULANATE POTASSIUM 1 TABLET: 875; 125 TABLET, FILM COATED ORAL at 21:08

## 2024-12-01 RX ADMIN — LEVOTHYROXINE SODIUM 100 MCG: 0.1 TABLET ORAL at 06:02

## 2024-12-01 RX ADMIN — HYDROCHLOROTHIAZIDE 25 MG: 25 TABLET ORAL at 09:04

## 2024-12-01 RX ADMIN — TRAMADOL HYDROCHLORIDE 50 MG: 50 TABLET, COATED ORAL at 14:12

## 2024-12-01 RX ADMIN — ACETAMINOPHEN 500 MG: 500 TABLET ORAL at 03:46

## 2024-12-01 RX ADMIN — ENOXAPARIN SODIUM 40 MG: 100 INJECTION SUBCUTANEOUS at 09:04

## 2024-12-01 RX ADMIN — PROMETHAZINE HYDROCHLORIDE 12.5 MG: 12.5 TABLET ORAL at 10:41

## 2024-12-01 RX ADMIN — GABAPENTIN 600 MG: 300 CAPSULE ORAL at 21:07

## 2024-12-01 RX ADMIN — ACETAMINOPHEN 500 MG: 500 TABLET ORAL at 10:37

## 2024-12-01 ASSESSMENT — PAIN DESCRIPTION - FREQUENCY
FREQUENCY: CONTINUOUS
FREQUENCY: INTERMITTENT

## 2024-12-01 ASSESSMENT — PAIN DESCRIPTION - ORIENTATION
ORIENTATION: LEFT
ORIENTATION: RIGHT;LEFT
ORIENTATION: RIGHT;LEFT
ORIENTATION: LEFT;LOWER;MID
ORIENTATION: LEFT;LOWER;MID
ORIENTATION: LEFT

## 2024-12-01 ASSESSMENT — PAIN SCALES - GENERAL
PAINLEVEL_OUTOF10: 4
PAINLEVEL_OUTOF10: 5
PAINLEVEL_OUTOF10: 6
PAINLEVEL_OUTOF10: 0
PAINLEVEL_OUTOF10: 0
PAINLEVEL_OUTOF10: 3
PAINLEVEL_OUTOF10: 6
PAINLEVEL_OUTOF10: 8
PAINLEVEL_OUTOF10: 8

## 2024-12-01 ASSESSMENT — PAIN DESCRIPTION - DESCRIPTORS
DESCRIPTORS: ACHING
DESCRIPTORS: ACHING;DISCOMFORT
DESCRIPTORS: ACHING
DESCRIPTORS: ACHING
DESCRIPTORS: THROBBING;DISCOMFORT
DESCRIPTORS: ACHING

## 2024-12-01 ASSESSMENT — PAIN DESCRIPTION - PAIN TYPE
TYPE: CHRONIC PAIN;NEUROPATHIC PAIN
TYPE: CHRONIC PAIN;NEUROPATHIC PAIN
TYPE: CHRONIC PAIN

## 2024-12-01 ASSESSMENT — PAIN DESCRIPTION - LOCATION
LOCATION: LEG
LOCATION: LEG
LOCATION: BACK
LOCATION: LEG
LOCATION: LEG
LOCATION: BACK

## 2024-12-01 ASSESSMENT — PAIN DESCRIPTION - ONSET
ONSET: ON-GOING
ONSET: AWAKENED FROM SLEEP
ONSET: ON-GOING
ONSET: ON-GOING

## 2024-12-01 ASSESSMENT — PAIN - FUNCTIONAL ASSESSMENT
PAIN_FUNCTIONAL_ASSESSMENT: PREVENTS OR INTERFERES SOME ACTIVE ACTIVITIES AND ADLS
PAIN_FUNCTIONAL_ASSESSMENT: PREVENTS OR INTERFERES SOME ACTIVE ACTIVITIES AND ADLS

## 2024-12-02 ENCOUNTER — TELEPHONE (OUTPATIENT)
Dept: INTERNAL MEDICINE CLINIC | Age: 88
End: 2024-12-02

## 2024-12-02 DIAGNOSIS — R32 URINARY INCONTINENCE, UNSPECIFIED TYPE: ICD-10-CM

## 2024-12-02 DIAGNOSIS — E66.9 TYPE 2 DIABETES MELLITUS WITH OBESITY (HCC): Primary | ICD-10-CM

## 2024-12-02 DIAGNOSIS — R26.2 UNABLE TO AMBULATE: ICD-10-CM

## 2024-12-02 DIAGNOSIS — E11.69 TYPE 2 DIABETES MELLITUS WITH OBESITY (HCC): Primary | ICD-10-CM

## 2024-12-02 DIAGNOSIS — E66.01 MORBIDLY OBESE: Chronic | ICD-10-CM

## 2024-12-02 LAB
ANION GAP SERPL CALCULATED.3IONS-SCNC: 10 MMOL/L (ref 3–16)
BASOPHILS # BLD: 0 K/UL (ref 0–0.2)
BASOPHILS NFR BLD: 0.4 %
BUN SERPL-MCNC: 15 MG/DL (ref 7–20)
CALCIUM SERPL-MCNC: 9.9 MG/DL (ref 8.3–10.6)
CHLORIDE SERPL-SCNC: 103 MMOL/L (ref 99–110)
CO2 SERPL-SCNC: 26 MMOL/L (ref 21–32)
CREAT SERPL-MCNC: 0.7 MG/DL (ref 0.6–1.2)
DEPRECATED RDW RBC AUTO: 12.9 % (ref 12.4–15.4)
EOSINOPHIL # BLD: 0.2 K/UL (ref 0–0.6)
EOSINOPHIL NFR BLD: 3.6 %
GFR SERPLBLD CREATININE-BSD FMLA CKD-EPI: 82 ML/MIN/{1.73_M2}
GLUCOSE BLD-MCNC: 117 MG/DL (ref 70–99)
GLUCOSE BLD-MCNC: 127 MG/DL (ref 70–99)
GLUCOSE BLD-MCNC: 150 MG/DL (ref 70–99)
GLUCOSE BLD-MCNC: 151 MG/DL (ref 70–99)
GLUCOSE SERPL-MCNC: 119 MG/DL (ref 70–99)
HCT VFR BLD AUTO: 35 % (ref 36–48)
HGB BLD-MCNC: 10.8 G/DL (ref 12–16)
LYMPHOCYTES # BLD: 2.1 K/UL (ref 1–5.1)
LYMPHOCYTES NFR BLD: 41.2 %
MCH RBC QN AUTO: 25.1 PG (ref 26–34)
MCHC RBC AUTO-ENTMCNC: 30.9 G/DL (ref 31–36)
MCV RBC AUTO: 81.1 FL (ref 80–100)
MONOCYTES # BLD: 0.5 K/UL (ref 0–1.3)
MONOCYTES NFR BLD: 9 %
NEUTROPHILS # BLD: 2.3 K/UL (ref 1.7–7.7)
NEUTROPHILS NFR BLD: 45.8 %
PERFORMED ON: ABNORMAL
PLATELET # BLD AUTO: 231 K/UL (ref 135–450)
PMV BLD AUTO: 9.2 FL (ref 5–10.5)
POTASSIUM SERPL-SCNC: 4.9 MMOL/L (ref 3.5–5.1)
RBC # BLD AUTO: 4.31 M/UL (ref 4–5.2)
SODIUM SERPL-SCNC: 139 MMOL/L (ref 136–145)
WBC # BLD AUTO: 5.1 K/UL (ref 4–11)

## 2024-12-02 PROCEDURE — 36415 COLL VENOUS BLD VENIPUNCTURE: CPT

## 2024-12-02 PROCEDURE — 99232 SBSQ HOSP IP/OBS MODERATE 35: CPT | Performed by: HOSPITALIST

## 2024-12-02 PROCEDURE — 6370000000 HC RX 637 (ALT 250 FOR IP)

## 2024-12-02 PROCEDURE — 80048 BASIC METABOLIC PNL TOTAL CA: CPT

## 2024-12-02 PROCEDURE — 97530 THERAPEUTIC ACTIVITIES: CPT

## 2024-12-02 PROCEDURE — 6360000002 HC RX W HCPCS

## 2024-12-02 PROCEDURE — 2580000003 HC RX 258

## 2024-12-02 PROCEDURE — 1200000000 HC SEMI PRIVATE

## 2024-12-02 PROCEDURE — 6370000000 HC RX 637 (ALT 250 FOR IP): Performed by: EMERGENCY MEDICINE

## 2024-12-02 PROCEDURE — 97535 SELF CARE MNGMENT TRAINING: CPT

## 2024-12-02 PROCEDURE — 97116 GAIT TRAINING THERAPY: CPT

## 2024-12-02 PROCEDURE — 85025 COMPLETE CBC W/AUTO DIFF WBC: CPT

## 2024-12-02 RX ADMIN — SPIRONOLACTONE 25 MG: 25 TABLET ORAL at 09:58

## 2024-12-02 RX ADMIN — METOPROLOL TARTRATE 50 MG: 50 TABLET, FILM COATED ORAL at 20:25

## 2024-12-02 RX ADMIN — METOPROLOL TARTRATE 50 MG: 50 TABLET, FILM COATED ORAL at 09:58

## 2024-12-02 RX ADMIN — TRAMADOL HYDROCHLORIDE 50 MG: 50 TABLET, COATED ORAL at 20:26

## 2024-12-02 RX ADMIN — LEVOTHYROXINE SODIUM 100 MCG: 0.1 TABLET ORAL at 10:27

## 2024-12-02 RX ADMIN — SODIUM CHLORIDE, PRESERVATIVE FREE 10 ML: 5 INJECTION INTRAVENOUS at 20:30

## 2024-12-02 RX ADMIN — HYDROCHLOROTHIAZIDE 25 MG: 25 TABLET ORAL at 09:59

## 2024-12-02 RX ADMIN — SODIUM CHLORIDE, PRESERVATIVE FREE 10 ML: 5 INJECTION INTRAVENOUS at 10:01

## 2024-12-02 RX ADMIN — GABAPENTIN 600 MG: 300 CAPSULE ORAL at 20:25

## 2024-12-02 RX ADMIN — GABAPENTIN 600 MG: 300 CAPSULE ORAL at 14:29

## 2024-12-02 RX ADMIN — PROMETHAZINE HYDROCHLORIDE 12.5 MG: 12.5 TABLET ORAL at 00:41

## 2024-12-02 RX ADMIN — ACETAMINOPHEN 500 MG: 500 TABLET ORAL at 06:18

## 2024-12-02 RX ADMIN — TRAMADOL HYDROCHLORIDE 50 MG: 50 TABLET, COATED ORAL at 09:58

## 2024-12-02 RX ADMIN — ACETAMINOPHEN 500 MG: 500 TABLET ORAL at 11:35

## 2024-12-02 RX ADMIN — ACETAMINOPHEN 500 MG: 500 TABLET ORAL at 17:07

## 2024-12-02 RX ADMIN — DULOXETINE HYDROCHLORIDE 30 MG: 30 CAPSULE, DELAYED RELEASE ORAL at 09:58

## 2024-12-02 RX ADMIN — ACETAMINOPHEN 500 MG: 500 TABLET ORAL at 22:03

## 2024-12-02 RX ADMIN — SPIRONOLACTONE 25 MG: 25 TABLET ORAL at 20:26

## 2024-12-02 RX ADMIN — ENOXAPARIN SODIUM 40 MG: 100 INJECTION SUBCUTANEOUS at 09:59

## 2024-12-02 RX ADMIN — AMOXICILLIN AND CLAVULANATE POTASSIUM 1 TABLET: 875; 125 TABLET, FILM COATED ORAL at 10:01

## 2024-12-02 RX ADMIN — GABAPENTIN 600 MG: 300 CAPSULE ORAL at 10:01

## 2024-12-02 RX ADMIN — PANTOPRAZOLE SODIUM 20 MG: 20 TABLET, DELAYED RELEASE ORAL at 06:18

## 2024-12-02 RX ADMIN — AMOXICILLIN AND CLAVULANATE POTASSIUM 1 TABLET: 875; 125 TABLET, FILM COATED ORAL at 20:24

## 2024-12-02 RX ADMIN — TRAZODONE HYDROCHLORIDE 100 MG: 100 TABLET ORAL at 20:25

## 2024-12-02 ASSESSMENT — PAIN DESCRIPTION - ORIENTATION
ORIENTATION: LEFT
ORIENTATION: OTHER (COMMENT)

## 2024-12-02 ASSESSMENT — PAIN SCALES - GENERAL
PAINLEVEL_OUTOF10: 6
PAINLEVEL_OUTOF10: 7
PAINLEVEL_OUTOF10: 7
PAINLEVEL_OUTOF10: 8

## 2024-12-02 ASSESSMENT — PAIN DESCRIPTION - LOCATION
LOCATION: LEG
LOCATION: BACK;LEG
LOCATION: GENERALIZED;BACK;LEG
LOCATION: GENERALIZED

## 2024-12-02 ASSESSMENT — PAIN - FUNCTIONAL ASSESSMENT
PAIN_FUNCTIONAL_ASSESSMENT: PREVENTS OR INTERFERES SOME ACTIVE ACTIVITIES AND ADLS

## 2024-12-02 ASSESSMENT — PAIN DESCRIPTION - DESCRIPTORS
DESCRIPTORS: DISCOMFORT;ACHING
DESCRIPTORS: SHOOTING
DESCRIPTORS: SHARP

## 2024-12-02 ASSESSMENT — PAIN DESCRIPTION - FREQUENCY: FREQUENCY: INTERMITTENT

## 2024-12-02 NOTE — TELEPHONE ENCOUNTER
Karma from [facility] last week they received and accepted the referral for Home health care for the pt but once the insurance was ran it came back as a aetna commercial plan which they are not in network with.    Karma gave recommendations for ones in the pts network which is [facility] home care, [facility] and also [facility].

## 2024-12-02 NOTE — DISCHARGE INSTR - COC
Continuity of Care Form    Patient Name: Mounika Leahy   :  1935  MRN:  [id number]    Admit date:  2024  Discharge date:  ***    Code Status Order: Limited   Advance Directives:   Advance Care Flowsheet Documentation             Admitting Physician:  Kaden Hogue MD  PCP: Maribel De MD    Discharging Nurse: ***  Discharging Hospital Unit/Room#: 6313/6313-01  Discharging Unit Phone Number: ***    Emergency Contact:   Extended Emergency Contact Information  Primary Emergency Contact: Jose Leahy           [facility]  Home Phone: [phone]  Relation: Child  Secondary Emergency Contact: Jorge Leahy           [facility]  Home Phone: [phone]  Relation: Child    Past Surgical History:  Past Surgical History:   Procedure Laterality Date    APPENDECTOMY       SECTION      X1    CYSTOSCOPY  2015    urethral dilitation    HYSTERECTOMY (CERVIX STATUS UNKNOWN)      OTHER SURGICAL HISTORY  2017    Ear cleaning under anesthesia    WA LAPS SURG CHOLECYSTECTOMY W/CHOLANGIOGRAPHY N/A 2018    LAPAROSCOPIC CHOLECYSTECTOMY WITH CHOLANGIOGRAM performed by Regulo Schmidt MD at [facility] OR    UPPER GASTROINTESTINAL ENDOSCOPY N/A 2018    EGD BIOPSY performed by Juanito Howard MD at [facility] ENDOSCOPY       Immunization History:   Immunization History   Administered Date(s) Administered    COVID-19, PFIZER PURPLE top, DILUTE for use, (age 12 y+), 30mcg/0.3mL 2021, 2021    Influenza Vaccine, unspecified formulation 10/05/2013, 2014, 2015, 10/07/2016    Influenza Virus Vaccine 10/05/2013, 2014, 2015, 10/07/2016    Influenza Whole 2010    Influenza, FLUAD, (age 65 y+), IM, Quadv, 0.5mL 10/23/2020, 2021, 2023, 2024    Influenza, FLUAD, (age 65 y+), IM, Trivalent PF, 0.5mL 2019    Influenza, FLUZONE High Dose, (age 65 y+), IM, Trivalent PF, 0.5mL 2012,

## 2024-12-02 NOTE — TELEPHONE ENCOUNTER
After-hours patient call.  Patient complains of severe back and abdominal pain.  Encourage patient to present to the emergency room for full and proper evaluation.  Patient states she will do this.

## 2024-12-02 NOTE — CARE COORDINATION
CM:  spoke with pt  and son Jose  both  agreeable to  SNF :    CM  spoke with Candi in admissions is starting pre cert:  pt needs  updated PTOT notes.       Electronically signed by Rox Morley RN on 12/2/2024 at 11:40 AM   +++++++++++++++++++++++++++++++++    CM  following for  d/c planning:    Patient interested in  SNF  at  d/c  , Patient will need  [employer] pre cert  ,  CM  faxed referral to  [facility]:    [facility]  Services: Skilled Nursing  [address] [place] [facility] [place] [address]  [phone]      Admissions  [phone]    If able to accept  will have them start  pre cert.     Electronically signed by Rxo Morley RN on 12/2/2024 at 8:31 AM         Rox Morley RN Case Manager  [facility]  [address]  [facility] [id number] [phone]  Fax [phone]

## 2024-12-03 LAB
ANION GAP SERPL CALCULATED.3IONS-SCNC: 10 MMOL/L (ref 3–16)
BASOPHILS # BLD: 0 K/UL (ref 0–0.2)
BASOPHILS NFR BLD: 0.3 %
BUN SERPL-MCNC: 17 MG/DL (ref 7–20)
CALCIUM SERPL-MCNC: 10.3 MG/DL (ref 8.3–10.6)
CHLORIDE SERPL-SCNC: 102 MMOL/L (ref 99–110)
CO2 SERPL-SCNC: 28 MMOL/L (ref 21–32)
CREAT SERPL-MCNC: 0.8 MG/DL (ref 0.6–1.2)
DEPRECATED RDW RBC AUTO: 13.3 % (ref 12.4–15.4)
EOSINOPHIL # BLD: 0.3 K/UL (ref 0–0.6)
EOSINOPHIL NFR BLD: 4.6 %
GFR SERPLBLD CREATININE-BSD FMLA CKD-EPI: 70 ML/MIN/{1.73_M2}
GLUCOSE BLD-MCNC: 132 MG/DL (ref 70–99)
GLUCOSE BLD-MCNC: 153 MG/DL (ref 70–99)
GLUCOSE SERPL-MCNC: 116 MG/DL (ref 70–99)
HCT VFR BLD AUTO: 35.2 % (ref 36–48)
HGB BLD-MCNC: 11 G/DL (ref 12–16)
LYMPHOCYTES # BLD: 2.6 K/UL (ref 1–5.1)
LYMPHOCYTES NFR BLD: 46.4 %
MCH RBC QN AUTO: 25.1 PG (ref 26–34)
MCHC RBC AUTO-ENTMCNC: 31.3 G/DL (ref 31–36)
MCV RBC AUTO: 80.1 FL (ref 80–100)
MONOCYTES # BLD: 0.5 K/UL (ref 0–1.3)
MONOCYTES NFR BLD: 8.2 %
NEUTROPHILS # BLD: 2.3 K/UL (ref 1.7–7.7)
NEUTROPHILS NFR BLD: 40.5 %
PERFORMED ON: ABNORMAL
PERFORMED ON: ABNORMAL
PLATELET # BLD AUTO: 233 K/UL (ref 135–450)
PMV BLD AUTO: 8.6 FL (ref 5–10.5)
POTASSIUM SERPL-SCNC: 4.7 MMOL/L (ref 3.5–5.1)
RBC # BLD AUTO: 4.39 M/UL (ref 4–5.2)
SODIUM SERPL-SCNC: 140 MMOL/L (ref 136–145)
WBC # BLD AUTO: 5.7 K/UL (ref 4–11)

## 2024-12-03 PROCEDURE — 6370000000 HC RX 637 (ALT 250 FOR IP)

## 2024-12-03 PROCEDURE — 1200000000 HC SEMI PRIVATE

## 2024-12-03 PROCEDURE — 99232 SBSQ HOSP IP/OBS MODERATE 35: CPT | Performed by: HOSPITALIST

## 2024-12-03 PROCEDURE — 80048 BASIC METABOLIC PNL TOTAL CA: CPT

## 2024-12-03 PROCEDURE — 36415 COLL VENOUS BLD VENIPUNCTURE: CPT

## 2024-12-03 PROCEDURE — 85025 COMPLETE CBC W/AUTO DIFF WBC: CPT

## 2024-12-03 PROCEDURE — 2580000003 HC RX 258

## 2024-12-03 PROCEDURE — 6370000000 HC RX 637 (ALT 250 FOR IP): Performed by: EMERGENCY MEDICINE

## 2024-12-03 PROCEDURE — 6360000002 HC RX W HCPCS

## 2024-12-03 RX ADMIN — LEVOTHYROXINE SODIUM 100 MCG: 0.1 TABLET ORAL at 08:20

## 2024-12-03 RX ADMIN — SPIRONOLACTONE 25 MG: 25 TABLET ORAL at 19:55

## 2024-12-03 RX ADMIN — SPIRONOLACTONE 25 MG: 25 TABLET ORAL at 08:20

## 2024-12-03 RX ADMIN — HYDROCHLOROTHIAZIDE 25 MG: 25 TABLET ORAL at 08:20

## 2024-12-03 RX ADMIN — GABAPENTIN 600 MG: 300 CAPSULE ORAL at 08:20

## 2024-12-03 RX ADMIN — AMOXICILLIN AND CLAVULANATE POTASSIUM 1 TABLET: 875; 125 TABLET, FILM COATED ORAL at 19:55

## 2024-12-03 RX ADMIN — ACETAMINOPHEN 500 MG: 500 TABLET ORAL at 13:12

## 2024-12-03 RX ADMIN — TRAZODONE HYDROCHLORIDE 100 MG: 100 TABLET ORAL at 19:55

## 2024-12-03 RX ADMIN — GABAPENTIN 600 MG: 300 CAPSULE ORAL at 19:55

## 2024-12-03 RX ADMIN — AMOXICILLIN AND CLAVULANATE POTASSIUM 1 TABLET: 875; 125 TABLET, FILM COATED ORAL at 08:20

## 2024-12-03 RX ADMIN — TRAMADOL HYDROCHLORIDE 50 MG: 50 TABLET, COATED ORAL at 17:25

## 2024-12-03 RX ADMIN — ONDANSETRON 4 MG: 4 TABLET, ORALLY DISINTEGRATING ORAL at 02:34

## 2024-12-03 RX ADMIN — METOPROLOL TARTRATE 50 MG: 50 TABLET, FILM COATED ORAL at 08:20

## 2024-12-03 RX ADMIN — PANTOPRAZOLE SODIUM 20 MG: 20 TABLET, DELAYED RELEASE ORAL at 05:34

## 2024-12-03 RX ADMIN — ACETAMINOPHEN 500 MG: 500 TABLET ORAL at 19:57

## 2024-12-03 RX ADMIN — ENOXAPARIN SODIUM 40 MG: 100 INJECTION SUBCUTANEOUS at 08:22

## 2024-12-03 RX ADMIN — GABAPENTIN 600 MG: 300 CAPSULE ORAL at 13:13

## 2024-12-03 RX ADMIN — DULOXETINE HYDROCHLORIDE 30 MG: 30 CAPSULE, DELAYED RELEASE ORAL at 08:22

## 2024-12-03 RX ADMIN — METOPROLOL TARTRATE 50 MG: 50 TABLET, FILM COATED ORAL at 19:55

## 2024-12-03 RX ADMIN — ACETAMINOPHEN 500 MG: 500 TABLET ORAL at 05:34

## 2024-12-03 RX ADMIN — SODIUM CHLORIDE, PRESERVATIVE FREE 10 ML: 5 INJECTION INTRAVENOUS at 19:56

## 2024-12-03 ASSESSMENT — PAIN DESCRIPTION - LOCATION
LOCATION: LEG
LOCATION: LEG

## 2024-12-03 ASSESSMENT — PAIN DESCRIPTION - ORIENTATION
ORIENTATION: LEFT
ORIENTATION: LEFT

## 2024-12-03 ASSESSMENT — PAIN DESCRIPTION - FREQUENCY
FREQUENCY: INTERMITTENT
FREQUENCY: INTERMITTENT

## 2024-12-03 ASSESSMENT — PAIN SCALES - GENERAL
PAINLEVEL_OUTOF10: 7

## 2024-12-03 ASSESSMENT — PAIN DESCRIPTION - PAIN TYPE
TYPE: CHRONIC PAIN
TYPE: CHRONIC PAIN

## 2024-12-03 ASSESSMENT — PAIN DESCRIPTION - ONSET
ONSET: ON-GOING
ONSET: PROGRESSIVE

## 2024-12-03 ASSESSMENT — PAIN DESCRIPTION - DIRECTION
RADIATING_TOWARDS: KNEE
RADIATING_TOWARDS: KNEE

## 2024-12-03 ASSESSMENT — PAIN DESCRIPTION - DESCRIPTORS
DESCRIPTORS: SHARP
DESCRIPTORS: SHARP

## 2024-12-03 NOTE — CARE COORDINATION
11:52 AM    Pre-cert went p2p, MD will need to call [phone] option 4 and provide name  and member ID. SW perfect served md.     SANDHYA updated pt.     SANDHYA following.   Electronically signed by KARINA Buckley, GALW, TAMERA on 12/3/2024 at 11:52 AM  [phone].

## 2024-12-03 NOTE — CARE COORDINATION
ALICE  recv'd VM  from Candi in admissions  [phone] from [facility]     Re: Patient BC  who moved to Room # 3309  -  regarding a  pre cert denied/  requesting PTP  by  4 pm today   MD to call  [phone], select  Option # 4 .     CM  forwarded  Message and teams current    Candi V. Re  needs.      SW  acknowledged   Electronically signed by Rox Morley RN on 12/3/2024 at 11:52 AM       Rox Morley RN Case Manager  [facility] [address]  [facility] [id number] [phone]  Fax [phone]

## 2024-12-04 LAB
ANION GAP SERPL CALCULATED.3IONS-SCNC: 10 MMOL/L (ref 3–16)
BASOPHILS # BLD: 0 K/UL (ref 0–0.2)
BASOPHILS NFR BLD: 0.5 %
BUN SERPL-MCNC: 21 MG/DL (ref 7–20)
CALCIUM SERPL-MCNC: 9.9 MG/DL (ref 8.3–10.6)
CHLORIDE SERPL-SCNC: 101 MMOL/L (ref 99–110)
CO2 SERPL-SCNC: 29 MMOL/L (ref 21–32)
CREAT SERPL-MCNC: 0.9 MG/DL (ref 0.6–1.2)
DEPRECATED RDW RBC AUTO: 13.2 % (ref 12.4–15.4)
EOSINOPHIL # BLD: 0.2 K/UL (ref 0–0.6)
EOSINOPHIL NFR BLD: 4.8 %
GFR SERPLBLD CREATININE-BSD FMLA CKD-EPI: 61 ML/MIN/{1.73_M2}
GLUCOSE BLD-MCNC: 156 MG/DL (ref 70–99)
GLUCOSE BLD-MCNC: 179 MG/DL (ref 70–99)
GLUCOSE SERPL-MCNC: 126 MG/DL (ref 70–99)
HCT VFR BLD AUTO: 34 % (ref 36–48)
HGB BLD-MCNC: 10.5 G/DL (ref 12–16)
LYMPHOCYTES # BLD: 2.3 K/UL (ref 1–5.1)
LYMPHOCYTES NFR BLD: 45.8 %
MCH RBC QN AUTO: 25 PG (ref 26–34)
MCHC RBC AUTO-ENTMCNC: 31 G/DL (ref 31–36)
MCV RBC AUTO: 80.5 FL (ref 80–100)
MONOCYTES # BLD: 0.4 K/UL (ref 0–1.3)
MONOCYTES NFR BLD: 7.6 %
NEUTROPHILS # BLD: 2.1 K/UL (ref 1.7–7.7)
NEUTROPHILS NFR BLD: 41.3 %
PERFORMED ON: ABNORMAL
PERFORMED ON: ABNORMAL
PLATELET # BLD AUTO: 215 K/UL (ref 135–450)
PMV BLD AUTO: 9 FL (ref 5–10.5)
POTASSIUM SERPL-SCNC: 4.6 MMOL/L (ref 3.5–5.1)
RBC # BLD AUTO: 4.22 M/UL (ref 4–5.2)
SODIUM SERPL-SCNC: 140 MMOL/L (ref 136–145)
WBC # BLD AUTO: 5.1 K/UL (ref 4–11)

## 2024-12-04 PROCEDURE — 2580000003 HC RX 258

## 2024-12-04 PROCEDURE — 1200000000 HC SEMI PRIVATE

## 2024-12-04 PROCEDURE — 6360000002 HC RX W HCPCS

## 2024-12-04 PROCEDURE — 85025 COMPLETE CBC W/AUTO DIFF WBC: CPT

## 2024-12-04 PROCEDURE — 80048 BASIC METABOLIC PNL TOTAL CA: CPT

## 2024-12-04 PROCEDURE — 6370000000 HC RX 637 (ALT 250 FOR IP)

## 2024-12-04 PROCEDURE — 6370000000 HC RX 637 (ALT 250 FOR IP): Performed by: EMERGENCY MEDICINE

## 2024-12-04 PROCEDURE — 99232 SBSQ HOSP IP/OBS MODERATE 35: CPT | Performed by: HOSPITALIST

## 2024-12-04 PROCEDURE — 97116 GAIT TRAINING THERAPY: CPT

## 2024-12-04 PROCEDURE — 36415 COLL VENOUS BLD VENIPUNCTURE: CPT

## 2024-12-04 PROCEDURE — 97530 THERAPEUTIC ACTIVITIES: CPT

## 2024-12-04 RX ADMIN — SPIRONOLACTONE 25 MG: 25 TABLET ORAL at 09:51

## 2024-12-04 RX ADMIN — METOPROLOL TARTRATE 50 MG: 50 TABLET, FILM COATED ORAL at 09:51

## 2024-12-04 RX ADMIN — TRAMADOL HYDROCHLORIDE 50 MG: 50 TABLET, COATED ORAL at 20:55

## 2024-12-04 RX ADMIN — HYDROCHLOROTHIAZIDE 25 MG: 25 TABLET ORAL at 09:51

## 2024-12-04 RX ADMIN — TRAZODONE HYDROCHLORIDE 100 MG: 100 TABLET ORAL at 20:55

## 2024-12-04 RX ADMIN — LEVOTHYROXINE SODIUM 100 MCG: 0.1 TABLET ORAL at 09:51

## 2024-12-04 RX ADMIN — SODIUM CHLORIDE, PRESERVATIVE FREE 10 ML: 5 INJECTION INTRAVENOUS at 20:56

## 2024-12-04 RX ADMIN — ACETAMINOPHEN 500 MG: 500 TABLET ORAL at 09:50

## 2024-12-04 RX ADMIN — GABAPENTIN 600 MG: 300 CAPSULE ORAL at 09:38

## 2024-12-04 RX ADMIN — TRAMADOL HYDROCHLORIDE 50 MG: 50 TABLET, COATED ORAL at 13:29

## 2024-12-04 RX ADMIN — PANTOPRAZOLE SODIUM 20 MG: 20 TABLET, DELAYED RELEASE ORAL at 05:32

## 2024-12-04 RX ADMIN — AMOXICILLIN AND CLAVULANATE POTASSIUM 1 TABLET: 875; 125 TABLET, FILM COATED ORAL at 09:51

## 2024-12-04 RX ADMIN — GABAPENTIN 600 MG: 300 CAPSULE ORAL at 20:55

## 2024-12-04 RX ADMIN — AMOXICILLIN AND CLAVULANATE POTASSIUM 1 TABLET: 875; 125 TABLET, FILM COATED ORAL at 20:55

## 2024-12-04 RX ADMIN — ACETAMINOPHEN 500 MG: 500 TABLET ORAL at 20:55

## 2024-12-04 RX ADMIN — GABAPENTIN 600 MG: 300 CAPSULE ORAL at 13:29

## 2024-12-04 RX ADMIN — TRAMADOL HYDROCHLORIDE 50 MG: 50 TABLET, COATED ORAL at 05:32

## 2024-12-04 RX ADMIN — ENOXAPARIN SODIUM 40 MG: 100 INJECTION SUBCUTANEOUS at 09:51

## 2024-12-04 RX ADMIN — DULOXETINE HYDROCHLORIDE 30 MG: 30 CAPSULE, DELAYED RELEASE ORAL at 09:58

## 2024-12-04 RX ADMIN — ACETAMINOPHEN 500 MG: 500 TABLET ORAL at 03:21

## 2024-12-04 RX ADMIN — METOPROLOL TARTRATE 50 MG: 50 TABLET, FILM COATED ORAL at 20:55

## 2024-12-04 RX ADMIN — SPIRONOLACTONE 25 MG: 25 TABLET ORAL at 20:55

## 2024-12-04 ASSESSMENT — PAIN SCALES - GENERAL
PAINLEVEL_OUTOF10: 7

## 2024-12-04 ASSESSMENT — PAIN DESCRIPTION - LOCATION: LOCATION: LEG

## 2024-12-04 ASSESSMENT — PAIN - FUNCTIONAL ASSESSMENT: PAIN_FUNCTIONAL_ASSESSMENT: ACTIVITIES ARE NOT PREVENTED

## 2024-12-04 ASSESSMENT — PAIN DESCRIPTION - FREQUENCY: FREQUENCY: INTERMITTENT

## 2024-12-04 ASSESSMENT — PAIN DESCRIPTION - ORIENTATION: ORIENTATION: LEFT

## 2024-12-04 ASSESSMENT — PAIN DESCRIPTION - ONSET: ONSET: GRADUAL

## 2024-12-04 ASSESSMENT — PAIN DESCRIPTION - DIRECTION: RADIATING_TOWARDS: DOWN LEG

## 2024-12-04 ASSESSMENT — PAIN DESCRIPTION - PAIN TYPE: TYPE: CHRONIC PAIN

## 2024-12-04 ASSESSMENT — PAIN DESCRIPTION - DESCRIPTORS: DESCRIPTORS: ACHING

## 2024-12-04 ASSESSMENT — PAIN SCALES - WONG BAKER: WONGBAKER_NUMERICALRESPONSE: NO HURT

## 2024-12-04 NOTE — CARE COORDINATION
CTN spoke with CM/SW this morning.  Patient with recurrent UTI's, HTN and some BLE edema.  PT/OT recommending SNF placement, was denied by insurance.  P2P was done, denial upheld, per CM/SW, plan is to appeal decision as PT/OT worked with patient again and are still requiring SNF placement.  CTN will continue to follow.    Thank You,    Elizabeth Rivera RN  Care Transition Coordinator  Contact Number:[phone]

## 2024-12-04 NOTE — CARE COORDINATION
DISCHARGE PLANNIN  Chart reviewed.  Patient is from home with her son and no current services. She has a walker for short distances and a wheelchair for longer distances.   Her son recently had heart and back problems occur so he cannot help her at home.    Discharge plan is [facility]. Insurance denied P2P yesterday per Dr. Cornelius due to patient being able to walk 30ft with walker yesterday.  I met with patient at bedside to discuss. She would like to appeal that decision. That process will be started today.  Therapy to see patient again today, if she has improved enough to go home then we will set up [facility].    UPDATE: 3584  I talked with admission liaison at [facility] ([phone]) who was informed of the above information. AOR form was signed and faxed to [facility] at [phone] so appeal process could be started.    UPDATE: 1623  A liaison from [facility] today stopped by to see patient. She informed me that appeal was started by [facility].    CM team will continue to follow.  Marleny Orta RN Case Manager  [phone]

## 2024-12-05 LAB
ANION GAP SERPL CALCULATED.3IONS-SCNC: 9 MMOL/L (ref 3–16)
BASOPHILS # BLD: 0 K/UL (ref 0–0.2)
BASOPHILS NFR BLD: 0.5 %
BUN SERPL-MCNC: 22 MG/DL (ref 7–20)
CALCIUM SERPL-MCNC: 10.1 MG/DL (ref 8.3–10.6)
CHLORIDE SERPL-SCNC: 101 MMOL/L (ref 99–110)
CO2 SERPL-SCNC: 29 MMOL/L (ref 21–32)
CREAT SERPL-MCNC: 0.8 MG/DL (ref 0.6–1.2)
DEPRECATED RDW RBC AUTO: 13.4 % (ref 12.4–15.4)
EOSINOPHIL # BLD: 0.2 K/UL (ref 0–0.6)
EOSINOPHIL NFR BLD: 3.4 %
GFR SERPLBLD CREATININE-BSD FMLA CKD-EPI: 70 ML/MIN/{1.73_M2}
GLUCOSE BLD-MCNC: 153 MG/DL (ref 70–99)
GLUCOSE BLD-MCNC: 159 MG/DL (ref 70–99)
GLUCOSE BLD-MCNC: 251 MG/DL (ref 70–99)
GLUCOSE SERPL-MCNC: 116 MG/DL (ref 70–99)
HCT VFR BLD AUTO: 36.5 % (ref 36–48)
HGB BLD-MCNC: 11 G/DL (ref 12–16)
LYMPHOCYTES # BLD: 2.4 K/UL (ref 1–5.1)
LYMPHOCYTES NFR BLD: 43.6 %
MCH RBC QN AUTO: 24.4 PG (ref 26–34)
MCHC RBC AUTO-ENTMCNC: 30.1 G/DL (ref 31–36)
MCV RBC AUTO: 80.9 FL (ref 80–100)
MONOCYTES # BLD: 0.4 K/UL (ref 0–1.3)
MONOCYTES NFR BLD: 7 %
NEUTROPHILS # BLD: 2.5 K/UL (ref 1.7–7.7)
NEUTROPHILS NFR BLD: 45.5 %
PERFORMED ON: ABNORMAL
PLATELET # BLD AUTO: 228 K/UL (ref 135–450)
PMV BLD AUTO: 8.9 FL (ref 5–10.5)
POTASSIUM SERPL-SCNC: 4.6 MMOL/L (ref 3.5–5.1)
RBC # BLD AUTO: 4.51 M/UL (ref 4–5.2)
SODIUM SERPL-SCNC: 139 MMOL/L (ref 136–145)
WBC # BLD AUTO: 5.4 K/UL (ref 4–11)

## 2024-12-05 PROCEDURE — 99233 SBSQ HOSP IP/OBS HIGH 50: CPT | Performed by: HOSPITALIST

## 2024-12-05 PROCEDURE — 6360000002 HC RX W HCPCS

## 2024-12-05 PROCEDURE — 6370000000 HC RX 637 (ALT 250 FOR IP)

## 2024-12-05 PROCEDURE — 36415 COLL VENOUS BLD VENIPUNCTURE: CPT

## 2024-12-05 PROCEDURE — 6370000000 HC RX 637 (ALT 250 FOR IP): Performed by: EMERGENCY MEDICINE

## 2024-12-05 PROCEDURE — 2580000003 HC RX 258

## 2024-12-05 PROCEDURE — 1200000000 HC SEMI PRIVATE

## 2024-12-05 PROCEDURE — 80048 BASIC METABOLIC PNL TOTAL CA: CPT

## 2024-12-05 PROCEDURE — 85025 COMPLETE CBC W/AUTO DIFF WBC: CPT

## 2024-12-05 RX ADMIN — SODIUM CHLORIDE, PRESERVATIVE FREE 10 ML: 5 INJECTION INTRAVENOUS at 08:54

## 2024-12-05 RX ADMIN — DULOXETINE HYDROCHLORIDE 30 MG: 30 CAPSULE, DELAYED RELEASE ORAL at 08:52

## 2024-12-05 RX ADMIN — AMOXICILLIN AND CLAVULANATE POTASSIUM 1 TABLET: 875; 125 TABLET, FILM COATED ORAL at 19:45

## 2024-12-05 RX ADMIN — ACETAMINOPHEN 500 MG: 500 TABLET ORAL at 15:16

## 2024-12-05 RX ADMIN — ONDANSETRON 4 MG: 4 TABLET, ORALLY DISINTEGRATING ORAL at 15:15

## 2024-12-05 RX ADMIN — SPIRONOLACTONE 25 MG: 25 TABLET ORAL at 08:53

## 2024-12-05 RX ADMIN — LEVOTHYROXINE SODIUM 100 MCG: 0.1 TABLET ORAL at 08:53

## 2024-12-05 RX ADMIN — TRAMADOL HYDROCHLORIDE 50 MG: 50 TABLET, COATED ORAL at 08:52

## 2024-12-05 RX ADMIN — INSULIN LISPRO 2 UNITS: 100 INJECTION, SOLUTION INTRAVENOUS; SUBCUTANEOUS at 19:45

## 2024-12-05 RX ADMIN — ACETAMINOPHEN 500 MG: 500 TABLET ORAL at 19:46

## 2024-12-05 RX ADMIN — SPIRONOLACTONE 25 MG: 25 TABLET ORAL at 19:45

## 2024-12-05 RX ADMIN — ENOXAPARIN SODIUM 40 MG: 100 INJECTION SUBCUTANEOUS at 08:52

## 2024-12-05 RX ADMIN — ONDANSETRON 4 MG: 2 INJECTION INTRAMUSCULAR; INTRAVENOUS at 05:17

## 2024-12-05 RX ADMIN — GABAPENTIN 600 MG: 300 CAPSULE ORAL at 15:16

## 2024-12-05 RX ADMIN — METOPROLOL TARTRATE 50 MG: 50 TABLET, FILM COATED ORAL at 08:52

## 2024-12-05 RX ADMIN — SODIUM CHLORIDE, PRESERVATIVE FREE 10 ML: 5 INJECTION INTRAVENOUS at 19:46

## 2024-12-05 RX ADMIN — PROMETHAZINE HYDROCHLORIDE 12.5 MG: 12.5 TABLET ORAL at 11:20

## 2024-12-05 RX ADMIN — ACETAMINOPHEN 500 MG: 500 TABLET ORAL at 08:53

## 2024-12-05 RX ADMIN — PANTOPRAZOLE SODIUM 20 MG: 20 TABLET, DELAYED RELEASE ORAL at 05:17

## 2024-12-05 RX ADMIN — TRAMADOL HYDROCHLORIDE 50 MG: 50 TABLET, COATED ORAL at 19:48

## 2024-12-05 RX ADMIN — HYDROCHLOROTHIAZIDE 25 MG: 25 TABLET ORAL at 08:53

## 2024-12-05 RX ADMIN — GABAPENTIN 600 MG: 300 CAPSULE ORAL at 19:45

## 2024-12-05 RX ADMIN — GABAPENTIN 600 MG: 300 CAPSULE ORAL at 08:53

## 2024-12-05 RX ADMIN — AMOXICILLIN AND CLAVULANATE POTASSIUM 1 TABLET: 875; 125 TABLET, FILM COATED ORAL at 08:53

## 2024-12-05 RX ADMIN — METOPROLOL TARTRATE 50 MG: 50 TABLET, FILM COATED ORAL at 19:45

## 2024-12-05 RX ADMIN — TRAZODONE HYDROCHLORIDE 100 MG: 100 TABLET ORAL at 19:45

## 2024-12-05 ASSESSMENT — PAIN DESCRIPTION - FREQUENCY
FREQUENCY: INTERMITTENT
FREQUENCY: INTERMITTENT

## 2024-12-05 ASSESSMENT — PAIN SCALES - GENERAL
PAINLEVEL_OUTOF10: 3
PAINLEVEL_OUTOF10: 2
PAINLEVEL_OUTOF10: 8
PAINLEVEL_OUTOF10: 7

## 2024-12-05 ASSESSMENT — PAIN DESCRIPTION - DESCRIPTORS
DESCRIPTORS: ACHING
DESCRIPTORS: ACHING

## 2024-12-05 ASSESSMENT — PAIN DESCRIPTION - ORIENTATION
ORIENTATION: MID
ORIENTATION: LOWER
ORIENTATION: LOWER

## 2024-12-05 ASSESSMENT — PAIN DESCRIPTION - ONSET
ONSET: GRADUAL
ONSET: GRADUAL

## 2024-12-05 ASSESSMENT — PAIN - FUNCTIONAL ASSESSMENT
PAIN_FUNCTIONAL_ASSESSMENT: PREVENTS OR INTERFERES SOME ACTIVE ACTIVITIES AND ADLS

## 2024-12-05 ASSESSMENT — PAIN DESCRIPTION - PAIN TYPE
TYPE: CHRONIC PAIN
TYPE: CHRONIC PAIN

## 2024-12-05 ASSESSMENT — PAIN DESCRIPTION - LOCATION
LOCATION: GROIN;LEG
LOCATION: LEG;GROIN

## 2024-12-05 NOTE — CARE COORDINATION
DISCHARGE PLANNING:  Chart reviewed.  Patient is from home with her son and no current services. She has a walker for short distances and a wheelchair for longer distances.   Her son recently had heart and back problems occur so he cannot help her at home.    Discharge plan is [facility].   P2P was denied on 12/3. Appeal was started on 12/4 by admssion liaison, [facility] ([phone]).    I called [facility] this morning who stated appeal is still pending. She has my callback number for any updates.    CM team will continue to follow.  Marleny Orta, RICHARD Case Manager  [phone]

## 2024-12-06 LAB
ANION GAP SERPL CALCULATED.3IONS-SCNC: 9 MMOL/L (ref 3–16)
BASOPHILS # BLD: 0 K/UL (ref 0–0.2)
BASOPHILS NFR BLD: 0.5 %
BUN SERPL-MCNC: 27 MG/DL (ref 7–20)
CALCIUM SERPL-MCNC: 10.1 MG/DL (ref 8.3–10.6)
CHLORIDE SERPL-SCNC: 101 MMOL/L (ref 99–110)
CO2 SERPL-SCNC: 28 MMOL/L (ref 21–32)
CREAT SERPL-MCNC: 0.9 MG/DL (ref 0.6–1.2)
DEPRECATED RDW RBC AUTO: 12.9 % (ref 12.4–15.4)
EOSINOPHIL # BLD: 0.2 K/UL (ref 0–0.6)
EOSINOPHIL NFR BLD: 2.9 %
GFR SERPLBLD CREATININE-BSD FMLA CKD-EPI: 61 ML/MIN/{1.73_M2}
GLUCOSE BLD-MCNC: 111 MG/DL (ref 70–99)
GLUCOSE BLD-MCNC: 123 MG/DL (ref 70–99)
GLUCOSE BLD-MCNC: 148 MG/DL (ref 70–99)
GLUCOSE BLD-MCNC: 218 MG/DL (ref 70–99)
GLUCOSE SERPL-MCNC: 128 MG/DL (ref 70–99)
HCT VFR BLD AUTO: 34 % (ref 36–48)
HGB BLD-MCNC: 10.6 G/DL (ref 12–16)
LYMPHOCYTES # BLD: 2.3 K/UL (ref 1–5.1)
LYMPHOCYTES NFR BLD: 39.3 %
MCH RBC QN AUTO: 25 PG (ref 26–34)
MCHC RBC AUTO-ENTMCNC: 31.1 G/DL (ref 31–36)
MCV RBC AUTO: 80.2 FL (ref 80–100)
MONOCYTES # BLD: 0.5 K/UL (ref 0–1.3)
MONOCYTES NFR BLD: 7.9 %
NEUTROPHILS # BLD: 2.8 K/UL (ref 1.7–7.7)
NEUTROPHILS NFR BLD: 49.4 %
PERFORMED ON: ABNORMAL
PLATELET # BLD AUTO: 219 K/UL (ref 135–450)
PMV BLD AUTO: 9.2 FL (ref 5–10.5)
POTASSIUM SERPL-SCNC: 4.7 MMOL/L (ref 3.5–5.1)
RBC # BLD AUTO: 4.25 M/UL (ref 4–5.2)
SODIUM SERPL-SCNC: 138 MMOL/L (ref 136–145)
WBC # BLD AUTO: 5.7 K/UL (ref 4–11)

## 2024-12-06 PROCEDURE — 85025 COMPLETE CBC W/AUTO DIFF WBC: CPT

## 2024-12-06 PROCEDURE — 2580000003 HC RX 258

## 2024-12-06 PROCEDURE — 6370000000 HC RX 637 (ALT 250 FOR IP)

## 2024-12-06 PROCEDURE — 97116 GAIT TRAINING THERAPY: CPT

## 2024-12-06 PROCEDURE — 97530 THERAPEUTIC ACTIVITIES: CPT

## 2024-12-06 PROCEDURE — 6360000002 HC RX W HCPCS

## 2024-12-06 PROCEDURE — 99238 HOSP IP/OBS DSCHRG MGMT 30/<: CPT | Performed by: INTERNAL MEDICINE

## 2024-12-06 PROCEDURE — 36415 COLL VENOUS BLD VENIPUNCTURE: CPT

## 2024-12-06 PROCEDURE — 80048 BASIC METABOLIC PNL TOTAL CA: CPT

## 2024-12-06 PROCEDURE — 6370000000 HC RX 637 (ALT 250 FOR IP): Performed by: EMERGENCY MEDICINE

## 2024-12-06 PROCEDURE — 1200000000 HC SEMI PRIVATE

## 2024-12-06 RX ORDER — GABAPENTIN 300 MG/1
600 CAPSULE ORAL 3 TIMES DAILY
Qty: 90 CAPSULE | Refills: 1 | Status: SHIPPED | OUTPATIENT
Start: 2024-12-06 | End: 2025-01-05

## 2024-12-06 RX ORDER — DULOXETIN HYDROCHLORIDE 30 MG/1
30 CAPSULE, DELAYED RELEASE ORAL DAILY
Qty: 30 CAPSULE | Refills: 3 | Status: SHIPPED | OUTPATIENT
Start: 2024-12-07

## 2024-12-06 RX ADMIN — ENOXAPARIN SODIUM 40 MG: 100 INJECTION SUBCUTANEOUS at 08:46

## 2024-12-06 RX ADMIN — METOPROLOL TARTRATE 50 MG: 50 TABLET, FILM COATED ORAL at 08:45

## 2024-12-06 RX ADMIN — LEVOTHYROXINE SODIUM 100 MCG: 0.1 TABLET ORAL at 08:49

## 2024-12-06 RX ADMIN — ACETAMINOPHEN 500 MG: 500 TABLET ORAL at 08:45

## 2024-12-06 RX ADMIN — SPIRONOLACTONE 25 MG: 25 TABLET ORAL at 08:45

## 2024-12-06 RX ADMIN — GABAPENTIN 600 MG: 300 CAPSULE ORAL at 08:46

## 2024-12-06 RX ADMIN — SPIRONOLACTONE 25 MG: 25 TABLET ORAL at 20:17

## 2024-12-06 RX ADMIN — TRAZODONE HYDROCHLORIDE 100 MG: 100 TABLET ORAL at 20:16

## 2024-12-06 RX ADMIN — INSULIN LISPRO 1 UNITS: 100 INJECTION, SOLUTION INTRAVENOUS; SUBCUTANEOUS at 18:59

## 2024-12-06 RX ADMIN — ACETAMINOPHEN 500 MG: 500 TABLET ORAL at 20:17

## 2024-12-06 RX ADMIN — ACETAMINOPHEN 500 MG: 500 TABLET ORAL at 01:47

## 2024-12-06 RX ADMIN — GABAPENTIN 600 MG: 300 CAPSULE ORAL at 15:10

## 2024-12-06 RX ADMIN — METOPROLOL TARTRATE 50 MG: 50 TABLET, FILM COATED ORAL at 20:17

## 2024-12-06 RX ADMIN — ACETAMINOPHEN 500 MG: 500 TABLET ORAL at 15:10

## 2024-12-06 RX ADMIN — PROMETHAZINE HYDROCHLORIDE 12.5 MG: 12.5 TABLET ORAL at 20:16

## 2024-12-06 RX ADMIN — SODIUM CHLORIDE, PRESERVATIVE FREE 10 ML: 5 INJECTION INTRAVENOUS at 20:17

## 2024-12-06 RX ADMIN — GABAPENTIN 600 MG: 300 CAPSULE ORAL at 20:16

## 2024-12-06 RX ADMIN — TRAMADOL HYDROCHLORIDE 50 MG: 50 TABLET, COATED ORAL at 19:23

## 2024-12-06 RX ADMIN — PANTOPRAZOLE SODIUM 20 MG: 20 TABLET, DELAYED RELEASE ORAL at 06:25

## 2024-12-06 RX ADMIN — HYDROCHLOROTHIAZIDE 25 MG: 25 TABLET ORAL at 08:45

## 2024-12-06 RX ADMIN — TRAMADOL HYDROCHLORIDE 50 MG: 50 TABLET, COATED ORAL at 08:56

## 2024-12-06 RX ADMIN — DULOXETINE HYDROCHLORIDE 30 MG: 30 CAPSULE, DELAYED RELEASE ORAL at 08:46

## 2024-12-06 ASSESSMENT — PAIN DESCRIPTION - FREQUENCY
FREQUENCY: CONTINUOUS
FREQUENCY: INTERMITTENT
FREQUENCY: INTERMITTENT

## 2024-12-06 ASSESSMENT — PAIN DESCRIPTION - DIRECTION: RADIATING_TOWARDS: DOWN LEG

## 2024-12-06 ASSESSMENT — PAIN - FUNCTIONAL ASSESSMENT
PAIN_FUNCTIONAL_ASSESSMENT: PREVENTS OR INTERFERES SOME ACTIVE ACTIVITIES AND ADLS

## 2024-12-06 ASSESSMENT — PAIN SCALES - GENERAL
PAINLEVEL_OUTOF10: 2
PAINLEVEL_OUTOF10: 8
PAINLEVEL_OUTOF10: 6
PAINLEVEL_OUTOF10: 3
PAINLEVEL_OUTOF10: 1
PAINLEVEL_OUTOF10: 5
PAINLEVEL_OUTOF10: 4
PAINLEVEL_OUTOF10: 5
PAINLEVEL_OUTOF10: 8

## 2024-12-06 ASSESSMENT — PAIN SCALES - WONG BAKER: WONGBAKER_NUMERICALRESPONSE: NO HURT

## 2024-12-06 ASSESSMENT — PAIN DESCRIPTION - LOCATION
LOCATION: LEG
LOCATION: LEG;HIP
LOCATION: LEG

## 2024-12-06 ASSESSMENT — PAIN DESCRIPTION - ONSET
ONSET: ON-GOING

## 2024-12-06 ASSESSMENT — PAIN DESCRIPTION - DESCRIPTORS
DESCRIPTORS: ACHING
DESCRIPTORS: ACHING;BURNING

## 2024-12-06 ASSESSMENT — PAIN DESCRIPTION - ORIENTATION
ORIENTATION: LOWER
ORIENTATION: LOWER;LEFT;MID

## 2024-12-06 ASSESSMENT — PAIN DESCRIPTION - PAIN TYPE
TYPE: CHRONIC PAIN
TYPE: CHRONIC PAIN

## 2024-12-06 NOTE — CARE COORDINATION
DISCHARGE PLANNING:  Chart reviewed.  Patient is from home with her son and no current services. She has a walker for short distances and a wheelchair for longer distances. Her son recently had heart and back problems occur so he cannot hel pher at home.    Discharge plan is [facility].  P2P was denied on 12/3. Appeal was started on 12/4 by admission liaison, [facility] ([phone]). Currently pending.    I called [facility] and left a voicemail with my callback number in case there was an update on appeal decision. I also left her the weekend CM numbers in case there are updates over the weekend.    CM team will continue to follow.  Marleny Orta, RN Case Manager  [phone]

## 2024-12-07 LAB
ANION GAP SERPL CALCULATED.3IONS-SCNC: 11 MMOL/L (ref 3–16)
BASOPHILS # BLD: 0 K/UL (ref 0–0.2)
BASOPHILS NFR BLD: 0.7 %
BUN SERPL-MCNC: 26 MG/DL (ref 7–20)
CALCIUM SERPL-MCNC: 10.1 MG/DL (ref 8.3–10.6)
CHLORIDE SERPL-SCNC: 101 MMOL/L (ref 99–110)
CO2 SERPL-SCNC: 27 MMOL/L (ref 21–32)
CREAT SERPL-MCNC: 0.8 MG/DL (ref 0.6–1.2)
DEPRECATED RDW RBC AUTO: 12.9 % (ref 12.4–15.4)
EOSINOPHIL # BLD: 0.2 K/UL (ref 0–0.6)
EOSINOPHIL NFR BLD: 4.1 %
GFR SERPLBLD CREATININE-BSD FMLA CKD-EPI: 70 ML/MIN/{1.73_M2}
GLUCOSE BLD-MCNC: 118 MG/DL (ref 70–99)
GLUCOSE BLD-MCNC: 123 MG/DL (ref 70–99)
GLUCOSE BLD-MCNC: 198 MG/DL (ref 70–99)
GLUCOSE SERPL-MCNC: 122 MG/DL (ref 70–99)
HCT VFR BLD AUTO: 34.9 % (ref 36–48)
HGB BLD-MCNC: 10.9 G/DL (ref 12–16)
LYMPHOCYTES # BLD: 2.4 K/UL (ref 1–5.1)
LYMPHOCYTES NFR BLD: 45.3 %
MCH RBC QN AUTO: 25.2 PG (ref 26–34)
MCHC RBC AUTO-ENTMCNC: 31.3 G/DL (ref 31–36)
MCV RBC AUTO: 80.5 FL (ref 80–100)
MONOCYTES # BLD: 0.4 K/UL (ref 0–1.3)
MONOCYTES NFR BLD: 6.9 %
NEUTROPHILS # BLD: 2.2 K/UL (ref 1.7–7.7)
NEUTROPHILS NFR BLD: 43 %
PERFORMED ON: ABNORMAL
PLATELET # BLD AUTO: 211 K/UL (ref 135–450)
PMV BLD AUTO: 9.4 FL (ref 5–10.5)
POTASSIUM SERPL-SCNC: 4.6 MMOL/L (ref 3.5–5.1)
RBC # BLD AUTO: 4.33 M/UL (ref 4–5.2)
SODIUM SERPL-SCNC: 139 MMOL/L (ref 136–145)
WBC # BLD AUTO: 5.2 K/UL (ref 4–11)

## 2024-12-07 PROCEDURE — 6370000000 HC RX 637 (ALT 250 FOR IP)

## 2024-12-07 PROCEDURE — 6360000002 HC RX W HCPCS

## 2024-12-07 PROCEDURE — 1200000000 HC SEMI PRIVATE

## 2024-12-07 PROCEDURE — 2500000003 HC RX 250 WO HCPCS

## 2024-12-07 PROCEDURE — 80048 BASIC METABOLIC PNL TOTAL CA: CPT

## 2024-12-07 PROCEDURE — 2580000003 HC RX 258

## 2024-12-07 PROCEDURE — 6370000000 HC RX 637 (ALT 250 FOR IP): Performed by: EMERGENCY MEDICINE

## 2024-12-07 PROCEDURE — 85025 COMPLETE CBC W/AUTO DIFF WBC: CPT

## 2024-12-07 PROCEDURE — 36415 COLL VENOUS BLD VENIPUNCTURE: CPT

## 2024-12-07 RX ADMIN — SODIUM CHLORIDE, PRESERVATIVE FREE 10 ML: 5 INJECTION INTRAVENOUS at 21:39

## 2024-12-07 RX ADMIN — PANTOPRAZOLE SODIUM 20 MG: 20 TABLET, DELAYED RELEASE ORAL at 06:15

## 2024-12-07 RX ADMIN — ACETAMINOPHEN 500 MG: 500 TABLET ORAL at 21:38

## 2024-12-07 RX ADMIN — LEVOTHYROXINE SODIUM 100 MCG: 0.1 TABLET ORAL at 09:02

## 2024-12-07 RX ADMIN — TRAMADOL HYDROCHLORIDE 50 MG: 50 TABLET, COATED ORAL at 05:23

## 2024-12-07 RX ADMIN — INSULIN LISPRO 1 UNITS: 100 INJECTION, SOLUTION INTRAVENOUS; SUBCUTANEOUS at 21:38

## 2024-12-07 RX ADMIN — ENOXAPARIN SODIUM 40 MG: 100 INJECTION SUBCUTANEOUS at 09:03

## 2024-12-07 RX ADMIN — METOPROLOL TARTRATE 50 MG: 50 TABLET, FILM COATED ORAL at 09:02

## 2024-12-07 RX ADMIN — TRAZODONE HYDROCHLORIDE 100 MG: 100 TABLET ORAL at 21:38

## 2024-12-07 RX ADMIN — ACETAMINOPHEN 500 MG: 500 TABLET ORAL at 09:02

## 2024-12-07 RX ADMIN — DULOXETINE HYDROCHLORIDE 30 MG: 30 CAPSULE, DELAYED RELEASE ORAL at 09:08

## 2024-12-07 RX ADMIN — METOPROLOL TARTRATE 50 MG: 50 TABLET, FILM COATED ORAL at 21:37

## 2024-12-07 RX ADMIN — SODIUM CHLORIDE, PRESERVATIVE FREE 10 ML: 5 INJECTION INTRAVENOUS at 09:03

## 2024-12-07 RX ADMIN — HYDROCHLOROTHIAZIDE 25 MG: 25 TABLET ORAL at 09:03

## 2024-12-07 RX ADMIN — GABAPENTIN 600 MG: 300 CAPSULE ORAL at 09:03

## 2024-12-07 RX ADMIN — MICONAZOLE NITRATE: 20 POWDER TOPICAL at 21:38

## 2024-12-07 RX ADMIN — TRAMADOL HYDROCHLORIDE 50 MG: 50 TABLET, COATED ORAL at 23:32

## 2024-12-07 RX ADMIN — ONDANSETRON 4 MG: 2 INJECTION INTRAMUSCULAR; INTRAVENOUS at 14:31

## 2024-12-07 RX ADMIN — GABAPENTIN 600 MG: 300 CAPSULE ORAL at 14:31

## 2024-12-07 RX ADMIN — ACETAMINOPHEN 500 MG: 500 TABLET ORAL at 14:31

## 2024-12-07 RX ADMIN — SPIRONOLACTONE 25 MG: 25 TABLET ORAL at 09:03

## 2024-12-07 RX ADMIN — SPIRONOLACTONE 25 MG: 25 TABLET ORAL at 21:38

## 2024-12-07 RX ADMIN — GABAPENTIN 600 MG: 300 CAPSULE ORAL at 21:38

## 2024-12-07 ASSESSMENT — PAIN SCALES - GENERAL
PAINLEVEL_OUTOF10: 8
PAINLEVEL_OUTOF10: 2
PAINLEVEL_OUTOF10: 2
PAINLEVEL_OUTOF10: 8
PAINLEVEL_OUTOF10: 2
PAINLEVEL_OUTOF10: 8
PAINLEVEL_OUTOF10: 2
PAINLEVEL_OUTOF10: 6
PAINLEVEL_OUTOF10: 8
PAINLEVEL_OUTOF10: 7
PAINLEVEL_OUTOF10: 3

## 2024-12-07 ASSESSMENT — PAIN - FUNCTIONAL ASSESSMENT: PAIN_FUNCTIONAL_ASSESSMENT: PREVENTS OR INTERFERES SOME ACTIVE ACTIVITIES AND ADLS

## 2024-12-07 ASSESSMENT — PAIN DESCRIPTION - PAIN TYPE
TYPE: CHRONIC PAIN
TYPE: CHRONIC PAIN

## 2024-12-07 ASSESSMENT — PAIN DESCRIPTION - DESCRIPTORS: DESCRIPTORS: ACHING;DISCOMFORT

## 2024-12-07 ASSESSMENT — PAIN DESCRIPTION - LOCATION
LOCATION: SHOULDER
LOCATION: LEG;HIP
LOCATION: HIP

## 2024-12-07 ASSESSMENT — PAIN DESCRIPTION - ONSET: ONSET: AWAKENED FROM SLEEP

## 2024-12-07 ASSESSMENT — PAIN DESCRIPTION - ORIENTATION
ORIENTATION: LEFT
ORIENTATION: LEFT

## 2024-12-07 ASSESSMENT — PAIN DESCRIPTION - FREQUENCY: FREQUENCY: INTERMITTENT

## 2024-12-07 NOTE — CARE COORDINATION
Called Dennise at [facility] [phone] and left a message regarding the appeal for this patient.     Electronically signed by Stephanie Palmer RN on 12/7/2024 at 8:26 AM  [phone]

## 2024-12-08 ENCOUNTER — APPOINTMENT (OUTPATIENT)
Dept: MRI IMAGING | Age: 88
DRG: 074 | End: 2024-12-08
Payer: MEDICARE

## 2024-12-08 LAB
ANION GAP SERPL CALCULATED.3IONS-SCNC: 12 MMOL/L (ref 3–16)
BASOPHILS # BLD: 0 K/UL (ref 0–0.2)
BASOPHILS NFR BLD: 0.6 %
BUN SERPL-MCNC: 27 MG/DL (ref 7–20)
CALCIUM SERPL-MCNC: 10.3 MG/DL (ref 8.3–10.6)
CHLORIDE SERPL-SCNC: 98 MMOL/L (ref 99–110)
CO2 SERPL-SCNC: 27 MMOL/L (ref 21–32)
CREAT SERPL-MCNC: 0.9 MG/DL (ref 0.6–1.2)
DEPRECATED RDW RBC AUTO: 13.2 % (ref 12.4–15.4)
EOSINOPHIL # BLD: 0.2 K/UL (ref 0–0.6)
EOSINOPHIL NFR BLD: 2.8 %
GFR SERPLBLD CREATININE-BSD FMLA CKD-EPI: 61 ML/MIN/{1.73_M2}
GLUCOSE BLD-MCNC: 118 MG/DL (ref 70–99)
GLUCOSE BLD-MCNC: 126 MG/DL (ref 70–99)
GLUCOSE BLD-MCNC: 166 MG/DL (ref 70–99)
GLUCOSE BLD-MCNC: 175 MG/DL (ref 70–99)
GLUCOSE SERPL-MCNC: 126 MG/DL (ref 70–99)
HCT VFR BLD AUTO: 35.8 % (ref 36–48)
HGB BLD-MCNC: 11 G/DL (ref 12–16)
LYMPHOCYTES # BLD: 2.6 K/UL (ref 1–5.1)
LYMPHOCYTES NFR BLD: 42 %
MCH RBC QN AUTO: 24.7 PG (ref 26–34)
MCHC RBC AUTO-ENTMCNC: 30.8 G/DL (ref 31–36)
MCV RBC AUTO: 80.4 FL (ref 80–100)
MONOCYTES # BLD: 0.5 K/UL (ref 0–1.3)
MONOCYTES NFR BLD: 7.5 %
NEUTROPHILS # BLD: 2.9 K/UL (ref 1.7–7.7)
NEUTROPHILS NFR BLD: 47.1 %
PERFORMED ON: ABNORMAL
PLATELET # BLD AUTO: 223 K/UL (ref 135–450)
PMV BLD AUTO: 8.8 FL (ref 5–10.5)
POTASSIUM SERPL-SCNC: 4.6 MMOL/L (ref 3.5–5.1)
RBC # BLD AUTO: 4.45 M/UL (ref 4–5.2)
SODIUM SERPL-SCNC: 137 MMOL/L (ref 136–145)
WBC # BLD AUTO: 6.2 K/UL (ref 4–11)

## 2024-12-08 PROCEDURE — 72148 MRI LUMBAR SPINE W/O DYE: CPT

## 2024-12-08 PROCEDURE — 6370000000 HC RX 637 (ALT 250 FOR IP)

## 2024-12-08 PROCEDURE — 6360000002 HC RX W HCPCS

## 2024-12-08 PROCEDURE — 36415 COLL VENOUS BLD VENIPUNCTURE: CPT

## 2024-12-08 PROCEDURE — 80048 BASIC METABOLIC PNL TOTAL CA: CPT

## 2024-12-08 PROCEDURE — 36592 COLLECT BLOOD FROM PICC: CPT

## 2024-12-08 PROCEDURE — 85025 COMPLETE CBC W/AUTO DIFF WBC: CPT

## 2024-12-08 PROCEDURE — 1200000000 HC SEMI PRIVATE

## 2024-12-08 PROCEDURE — 6370000000 HC RX 637 (ALT 250 FOR IP): Performed by: EMERGENCY MEDICINE

## 2024-12-08 PROCEDURE — 2580000003 HC RX 258

## 2024-12-08 RX ADMIN — METOPROLOL TARTRATE 50 MG: 50 TABLET, FILM COATED ORAL at 09:27

## 2024-12-08 RX ADMIN — GABAPENTIN 600 MG: 300 CAPSULE ORAL at 19:47

## 2024-12-08 RX ADMIN — ACETAMINOPHEN 500 MG: 500 TABLET ORAL at 15:41

## 2024-12-08 RX ADMIN — ACETAMINOPHEN 500 MG: 500 TABLET ORAL at 09:26

## 2024-12-08 RX ADMIN — MICONAZOLE NITRATE: 20 POWDER TOPICAL at 09:29

## 2024-12-08 RX ADMIN — PANTOPRAZOLE SODIUM 20 MG: 20 TABLET, DELAYED RELEASE ORAL at 06:51

## 2024-12-08 RX ADMIN — TRAMADOL HYDROCHLORIDE 50 MG: 50 TABLET, COATED ORAL at 12:39

## 2024-12-08 RX ADMIN — ACETAMINOPHEN 500 MG: 500 TABLET ORAL at 19:47

## 2024-12-08 RX ADMIN — GABAPENTIN 600 MG: 300 CAPSULE ORAL at 09:27

## 2024-12-08 RX ADMIN — DULOXETINE HYDROCHLORIDE 30 MG: 30 CAPSULE, DELAYED RELEASE ORAL at 09:28

## 2024-12-08 RX ADMIN — GABAPENTIN 600 MG: 300 CAPSULE ORAL at 15:41

## 2024-12-08 RX ADMIN — LEVOTHYROXINE SODIUM 100 MCG: 0.1 TABLET ORAL at 09:27

## 2024-12-08 RX ADMIN — TRAZODONE HYDROCHLORIDE 100 MG: 100 TABLET ORAL at 19:47

## 2024-12-08 RX ADMIN — SPIRONOLACTONE 25 MG: 25 TABLET ORAL at 19:47

## 2024-12-08 RX ADMIN — SPIRONOLACTONE 25 MG: 25 TABLET ORAL at 09:27

## 2024-12-08 RX ADMIN — SODIUM CHLORIDE, PRESERVATIVE FREE 10 ML: 5 INJECTION INTRAVENOUS at 19:49

## 2024-12-08 RX ADMIN — SODIUM CHLORIDE, PRESERVATIVE FREE 10 ML: 5 INJECTION INTRAVENOUS at 09:28

## 2024-12-08 RX ADMIN — ENOXAPARIN SODIUM 40 MG: 100 INJECTION SUBCUTANEOUS at 09:27

## 2024-12-08 RX ADMIN — ACETAMINOPHEN 500 MG: 500 TABLET ORAL at 03:55

## 2024-12-08 RX ADMIN — HYDROCHLOROTHIAZIDE 25 MG: 25 TABLET ORAL at 09:27

## 2024-12-08 RX ADMIN — METOPROLOL TARTRATE 50 MG: 50 TABLET, FILM COATED ORAL at 19:47

## 2024-12-08 RX ADMIN — MICONAZOLE NITRATE: 20 POWDER TOPICAL at 19:46

## 2024-12-08 ASSESSMENT — PAIN SCALES - GENERAL
PAINLEVEL_OUTOF10: 3
PAINLEVEL_OUTOF10: 7
PAINLEVEL_OUTOF10: 8
PAINLEVEL_OUTOF10: 2
PAINLEVEL_OUTOF10: 6
PAINLEVEL_OUTOF10: 4
PAINLEVEL_OUTOF10: 8
PAINLEVEL_OUTOF10: 4

## 2024-12-08 ASSESSMENT — PAIN DESCRIPTION - LOCATION
LOCATION: BACK
LOCATION: BACK

## 2024-12-08 ASSESSMENT — PAIN DESCRIPTION - ONSET
ONSET: ON-GOING
ONSET: ON-GOING

## 2024-12-08 ASSESSMENT — PAIN DESCRIPTION - FREQUENCY
FREQUENCY: CONTINUOUS
FREQUENCY: CONTINUOUS

## 2024-12-08 ASSESSMENT — PAIN DESCRIPTION - PAIN TYPE: TYPE: CHRONIC PAIN

## 2024-12-08 ASSESSMENT — PAIN DESCRIPTION - DESCRIPTORS
DESCRIPTORS: ACHING
DESCRIPTORS: ACHING

## 2024-12-08 ASSESSMENT — PAIN DESCRIPTION - ORIENTATION: ORIENTATION: LEFT

## 2024-12-08 ASSESSMENT — PAIN - FUNCTIONAL ASSESSMENT: PAIN_FUNCTIONAL_ASSESSMENT: PREVENTS OR INTERFERES SOME ACTIVE ACTIVITIES AND ADLS

## 2024-12-08 NOTE — CARE COORDINATION
Appeal pending for [facility] placement.   Electronically signed by Stephanie Palmer RN on 12/8/2024 at 8:33 AM  [phone]

## 2024-12-08 NOTE — CARE COORDINATION
Cm met with pt and family at bedside. Informed pt that SNF appeal was approved. Pt states MRI was ordered this morning and she was wanting to have it done prior to dc.     CM messaged attending resident. MRI was ordered. Pt can dc if she wants without MRI.     MRI was unsure when it would be completed but will try to get pt in around 5pm.    Cm informed pt unsure of MRI time, hoping around 5pm. Pt states she wants to stay for MRI and dc tomorrow.    Cm informed Dennise at [facility]. She is aware pt will admit tomorrow morning after MRI is completed.

## 2024-12-09 ENCOUNTER — APPOINTMENT (OUTPATIENT)
Dept: INTERVENTIONAL RADIOLOGY/VASCULAR | Age: 88
DRG: 074 | End: 2024-12-09
Payer: MEDICARE

## 2024-12-09 VITALS
SYSTOLIC BLOOD PRESSURE: 133 MMHG | TEMPERATURE: 98.4 F | OXYGEN SATURATION: 96 % | HEIGHT: 63 IN | DIASTOLIC BLOOD PRESSURE: 74 MMHG | RESPIRATION RATE: 18 BRPM | HEART RATE: 77 BPM | BODY MASS INDEX: 33.79 KG/M2 | WEIGHT: 190.7 LBS

## 2024-12-09 LAB
ANION GAP SERPL CALCULATED.3IONS-SCNC: 12 MMOL/L (ref 3–16)
BASOPHILS # BLD: 0 K/UL (ref 0–0.2)
BASOPHILS NFR BLD: 0.2 %
BUN SERPL-MCNC: 25 MG/DL (ref 7–20)
CALCIUM SERPL-MCNC: 10 MG/DL (ref 8.3–10.6)
CHLORIDE SERPL-SCNC: 99 MMOL/L (ref 99–110)
CO2 SERPL-SCNC: 25 MMOL/L (ref 21–32)
CREAT SERPL-MCNC: 0.9 MG/DL (ref 0.6–1.2)
DEPRECATED RDW RBC AUTO: 12.8 % (ref 12.4–15.4)
EOSINOPHIL # BLD: 0.2 K/UL (ref 0–0.6)
EOSINOPHIL NFR BLD: 2.7 %
GFR SERPLBLD CREATININE-BSD FMLA CKD-EPI: 61 ML/MIN/{1.73_M2}
GLUCOSE BLD-MCNC: 117 MG/DL (ref 70–99)
GLUCOSE BLD-MCNC: 118 MG/DL (ref 70–99)
GLUCOSE BLD-MCNC: 165 MG/DL (ref 70–99)
GLUCOSE SERPL-MCNC: 179 MG/DL (ref 70–99)
HCT VFR BLD AUTO: 35.5 % (ref 36–48)
HGB BLD-MCNC: 11 G/DL (ref 12–16)
LYMPHOCYTES # BLD: 2.1 K/UL (ref 1–5.1)
LYMPHOCYTES NFR BLD: 35 %
MCH RBC QN AUTO: 24.8 PG (ref 26–34)
MCHC RBC AUTO-ENTMCNC: 30.9 G/DL (ref 31–36)
MCV RBC AUTO: 80.4 FL (ref 80–100)
MONOCYTES # BLD: 0.4 K/UL (ref 0–1.3)
MONOCYTES NFR BLD: 7 %
NEUTROPHILS # BLD: 3.3 K/UL (ref 1.7–7.7)
NEUTROPHILS NFR BLD: 55.1 %
PERFORMED ON: ABNORMAL
PLATELET # BLD AUTO: 240 K/UL (ref 135–450)
PMV BLD AUTO: 9 FL (ref 5–10.5)
POTASSIUM SERPL-SCNC: 4.3 MMOL/L (ref 3.5–5.1)
RBC # BLD AUTO: 4.42 M/UL (ref 4–5.2)
SODIUM SERPL-SCNC: 136 MMOL/L (ref 136–145)
WBC # BLD AUTO: 6 K/UL (ref 4–11)

## 2024-12-09 PROCEDURE — 97535 SELF CARE MNGMENT TRAINING: CPT

## 2024-12-09 PROCEDURE — 6370000000 HC RX 637 (ALT 250 FOR IP): Performed by: EMERGENCY MEDICINE

## 2024-12-09 PROCEDURE — 3E0S33Z INTRODUCTION OF ANTI-INFLAMMATORY INTO EPIDURAL SPACE, PERCUTANEOUS APPROACH: ICD-10-PCS | Performed by: STUDENT IN AN ORGANIZED HEALTH CARE EDUCATION/TRAINING PROGRAM

## 2024-12-09 PROCEDURE — 2580000003 HC RX 258

## 2024-12-09 PROCEDURE — 97530 THERAPEUTIC ACTIVITIES: CPT

## 2024-12-09 PROCEDURE — 36415 COLL VENOUS BLD VENIPUNCTURE: CPT

## 2024-12-09 PROCEDURE — 64483 NJX AA&/STRD TFRM EPI L/S 1: CPT

## 2024-12-09 PROCEDURE — 6370000000 HC RX 637 (ALT 250 FOR IP)

## 2024-12-09 PROCEDURE — 97110 THERAPEUTIC EXERCISES: CPT

## 2024-12-09 PROCEDURE — 6360000002 HC RX W HCPCS: Performed by: STUDENT IN AN ORGANIZED HEALTH CARE EDUCATION/TRAINING PROGRAM

## 2024-12-09 PROCEDURE — 85025 COMPLETE CBC W/AUTO DIFF WBC: CPT

## 2024-12-09 PROCEDURE — 6360000004 HC RX CONTRAST MEDICATION: Performed by: STUDENT IN AN ORGANIZED HEALTH CARE EDUCATION/TRAINING PROGRAM

## 2024-12-09 PROCEDURE — 99254 IP/OBS CNSLTJ NEW/EST MOD 60: CPT | Performed by: NURSE PRACTITIONER

## 2024-12-09 PROCEDURE — 80048 BASIC METABOLIC PNL TOTAL CA: CPT

## 2024-12-09 PROCEDURE — 2709999900 HC NON-CHARGEABLE SUPPLY

## 2024-12-09 RX ORDER — BETAMETHASONE SODIUM PHOSPHATE AND BETAMETHASONE ACETATE 3; 3 MG/ML; MG/ML
INJECTION, SUSPENSION INTRA-ARTICULAR; INTRALESIONAL; INTRAMUSCULAR; SOFT TISSUE PRN
Status: COMPLETED | OUTPATIENT
Start: 2024-12-09 | End: 2024-12-09

## 2024-12-09 RX ORDER — BUPIVACAINE HYDROCHLORIDE 2.5 MG/ML
INJECTION, SOLUTION EPIDURAL; INFILTRATION; INTRACAUDAL PRN
Status: COMPLETED | OUTPATIENT
Start: 2024-12-09 | End: 2024-12-09

## 2024-12-09 RX ORDER — IOPAMIDOL 408 MG/ML
INJECTION, SOLUTION INTRATHECAL PRN
Status: COMPLETED | OUTPATIENT
Start: 2024-12-09 | End: 2024-12-09

## 2024-12-09 RX ORDER — LIDOCAINE HYDROCHLORIDE 10 MG/ML
INJECTION, SOLUTION EPIDURAL; INFILTRATION; INTRACAUDAL; PERINEURAL PRN
Status: COMPLETED | OUTPATIENT
Start: 2024-12-09 | End: 2024-12-09

## 2024-12-09 RX ORDER — GABAPENTIN 300 MG/1
300 CAPSULE ORAL 3 TIMES DAILY
Status: DISCONTINUED | OUTPATIENT
Start: 2024-12-09 | End: 2024-12-09 | Stop reason: HOSPADM

## 2024-12-09 RX ADMIN — GABAPENTIN 600 MG: 300 CAPSULE ORAL at 08:38

## 2024-12-09 RX ADMIN — BETAMETHASONE SODIUM PHOSPHATE AND BETAMETHASONE ACETATE 12 MG: 3; 3 INJECTION, SUSPENSION INTRA-ARTICULAR; INTRALESIONAL; INTRAMUSCULAR at 13:53

## 2024-12-09 RX ADMIN — ACETAMINOPHEN 500 MG: 500 TABLET ORAL at 17:21

## 2024-12-09 RX ADMIN — IOPAMIDOL 1 ML: 408 INJECTION, SOLUTION INTRATHECAL at 13:54

## 2024-12-09 RX ADMIN — SODIUM CHLORIDE, PRESERVATIVE FREE 10 ML: 5 INJECTION INTRAVENOUS at 08:37

## 2024-12-09 RX ADMIN — HYDROCHLOROTHIAZIDE 25 MG: 25 TABLET ORAL at 08:38

## 2024-12-09 RX ADMIN — GABAPENTIN 300 MG: 300 CAPSULE ORAL at 17:21

## 2024-12-09 RX ADMIN — TRAMADOL HYDROCHLORIDE 50 MG: 50 TABLET, COATED ORAL at 05:24

## 2024-12-09 RX ADMIN — MICONAZOLE NITRATE: 20 POWDER TOPICAL at 08:38

## 2024-12-09 RX ADMIN — ACETAMINOPHEN 500 MG: 500 TABLET ORAL at 08:38

## 2024-12-09 RX ADMIN — LEVOTHYROXINE SODIUM 100 MCG: 0.1 TABLET ORAL at 08:38

## 2024-12-09 RX ADMIN — METOPROLOL TARTRATE 50 MG: 50 TABLET, FILM COATED ORAL at 08:38

## 2024-12-09 RX ADMIN — SPIRONOLACTONE 25 MG: 25 TABLET ORAL at 08:38

## 2024-12-09 RX ADMIN — BUPIVACAINE HYDROCHLORIDE 4 ML: 2.5 INJECTION, SOLUTION EPIDURAL; INFILTRATION; INTRACAUDAL at 13:54

## 2024-12-09 RX ADMIN — DULOXETINE HYDROCHLORIDE 30 MG: 30 CAPSULE, DELAYED RELEASE ORAL at 08:38

## 2024-12-09 RX ADMIN — PANTOPRAZOLE SODIUM 20 MG: 20 TABLET, DELAYED RELEASE ORAL at 05:23

## 2024-12-09 RX ADMIN — LIDOCAINE HYDROCHLORIDE 5 ML: 10 INJECTION, SOLUTION EPIDURAL; INFILTRATION; INTRACAUDAL; PERINEURAL at 13:53

## 2024-12-09 ASSESSMENT — PAIN SCALES - GENERAL
PAINLEVEL_OUTOF10: 3
PAINLEVEL_OUTOF10: 3
PAINLEVEL_OUTOF10: 2
PAINLEVEL_OUTOF10: 4

## 2024-12-09 ASSESSMENT — ENCOUNTER SYMPTOMS
GASTROINTESTINAL NEGATIVE: 1
BACK PAIN: 1

## 2024-12-09 NOTE — DISCHARGE SUMMARY
INTERNAL MEDICINE DEPARTMENT  DISCHARGE SUMMARY    Patient ID: Mounika Leahy                                             Discharge Date: 12/9/2024   Patient's PCP: Maribel De MD                                          Discharge Physician: Frank Méndez MD  Admit Date: 11/27/2024   Admitting Physician: Kaden Hogue MD    PROBLEMS DURING HOSPITALIZATION:  Present on Admission:   Cellulitis   Controlled type 2 diabetes mellitus without complication, without long-term current use of insulin (HCC)   UTI (urinary tract infection), bacterial   Generalized weakness   Bilateral leg pain      DISCHARGE DIAGNOSES:  Severe Spinal Canal Stenosis  Urinary tract infection    Hospital Course:      HPI:  Patient is a 89 y.o. female with PMHx sickle cell disease, G6PD, hypothyroidism, HTN, T2DM, chronic pain, lymphedema,who presents with leg pain L > R. Patient states that the pain began 3 days ago. She notes \"sharp\" that radiates from her knee to the toe on the L and bilateral leg pain to palpation. Has had similar pain like this in the past. MRI from 2012 showed severe spinal canal stenosis in the lumbar spine. She additionally notes dysuria but unable to recall when this started. Patient denies any fever but notes chills. Endorses nausea. Denies chest pain, SOB, abdominal pain, vomiting, weakness.       The following issues were addressed during hospitalization:  # Severe spinal canal stenosis  # Bilateral lower extremity pain  For patient's lower extremity pain, patient was started on Cymbalta, gabapentin trazodone and Ultram.  Had MRI of the lumbar spine, which showed multilevel spondylosis most significant at L3-L4 and L4-L5.  At L3-L4, there is severe narrowing of the central canal with moderate narrowing at L4-L5.  Also notable for multilevel spondylolisthesis.  Neurosurgery was consulted, and did not recommend any acute surgical intervention.  Patient was given an epidural steroid injection for symptom relief.  PT

## 2024-12-09 NOTE — CARE COORDINATION
Case Management Assessment            Discharge Note                    Date / Time of Note: 12/9/2024 3:03 PM                  Discharge Note Completed by: Ramila Lopez RN    Patient Name: Mounika Leahy   YOB: 1935  Diagnosis: Cellulitis [L03.90]  Generalized weakness [R53.1]  Cellulitis of left lower extremity [L03.116]  UTI (urinary tract infection), bacterial [N39.0, A49.9]   Date / Time: 11/27/2024 10:01 PM    Current PCP: Maribel De MD  Clinic patient: No    Hospitalization in the last 30 days: No       Advance Directives:  Code Status: Limited  [place] DNR form completed and on chart: Yes    Financial:  Payor: AETNA MEDICARE / Plan: AET MEDICARE ADVANTAGE HMO / Product Type: Medicare /      Pharmacy:    [facility] - [place] [address] - P [phone] - F [phone]  [address]  [facility] [address]  Phone: [phone] Fax: [phone]    [facility] [address] - P [phone] - F [phone]  [address]  [facility] [address]  Phone: [phone] Fax: [phone]      Assistance purchasing medications?: Potential Assistance Purchasing Medications: No  Assistance provided by Case Management: None at this time    Does patient want to participate in local refill/ meds to beds program?:      Meds To Beds General Rules:  1. Can ONLY be done Monday- Friday between 8:30am-5pm  2. Prescription(s) must be in pharmacy by 3pm to be filled same day  3.Copy of patient's insurance/ prescription drug card and patient face sheet must be sent along with the prescription(s)  4. Cost of Rx cannot be added to hospital bill. If financial assistance is needed, please contact unit  or ;  or  CANNOT provide pharmacy voucher for patients co-pays  5. Patients can then  the prescription on their way out of the hospital at discharge, or pharmacy can deliver to the

## 2024-12-09 NOTE — PLAN OF CARE
Problem: Chronic Conditions and Co-morbidities  Goal: Patient's chronic conditions and co-morbidity symptoms are monitored and maintained or improved  11/30/2024 1521 by Merry Barajas, RN  Outcome: Progressing  Flowsheets (Taken 11/30/2024 1521)  Care Plan - Patient's Chronic Conditions and Co-Morbidity Symptoms are Monitored and Maintained or Improved:   Monitor and assess patient's chronic conditions and comorbid symptoms for stability, deterioration, or improvement   Collaborate with multidisciplinary team to address chronic and comorbid conditions and prevent exacerbation or deterioration   Update acute care plan with appropriate goals if chronic or comorbid symptoms are exacerbated and prevent overall improvement and discharge     Problem: Discharge Planning  Goal: Discharge to home or other facility with appropriate resources  11/30/2024 1521 by Merry Barajas RN  Outcome: Progressing  Flowsheets (Taken 11/30/2024 1521)  Discharge to home or other facility with appropriate resources:   Identify barriers to discharge with patient and caregiver   Arrange for needed discharge resources and transportation as appropriate   Refer to discharge planning if patient needs post-hospital services based on physician order or complex needs related to functional status, cognitive ability or social support system   Identify discharge learning needs (meds, wound care, etc)     
  Problem: Chronic Conditions and Co-morbidities  Goal: Patient's chronic conditions and co-morbidity symptoms are monitored and maintained or improved  12/1/2024 2127 by Kristin Chen RN  Outcome: Progressing  Flowsheets (Taken 12/1/2024 2127)  Care Plan - Patient's Chronic Conditions and Co-Morbidity Symptoms are Monitored and Maintained or Improved:   Monitor and assess patient's chronic conditions and comorbid symptoms for stability, deterioration, or improvement   Collaborate with multidisciplinary team to address chronic and comorbid conditions and prevent exacerbation or deterioration     Problem: Discharge Planning  Goal: Discharge to home or other facility with appropriate resources  12/1/2024 2127 by Kristin Chen RN  Outcome: Progressing  Flowsheets (Taken 12/1/2024 2127)  Discharge to home or other facility with appropriate resources:   Identify barriers to discharge with patient and caregiver   Identify discharge learning needs (meds, wound care, etc)   Arrange for needed discharge resources and transportation as appropriate     Problem: Pain  Goal: Verbalizes/displays adequate comfort level or baseline comfort level  Outcome: Progressing  Flowsheets (Taken 12/1/2024 2127)  Verbalizes/displays adequate comfort level or baseline comfort level:   Encourage patient to monitor pain and request assistance   Administer analgesics based on type and severity of pain and evaluate response   Assess pain using appropriate pain scale     Problem: Safety - Adult  Goal: Free from fall injury  Outcome: Progressing  Flowsheets (Taken 12/1/2024 2127)  Free From Fall Injury: Instruct family/caregiver on patient safety     Problem: Skin/Tissue Integrity  Goal: Absence of new skin breakdown  Outcome: Progressing     Problem: ABCDS Injury Assessment  Goal: Absence of physical injury  Outcome: Progressing     
  Problem: Chronic Conditions and Co-morbidities  Goal: Patient's chronic conditions and co-morbidity symptoms are monitored and maintained or improved  12/4/2024 2222 by Remigio Alvarez RN  Outcome: Progressing  12/4/2024 1844 by Cr Andrews RN  Outcome: Progressing     Problem: Discharge Planning  Goal: Discharge to home or other facility with appropriate resources  Outcome: Progressing     Problem: Pain  Goal: Verbalizes/displays adequate comfort level or baseline comfort level  12/4/2024 2222 by Remigio Alvarez, RN  Outcome: Progressing  12/4/2024 1844 by Cr Andrews, RN  Outcome: Progressing     Problem: Safety - Adult  Goal: Free from fall injury  Outcome: Progressing     Problem: Skin/Tissue Integrity  Goal: Absence of new skin breakdown  Description: 1.  Monitor for areas of redness and/or skin breakdown  2.  Assess vascular access sites hourly  3.  Every 4-6 hours minimum:  Change oxygen saturation probe site  4.  Every 4-6 hours:  If on nasal continuous positive airway pressure, respiratory therapy assess nares and determine need for appliance change or resting period.  Outcome: Progressing     Problem: ABCDS Injury Assessment  Goal: Absence of physical injury  Outcome: Progressing     
  Problem: Chronic Conditions and Co-morbidities  Goal: Patient's chronic conditions and co-morbidity symptoms are monitored and maintained or improved  12/5/2024 2038 by Simin Ash RN  Outcome: Progressing  12/5/2024 1259 by Norberto Alexandre RN  Outcome: Progressing  Flowsheets (Taken 12/5/2024 0800)  Care Plan - Patient's Chronic Conditions and Co-Morbidity Symptoms are Monitored and Maintained or Improved:   Monitor and assess patient's chronic conditions and comorbid symptoms for stability, deterioration, or improvement   Collaborate with multidisciplinary team to address chronic and comorbid conditions and prevent exacerbation or deterioration   Update acute care plan with appropriate goals if chronic or comorbid symptoms are exacerbated and prevent overall improvement and discharge     Problem: Discharge Planning  Goal: Discharge to home or other facility with appropriate resources  12/5/2024 2038 by Simin Ash RN  Outcome: Progressing  12/5/2024 1259 by Norberto Alexandre RN  Outcome: Progressing  Flowsheets (Taken 12/5/2024 0800)  Discharge to home or other facility with appropriate resources:   Identify barriers to discharge with patient and caregiver   Arrange for needed discharge resources and transportation as appropriate   Identify discharge learning needs (meds, wound care, etc)   Arrange for interpreters to assist at discharge as needed   Refer to discharge planning if patient needs post-hospital services based on physician order or complex needs related to functional status, cognitive ability or social support system     Problem: Pain  Goal: Verbalizes/displays adequate comfort level or baseline comfort level  12/5/2024 2038 by Simin Ash RN  Outcome: Progressing  12/5/2024 1259 by Norberto Alexandre RN  Outcome: Progressing  Flowsheets (Taken 12/5/2024 0850)  Verbalizes/displays adequate comfort level or baseline comfort level:   Encourage patient to monitor pain and request 
  Problem: Chronic Conditions and Co-morbidities  Goal: Patient's chronic conditions and co-morbidity symptoms are monitored and maintained or improved  12/8/2024 1629 by Na Haskins RN  Outcome: Progressing  12/8/2024 0552 by Kinga Boo RN  Outcome: Progressing     Problem: Discharge Planning  Goal: Discharge to home or other facility with appropriate resources  12/8/2024 0552 by Kinga Boo RN  Outcome: Progressing     Problem: Pain  Goal: Verbalizes/displays adequate comfort level or baseline comfort level  12/8/2024 1629 by Na Haskins RN  Outcome: Progressing  12/8/2024 0552 by Kinga Boo RN  Outcome: Progressing     Problem: Safety - Adult  Goal: Free from fall injury  12/8/2024 1629 by Na Haskins RN  Outcome: Progressing  12/8/2024 0552 by Kinga Boo RN  Outcome: Progressing     Problem: Skin/Tissue Integrity  Goal: Absence of new skin breakdown  Description: 1.  Monitor for areas of redness and/or skin breakdown  2.  Assess vascular access sites hourly  3.  Every 4-6 hours minimum:  Change oxygen saturation probe site  4.  Every 4-6 hours:  If on nasal continuous positive airway pressure, respiratory therapy assess nares and determine need for appliance change or resting period.  12/8/2024 0552 by Kinga Boo, RN  Outcome: Progressing     Problem: ABCDS Injury Assessment  Goal: Absence of physical injury  12/8/2024 0552 by Kinga Boo, RN  Outcome: Progressing     
  Problem: Chronic Conditions and Co-morbidities  Goal: Patient's chronic conditions and co-morbidity symptoms are monitored and maintained or improved  12/8/2024 2218 by Reji Leborn RN  Outcome: Progressing     Problem: Discharge Planning  Goal: Discharge to home or other facility with appropriate resources  Outcome: Progressing  Flowsheets (Taken 12/8/2024 0830 by Na Haskins, RN)  Discharge to home or other facility with appropriate resources: Identify barriers to discharge with patient and caregiver     Problem: Pain  Goal: Verbalizes/displays adequate comfort level or baseline comfort level  12/8/2024 2218 by Reji Lebron, RN  Outcome: Progressing     Problem: Safety - Adult  Goal: Free from fall injury  12/8/2024 2218 by Reji Lebron RN  Outcome: Progressing     Problem: Skin/Tissue Integrity  Goal: Absence of new skin breakdown  Description: 1.  Monitor for areas of redness and/or skin breakdown  2.  Assess vascular access sites hourly  3.  Every 4-6 hours minimum:  Change oxygen saturation probe site  4.  Every 4-6 hours:  If on nasal continuous positive airway pressure, respiratory therapy assess nares and determine need for appliance change or resting period.  Outcome: Progressing     Problem: ABCDS Injury Assessment  Goal: Absence of physical injury  Outcome: Progressing     
  Problem: Chronic Conditions and Co-morbidities  Goal: Patient's chronic conditions and co-morbidity symptoms are monitored and maintained or improved  Outcome: Progressing     Problem: Discharge Planning  Goal: Discharge to home or other facility with appropriate resources  Outcome: Progressing     Problem: Pain  Goal: Verbalizes/displays adequate comfort level or baseline comfort level  Outcome: Progressing     Problem: Safety - Adult  Goal: Free from fall injury  Outcome: Progressing     Problem: Skin/Tissue Integrity  Goal: Absence of new skin breakdown  Description: 1.  Monitor for areas of redness and/or skin breakdown  2.  Assess vascular access sites hourly  3.  Every 4-6 hours minimum:  Change oxygen saturation probe site  4.  Every 4-6 hours:  If on nasal continuous positive airway pressure, respiratory therapy assess nares and determine need for appliance change or resting period.  Outcome: Progressing     Problem: ABCDS Injury Assessment  Goal: Absence of physical injury  Outcome: Progressing     
  Problem: Chronic Conditions and Co-morbidities  Goal: Patient's chronic conditions and co-morbidity symptoms are monitored and maintained or improved  Outcome: Progressing     Problem: Discharge Planning  Goal: Discharge to home or other facility with appropriate resources  Outcome: Progressing     Problem: Pain  Goal: Verbalizes/displays adequate comfort level or baseline comfort level  Outcome: Progressing     Problem: Safety - Adult  Goal: Free from fall injury  Outcome: Progressing     Problem: Skin/Tissue Integrity  Goal: Absence of new skin breakdown  Description: 1.  Monitor for areas of redness and/or skin breakdown  2.  Assess vascular access sites hourly  3.  Every 4-6 hours minimum:  Change oxygen saturation probe site  4.  Every 4-6 hours:  If on nasal continuous positive airway pressure, respiratory therapy assess nares and determine need for appliance change or resting period.  Outcome: Progressing     Problem: ABCDS Injury Assessment  Goal: Absence of physical injury  Outcome: Progressing     
  Problem: Chronic Conditions and Co-morbidities  Goal: Patient's chronic conditions and co-morbidity symptoms are monitored and maintained or improved  Outcome: Progressing     Problem: Pain  Goal: Verbalizes/displays adequate comfort level or baseline comfort level  Outcome: Progressing          
  Problem: Chronic Conditions and Co-morbidities  Goal: Patient's chronic conditions and co-morbidity symptoms are monitored and maintained or improved  Outcome: Progressing  Flowsheets (Taken 11/28/2024 1923)  Care Plan - Patient's Chronic Conditions and Co-Morbidity Symptoms are Monitored and Maintained or Improved: Collaborate with multidisciplinary team to address chronic and comorbid conditions and prevent exacerbation or deterioration     Problem: Pain  Goal: Verbalizes/displays adequate comfort level or baseline comfort level  Outcome: Progressing  Flowsheets (Taken 11/28/2024 1923)  Verbalizes/displays adequate comfort level or baseline comfort level: Encourage patient to monitor pain and request assistance     Problem: Safety - Adult  Goal: Free from fall injury  Outcome: Progressing     Problem: Discharge Planning  Goal: Discharge to home or other facility with appropriate resources  Recent Flowsheet Documentation  Taken 11/28/2024 1923 by Deborah Cunningham, RN  Discharge to home or other facility with appropriate resources: Arrange for needed discharge resources and transportation as appropriate     
  Problem: Chronic Conditions and Co-morbidities  Goal: Patient's chronic conditions and co-morbidity symptoms are monitored and maintained or improved  Outcome: Progressing  Flowsheets (Taken 11/29/2024 1704)  Care Plan - Patient's Chronic Conditions and Co-Morbidity Symptoms are Monitored and Maintained or Improved:   Monitor and assess patient's chronic conditions and comorbid symptoms for stability, deterioration, or improvement   Update acute care plan with appropriate goals if chronic or comorbid symptoms are exacerbated and prevent overall improvement and discharge   Collaborate with multidisciplinary team to address chronic and comorbid conditions and prevent exacerbation or deterioration     Problem: Discharge Planning  Goal: Discharge to home or other facility with appropriate resources  Outcome: Progressing  Flowsheets (Taken 11/29/2024 1704)  Discharge to home or other facility with appropriate resources:   Identify barriers to discharge with patient and caregiver   Arrange for needed discharge resources and transportation as appropriate   Identify discharge learning needs (meds, wound care, etc)   Refer to discharge planning if patient needs post-hospital services based on physician order or complex needs related to functional status, cognitive ability or social support system     
  Problem: Chronic Conditions and Co-morbidities  Goal: Patient's chronic conditions and co-morbidity symptoms are monitored and maintained or improved  Outcome: Progressing  Flowsheets (Taken 12/1/2024 1652)  Care Plan - Patient's Chronic Conditions and Co-Morbidity Symptoms are Monitored and Maintained or Improved:   Monitor and assess patient's chronic conditions and comorbid symptoms for stability, deterioration, or improvement   Collaborate with multidisciplinary team to address chronic and comorbid conditions and prevent exacerbation or deterioration   Update acute care plan with appropriate goals if chronic or comorbid symptoms are exacerbated and prevent overall improvement and discharge     Problem: Discharge Planning  Goal: Discharge to home or other facility with appropriate resources  Flowsheets (Taken 12/1/2024 1652)  Discharge to home or other facility with appropriate resources:   Identify barriers to discharge with patient and caregiver   Arrange for needed discharge resources and transportation as appropriate   Arrange for interpreters to assist at discharge as needed   Refer to discharge planning if patient needs post-hospital services based on physician order or complex needs related to functional status, cognitive ability or social support system     
  Problem: Chronic Conditions and Co-morbidities  Goal: Patient's chronic conditions and co-morbidity symptoms are monitored and maintained or improved  Outcome: Progressing  Flowsheets (Taken 12/2/2024 1742)  Care Plan - Patient's Chronic Conditions and Co-Morbidity Symptoms are Monitored and Maintained or Improved:   Monitor and assess patient's chronic conditions and comorbid symptoms for stability, deterioration, or improvement   Collaborate with multidisciplinary team to address chronic and comorbid conditions and prevent exacerbation or deterioration     Problem: Discharge Planning  Goal: Discharge to home or other facility with appropriate resources  Outcome: Progressing  Flowsheets (Taken 12/2/2024 1742)  Discharge to home or other facility with appropriate resources:   Identify barriers to discharge with patient and caregiver   Identify discharge learning needs (meds, wound care, etc)     Problem: Pain  Goal: Verbalizes/displays adequate comfort level or baseline comfort level  Outcome: Progressing  Flowsheets (Taken 12/2/2024 1742)  Verbalizes/displays adequate comfort level or baseline comfort level:   Encourage patient to monitor pain and request assistance   Assess pain using appropriate pain scale   Administer analgesics based on type and severity of pain and evaluate response     Problem: Safety - Adult  Goal: Free from fall injury  Outcome: Progressing  Flowsheets (Taken 12/2/2024 1742)  Free From Fall Injury: Instruct family/caregiver on patient safety  Note: Bed alarm on and bed in lowest position. Call light within reach.     Problem: Skin/Tissue Integrity  Goal: Absence of new skin breakdown  Description: 1.  Monitor for areas of redness and/or skin breakdown  2.  Assess vascular access sites hourly  3.  Every 4-6 hours minimum:  Change oxygen saturation probe site  4.  Every 4-6 hours:  If on nasal continuous positive airway pressure, respiratory therapy assess nares and determine need for 
  Problem: Chronic Conditions and Co-morbidities  Goal: Patient's chronic conditions and co-morbidity symptoms are monitored and maintained or improved  Outcome: Progressing  Flowsheets (Taken 12/5/2024 0800)  Care Plan - Patient's Chronic Conditions and Co-Morbidity Symptoms are Monitored and Maintained or Improved:   Monitor and assess patient's chronic conditions and comorbid symptoms for stability, deterioration, or improvement   Collaborate with multidisciplinary team to address chronic and comorbid conditions and prevent exacerbation or deterioration   Update acute care plan with appropriate goals if chronic or comorbid symptoms are exacerbated and prevent overall improvement and discharge     
  Problem: Discharge Planning  Goal: Discharge to home or other facility with appropriate resources  11/30/2024 2351 by Afia Jesus RN  Outcome: Progressing   Case management following for d/c needs.    Problem: Pain  Goal: Verbalizes/displays adequate comfort level or baseline comfort level  Outcome: Progressing   C/o L leg pain and R shoulder pain.  Medicated with prn tramadol and tylenol.  Will continue to monitor pain.    Problem: Safety - Adult  Goal: Free from fall injury  Outcome: Progressing   Fall precautions in place. Bed alarm activated, low position, wheels locked.  Up with contact guard assist with gait belt and walker.  Call light and belongings within reach.  Continue to monitor safety.    Problem: Skin/Tissue Integrity  Goal: Absence of new skin breakdown  Description: 1.  Monitor for areas of redness and/or skin breakdown  2.  Assess vascular access sites hourly  3.  Every 4-6 hours minimum:  Change oxygen saturation probe site  4.  Every 4-6 hours:  If on nasal continuous positive airway pressure, respiratory therapy assess nares and determine need for appliance change or resting period.  Outcome: Progressing   Patient refusing repositioning.  Will continue to encouraged repositioning to prevent further skin breakdown.    
Call light and pt belongings within reach, uses call light appropriately, no attempts to get oob without assistance.  Will continue to monitor for pt safety.     Problem: Skin/Tissue Integrity  Goal: Absence of new skin breakdown  Description: 1.  Monitor for areas of redness and/or skin breakdown  2.  Assess vascular access sites hourly  3.  Every 4-6 hours minimum:  Change oxygen saturation probe site  4.  Every 4-6 hours:  If on nasal continuous positive airway pressure, respiratory therapy assess nares and determine need for appliance change or resting period.  12/3/2024 0153 by Josefa Giang, RN  Outcome: Progressing  Note: No new skin breakdown noted this shift. Will continue to monitor.      Problem: ABCDS Injury Assessment  Goal: Absence of physical injury  12/3/2024 0153 by Josefa Giang, RN  Outcome: Progressing  Flowsheets (Taken 12/3/2024 0153)  Absence of Physical Injury: Implement safety measures based on patient assessment     
site  4.  Every 4-6 hours:  If on nasal continuous positive airway pressure, respiratory therapy assess nares and determine need for appliance change or resting period.  Outcome: Progressing     Problem: ABCDS Injury Assessment  Goal: Absence of physical injury  Outcome: Progressing

## 2024-12-09 NOTE — CONSULTS
NEUROSURGERY CONSULT NOTE    AMMON VALDEZ  [id number]   1935 12/9/2024    Requesting physician:Dr. Méndez    Reason for consultation:back and left leg pain    History of present illness: Patient is a 89 y.o. female w/ PMH  sickle cell disease, G6PD, hypothyroidism, HTN, T2DM, chronic pain, lymphedema,who presents with low back pain with radicular pain down left leg all the way to the foot.   The pt is known to have lumbar stenosis and MRI reveled severe stenosis at L3-4. L4-5 on left. She states she has some numbness and tingling .    ROS:   GENERAL:  Denies fever or recent illness. Denies weight changes   EYES:  Denies vision change or diplopia  EARS:  Denies hearing loss  CARDIAC:  Denies chest pain  RESPIRATORY:  Denies shortness of breath  SKIN:  Denies rash or lesions   HEM:  Denies excessive bruising  PSYCH:  Denies anxiety or depression  NEURO:  Denies headache, + Left leg numbness or tingling or lateralizing weakness   :  Denies urinary difficulty  GI: Denies nausea, vomiting, diarrhea or constipation  MUSCULOSKELETAL:  No arthralgias    Allergies   Allergen Reactions    Vaibhav Beans [Vaibhav Beans] Other (See Comments)     h/o G6PD deficiency    Percocet [Oxycodone-Acetaminophen] Shortness Of Breath, Swelling and Rash     Reported that she has swelling, rash and difficulty breathing.     Quinolones Other (See Comments)     h/o G6PD deficiency    Green Castle [Macadamia Nut Oil]     Aspirin Nausea And Vomiting and Other (See Comments)     Patient refuses anticoagulation as advised not to use given her hx of G6PD as per patient    Coumadin [Warfarin Sodium] Other (See Comments)     Patient refuses anticoagulation as advised not to use given her hx of G6PD as per patient    Dalteparin Sodium Other (See Comments)     Patient refuses anticoagulation as advised not to use given her hx of G6PD as per patient    Heparin Other (See Comments)     Patient refuses anticoagulation as advised not to use given her hx

## 2024-12-09 NOTE — PROGRESS NOTES
Internal Medicine Progress Note    Patient Name: Mounika Leahy   Patient : 1935   Date: 12/3/2024   Admit Date: 2024     CC: Leg Pain (From home via EMS, L sided leg pain since , off and on pain radiates to her feet, HX diabetic, pt denies injury to leg and blood thinners, )       Interval History     No acute events overnight. Does not endorse any new complaints. Awaiting SNF placement.    ROS   Review of Systems   Constitutional:  Negative for chills, fatigue and fever.   HENT: Negative.     Respiratory:  Negative for cough and shortness of breath.    Cardiovascular:  Negative for chest pain.   Gastrointestinal:  Negative for abdominal pain, constipation, diarrhea, nausea and vomiting.   Endocrine: Negative.    Genitourinary: Negative.    Musculoskeletal: Negative.    Skin: Negative.    Allergic/Immunologic: Negative.    Neurological: Negative.    Hematological: Negative.    Psychiatric/Behavioral: Negative.      Objective     I/Os:  I/O last 3 completed shifts:  In: 608 [P.O.:608]  Out: 2475 [Urine:2475]      Vital Signs:  Patient Vitals for the past 8 hrs:   BP Temp Temp src Pulse Resp SpO2 Weight   24 0815 134/73 97.9 °F (36.6 °C) Oral 70 18 95 % --   24 0229 130/79 98.3 °F (36.8 °C) Oral 63 18 96 % 91 kg (200 lb 9.9 oz)       Physical Exam:  Physical Exam      Constitutional:       Appearance: She is obese.   HENT:      Head: Normocephalic and atraumatic.      Mouth/Throat:      Mouth: Mucous membranes are moist.   Cardiovascular:      Rate and Rhythm: Normal rate.   Pulmonary:      Effort: Pulmonary effort is normal. No respiratory distress.   Abdominal:      Palpations: Abdomen is soft.      Tenderness: There is no abdominal tenderness. There is no guarding or rebound.   Musculoskeletal:      Right lower leg: Edema present.      Left lower leg: Edema present.      Comments: BLE chronic venous stasis changes    Skin:     General: Skin is warm and dry.      Capillary 
      Internal Medicine Progress Note    Patient Name: Mounika Leahy   Patient : 1935   Date: 2024   Admit Date: 2024     CC: Leg Pain (From home via EMS, L sided leg pain since , off and on pain radiates to her feet, HX diabetic, pt denies injury to leg and blood thinners, )       Interval History     NAEO. MRI showed severe spinal stenosis. NSGY consulted for evaluation and SALVATORE. Ok to dc to SNF as appeal has been approved    ROS   Review of Systems   Constitutional: Negative.    HENT: Negative.     Cardiovascular: Negative.    Gastrointestinal: Negative.    Genitourinary: Negative.    Musculoskeletal:  Positive for back pain.      Objective     I/Os:  I/O last 3 completed shifts:  In: 960 [P.O.:960]  Out: 2225 [Urine:2225]      Vital Signs:  Patient Vitals for the past 8 hrs:   BP Temp Temp src Pulse Resp SpO2 Weight   24 0817 129/73 98.3 °F (36.8 °C) Oral 76 18 95 % --   24 0557 -- -- -- -- -- -- 86.5 kg (190 lb 11.2 oz)   24 0554 -- -- -- -- 17 -- --   24 0400 (!) 147/86 98 °F (36.7 °C) -- 73 18 95 % --       Physical Exam:  Physical Exam  Constitutional:       Appearance: She is obese.   HENT:      Head: Normocephalic and atraumatic.      Mouth/Throat:      Mouth: Mucous membranes are moist.   Cardiovascular:      Rate and Rhythm: Normal rate.   Pulmonary:      Effort: Pulmonary effort is normal. No respiratory distress.   Abdominal:      Palpations: Abdomen is soft.      Tenderness: There is no abdominal tenderness. There is no guarding or rebound.   Musculoskeletal:      Right lower leg: Edema present.      Left lower leg: Edema present.      Comments: BLE chronic venous stasis changes    Skin:     General: Skin is warm and dry.      Capillary Refill: Capillary refill takes less than 2 seconds.      Coloration: Skin is not pale.      Findings: No erythema.   Neurological:      Mental Status: She is alert and oriented to person, place, and time. Mental status 
      Internal Medicine Progress Note    Patient Name: Mounika Leahy   Patient : 1935   Date: 2024   Admit Date: 2024     CC: Leg Pain (From home via EMS, L sided leg pain since , off and on pain radiates to her feet, HX diabetic, pt denies injury to leg and blood thinners, )       Interval History     NAOE. No new complaints. Appeal for insurance decision still pending.    ROS   Review of Systems   Constitutional:  Negative for chills, fatigue and fever.   HENT: Negative.     Respiratory:  Negative for cough and shortness of breath.    Cardiovascular:  Negative for chest pain.   Gastrointestinal:  Negative for abdominal pain, constipation, diarrhea, nausea and vomiting.   Endocrine: Negative.    Genitourinary: Negative.    Musculoskeletal: Negative.    Skin: Negative.    Allergic/Immunologic: Negative.    Neurological: Negative.    Hematological: Negative.    Psychiatric/Behavioral: Negative    Objective     I/Os:  I/O last 3 completed shifts:  In: 720 [P.O.:720]  Out: 2725 [Urine:2725]      Vital Signs:  Patient Vitals for the past 8 hrs:   BP Temp Temp src Pulse Resp SpO2 Weight   24 0850 (!) 145/74 98.4 °F (36.9 °C) Oral 79 16 95 % --   24 0600 -- -- -- -- -- -- 89.3 kg (196 lb 13.9 oz)   24 0358 129/78 98 °F (36.7 °C) Oral 64 16 94 % --       Physical Exam:  Physical Exam  Constitutional:       Appearance: She is obese.   HENT:      Head: Normocephalic and atraumatic.      Mouth/Throat:      Mouth: Mucous membranes are moist.   Cardiovascular:      Rate and Rhythm: Normal rate.   Pulmonary:      Effort: Pulmonary effort is normal. No respiratory distress.   Abdominal:      Palpations: Abdomen is soft.      Tenderness: There is no abdominal tenderness. There is no guarding or rebound.   Musculoskeletal:      Right lower leg: Edema present.      Left lower leg: Edema present.      Comments: BLE chronic venous stasis changes    Skin:     General: Skin is warm and dry.    
      Internal Medicine Progress Note    Patient Name: Mounika Leahy   Patient : 1935   Date: 2024   Admit Date: 2024     CC: Leg Pain (From home via EMS, L sided leg pain since , off and on pain radiates to her feet, HX diabetic, pt denies injury to leg and blood thinners, )       Interval History     NAOE. Patient continues to complain of back pain. Ordered MRI. Appeal for insurance P2P decision pending.    ROS   Review of Systems   Constitutional:  Negative for chills, fatigue and fever.   HENT: Negative.     Respiratory:  Negative for cough and shortness of breath.    Cardiovascular:  Negative for chest pain.   Gastrointestinal:  Negative for abdominal pain, constipation, diarrhea, nausea and vomiting.   Endocrine: Negative.    Genitourinary: Negative.    Musculoskeletal: Negative.    Skin: Negative.    Allergic/Immunologic: Negative.    Neurological: Negative.    Hematological: Negative.    Psychiatric/Behavioral: Negative    Objective     I/Os:  I/O last 3 completed shifts:  In: 1330 [P.O.:1320; I.V.:10]  Out: 2350 [Urine:2350]      Vital Signs:  Patient Vitals for the past 8 hrs:   BP Temp Temp src Pulse Resp SpO2 Weight   24 0915 (!) 149/73 97.9 °F (36.6 °C) Oral 74 20 99 % --   24 0400 112/70 98.2 °F (36.8 °C) Oral 64 16 98 % 85.7 kg (188 lb 15 oz)       Physical Exam:  Physical Exam  Constitutional:       Appearance: She is obese.   HENT:      Head: Normocephalic and atraumatic.      Mouth/Throat:      Mouth: Mucous membranes are moist.   Cardiovascular:      Rate and Rhythm: Normal rate.   Pulmonary:      Effort: Pulmonary effort is normal. No respiratory distress.   Abdominal:      Palpations: Abdomen is soft.      Tenderness: There is no abdominal tenderness. There is no guarding or rebound.   Musculoskeletal:      Right lower leg: Edema present.      Left lower leg: Edema present.      Comments: BLE chronic venous stasis changes    Skin:     General: Skin is warm 
      Internal Medicine Progress Note    Patient Name: Mounika Leahy   Patient : 1935   Date: 2024   Admit Date: 2024     CC: Leg Pain (From home via EMS, L sided leg pain since , off and on pain radiates to her feet, HX diabetic, pt denies injury to leg and blood thinners, )       Interval History     No acute events overnight.  Patient still endorses persistent bilateral lower extremity pain despite increase gabapentin dose.  Also endorses upper extremity pain, tingling and numbness after she recently woke up.  States this pain is not new but has intermittently worsened.    ROS   Review of Systems   Constitutional:  Positive for fatigue. Negative for chills and fever.   HENT: Negative.     Respiratory: Negative.     Cardiovascular: Negative.    Gastrointestinal:  Negative for constipation, diarrhea, nausea and vomiting.   Musculoskeletal:  Positive for back pain and neck pain.   Neurological:  Positive for weakness.     Objective     I/Os:  I/O last 3 completed shifts:  In: 900 [P.O.:900]  Out: 1841 [Urine:1840; Stool:1]      Vital Signs:  Patient Vitals for the past 8 hrs:   BP Temp Temp src Pulse Resp SpO2 Weight   24 0934 (!) 108/58 98.4 °F (36.9 °C) Oral 74 18 93 % --   24 0554 -- -- -- -- 18 -- --   24 0450 137/74 99 °F (37.2 °C) Oral 68 16 96 % 89.2 kg (196 lb 10.4 oz)       Physical Exam:  Physical Exam  Constitutional:       General: She is not in acute distress.     Appearance: Normal appearance. She is ill-appearing.   HENT:      Head: Normocephalic and atraumatic.      Nose: Nose normal.      Mouth/Throat:      Mouth: Mucous membranes are moist.   Eyes:      Extraocular Movements: Extraocular movements intact.   Cardiovascular:      Rate and Rhythm: Normal rate and regular rhythm.      Heart sounds: Normal heart sounds. No murmur heard.  Pulmonary:      Effort: Pulmonary effort is normal. No respiratory distress.      Breath sounds: Normal breath sounds. No 
      Internal Medicine Progress Note    Patient Name: Mounika Leahy   Patient : 1935   Date: 2024   Admit Date: 2024     CC: Leg Pain (From home via EMS, L sided leg pain since , off and on pain radiates to her feet, HX diabetic, pt denies injury to leg and blood thinners, )       Interval History     No acute events overnight. No new complaints. Appeal for insurance P2P decision pending.    ROS   Review of Systems   Constitutional:  Negative for chills, fatigue and fever.   HENT: Negative.     Respiratory:  Negative for cough and shortness of breath.    Cardiovascular:  Negative for chest pain.   Gastrointestinal:  Negative for abdominal pain, constipation, diarrhea, nausea and vomiting.   Endocrine: Negative.    Genitourinary: Negative.    Musculoskeletal: Negative.    Skin: Negative.    Allergic/Immunologic: Negative.    Neurological: Negative.    Hematological: Negative.    Psychiatric/Behavioral: Negative    Objective     I/Os:  I/O last 3 completed shifts:  In: 1200 [P.O.:1200]  Out: 2300 [Urine:2300]      Vital Signs:  Patient Vitals for the past 8 hrs:   BP Temp Temp src Pulse Resp SpO2 Weight   24 0847 133/77 98 °F (36.7 °C) Oral 73 16 94 % --   24 0553 -- -- -- -- 16 -- --   24 0512 -- -- -- -- -- -- 86 kg (189 lb 11.2 oz)   24 0400 128/76 97.9 °F (36.6 °C) -- 88 17 96 % --       Physical Exam:  Physical Exam  Constitutional:       Appearance: She is obese.   HENT:      Head: Normocephalic and atraumatic.      Mouth/Throat:      Mouth: Mucous membranes are moist.   Cardiovascular:      Rate and Rhythm: Normal rate.   Pulmonary:      Effort: Pulmonary effort is normal. No respiratory distress.   Abdominal:      Palpations: Abdomen is soft.      Tenderness: There is no abdominal tenderness. There is no guarding or rebound.   Musculoskeletal:      Right lower leg: Edema present.      Left lower leg: Edema present.      Comments: BLE chronic venous stasis 
      Internal Medicine Progress Note    Patient Name: Mounika Leahy   Patient : 1935   Date: 2024   Admit Date: 2024     CC: Leg Pain (From home via EMS, L sided leg pain since , off and on pain radiates to her feet, HX diabetic, pt denies injury to leg and blood thinners, )       Interval History     No acute events overnight. Patient endorses chronic back pain and lower extremity pain but denies any new complaints. Discussed disposition with CM. PTOT recommends SNF. Awaiting SNF placement and precert process.    ROS   Review of Systems   Constitutional:  Negative for chills, fatigue and fever.   HENT: Negative.     Respiratory:  Negative for cough and shortness of breath.    Cardiovascular:  Negative for chest pain.   Gastrointestinal:  Negative for abdominal pain, constipation, diarrhea, nausea and vomiting.   Endocrine: Negative.    Genitourinary: Negative.    Musculoskeletal: Negative.    Skin: Negative.    Allergic/Immunologic: Negative.    Neurological: Negative.    Hematological: Negative.    Psychiatric/Behavioral: Negative.        Objective     I/Os:  I/O last 3 completed shifts:  In: 730 [P.O.:730]  Out: 3700 [Urine:3700]      Vital Signs:  Patient Vitals for the past 8 hrs:   BP Temp Temp src Pulse SpO2 Weight   24 0630 128/66 98 °F (36.7 °C) Oral 67 98 % --   24 0618 -- -- -- -- -- 88.5 kg (195 lb 1.7 oz)       Physical Exam:  Physical Exam  Constitutional:       Appearance: She is obese.   HENT:      Head: Normocephalic and atraumatic.      Mouth/Throat:      Mouth: Mucous membranes are moist.   Cardiovascular:      Rate and Rhythm: Normal rate.   Pulmonary:      Effort: Pulmonary effort is normal. No respiratory distress.   Abdominal:      Palpations: Abdomen is soft.      Tenderness: There is no abdominal tenderness. There is no guarding or rebound.   Musculoskeletal:      Right lower leg: Edema present.      Left lower leg: Edema present.      Comments: BLE 
      Internal Medicine Progress Note    Patient Name: Mounika Leahy   Patient : 1935   Date: 2024   Admit Date: 2024     CC: Leg Pain (From home via EMS, L sided leg pain since , off and on pain radiates to her feet, HX diabetic, pt denies injury to leg and blood thinners, )       Interval History     No acute events overnight. Patient endorses persistent LE pain bilaterally with the left feeling worse than the right. Has had LE pain in the past before but states it has not been as bad as this time around. Denies any other new complaints.    ROS   Review of Systems   Constitutional:  Positive for fatigue. Negative for chills and fever.   HENT: Negative.     Respiratory: Negative.     Cardiovascular: Negative.    Gastrointestinal:  Negative for constipation, diarrhea, nausea and vomiting.   Musculoskeletal:  Positive for back pain and neck pain.   Neurological:  Positive for weakness.     Objective     Vital Signs:  Patient Vitals for the past 8 hrs:   BP Temp Temp src Pulse Resp SpO2 Weight   24 0810 124/67 97.9 °F (36.6 °C) Oral 65 18 95 % --   24 0600 -- -- -- -- -- -- 95.8 kg (211 lb 3.2 oz)   24 0320 132/68 98.2 °F (36.8 °C) Oral 73 18 96 % --       Physical Exam:  Physical Exam  Constitutional:       General: She is not in acute distress.     Appearance: Normal appearance. She is ill-appearing.   HENT:      Head: Normocephalic and atraumatic.      Nose: Nose normal.      Mouth/Throat:      Mouth: Mucous membranes are moist.   Eyes:      Extraocular Movements: Extraocular movements intact.   Cardiovascular:      Rate and Rhythm: Normal rate and regular rhythm.      Heart sounds: Normal heart sounds. No murmur heard.  Pulmonary:      Effort: Pulmonary effort is normal. No respiratory distress.      Breath sounds: Normal breath sounds. No wheezing.   Abdominal:      General: There is no distension.      Palpations: Abdomen is soft.      Tenderness: There is abdominal 
  Comprehensive Nutrition Assessment    RECOMMENDATIONS:  PO Diet: Continue Regular  Nutrition Supplement: No indication  Nutrition Education: Education/Counseling not indicated     NUTRITION ASSESSMENT:   Nutritional summary & status: LOS. Presented to ED c/o lower back and L leg pain radiating down to feet and nausea per ED notes. XR showing generalized soft tissue edema per H&P. Back and leg pain suspected to be r/t peripheral neuropathy and venous stasis dermatitis per internal med MD 12/3. Awaiting SNF placement, awaiting appeal decision per CM notes. Per wt hx in EMR, frequent wt fluctuations likely r/t fluid shifts as pt on aldactone. ~20 lb down since admit, -8L since admit. No body fat or muscle mass loss noted upon NFPE. Pt reports good appetite and intake currently as well as pta. Documented PO intake per EMR shows most meals at % consumed. No indication for ONS. Will continue to monitor PO intake and any other changes to nutritional status.   Admission // PMH: Cellulitis // sickle cell disease, G6PD, hypothyroidism, HTN, T2DM, chronic pain, lymphedema    MALNUTRITION ASSESSMENT  Context of Malnutrition: Acute Illness   Malnutrition Status: No malnutrition  Findings of the 6 clinical characteristics of malnutrition (Minimum of 2 out of 6 clinical characteristics is required to make the diagnosis of moderate or severe Protein Calorie Malnutrition based on AND/ASPEN Guidelines):  Energy Intake:  No decrease in energy intake  Weight Loss:  No weight loss     Body Fat Loss:  No body fat loss     Muscle Mass Loss:  No muscle mass loss      NUTRITION DIAGNOSIS   No nutrition diagnosis at this time     Nutrition Monitoring and Evaluation:   Food/Nutrient Intake Outcomes:  Food and Nutrient Intake  Physical Signs/Symptoms Outcomes:  Biochemical Data, Fluid Status or Edema, Nutrition Focused Physical Findings, Weight     OBJECTIVE DATA: Significant to nutrition assessment  Nutrition Related Findings: LBM 
4 Eyes Skin Assessment     NAME:  Mounika Leahy  YOB: 1935  MEDICAL RECORD NUMBER:  [id number]    The patient is being assessed for  Transfer to New Unit    I agree that at least one RN has performed a thorough Head to Toe Skin Assessment on the patient. ALL assessment sites listed below have been assessed.      Areas assessed by both nurses:    Head, Face, Ears, Shoulders, Back, Chest, Arms, Elbows, Hands, Sacrum. Buttock, Coccyx, Ischium, and Legs. Feet and Heels    Redness under abdominal folds   Excoriation to sangita area  Redness on sacrum  Cracking, swelling, redness BLE        Does the Patient have a Wound? No noted wound(s)       Lebron Prevention initiated by RN: Yes  Wound Care Orders initiated by RN: No    Pressure Injury (Stage 3,4, Unstageable, DTI, NWPT, and Complex wounds) if present, place Wound referral order by RN under : No    New Ostomies, if present place, Ostomy referral order under : No     Nurse 1 eSignature: Electronically signed by Na Haskins RN on 12/2/24 at 6:20 PM EST    **SHARE this note so that the co-signing nurse can place an eSignature**    Nurse 2 eSignature: Electronically signed by Judd Fabian RN on 12/2/24 at 6:53 PM EST   
AM labs for CBC/BMP pending with alternative lab personnel planned to return.   
Call made to CM regarding updates with respect to discharge. LVM w/ call back number  
Occupational Therapy  Facility/Department: [address]  Daily Treatment Note  NAME: Mounika Leahy  : 1935  MRN: [id number]    Date of Service: 2024    Discharge Recommendations:  Subacute/Skilled Nursing Facility  OT Equipment Recommendations  Equipment Needed: No  Other: defer      Patient Diagnosis(es): The primary encounter diagnosis was UTI (urinary tract infection), bacterial. Diagnoses of Cellulitis of left lower extremity and Generalized weakness were also pertinent to this visit.  Past Medical History:  has a past medical history of Arthritis, Chronic back pain, Diabetes mellitus (HCC), Esophagitis, Essential hypertension, Fatty liver, G6P deficiency (glucose-6-phosphatase deficiency) (HCC), G6PD deficiency, Gastric polyps, GERD (gastroesophageal reflux disease), Hemorrhoids, History of blood transfusion, Hypothyroidism, Kidney stone, Mediterranean anemia, Normal cardiac stress test, Sickle cell anemia (HCC), and UTI (urinary tract infection).  Past Surgical History:  has a past surgical history that includes Appendectomy; Hysterectomy;  section; Cystocopy (2015); other surgical history (2017); Upper gastrointestinal endoscopy (N/A, 2018); and pr laps surg cholecystectomy w/cholangiography (N/A, 2018).  Assessment       Assessment: Pt demonsrtates functional STS to/from bedside chair with CGA and RW as well as short distance gait with Min A and RW. Pt unable to progress further this session d/t pain and fatigue. Pt requires cues for RW safety and management. As Pt continues to demonstrate safety concerns with functional mobility/transfers and concern with safety completing ADLs, pt would significantly benefit from cont inpt OT to maximize safe progression towards functional independence. Cont with POC      Activity Tolerance: Patient limited by fatigue;Patient limited by endurance  Discharge Recommendations: Subacute/Skilled Nursing Facility  Equipment Needed: 
Occupational Therapy  [address]  Daily [address]  NAME: Mounika Leahy  : 1935  MRN: [id number]    Date of Service: 2024    Discharge Recommendations:  Subacute/Skilled Nursing Facility  OT Equipment Recommendations  Equipment Needed: No  Other: defer      Patient Diagnosis(es): The primary encounter diagnosis was UTI (urinary tract infection), bacterial. Diagnoses of Cellulitis of left lower extremity and Generalized weakness were also pertinent to this visit.     Assessment   Assessment: Pt demo functional transfers and mobility with CGA-min A using RW and req + time and effort to complete household distances. Pt limited by decreased activity tolerance, pain and impaired dynamic balance. Pt below baseline and would benefit from ongoing skilled OT at SNF level prior to dc home for increased safety and to decrease caregiver burden. Cont OT per POC.  Activity Tolerance: Patient tolerated treatment well;Patient limited by fatigue  Discharge Recommendations: Subacute/Skilled Nursing Facility  Equipment Needed: No  Other: defer     Plan  Occupational Therapy Plan  Times Per Week: 2-5  Current Treatment Recommendations: Strengthening;Balance training;Functional mobility training;Endurance training;Safety education & training;Self-Care / ADL    Restrictions       Subjective  Additional Pertinent Hx: 89 y.o. F who presents for left lower extremity pain (L sided leg pain since , off and on pain radiates to her feet).               Hospital course: L Tib/Fib: Generalized soft tissue edema consistent with history of cellulitis.          PMH: sickle cell disease, G6PD, hypothyroidism, HTN, T2DM, chronic pain, lymphedema.  Family / Caregiver Present: No  Referring Practitioner: Ferny Richard MD; Tia Amaot MD  Subjective  Subjective: Pt semi supine in bed upon arrival, pleasant and agreeable to OT session reporting pain to BLEs L>R but did not rate, denies need 
Occupational Therapy  Facility/Department: [address]  Occupational Therapy Treatment     Name: Mounika Leahy  : 1935  MRN: [id number]  Date of Service: 2024    Discharge Recommendations:  Subacute/Skilled Nursing Facility          Patient Diagnosis(es): The primary encounter diagnosis was UTI (urinary tract infection), bacterial. Diagnoses of Cellulitis of left lower extremity and Generalized weakness were also pertinent to this visit.  Past Medical History:  has a past medical history of Arthritis, Chronic back pain, Diabetes mellitus (HCC), Esophagitis, Essential hypertension, Fatty liver, G6P deficiency (glucose-6-phosphatase deficiency) (HCC), G6PD deficiency, Gastric polyps, GERD (gastroesophageal reflux disease), Hemorrhoids, History of blood transfusion, Hypothyroidism, Kidney stone, Mediterranean anemia, Normal cardiac stress test, Sickle cell anemia (HCC), and UTI (urinary tract infection).  Past Surgical History:  has a past surgical history that includes Appendectomy; Hysterectomy;  section; Cystocopy (2015); other surgical history (2017); Upper gastrointestinal endoscopy (N/A, 2018); and pr laps surg cholecystectomy w/cholangiography (N/A, 2018).    Treatment Diagnosis: impaired ADLs/transfers and decreased functional activity tolerance      Assessment  Performance deficits / Impairments: Decreased functional mobility ;Decreased ADL status;Decreased endurance  Assessment: Pt typically able to ambulate short distances using RW. Pt reports being independent w/ ADLs as well. Pt currently requires Manjit with fx mobility and transfers with RW. All mobility very slow and effortful. Assist with ADLs. Pt would benefit from cont therapies while in acute care and cont inpt at [place].  Treatment Diagnosis: impaired ADLs/transfers and decreased functional activity tolerance  REQUIRES OT FOLLOW-UP: Yes  Activity Tolerance  Activity Tolerance: Patient limited by fatigue 
Patient A&Ox4. Pain managed to goal with PRN medications and alternative measures. Access appropriate. Skin per protocol. VSC on RA. Room safety measures in place.   
Patient complained of hip pain that is resolved with ultram, vitals stable, repositioned  for comfort, encouraged to call with needs   
Patient would like grandson Grupo to be notified when she leaves tomorrow.  [phone].    Left message with   
Physical Therapy  Attempt Note/No Treatment    Pt declined PT at this time d/t nausea. Will follow up per plan [place] care[place] [id number]    
Physical Therapy  Facility/Department: [address]  Physical Therapy Daily Treatment    Name: Mounika Leahy  : 1935  MRN: [id number]  Date of Service: 2024    Discharge Recommendations:  Subacute/Skilled Nursing Facility   PT Equipment Recommendations  Equipment Needed: No      Patient Diagnosis(es): The primary encounter diagnosis was UTI (urinary tract infection), bacterial. Diagnoses of Cellulitis of left lower extremity and Generalized weakness were also pertinent to this visit.  Past Medical History:  has a past medical history of Arthritis, Chronic back pain, Diabetes mellitus (HCC), Esophagitis, Essential hypertension, Fatty liver, G6P deficiency (glucose-6-phosphatase deficiency) (HCC), G6PD deficiency, Gastric polyps, GERD (gastroesophageal reflux disease), Hemorrhoids, History of blood transfusion, Hypothyroidism, Kidney stone, Mediterranean anemia, Normal cardiac stress test, Sickle cell anemia (HCC), and UTI (urinary tract infection).  Past Surgical History:  has a past surgical history that includes Appendectomy; Hysterectomy;  section; Cystocopy (2015); other surgical history (2017); Upper gastrointestinal endoscopy (N/A, 2018); and pr laps surg cholecystectomy w/cholangiography (N/A, 2018).    Assessment  Body Structures, Functions, Activity Limitations Requiring Skilled Therapeutic Intervention: Decreased functional mobility ;Decreased strength;Decreased endurance;Decreased balance;Increased pain  Assessment: Pt with decreased independent mobility from reported baseline.  Pt reports normally independent with ambulation with RW short distances.  Pt currently needing SBA for bed mobility, CGA for transfers, CGA with RW short distance.  continues to be limited by c/o pain in LLE, requires significantly inc time to ambulate short distances.  At risk for falls.  Safety concerns for pt returning home.  Pt lives with son however he is unable to assist 
Physical Therapy  Facility/Department: [address]  Physical Therapy Treatment    Name: Mounika Leahy  : 1935  MRN: [id number]  Date of Service: 2024    Discharge Recommendations:  Subacute/Skilled Nursing Facility   PT Equipment Recommendations  Equipment Needed:  (wc, hospital bed if doesn't have)      Patient Diagnosis(es): The primary encounter diagnosis was UTI (urinary tract infection), bacterial. Diagnoses of Cellulitis of left lower extremity and Generalized weakness were also pertinent to this visit.  Past Medical History:  has a past medical history of Arthritis, Chronic back pain, Diabetes mellitus (HCC), Esophagitis, Essential hypertension, Fatty liver, G6P deficiency (glucose-6-phosphatase deficiency) (HCC), G6PD deficiency, Gastric polyps, GERD (gastroesophageal reflux disease), Hemorrhoids, History of blood transfusion, Hypothyroidism, Kidney stone, Mediterranean anemia, Normal cardiac stress test, Sickle cell anemia (HCC), and UTI (urinary tract infection).  Past Surgical History:  has a past surgical history that includes Appendectomy; Hysterectomy;  section; Cystocopy (2015); other surgical history (2017); Upper gastrointestinal endoscopy (N/A, 2018); and pr laps surg cholecystectomy w/cholangiography (N/A, 2018).    Assessment  Assessment: Walked slightly further distance today but fatigues very quickly and is unsafe when she does fatigue as posture gets extremely flexed.  Required alot of A to get back in bed (max).  Pt is not safe to mobilize on her own & needs ongong PT at [place].  At risk for falls if goes home at this point. Do not feel pt could navigate the 2 steps to enter her home. Recommend SNF.  If home, pt will be room confined & would need to use wc.  Will follow & progress as tolerated.  Requires PT Follow-Up: Yes  Activity Tolerance  Activity Tolerance: Patient limited by fatigue;Patient limited by endurance    Plan  Physical Therapy 
Physical Therapy  Facility/Department: [address]  Physical Therapy treatment note    Name: Mounika Leahy  : 1935  MRN: [id number]  Date of Service: 2024    Discharge Recommendations:  Subacute/Skilled Nursing Facility   PT Equipment Recommendations  Equipment Needed:  (wheelchair, hospital bed if SNF denied)      Patient Diagnosis(es): The primary encounter diagnosis was UTI (urinary tract infection), bacterial. Diagnoses of Cellulitis of left lower extremity and Generalized weakness were also pertinent to this visit.  Past Medical History:  has a past medical history of Arthritis, Chronic back pain, Diabetes mellitus (HCC), Esophagitis, Essential hypertension, Fatty liver, G6P deficiency (glucose-6-phosphatase deficiency) (HCC), G6PD deficiency, Gastric polyps, GERD (gastroesophageal reflux disease), Hemorrhoids, History of blood transfusion, Hypothyroidism, Kidney stone, Mediterranean anemia, Normal cardiac stress test, Sickle cell anemia (HCC), and UTI (urinary tract infection).  Past Surgical History:  has a past surgical history that includes Appendectomy; Hysterectomy;  section; Cystocopy (2015); other surgical history (2017); Upper gastrointestinal endoscopy (N/A, 2018); and pr laps surg cholecystectomy w/cholangiography (N/A, 2018).    Assessment  Body Structures, Functions, Activity Limitations Requiring Skilled Therapeutic Intervention: Decreased functional mobility ;Decreased strength;Decreased endurance;Decreased balance;Increased pain  Assessment: Pt demo decreased mobility this date from previous session and she demo mobility well below her reported baseline of independent at home with RW for short distances. Pt required CGA for bed mobility and min assist for gait/transfers with RW. Pt expressed frustration over \"no answers\" and that she still does not feel well. Pt is a very high fall risk. Pt is pursuing SNF at discharge. Recommend SNF to maximize her 
Progress Note    Admit Date: 11/27/2024  Day: 5  Diet: ADULT DIET; Regular    CC: Leg pain    Interval history:   1 L UOP over last 24 hrs. Patient continues to be afebrile, without leukocytosis. Hg stable around 10  NAEON. Pt seen and examined at bedside this morning.  She continues to report BLE pain, distal to the knee, more on the left side. Will discuss Duloxetine  No other acute complaints.  Sensitivities back, abx switched to Augmentin       HPI:   Per H&P:  \"Patient is a 89 y.o. female with PMHx sickle cell disease, G6PD, hypothyroidism, HTN, T2DM, chronic pain, lymphedema,who presents with leg pain L > R     Patient states that the pain began 3 days ago. She notes \"sharp\" that radiates from her knee to the toe on the L and bilateral leg pain to palpation. She additionally notes dyuria but unable to recall when this started. Patient denies any fever but notes chills. Endorses nausea. Denies chest pain, SOB, abdominal pain, vomiting, weakness.\"    Medications:     Scheduled Meds:   gabapentin  600 mg Oral TID    acetaminophen  500 mg Oral Q6H    enoxaparin  40 mg SubCUTAneous Daily    cefepime  2,000 mg IntraVENous Q24H    hydroCHLOROthiazide  25 mg Oral Daily    pantoprazole  20 mg Oral QAM AC    levothyroxine  100 mcg Oral Daily    metoprolol tartrate  50 mg Oral BID    spironolactone  25 mg Oral BID    traZODone  100 mg Oral Nightly    sodium chloride flush  5-40 mL IntraVENous 2 times per day    insulin lispro  0-4 Units SubCUTAneous 4x Daily AC & HS    sertraline  50 mg Oral Daily     Continuous Infusions:   sodium chloride      dextrose       PRN Meds:traMADol, promethazine, lidocaine, sodium chloride flush, sodium chloride, ondansetron **OR** ondansetron, polyethylene glycol, glucose, dextrose bolus **OR** dextrose bolus, glucagon (rDNA), dextrose    Objective:   Vitals:   T-max:  Patient Vitals for the past 8 hrs:   BP Temp Temp src Pulse Resp SpO2   11/30/24 1614 113/70 98.5 °F (36.9 °C) Oral 68 18 
Pt back to room from xray    
Pt is alert and oriented x4. VSS. Pt c/o nausea. Zofran given. Scheduled meds given. Denies any vomiting.  Encouraged to call out for assistance when needed. Will continue to monitor.    
Pt picked up by transport. Pt has all belongings. Pt report called to nurse at [facility]. Family notified of transfer. Judd Fabian RN    
Pt to xray  
Shift Summary    Admission Diagnosis: Cellulitis    Shift Events:  Patient rested well overnight   Remains confused at times   Scheduled Tylenol and PRN Tramadol given for leg pain     Vitals:  Vitals:    11/29/24 1937 11/29/24 2345 11/30/24 0450 11/30/24 0554   BP: 137/85 127/74 137/74    Pulse: 72 78 68    Resp: 18 18 16 18   Temp: 98.7 °F (37.1 °C) 98.3 °F (36.8 °C) 99 °F (37.2 °C)    TempSrc: Oral Oral Oral    SpO2: 96% 94% 96%    Weight:   89.2 kg (196 lb 10.4 oz)    Height:            WEIGHT: Admit Weight - Scale: 99.4 kg (219 lb 3.2 oz)      Today  Weight - Scale: 89.2 kg (196 lb 10.4 oz)    Tele:  normal sinus     IV/Line:  Peripheral IV 11/28/24 Left Antecubital (Active)   Site Assessment Clean, dry & intact 11/30/24 0400   Line Status Normal saline locked 11/30/24 0400   Line Care Connections checked and tightened 11/30/24 0400   Phlebitis Assessment No symptoms 11/30/24 0400   Infiltration Assessment 0 11/30/24 0400   Alcohol Cap Used Yes 11/30/24 0400   Dressing Status Clean, dry & intact 11/30/24 0400   Dressing Type Transparent 11/30/24 0400     Drains/Haskins:  External Urinary Catheter (Active)   Site Assessment Clean,dry & intact 11/29/24 2140   Placement Replaced 11/29/24 2140   Securement Method Securing device (Describe) 11/29/24 2140   Catheter Care Suction Canister/Tubing changed 11/30/24 0450   Perineal Care Yes 11/29/24 2140   Suction 40 mmgHg continuous 11/29/24 2140   Urine Color Yellow 11/29/24 2140   Urine Appearance Clear 11/29/24 2140   Output (mL) 400 mL 11/30/24 0555       Neuro:   oriented to time, place, person and situation - confused at times     I/O:   I/O this shift:  In: 300 [P.O.:300]  Out: 1250 [Urine:1250]               
mobility and independence.  If pt would go home, rec 24 hr assist and home PT  Treatment Diagnosis: impaired functional mobility 2/2 decreased endurance and strength  Decision Making: Medium Complexity  Requires PT Follow-Up: Yes    Plan  Physical Therapy Plan  General Plan:  (2-5)  Current Treatment Recommendations: Strengthening, Functional mobility training, Endurance training, Gait training, Patient/Caregiver education & training, Safety education & training  Safety Devices  Type of Devices: Nurse notified, Chair alarm in place, Call light within reach, Left in chair (chair in reclined position)    Restrictions  Position Activity Restriction  Other position/activity restrictions: up with assist     Subjective  Pain: R shoulder pain, L hip and L knee pain - not rated, RN aware  General  Chart Reviewed: Yes  Additional Pertinent Hx: Pt is an 89 y.o. female adm 11/27 with cellulitis.  Pt presented to ED for left lower extremity pain. L tib/fib Xray:Generalized soft tissue edema consistent with history of cellulitis.   PMH:recurrent UTI, diabetes, chronic back pain, bilateral lower extremity lymphedema  Referring Practitioner: Tia mAato MD  Referral Date : 11/28/24  Diagnosis: cellulitis  Subjective  Subjective: Pt found supine.  Pain in L leg, R shoulder, back - not rated.  Agreeable to PT with encouragement.         Social/Functional History  Social/Functional History  Lives With: Son  Type of Home: House  Home Layout: Able to Live on Main level with bedroom/bathroom, Multi-level  Home Access: Stairs to enter with rails (2 JEWELL - Reports steps are hard and has assist)  Entrance Stairs - Rails: Both (can't reach both rails at the same time)  Bathroom Shower/Tub: Walk-in shower  Bathroom Toilet: Standard  Bathroom Equipment: Grab bars in shower, Built-in shower seat, Grab bars around toilet  Home Equipment: Walker - Rolling, Wheelchair - Manual, Hospital bed, Lift chair, Alert button  Has the patient had two or 
Term Goal 1: toilet transfer w/ SBA  Short Term Goal 2: LB dressing using AE w/ Manjit  Short Term Goal 3: increase functional standing tolerance to 5 minutes  Short Term Goal 4: improve bed mobility to Supervision  Patient Goals   Patient goals : to return home and be more independent    AM-PAC - ADL  AM-PAC Daily Activity - Inpatient   How much help is needed for putting on and taking off regular lower body clothing?: A Lot  How much help is needed for bathing (which includes washing, rinsing, drying)?: A Lot  How much help is needed for toileting (which includes using toilet, bedpan, or urinal)?: A Lot  How much help is needed for putting on and taking off regular upper body clothing?: A Little  How much help is needed for taking care of personal grooming?: A Little  How much help for eating meals?: None  AM-PAC Inpatient Daily Activity Raw Score: 16  AM-PAC Inpatient ADL T-Scale Score : 35.96  ADL Inpatient CMS 0-100% Score: 53.32  ADL Inpatient CMS G-Code Modifier : CK    Therapy Time   Individual Concurrent Group Co-treatment   Time In 1309         Time Out 1335         Minutes 26         Timed Code Treatment Minutes:   26 mins    Total Treatment Minutes:   26 mins    Nava Sloan OT     
have other family in town who could help \"if i ask.\"    Objective                 Safety Devices  Type of Devices: Nurse notified;Chair alarm in place;Call light within reach;Left in chair (chair in reclined position)    Balance  Sitting: High guard (SBA)  Standing: Impaired;With support;High guard (CGA static, CG/Manjit - dynamic)    Toilet Transfers  Toilet - Technique: Stand step (using RW)  Equipment Used: Standard bedside commode (simulated w/ bedside chair)  Toilet Transfer: Contact guard assistance    AROM: Generally decreased, functional (c/o pain R shoulder)  Strength: Generally decreased, functional    ADL  Grooming: Setup  Grooming Skilled Clinical Factors: seated  LE Dressing: Maximum assistance  Putting On/Taking Off Footwear: Dependent/Total  Toileting: Dependent/Total  Toileting Skilled Clinical Factors: incontinent of bowel and bladder - pericare provided at bed level and barrier ointment applied. Clean depends donned and clean purewick applied.    Functional Mobility: Contact guard assistance  Functional Mobility Skilled Clinical Factors: 10' forward w/ chair follow - slow, effortful              Bed mobility  Supine to Sit: Stand by assistance (HOB slightly elevated, increased time and effort to complete)      Transfers  Sit to stand: Contact guard assistance  Stand to sit: Contact guard assistance  Transfer Comments: forward mobility w/ chair follow using RW and CGA (effortful, slow) - 10'    Vision  Vision: Impaired  Vision Exceptions: Wears glasses for reading  Hearing  Hearing: Within functional limits    Orientation  Overall Orientation Status: Within Functional Limits                    Education Given To: Patient  Education Provided: Role of Therapy;Plan of Care;ADL Adaptive Strategies;Transfer Training;Fall Prevention Strategies  Education Method: Verbal  Barriers to Learning: None  Education Outcome: Verbalized understanding;Continued education needed                     Pt seen by OT for 
5 days course of Augmentin  - Medically stable for discharge            Chronic Medical Conditions:     # T2DM  Last HbA1c 5.3 (7/2023).   - hypoglycemia protocol, POCT glucose  - LDSSI  - All A1c values per chart are <6     # Hypothyroidism - continue Synthroid  # GERD - continue home Prevacid  # HTN - continue home HCTZ, Aldactone, metoprolol  # Depression/Anxiety: continue home zoloft        DVT Ppx: Lovenox  Diet:  ADULT DIET; Regular   Code status:  Limited x4     ELOS: >3 days  Barriers to discharge: LE pain  Disposition  - Preadmission: Home  - Current: [facility]  - Upon discharge: SNF vs Home      Will discuss with attending physician Frank Yoo MD.     Kei Cornelius MD, PGY-2  Internal Medicine Resident  Contact via [employer]          
pan-sensitive E coli. Received 3 days of Cefepime.  Plan:  - Antibiotics now switched to Augmentin (Day 4/5)           Chronic Medical Conditions:     # T2DM  Last HbA1c 5.3 (7/2023).   - hypoglycemia protocol, POCT glucose  - LDSSI  - All A1c values per chart are <6     # Hypothyroidism - continue Synthroid  # GERD - continue home Prevacid  # HTN - continue home HCTZ, Aldactone, metoprolol  # Depression/Anxiety: continue home zoloft        DVT Ppx: Lovenox  Diet:  ADULT DIET; Regular   Code status:  Limited      ELOS: >3 days  Barriers to discharge: LE pain  Disposition  - Preadmission: Home  - Current: [facility]  - Upon discharge: SNF       Will discuss with attending physician Sharee Miller MD.     Kei Cornelius MD, PGY-2  Internal Medicine Resident  Contact via [employer]

## 2024-12-12 ENCOUNTER — TELEPHONE (OUTPATIENT)
Dept: INTERNAL MEDICINE CLINIC | Age: 88
End: 2024-12-12

## 2024-12-12 NOTE — TELEPHONE ENCOUNTER
Pt states she is in hospital and her left leg to her toes is swollen and wants to talk to Elizabet    [phone]  Pls call and advise

## 2024-12-12 NOTE — TELEPHONE ENCOUNTER
[phone] (home)   Spoke with patient sh is requesting therapy @ [facility] ,they will send orders (per patient)

## 2024-12-26 RX ORDER — GABAPENTIN 300 MG/1
CAPSULE ORAL
Qty: 90 CAPSULE | Refills: 1 | OUTPATIENT
Start: 2024-12-26

## 2024-12-27 DIAGNOSIS — F51.02 ADJUSTMENT INSOMNIA: ICD-10-CM

## 2024-12-27 DIAGNOSIS — I10 ESSENTIAL HYPERTENSION: ICD-10-CM

## 2024-12-27 DIAGNOSIS — F32.A ANXIETY AND DEPRESSION: ICD-10-CM

## 2024-12-27 DIAGNOSIS — F41.9 ANXIETY AND DEPRESSION: ICD-10-CM

## 2024-12-27 RX ORDER — TRAZODONE HYDROCHLORIDE 100 MG/1
TABLET ORAL
Qty: 90 TABLET | Refills: 0 | Status: SHIPPED | OUTPATIENT
Start: 2024-12-27

## 2024-12-27 RX ORDER — SPIRONOLACTONE 25 MG/1
25 TABLET ORAL 2 TIMES DAILY
Qty: 180 TABLET | Refills: 0 | Status: SHIPPED | OUTPATIENT
Start: 2024-12-27

## 2024-12-27 NOTE — TELEPHONE ENCOUNTER
Last appointment: 11/19/2024  Next appointment: Visit date not found  Last refill:   Requested Prescriptions     Pending Prescriptions Disp Refills    traZODone (DESYREL) 100 MG tablet [Pharmacy Med Name: trazodone 100 mg tablet] 90 tablet 1     Sig: TAKE ONE TABLET BY MOUTH AT BEDTIME    spironolactone (ALDACTONE) 25 MG tablet [Pharmacy Med Name: spironolactone 25 mg tablet] 180 tablet 1     Sig: TAKE ONE TABLET BY MOUTH TWICE DAILY    sertraline (ZOLOFT) 50 MG tablet [Pharmacy Med Name: sertraline 50 mg tablet] 90 tablet 1     Sig: TAKE ONE TABLET EVERY DAY

## 2024-12-31 ENCOUNTER — TELEPHONE (OUTPATIENT)
Dept: INTERNAL MEDICINE CLINIC | Age: 88
End: 2024-12-31

## 2024-12-31 DIAGNOSIS — E11.9 CONTROLLED TYPE 2 DIABETES MELLITUS WITHOUT COMPLICATION, WITHOUT LONG-TERM CURRENT USE OF INSULIN (HCC): ICD-10-CM

## 2024-12-31 DIAGNOSIS — E11.69 TYPE 2 DIABETES MELLITUS WITH OBESITY (HCC): Primary | ICD-10-CM

## 2024-12-31 DIAGNOSIS — E66.9 TYPE 2 DIABETES MELLITUS WITH OBESITY (HCC): Primary | ICD-10-CM

## 2024-12-31 NOTE — TELEPHONE ENCOUNTER
Last appointment: 11/19/2024  Next appointment: 1/10/25  Last refill:   Requested Prescriptions     Pending Prescriptions Disp Refills    SITagliptin (JANUVIA) 50 MG tablet [Pharmacy Med Name: JANUVIA 50 MG TABLET] 90 tablet 1     Sig: TAKE 1 TABLET BY MOUTH EVERY DAY

## 2024-12-31 NOTE — TELEPHONE ENCOUNTER
Ida from [facility] wants to know if Dr De would sign orders for home care for the pt.    Please call Ida at [phone] on a secure line.

## 2025-01-02 ENCOUNTER — CARE COORDINATION (OUTPATIENT)
Dept: CASE MANAGEMENT | Age: 89
End: 2025-01-02

## 2025-01-02 NOTE — CARE COORDINATION
Care Transitions Note    Initial Call - Call within 2 business days of discharge: Yes    Patient [place]:  [place] [id number] [facility] [place] [address]    Post Acute Care Manager contacted the patient, family, Cmren  by telephone to perform post hospital discharge assessment, verified name and  as identifiers. Provided introduction to self, and explanation of the Post Acute Care Manager role.     Patient: Mounika Leahy    Patient : 1935   MRN: [id number]    Reason for Admission: Cellulitis  Discharge Date: 24  RURS: Readmission Risk Score: 14      Last Discharge Facility       Date Complaint Diagnosis Description Type Department Provider    24 Leg Pain UTI (urinary tract infection), bacterial ... ED to Hosp-Admission (Discharged) (ADMITTED) [facility] Frank Max MD; Ferny Richard...            Was this an external facility discharge? Yes. Discharge Date: . Facility Name:   [facility]    Additional needs identified to be addressed with provider   No needs identified             Method of communication with provider: none.    Patients top risk factors for readmission: medical condition-severe spinal stenosis - cellulitis    Interventions to address risk factors:   Review of patient management of conditions/medications: for discharge    Care Summary Note: spoke with Mounika after 2 IDs. She is doing fair. She states she does not know about upcoming appt. Okay for me to call her son.    Called Jorge and spoke to him after 2 IDs. Pt is doing fair and weaker than before but he expected this this. Pt lives with her  and he is not able to help her. Care [facility] has called and he thinks they will need to look into care options in the near future. States mom was d/c with medications and educated that he will need to call PCP. Educated on upcoming appt. NO questions on medications. Agreeable to calls. Left my contact information

## 2025-01-06 DIAGNOSIS — I10 ESSENTIAL HYPERTENSION: ICD-10-CM

## 2025-01-06 RX ORDER — METOPROLOL TARTRATE 50 MG
TABLET ORAL
Qty: 180 TABLET | Refills: 1 | Status: SHIPPED | OUTPATIENT
Start: 2025-01-06

## 2025-01-06 NOTE — TELEPHONE ENCOUNTER
Last appointment: 11/19/2024  Next appointment: 1/10/2025  Last refill:   Last ordered: 1 year ago (10/26/2023) by Maribel De MD           Requested Prescriptions     Pending Prescriptions Disp Refills    metoprolol tartrate (LOPRESSOR) 50 MG tablet [Pharmacy Med Name: metoprolol tartrate 50 mg tablet] 60 tablet 11     Sig: TAKE ONE TABLET TWICE DAILY

## 2025-01-07 ENCOUNTER — TELEPHONE (OUTPATIENT)
Dept: INTERNAL MEDICINE CLINIC | Age: 89
End: 2025-01-07

## 2025-01-07 NOTE — TELEPHONE ENCOUNTER
Melinda from [facility] states that is Zinc Sulfate ok to dispense instead of Glucomate    [phone] (Melinda)  [facility]

## 2025-01-09 ENCOUNTER — TELEPHONE (OUTPATIENT)
Dept: INTERNAL MEDICINE CLINIC | Age: 89
End: 2025-01-09

## 2025-01-09 NOTE — TELEPHONE ENCOUNTER
Asuncion called to see if the patient can be seen virtually for her hospital follow up tomorrow because she doesn't want to take the patient out in the ice.    She's still having pain and is requesting pain medication until her appointment.

## 2025-01-10 ENCOUNTER — TELEMEDICINE (OUTPATIENT)
Dept: INTERNAL MEDICINE CLINIC | Age: 89
End: 2025-01-10

## 2025-01-10 ENCOUNTER — CARE COORDINATION (OUTPATIENT)
Dept: CARE COORDINATION | Age: 89
End: 2025-01-10

## 2025-01-10 DIAGNOSIS — E66.9 TYPE 2 DIABETES MELLITUS WITH OBESITY (HCC): ICD-10-CM

## 2025-01-10 DIAGNOSIS — F51.02 ADJUSTMENT INSOMNIA: Primary | ICD-10-CM

## 2025-01-10 DIAGNOSIS — I10 ESSENTIAL HYPERTENSION: ICD-10-CM

## 2025-01-10 DIAGNOSIS — E11.69 TYPE 2 DIABETES MELLITUS WITH OBESITY (HCC): ICD-10-CM

## 2025-01-10 DIAGNOSIS — M48.062 SPINAL STENOSIS OF LUMBAR REGION WITH NEUROGENIC CLAUDICATION: ICD-10-CM

## 2025-01-10 DIAGNOSIS — M54.41 CHRONIC RIGHT-SIDED LOW BACK PAIN WITH RIGHT-SIDED SCIATICA: ICD-10-CM

## 2025-01-10 DIAGNOSIS — G89.29 CHRONIC RIGHT-SIDED LOW BACK PAIN WITH RIGHT-SIDED SCIATICA: ICD-10-CM

## 2025-01-10 RX ORDER — DULOXETIN HYDROCHLORIDE 30 MG/1
30 CAPSULE, DELAYED RELEASE ORAL DAILY
Qty: 30 CAPSULE | Refills: 3 | Status: SHIPPED | OUTPATIENT
Start: 2025-01-10

## 2025-01-10 RX ORDER — TRAMADOL HYDROCHLORIDE 50 MG/1
50 TABLET ORAL EVERY 8 HOURS PRN
Qty: 60 TABLET | Refills: 1 | Status: SHIPPED | OUTPATIENT
Start: 2025-01-10 | End: 2025-02-09

## 2025-01-10 RX ORDER — TRAZODONE HYDROCHLORIDE 100 MG/1
100 TABLET ORAL NIGHTLY
Qty: 90 TABLET | Refills: 0 | Status: SHIPPED | OUTPATIENT
Start: 2025-01-10

## 2025-01-10 RX ORDER — LIDOCAINE 4 G/G
1 PATCH TOPICAL DAILY
Qty: 30 PATCH | Refills: 0 | Status: SHIPPED | OUTPATIENT
Start: 2025-01-10 | End: 2025-02-09

## 2025-01-10 RX ORDER — PSEUDOEPHED/ACETAMINOPH/DIPHEN 30MG-500MG
TABLET ORAL
Qty: 120 TABLET | Refills: 3 | Status: SHIPPED | OUTPATIENT
Start: 2025-01-10

## 2025-01-10 RX ORDER — HYDROCHLOROTHIAZIDE 25 MG/1
25 TABLET ORAL DAILY
Qty: 90 TABLET | Refills: 3 | Status: SHIPPED | OUTPATIENT
Start: 2025-01-10

## 2025-01-10 ASSESSMENT — ENCOUNTER SYMPTOMS
TROUBLE SWALLOWING: 0
CHOKING: 0
ABDOMINAL DISTENTION: 0
PHOTOPHOBIA: 0
ABDOMINAL PAIN: 0
SHORTNESS OF BREATH: 0
APNEA: 0
VOMITING: 0
WHEEZING: 0
DIARRHEA: 0
STRIDOR: 0
COUGH: 0
CHEST TIGHTNESS: 0
VOICE CHANGE: 0
NAUSEA: 0
CONSTIPATION: 0

## 2025-01-10 NOTE — PROGRESS NOTES
weakness and light-headedness.   Psychiatric/Behavioral:  Negative for agitation and behavioral problems.           Objective   Patient-Reported Vitals  Patient-Reported Systolic (Top): 72 mmHg  Patient-Reported Temperature: 98.0  Patient-Reported Weight: 190LB  Patient-Reported Height: 134       Physical Exam  [INSTRUCTIONS:  \"[x]\" Indicates a positive item  \"[]\" Indicates a negative item  -- DELETE ALL ITEMS NOT EXAMINED]    Constitutional: [x] Appears well-developed and well-nourished [x] No apparent distress      [] Abnormal -     Mental status: [x] Alert and awake  [x] Oriented to person/place/time [x] Able to follow commands    [] Abnormal -     Eyes:   EOM    [x]  Normal    [] Abnormal -   Sclera  [x]  Normal    [] Abnormal -          Discharge [x]  None visible   [] Abnormal -     HENT: [x] Normocephalic, atraumatic  [] Abnormal -   [x] Mouth/Throat: Mucous membranes are moist    External Ears [x] Normal  [] Abnormal -    Neck: [x] No visualized mass [] Abnormal -     Pulmonary/Chest: [x] Respiratory effort normal   [x] No visualized signs of difficulty breathing or respiratory distress        [] Abnormal -      Musculoskeletal:   [x] Normal gait with no signs of ataxia         [x] Normal range of motion of neck        [] Abnormal -     Neurological:        [x] No Facial Asymmetry (Cranial nerve 7 motor function) (limited exam due to video visit)          [x] No gaze palsy        [] Abnormal -          Skin:        [x] No significant exanthematous lesions or discoloration noted on facial skin         [] Abnormal -            Psychiatric:       [x] Normal Affect [] Abnormal -        [x] No Hallucinations    Other pertinent observable physical exam findings:-         On this date 1/10/2025 I have spent 40 minutes reviewing previous notes, test results and face to face (virtual) with the patient discussing the diagnosis and importance of compliance with the treatment plan as well as documenting on the day of

## 2025-01-10 NOTE — ASSESSMENT & PLAN NOTE
Orders:    DULoxetine (CYMBALTA) 30 MG extended release capsule; Take 1 capsule by mouth daily    Acetaminophen Extra Strength 500 MG TABS; TAKE ONE TABLET BY MOUTH EVERY 6 HOURS AS NEEDED FOR PAIN    traMADol (ULTRAM) 50 MG tablet; Take 1 tablet by mouth every 8 hours as needed for Pain for up to 30 days. Max Daily Amount: 150 mg

## 2025-01-10 NOTE — CARE COORDINATION
Ambulatory Care Coordination Note     1/10/2025 2:58 PM     Patient Current Location:  Home: [address]  [facility] [address]     This patient was received as a referral from Ambulatory Care Manager  and Care Transition Nurse.    ACM contacted the patient by telephone. Verified name and  with patient as identifiers. Provided introduction to self, and explanation of the ACM role.   Patient accepted care management services at this time.          ACM: Bettye Sanchez RN     Challenges to be reviewed by the provider   Additional needs identified to be addressed with provider No  none               Method of communication with provider: none.    Utilization: N/A - Initial Call     Care Summary Note:  ACM made outreach today following CTN/PAC handoff of pt. PMH: HTN, DM II.  Spoke very briefly to Mounika as pt stating that she was feeling a little nauseous. Denies CP, SOB, S/S of high or low BS at this time. Pt had VV with PCP today, reviewed and noted. Pt recently Dc'd from [facility]  to [facility]. Home now and  active with [facility], ACM made outreach to [facility] [phone] spoke to Abhinav and elias SOC done 25 active with SN, PT and OT. Pt is pleasant and engaged, open to care coordination as well as follow up calls.    Offered patient enrollment in the Remote Patient Monitoring (RPM) program for in-home monitoring: Deferred at this time because quick conversation; will discuss at next outreach.     Assessments Completed:   General Assessment    Do you have any symptoms that are causing concern?: No          Medications Reviewed:   Not completed during this call: Pt declined.    Advance Care Planning:   Not reviewed during this call     Care Planning:   Not completed during this call    PCP/Specialist follow up:       Follow Up:   Plan for next ACM outreach in approximately 1 week to complete:  - CC Protocol assessments  - disease specific assessments  - SDOH

## 2025-01-13 DIAGNOSIS — K21.9 GASTROESOPHAGEAL REFLUX DISEASE WITHOUT ESOPHAGITIS: ICD-10-CM

## 2025-01-13 DIAGNOSIS — E03.9 ACQUIRED HYPOTHYROIDISM: Chronic | ICD-10-CM

## 2025-01-13 RX ORDER — LEVOTHYROXINE SODIUM 100 UG/1
100 TABLET ORAL DAILY
Qty: 90 TABLET | Refills: 0 | Status: SHIPPED | OUTPATIENT
Start: 2025-01-13

## 2025-01-13 RX ORDER — LANSOPRAZOLE 30 MG/1
CAPSULE, DELAYED RELEASE ORAL
Qty: 90 CAPSULE | Refills: 1 | Status: SHIPPED | OUTPATIENT
Start: 2025-01-13

## 2025-01-17 ENCOUNTER — TELEPHONE (OUTPATIENT)
Dept: INTERNAL MEDICINE CLINIC | Age: 89
End: 2025-01-17

## 2025-01-17 ENCOUNTER — CARE COORDINATION (OUTPATIENT)
Dept: CARE COORDINATION | Age: 89
End: 2025-01-17

## 2025-01-17 RX ORDER — CEPHALEXIN 500 MG/1
500 CAPSULE ORAL 3 TIMES DAILY
COMMUNITY

## 2025-01-17 NOTE — TELEPHONE ENCOUNTER
Yes continue with Duloxetine. I won't be able to prescribe Clonazepam as it is a benzodiazepine and it can cause more harm than good. If she still have lots of anxiety, can request an appointment [facility] psych

## 2025-01-17 NOTE — CARE COORDINATION
Ambulatory Care Coordination Note     2025 9:35 AM     Patient Current Location:  Home: [address] [facility] [address]     This patient was received as a referral from Ambulatory Care Manager .    ACM contacted the patient by telephone. Verified name and  with patient as identifiers. Provided introduction to self, and explanation of the ACM role.   Patient accepted care management services at this time.          ACM: Peggy Hartmann RN     Challenges to be reviewed by the provider   Additional needs identified to be addressed with provider No  none               Method of communication with provider: none.    Utilization: Initial Call - N/A    Care Summary Note: ACM made outreach to patient for care management. Patient advised ACM that she has issues with starting [facility] d/t [employer] but notes services will start. Patient has \"Francisca\" coming today. Patient is unsure if Francisca is C Aide or nurse. Patient notes that she will have PT/OT in home. ACM arranged to call early next week to follow up on services. Patient advised ACM that she is doing ok. She is slower than what she likes but feels this is the new normal for her at this time. Patient is discussing with family moving into an [facility]. Patient notes that her grandson is actively looking for options. Patient notes that she enjoyed being at [facility]. Patient continues cephalexin for UTI. Patient advised that she has trouble taking TID as prescribed. ACM discussed importance of taking as prescribed. Patient VU. ACM discussed s/sx to monitor and importance of reporting changes to appropriate site of care. ACM reminded patient of on call providers for after hours calls. Patient denies any dysuria, hematuria, back/flank pain, fevers or other concerns at this time. Patient is aware to report ANY changes and RED flags for ED. Patient has son who is \"in\" the home with her. He was not available at time of call. Patient has all basic needs met at this time

## 2025-01-17 NOTE — TELEPHONE ENCOUNTER
Per Dr De, this is a controlled benzodiazepine and she does not want her to take it. If her anxiety is not controlled and she feels that she needs something to help, she will need to see psych for a formal evaluation.   Pt notified and expressed understanding. She will see how she feels not taking it and let us know if she wants to see psych.

## 2025-01-17 NOTE — TELEPHONE ENCOUNTER
Message received on 3/22/2023 by PRS to place patient on the waiting list.     Patient will be called in the future, when reached her name to offer an appointment.   Pt's pharmacy is calling to request Clonazepam for the pt.  Pt states she was on this medication while in rehab and wants to continue.   Please advise

## 2025-01-17 NOTE — CARE COORDINATION
Challenges to be reviewed by the provider   Additional needs identified to be addressed with provider Yes  medications-Patient's caregiver/God daughter Summer called to advise that patient is requesting 0.25 mg Clonazepam. Per Summer patient was taking medication while at SNF and would like to continue.    Summer advised that patient has duloxetine for anxiety as well. Summer has asked if patient should take both. Per Summer when calling patient to advise \"tell her if she should or shouldn't take the medication in the bag\" per Summer patient will know what you mean.  If there are questions with medication Summer can be reached at [phone].  Patient was present during call and did not have questions.  [facility] advised patient/caregiver of after hours provider with any further questions if office is closed.             [facility] was advised per Summer that [facility] is on hold d/t [employer] insurance. Per Summer patient only has prescription coverage at this time. Patient's medical insurance will be reinstated on 2/1/2025. Patient has stated to Summer that she would prefer to complete PT/OT at [facility]. Will determine eligibility once insurance is active. ACM will discuss with patient/caregiver next outreach.

## 2025-01-28 ENCOUNTER — CARE COORDINATION (OUTPATIENT)
Dept: CARE COORDINATION | Age: 89
End: 2025-01-28

## 2025-01-28 SDOH — ECONOMIC STABILITY: FOOD INSECURITY: WITHIN THE PAST 12 MONTHS, THE FOOD YOU BOUGHT JUST DIDN'T LAST AND YOU DIDN'T HAVE MONEY TO GET MORE.: NEVER TRUE

## 2025-01-28 SDOH — ECONOMIC STABILITY: FOOD INSECURITY: WITHIN THE PAST 12 MONTHS, YOU WORRIED THAT YOUR FOOD WOULD RUN OUT BEFORE YOU GOT MONEY TO BUY MORE.: NEVER TRUE

## 2025-01-28 SDOH — HEALTH STABILITY: PHYSICAL HEALTH: ON AVERAGE, HOW MANY MINUTES DO YOU ENGAGE IN EXERCISE AT THIS LEVEL?: 0 MIN

## 2025-01-28 SDOH — HEALTH STABILITY: PHYSICAL HEALTH: ON AVERAGE, HOW MANY DAYS PER WEEK DO YOU ENGAGE IN MODERATE TO STRENUOUS EXERCISE (LIKE A BRISK WALK)?: 0 DAYS

## 2025-01-28 SDOH — ECONOMIC STABILITY: INCOME INSECURITY: IN THE LAST 12 MONTHS, WAS THERE A TIME WHEN YOU WERE NOT ABLE TO PAY THE MORTGAGE OR RENT ON TIME?: NO

## 2025-01-28 SDOH — ECONOMIC STABILITY: INCOME INSECURITY: HOW HARD IS IT FOR YOU TO PAY FOR THE VERY BASICS LIKE FOOD, HOUSING, MEDICAL CARE, AND HEATING?: NOT HARD AT ALL

## 2025-01-28 SDOH — HEALTH STABILITY: MENTAL HEALTH
STRESS IS WHEN SOMEONE FEELS TENSE, NERVOUS, ANXIOUS, OR CAN'T SLEEP AT NIGHT BECAUSE THEIR MIND IS TROUBLED. HOW STRESSED ARE YOU?: TO SOME EXTENT

## 2025-01-28 SDOH — SOCIAL STABILITY: SOCIAL NETWORK: ARE YOU MARRIED, WIDOWED, DIVORCED, SEPARATED, NEVER MARRIED, OR LIVING WITH A PARTNER?: WIDOWED

## 2025-01-28 SDOH — SOCIAL STABILITY: SOCIAL NETWORK: HOW OFTEN DO YOU GET TOGETHER WITH FRIENDS OR RELATIVES?: MORE THAN THREE TIMES A WEEK

## 2025-01-28 SDOH — SOCIAL STABILITY: SOCIAL NETWORK: HOW OFTEN DO YOU ATTEND CHURCH OR RELIGIOUS SERVICES?: 1 TO 4 TIMES PER YEAR

## 2025-01-28 SDOH — SOCIAL STABILITY: SOCIAL NETWORK
IN A TYPICAL WEEK, HOW MANY TIMES DO YOU TALK ON THE PHONE WITH FAMILY, FRIENDS, OR NEIGHBORS?: MORE THAN THREE TIMES A WEEK

## 2025-01-28 NOTE — CARE COORDINATION
Explanation  Response: Verbalizes Understanding    Educate medication side effects, taught by Peggy Hartmann RN at 1/28/2025 10:47 AM.  Learner: Patient  Readiness: Acceptance  Method: Explanation  Response: Verbalizes Understanding    Educate safety precautions, taught by Peggy Hartmann RN at 1/28/2025 10:47 AM.  Learner: Patient  Readiness: Acceptance  Method: Explanation  Response: Verbalizes Understanding    Educate home safety, taught by Peggy Hartmann RN at 1/28/2025 10:47 AM.  Learner: Patient  Readiness: Acceptance  Method: Explanation  Response: Verbalizes Understanding    COGNITIVE : FALLS-RISK OF, taught by Peggy Hartmann RN at 1/28/2025 10:47 AM.  Learner: Patient  Readiness: Acceptance  Method: Explanation  Response: Verbalizes Understanding    COGNITIVE : FALLS-RISK OF, taught by Peggy Hartmann RN at 1/28/2025 10:47 AM.  Learner: Patient  Readiness: Acceptance  Method: Explanation  Response: Verbalizes Understanding    Educate bathing safety, taught by Peggy Hartmann RN at 1/28/2025 10:47 AM.  Learner: Patient  Readiness: Acceptance  Method: Explanation  Response: Verbalizes Understanding    Educate ambulation safety, taught by Peggy Hartmann RN at 1/28/2025 10:47 AM.  Learner: Patient  Readiness: Acceptance  Method: Explanation  Response: Verbalizes Understanding    Education Comments  No comments found.     ,    Goals Addressed                   This Visit's Progress     Community Resource Goal        I will work with LSW for support and resources with [facility].    Barriers: overwhelmed by complexity of regimen, stress, and lack of education  Plan for overcoming my barriers: will work with ACM, PCP and LSW  Confidence: 6/10  Anticipated Goal Completion Date: 4/28/2025               PCP/Specialist follow up:       Follow Up:   Plan for next ACM outreach in approximately 1 week to complete:  - disease specific assessments  - SDOH assessments  - advance care planning   - goal

## 2025-01-30 DIAGNOSIS — M54.41 CHRONIC RIGHT-SIDED LOW BACK PAIN WITH RIGHT-SIDED SCIATICA: ICD-10-CM

## 2025-01-30 DIAGNOSIS — R11.0 NAUSEA: ICD-10-CM

## 2025-01-30 DIAGNOSIS — G89.29 CHRONIC RIGHT-SIDED LOW BACK PAIN WITH RIGHT-SIDED SCIATICA: ICD-10-CM

## 2025-01-30 DIAGNOSIS — M48.062 SPINAL STENOSIS OF LUMBAR REGION WITH NEUROGENIC CLAUDICATION: ICD-10-CM

## 2025-02-03 DIAGNOSIS — R11.0 NAUSEA: ICD-10-CM

## 2025-02-03 RX ORDER — PROMETHAZINE HYDROCHLORIDE 12.5 MG/1
TABLET ORAL
Qty: 30 TABLET | Refills: 0 | OUTPATIENT
Start: 2025-02-03

## 2025-02-03 RX ORDER — PROMETHAZINE HYDROCHLORIDE 12.5 MG/1
12.5 TABLET ORAL DAILY
Qty: 30 TABLET | Refills: 0 | Status: SHIPPED | OUTPATIENT
Start: 2025-02-03

## 2025-02-03 RX ORDER — TRAMADOL HYDROCHLORIDE 50 MG/1
TABLET ORAL
Qty: 60 TABLET | Refills: 1 | OUTPATIENT
Start: 2025-02-03

## 2025-02-03 NOTE — TELEPHONE ENCOUNTER
Pt needs medication refill below    [phone]  Pls call and advise    promethazine (PHENERGAN) 12.5 MG tablet [[id number]]     [facility] - [place], [place] - [facility] [address] - P [phone] - F [phone]

## 2025-02-03 NOTE — TELEPHONE ENCOUNTER
Called [facility] to check on when this was filled last.     promethazine (PHENERGAN) 12.5 MG tablet last filled 11/25/24.

## 2025-02-05 ENCOUNTER — CARE COORDINATION (OUTPATIENT)
Dept: CARE COORDINATION | Age: 89
End: 2025-02-05

## 2025-02-06 ENCOUNTER — CARE COORDINATION (OUTPATIENT)
Dept: CARE COORDINATION | Age: 89
End: 2025-02-06

## 2025-02-06 NOTE — CARE COORDINATION
Spoke with Asuncion regarding SW referral. She inquired about a therapeutic hospital bed for patient.    She reported that patient would like services and DME to allow her to maintain her independence within her home instead of going to a facility. Patient does not want Medicaid due to estate recovery. Explained to Asuncion that in home services require Medicaid waiver as well. She understood and stated that patient's main concern at this time was obtaining a bed.    Call to [facility]. Informed insurance only covers standard hospital beds. Updated Summer. Informed her that a bed other than the standard would have to be paid for out of pocket. [facility] site has a variety of beds patient expressed interest in. Informed her that number to order is on their site.    No other issues to be addressed.

## 2025-02-06 NOTE — CARE COORDINATION
Ambulatory Care Coordination Note     2025 2:48 PM     Patient Current Location:  Home: [address] [facility] [address]     ACM contacted the patient by telephone. Verified name and  with patient as identifiers.         ACM: Peggy Hartmann RN     Challenges to be reviewed by the provider   Additional needs identified to be addressed with provider No  none               Method of communication with provider: none.    Utilization: Patient has not had any utilization since our last call.     Care Summary Note: ACM made outreach to patient for care management follow up. Patient advised ACM that she is overall doing well. Patient notes that she was present during outreach from hospitals, [place] and working on getting a hospital bed to assist with patient elevating her her legs. Patient notes that she has a visit scheduled with [facility] tomorrow at 1pm for household chores. Patient advised that she is eating/drinking ok at this time. Patient has asked for referral to dietician to discuss low sodium and low carb diet. ACM advised referral to Andressa care management dietician will be sent and Andressa will reach out via phone to provide education and resources. Patient is taking all medications as prescribed and having normal bowel and bladder habits. Patient denies any s/sx of concerns to report at this time. Patient is aware of importance of closely monitoring and reporting any changes in healthcare status to appropriate site of care for early interventions.   Patient has no questions for ACM at the end of the call. ACM has arranged for follow up at a late date/time and encouraged sooner outreach with any non urgent questions.     Offered patient enrollment in the Remote Patient Monitoring (RPM) program for in-home monitoring: Yes, but did not enroll at this time: limited patient ability to navigate RPM/equipment     Assessments Completed:   Hypertension - Encounter Level    Symptom course: stable      ,

## 2025-02-07 ENCOUNTER — TELEPHONE (OUTPATIENT)
Dept: INTERNAL MEDICINE CLINIC | Age: 89
End: 2025-02-07

## 2025-02-07 RX ORDER — NITROFURANTOIN 25; 75 MG/1; MG/1
100 CAPSULE ORAL 2 TIMES DAILY
Qty: 20 CAPSULE | Refills: 0 | Status: ON HOLD | OUTPATIENT
Start: 2025-02-07 | End: 2025-02-16 | Stop reason: CLARIF

## 2025-02-07 NOTE — TELEPHONE ENCOUNTER
Pt states that blood in urine mite be UTI and needs medication    Burning  Lower back hurting  Little urine coming out    [phone]  Pls call and advise    [facility] - [place], [place] - [facility] [address] - P [phone] - F [phone]

## 2025-02-07 NOTE — TELEPHONE ENCOUNTER
Dr De will send macorbid. Go to ER if there is a lot of blood or symptoms get worse.  Pt notified and expressed verbal understanding.

## 2025-02-10 ENCOUNTER — CARE COORDINATION (OUTPATIENT)
Dept: CARE COORDINATION | Age: 89
End: 2025-02-10

## 2025-02-10 NOTE — CARE COORDINATION
Mounika Leahy  February 10, 2025    Initial Referral Reason: DM, HTN    Patient Care Team:  Maribel De MD as PCP - General (Internal Medicine)  Maribel De MD as PCP - Empaneled Provider  Regulo Schmidt MD as Surgeon (General Surgery)  Peggy Hartmann, RN as Ambulatory Care Manager  Andressa Oakley RD as Dietitian (Dietitian Registered)    Past Medical History:    Current Outpatient Medications   Medication Sig Dispense Refill    nitrofurantoin, macrocrystal-monohydrate, (MACROBID) 100 MG capsule Take 1 capsule by mouth 2 times daily for 10 days 20 capsule 0    promethazine (PHENERGAN) 12.5 MG tablet Take 1 tablet by mouth daily 30 tablet 0    cephALEXin (KEFLEX) 500 MG capsule Take 1 capsule by mouth 3 times daily      levothyroxine (SYNTHROID) 100 MCG tablet TAKE ONE TABLET BY MOUTH EVERY DAY 90 tablet 0    lansoprazole (PREVACID) 30 MG delayed release capsule TAKE ONE CAPSULE EVERY DAY 90 capsule 1    traZODone (DESYREL) 100 MG tablet Take 1 tablet by mouth nightly 90 tablet 0    DULoxetine (CYMBALTA) 30 MG extended release capsule Take 1 capsule by mouth daily 30 capsule 3    Acetaminophen Extra Strength 500 MG TABS TAKE ONE TABLET BY MOUTH EVERY 6 HOURS AS NEEDED FOR PAIN 120 tablet 3    hydroCHLOROthiazide (HYDRODIURIL) 25 MG tablet Take 1 tablet by mouth daily 90 tablet 3    ZINC SULFATE ER PO Take 1 tablet by mouth daily      metoprolol tartrate (LOPRESSOR) 50 MG tablet TAKE ONE TABLET TWICE DAILY 180 tablet 1    SITagliptin (JANUVIA) 50 MG tablet TAKE 1 TABLET BY MOUTH EVERY DAY 90 tablet 1    spironolactone (ALDACTONE) 25 MG tablet TAKE ONE TABLET BY MOUTH TWICE DAILY 180 tablet 0    sertraline (ZOLOFT) 50 MG tablet TAKE ONE TABLET EVERY DAY 90 tablet 0    gabapentin (NEURONTIN) 300 MG capsule Take 2 capsules by mouth 3 times daily for 30 days. 90 capsule 1    diphenhydrAMINE HCl (BENADRYL ALLERGY PO) Take 1 tablet by mouth daily as needed for allergy or sleep. (Patient not taking: Reported

## 2025-02-15 ENCOUNTER — HOSPITAL ENCOUNTER (EMERGENCY)
Age: 89
Discharge: HOME | DRG: 690 | End: 2025-02-16
Payer: MEDICARE

## 2025-02-15 ENCOUNTER — APPOINTMENT (OUTPATIENT)
Dept: CT IMAGING | Age: 89
DRG: 690 | End: 2025-02-15
Payer: MEDICARE

## 2025-02-15 DIAGNOSIS — K92.1 BLOOD IN STOOL: ICD-10-CM

## 2025-02-15 DIAGNOSIS — R31.9 URINARY TRACT INFECTION WITH HEMATURIA, SITE UNSPECIFIED: Primary | ICD-10-CM

## 2025-02-15 DIAGNOSIS — N39.0 URINARY TRACT INFECTION WITH HEMATURIA, SITE UNSPECIFIED: Primary | ICD-10-CM

## 2025-02-15 LAB
ALBUMIN SERPL-MCNC: 3.7 G/DL (ref 3.4–5)
ALBUMIN/GLOB SERPL: 1.1 {RATIO} (ref 1.1–2.2)
ALP SERPL-CCNC: 115 U/L (ref 40–129)
ALT SERPL-CCNC: 60 U/L (ref 10–40)
ANION GAP SERPL CALCULATED.3IONS-SCNC: 11 MMOL/L (ref 3–16)
AST SERPL-CCNC: 72 U/L (ref 15–37)
BACTERIA URNS QL MICRO: ABNORMAL /HPF
BASOPHILS # BLD: 0 K/UL (ref 0–0.2)
BASOPHILS NFR BLD: 0.7 %
BILIRUB SERPL-MCNC: 0.4 MG/DL (ref 0–1)
BUN SERPL-MCNC: 16 MG/DL (ref 7–20)
CALCIUM SERPL-MCNC: 10 MG/DL (ref 8.3–10.6)
CHLORIDE SERPL-SCNC: 105 MMOL/L (ref 99–110)
CO2 SERPL-SCNC: 26 MMOL/L (ref 21–32)
CREAT SERPL-MCNC: 0.9 MG/DL (ref 0.6–1.2)
DEPRECATED RDW RBC AUTO: 14.1 % (ref 12.4–15.4)
EOSINOPHIL # BLD: 0.2 K/UL (ref 0–0.6)
EOSINOPHIL NFR BLD: 3.2 %
EPI CELLS #/AREA URNS AUTO: 21 /HPF (ref 0–5)
GFR SERPLBLD CREATININE-BSD FMLA CKD-EPI: 61 ML/MIN/{1.73_M2}
GLUCOSE SERPL-MCNC: 109 MG/DL (ref 70–99)
HCT VFR BLD AUTO: 30.5 % (ref 36–48)
HEMOCCULT STL QL: NORMAL
HGB BLD-MCNC: 9.6 G/DL (ref 12–16)
HYALINE CASTS #/AREA URNS AUTO: 13 /LPF (ref 0–8)
LYMPHOCYTES # BLD: 2.6 K/UL (ref 1–5.1)
LYMPHOCYTES NFR BLD: 49.7 %
MCH RBC QN AUTO: 24.8 PG (ref 26–34)
MCHC RBC AUTO-ENTMCNC: 31.3 G/DL (ref 31–36)
MCV RBC AUTO: 79.1 FL (ref 80–100)
MONOCYTES # BLD: 0.4 K/UL (ref 0–1.3)
MONOCYTES NFR BLD: 7.9 %
NEUTROPHILS # BLD: 2.1 K/UL (ref 1.7–7.7)
NEUTROPHILS NFR BLD: 38.5 %
PLATELET # BLD AUTO: 217 K/UL (ref 135–450)
PMV BLD AUTO: 8.5 FL (ref 5–10.5)
POTASSIUM SERPL-SCNC: 3.9 MMOL/L (ref 3.5–5.1)
PROT SERPL-MCNC: 7.1 G/DL (ref 6.4–8.2)
RBC # BLD AUTO: 3.86 M/UL (ref 4–5.2)
RBC CLUMPS #/AREA URNS AUTO: 2 /HPF (ref 0–4)
SODIUM SERPL-SCNC: 142 MMOL/L (ref 136–145)
WBC # BLD AUTO: 5.3 K/UL (ref 4–11)
WBC #/AREA URNS AUTO: 2252 /HPF (ref 0–5)

## 2025-02-15 PROCEDURE — 81001 URINALYSIS AUTO W/SCOPE: CPT

## 2025-02-15 PROCEDURE — 6370000000 HC RX 637 (ALT 250 FOR IP): Performed by: PHYSICIAN ASSISTANT

## 2025-02-15 PROCEDURE — 6360000004 HC RX CONTRAST MEDICATION: Performed by: PHYSICIAN ASSISTANT

## 2025-02-15 PROCEDURE — 87086 URINE CULTURE/COLONY COUNT: CPT

## 2025-02-15 PROCEDURE — 85025 COMPLETE CBC W/AUTO DIFF WBC: CPT

## 2025-02-15 PROCEDURE — 74177 CT ABD & PELVIS W/CONTRAST: CPT

## 2025-02-15 PROCEDURE — 80053 COMPREHEN METABOLIC PANEL: CPT

## 2025-02-15 PROCEDURE — 99285 EMERGENCY DEPT VISIT HI MDM: CPT

## 2025-02-15 PROCEDURE — 87077 CULTURE AEROBIC IDENTIFY: CPT

## 2025-02-15 PROCEDURE — 82270 OCCULT BLOOD FECES: CPT

## 2025-02-15 PROCEDURE — 87186 SC STD MICRODIL/AGAR DIL: CPT

## 2025-02-15 RX ORDER — ACETAMINOPHEN 500 MG
1000 TABLET ORAL ONCE
Status: COMPLETED | OUTPATIENT
Start: 2025-02-15 | End: 2025-02-15

## 2025-02-15 RX ORDER — ONDANSETRON 4 MG/1
4 TABLET, ORALLY DISINTEGRATING ORAL ONCE
Status: COMPLETED | OUTPATIENT
Start: 2025-02-15 | End: 2025-02-15

## 2025-02-15 RX ORDER — IOPAMIDOL 755 MG/ML
75 INJECTION, SOLUTION INTRAVASCULAR
Status: COMPLETED | OUTPATIENT
Start: 2025-02-15 | End: 2025-02-15

## 2025-02-15 RX ADMIN — ACETAMINOPHEN 1000 MG: 500 TABLET ORAL at 23:36

## 2025-02-15 RX ADMIN — IOPAMIDOL 75 ML: 755 INJECTION, SOLUTION INTRAVENOUS at 21:43

## 2025-02-15 RX ADMIN — ONDANSETRON 4 MG: 4 TABLET, ORALLY DISINTEGRATING ORAL at 23:37

## 2025-02-15 ASSESSMENT — PAIN SCALES - GENERAL
PAINLEVEL_OUTOF10: 8
PAINLEVEL_OUTOF10: 7

## 2025-02-15 ASSESSMENT — PAIN DESCRIPTION - LOCATION: LOCATION: BACK

## 2025-02-15 ASSESSMENT — PAIN - FUNCTIONAL ASSESSMENT: PAIN_FUNCTIONAL_ASSESSMENT: 0-10

## 2025-02-15 ASSESSMENT — PAIN DESCRIPTION - PAIN TYPE: TYPE: CHRONIC PAIN

## 2025-02-16 ENCOUNTER — HOSPITAL ENCOUNTER (INPATIENT)
Age: 89
LOS: 5 days | Discharge: NURSING, REHAB OR LONG-TERM CARE | DRG: 690 | End: 2025-02-21
Attending: EMERGENCY MEDICINE | Admitting: INTERNAL MEDICINE
Payer: MEDICARE

## 2025-02-16 VITALS
HEIGHT: 63 IN | BODY MASS INDEX: 33.66 KG/M2 | SYSTOLIC BLOOD PRESSURE: 123 MMHG | TEMPERATURE: 98.7 F | HEART RATE: 85 BPM | OXYGEN SATURATION: 95 % | WEIGHT: 190 LBS | DIASTOLIC BLOOD PRESSURE: 70 MMHG | RESPIRATION RATE: 17 BRPM

## 2025-02-16 DIAGNOSIS — Z78.9 IMPAIRED MOBILITY AND ADLS: ICD-10-CM

## 2025-02-16 DIAGNOSIS — Z74.09 IMPAIRED MOBILITY AND ADLS: ICD-10-CM

## 2025-02-16 DIAGNOSIS — N30.00 ACUTE CYSTITIS WITHOUT HEMATURIA: Primary | ICD-10-CM

## 2025-02-16 DIAGNOSIS — Z79.891 CHRONIC PRESCRIPTION OPIATE USE: ICD-10-CM

## 2025-02-16 PROBLEM — N39.0 UTI (URINARY TRACT INFECTION): Status: ACTIVE | Noted: 2025-02-16

## 2025-02-16 LAB
BASOPHILS # BLD: 0 K/UL (ref 0–0.2)
BASOPHILS NFR BLD: 0.7 %
BILIRUB UR QL STRIP.AUTO: NEGATIVE
CLARITY UR: ABNORMAL
COLOR UR: YELLOW
DEPRECATED RDW RBC AUTO: 14.1 % (ref 12.4–15.4)
EOSINOPHIL # BLD: 0.2 K/UL (ref 0–0.6)
EOSINOPHIL NFR BLD: 3.8 %
GLUCOSE BLD-MCNC: 125 MG/DL (ref 70–99)
GLUCOSE BLD-MCNC: 190 MG/DL (ref 70–99)
GLUCOSE BLD-MCNC: 95 MG/DL (ref 70–99)
GLUCOSE UR STRIP.AUTO-MCNC: NEGATIVE MG/DL
HCT VFR BLD AUTO: 29.6 % (ref 36–48)
HGB BLD-MCNC: 9.2 G/DL (ref 12–16)
HGB UR QL STRIP.AUTO: ABNORMAL
KETONES UR STRIP.AUTO-MCNC: NEGATIVE MG/DL
LEUKOCYTE ESTERASE UR QL STRIP.AUTO: ABNORMAL
LYMPHOCYTES # BLD: 1.7 K/UL (ref 1–5.1)
LYMPHOCYTES NFR BLD: 38.8 %
MCH RBC QN AUTO: 24.9 PG (ref 26–34)
MCHC RBC AUTO-ENTMCNC: 31 G/DL (ref 31–36)
MCV RBC AUTO: 80.3 FL (ref 80–100)
MONOCYTES # BLD: 0.3 K/UL (ref 0–1.3)
MONOCYTES NFR BLD: 7.8 %
NEUTROPHILS # BLD: 2.2 K/UL (ref 1.7–7.7)
NEUTROPHILS NFR BLD: 48.9 %
NITRITE UR QL STRIP.AUTO: POSITIVE
PERFORMED ON: ABNORMAL
PERFORMED ON: ABNORMAL
PERFORMED ON: NORMAL
PH UR STRIP.AUTO: 6 [PH] (ref 5–8)
PLATELET # BLD AUTO: 182 K/UL (ref 135–450)
PMV BLD AUTO: 8.6 FL (ref 5–10.5)
PROT UR STRIP.AUTO-MCNC: 30 MG/DL
RBC # BLD AUTO: 3.68 M/UL (ref 4–5.2)
SP GR UR STRIP.AUTO: >=1.03 (ref 1–1.03)
UA COMPLETE W REFLEX CULTURE PNL UR: YES
UA DIPSTICK W REFLEX MICRO PNL UR: YES
URN SPEC COLLECT METH UR: ABNORMAL
UROBILINOGEN UR STRIP-ACNC: 1 E.U./DL
WBC # BLD AUTO: 4.4 K/UL (ref 4–11)

## 2025-02-16 PROCEDURE — 2500000003 HC RX 250 WO HCPCS: Performed by: PHYSICIAN ASSISTANT

## 2025-02-16 PROCEDURE — 96374 THER/PROPH/DIAG INJ IV PUSH: CPT

## 2025-02-16 PROCEDURE — 99285 EMERGENCY DEPT VISIT HI MDM: CPT

## 2025-02-16 PROCEDURE — 6360000002 HC RX W HCPCS: Performed by: PHYSICIAN ASSISTANT

## 2025-02-16 PROCEDURE — 6370000000 HC RX 637 (ALT 250 FOR IP): Performed by: INTERNAL MEDICINE

## 2025-02-16 PROCEDURE — 36415 COLL VENOUS BLD VENIPUNCTURE: CPT

## 2025-02-16 PROCEDURE — 2500000003 HC RX 250 WO HCPCS: Performed by: INTERNAL MEDICINE

## 2025-02-16 PROCEDURE — 6370000000 HC RX 637 (ALT 250 FOR IP): Performed by: NURSE PRACTITIONER

## 2025-02-16 PROCEDURE — 1200000000 HC SEMI PRIVATE

## 2025-02-16 PROCEDURE — 6370000000 HC RX 637 (ALT 250 FOR IP): Performed by: EMERGENCY MEDICINE

## 2025-02-16 PROCEDURE — 85025 COMPLETE CBC W/AUTO DIFF WBC: CPT

## 2025-02-16 RX ORDER — DULOXETIN HYDROCHLORIDE 30 MG/1
30 CAPSULE, DELAYED RELEASE ORAL DAILY
Status: DISCONTINUED | OUTPATIENT
Start: 2025-02-16 | End: 2025-02-21 | Stop reason: HOSPADM

## 2025-02-16 RX ORDER — PANTOPRAZOLE SODIUM 40 MG/1
40 TABLET, DELAYED RELEASE ORAL
Status: DISCONTINUED | OUTPATIENT
Start: 2025-02-17 | End: 2025-02-21 | Stop reason: HOSPADM

## 2025-02-16 RX ORDER — DEXTROSE MONOHYDRATE 100 MG/ML
INJECTION, SOLUTION INTRAVENOUS CONTINUOUS PRN
Status: DISCONTINUED | OUTPATIENT
Start: 2025-02-16 | End: 2025-02-21 | Stop reason: HOSPADM

## 2025-02-16 RX ORDER — ACETAMINOPHEN 325 MG/1
650 TABLET ORAL EVERY 6 HOURS PRN
Status: DISCONTINUED | OUTPATIENT
Start: 2025-02-16 | End: 2025-02-21 | Stop reason: HOSPADM

## 2025-02-16 RX ORDER — GLUCAGON 1 MG/ML
1 KIT INJECTION PRN
Status: DISCONTINUED | OUTPATIENT
Start: 2025-02-16 | End: 2025-02-21 | Stop reason: HOSPADM

## 2025-02-16 RX ORDER — ACETAMINOPHEN 650 MG/1
650 SUPPOSITORY RECTAL EVERY 6 HOURS PRN
Status: DISCONTINUED | OUTPATIENT
Start: 2025-02-16 | End: 2025-02-21 | Stop reason: HOSPADM

## 2025-02-16 RX ORDER — SODIUM CHLORIDE 0.9 % (FLUSH) 0.9 %
5-40 SYRINGE (ML) INJECTION EVERY 12 HOURS SCHEDULED
Status: DISCONTINUED | OUTPATIENT
Start: 2025-02-16 | End: 2025-02-21 | Stop reason: HOSPADM

## 2025-02-16 RX ORDER — POLYETHYLENE GLYCOL 3350 17 G/17G
17 POWDER, FOR SOLUTION ORAL DAILY PRN
Status: DISCONTINUED | OUTPATIENT
Start: 2025-02-16 | End: 2025-02-21 | Stop reason: HOSPADM

## 2025-02-16 RX ORDER — LEVOTHYROXINE SODIUM 100 UG/1
100 TABLET ORAL DAILY
Status: DISCONTINUED | OUTPATIENT
Start: 2025-02-17 | End: 2025-02-21 | Stop reason: HOSPADM

## 2025-02-16 RX ORDER — CEFUROXIME AXETIL 500 MG/1
500 TABLET ORAL 2 TIMES DAILY
Qty: 14 TABLET | Refills: 0 | Status: ON HOLD | OUTPATIENT
Start: 2025-02-16 | End: 2025-02-16 | Stop reason: CLARIF

## 2025-02-16 RX ORDER — METOPROLOL TARTRATE 50 MG
50 TABLET ORAL 2 TIMES DAILY
Status: DISCONTINUED | OUTPATIENT
Start: 2025-02-16 | End: 2025-02-21 | Stop reason: HOSPADM

## 2025-02-16 RX ORDER — SODIUM CHLORIDE 9 MG/ML
INJECTION, SOLUTION INTRAVENOUS PRN
Status: DISCONTINUED | OUTPATIENT
Start: 2025-02-16 | End: 2025-02-21 | Stop reason: HOSPADM

## 2025-02-16 RX ORDER — SPIRONOLACTONE 25 MG/1
25 TABLET ORAL 2 TIMES DAILY
Status: DISCONTINUED | OUTPATIENT
Start: 2025-02-16 | End: 2025-02-21 | Stop reason: HOSPADM

## 2025-02-16 RX ORDER — INSULIN LISPRO 100 [IU]/ML
0-8 INJECTION, SOLUTION INTRAVENOUS; SUBCUTANEOUS
Status: DISCONTINUED | OUTPATIENT
Start: 2025-02-16 | End: 2025-02-21 | Stop reason: HOSPADM

## 2025-02-16 RX ORDER — TRAZODONE HYDROCHLORIDE 50 MG/1
100 TABLET ORAL NIGHTLY
Status: DISCONTINUED | OUTPATIENT
Start: 2025-02-16 | End: 2025-02-21 | Stop reason: HOSPADM

## 2025-02-16 RX ORDER — SODIUM CHLORIDE 0.9 % (FLUSH) 0.9 %
5-40 SYRINGE (ML) INJECTION PRN
Status: DISCONTINUED | OUTPATIENT
Start: 2025-02-16 | End: 2025-02-21 | Stop reason: HOSPADM

## 2025-02-16 RX ORDER — TRAMADOL HYDROCHLORIDE 50 MG/1
50 TABLET ORAL EVERY 8 HOURS PRN
Status: DISCONTINUED | OUTPATIENT
Start: 2025-02-16 | End: 2025-02-21 | Stop reason: HOSPADM

## 2025-02-16 RX ORDER — ENOXAPARIN SODIUM 100 MG/ML
40 INJECTION SUBCUTANEOUS DAILY
Status: DISCONTINUED | OUTPATIENT
Start: 2025-02-16 | End: 2025-02-16

## 2025-02-16 RX ORDER — GABAPENTIN 300 MG/1
600 CAPSULE ORAL 3 TIMES DAILY
Status: DISCONTINUED | OUTPATIENT
Start: 2025-02-16 | End: 2025-02-16

## 2025-02-16 RX ORDER — TRAMADOL HYDROCHLORIDE 50 MG/1
50 TABLET ORAL EVERY 6 HOURS PRN
Status: ON HOLD | COMMUNITY
End: 2025-02-21

## 2025-02-16 RX ORDER — AMOXICILLIN 250 MG
1 CAPSULE ORAL DAILY
Status: DISCONTINUED | OUTPATIENT
Start: 2025-02-16 | End: 2025-02-16

## 2025-02-16 RX ORDER — TRAMADOL HYDROCHLORIDE 50 MG/1
50 TABLET ORAL ONCE
Status: COMPLETED | OUTPATIENT
Start: 2025-02-16 | End: 2025-02-16

## 2025-02-16 RX ORDER — ONDANSETRON 4 MG/1
4 TABLET, ORALLY DISINTEGRATING ORAL EVERY 8 HOURS PRN
Status: DISCONTINUED | OUTPATIENT
Start: 2025-02-16 | End: 2025-02-21 | Stop reason: HOSPADM

## 2025-02-16 RX ORDER — PYRIDOXINE HCL (VITAMIN B6) 50 MG
50 TABLET ORAL DAILY
Status: DISCONTINUED | OUTPATIENT
Start: 2025-02-16 | End: 2025-02-21 | Stop reason: HOSPADM

## 2025-02-16 RX ORDER — HYDROCHLOROTHIAZIDE 25 MG/1
25 TABLET ORAL DAILY
Status: DISCONTINUED | OUTPATIENT
Start: 2025-02-16 | End: 2025-02-21 | Stop reason: HOSPADM

## 2025-02-16 RX ADMIN — PYRIDOXINE HCL TAB 50 MG 50 MG: 50 TAB at 21:09

## 2025-02-16 RX ADMIN — SODIUM CHLORIDE, PRESERVATIVE FREE 10 ML: 5 INJECTION INTRAVENOUS at 21:10

## 2025-02-16 RX ADMIN — TRAMADOL HYDROCHLORIDE 50 MG: 50 TABLET ORAL at 07:42

## 2025-02-16 RX ADMIN — SODIUM CHLORIDE, PRESERVATIVE FREE 10 ML: 5 INJECTION INTRAVENOUS at 18:14

## 2025-02-16 RX ADMIN — TRAMADOL HYDROCHLORIDE 50 MG: 50 TABLET, COATED ORAL at 21:08

## 2025-02-16 RX ADMIN — METOPROLOL TARTRATE 50 MG: 50 TABLET, FILM COATED ORAL at 18:15

## 2025-02-16 RX ADMIN — SPIRONOLACTONE 25 MG: 25 TABLET ORAL at 18:15

## 2025-02-16 RX ADMIN — WATER 1000 MG: 1 INJECTION INTRAMUSCULAR; INTRAVENOUS; SUBCUTANEOUS at 00:22

## 2025-02-16 RX ADMIN — HYDROCHLOROTHIAZIDE 25 MG: 25 TABLET ORAL at 15:47

## 2025-02-16 RX ADMIN — DULOXETINE HYDROCHLORIDE 30 MG: 30 CAPSULE, DELAYED RELEASE ORAL at 15:47

## 2025-02-16 RX ADMIN — ACETAMINOPHEN 650 MG: 325 TABLET ORAL at 15:46

## 2025-02-16 RX ADMIN — ONDANSETRON 4 MG: 4 TABLET, ORALLY DISINTEGRATING ORAL at 15:50

## 2025-02-16 RX ADMIN — TRAZODONE HYDROCHLORIDE 100 MG: 50 TABLET ORAL at 21:09

## 2025-02-16 RX ADMIN — INSULIN LISPRO 2 UNITS: 100 INJECTION, SOLUTION INTRAVENOUS; SUBCUTANEOUS at 18:15

## 2025-02-16 RX ADMIN — SERTRALINE 50 MG: 50 TABLET, FILM COATED ORAL at 15:47

## 2025-02-16 ASSESSMENT — PAIN DESCRIPTION - DESCRIPTORS
DESCRIPTORS: ACHING
DESCRIPTORS: ACHING

## 2025-02-16 ASSESSMENT — PAIN DESCRIPTION - PAIN TYPE
TYPE: CHRONIC PAIN
TYPE: CHRONIC PAIN

## 2025-02-16 ASSESSMENT — PAIN DESCRIPTION - LOCATION
LOCATION: BACK;SHOULDER;KNEE
LOCATION: BUTTOCKS;BACK
LOCATION: BACK

## 2025-02-16 ASSESSMENT — PAIN SCALES - GENERAL
PAINLEVEL_OUTOF10: 3
PAINLEVEL_OUTOF10: 3
PAINLEVEL_OUTOF10: 0
PAINLEVEL_OUTOF10: 9
PAINLEVEL_OUTOF10: 4
PAINLEVEL_OUTOF10: 4
PAINLEVEL_OUTOF10: 7
PAINLEVEL_OUTOF10: 4

## 2025-02-16 ASSESSMENT — PAIN DESCRIPTION - FREQUENCY: FREQUENCY: CONTINUOUS

## 2025-02-16 ASSESSMENT — PAIN DESCRIPTION - ONSET: ONSET: ON-GOING

## 2025-02-16 ASSESSMENT — PAIN - FUNCTIONAL ASSESSMENT: PAIN_FUNCTIONAL_ASSESSMENT: NONE - DENIES PAIN

## 2025-02-16 ASSESSMENT — PAIN DESCRIPTION - ORIENTATION
ORIENTATION: LOWER;RIGHT;LEFT
ORIENTATION: RIGHT

## 2025-02-16 NOTE — ED NOTES
Patient Name: Mounika Leahy  : 1935 89 y.o.  MRN: [id number]  ED Room #: ED-0020/20     Chief complaint:   Chief Complaint   Patient presents with    readmissioin     Pt arrived via [facility] after discharge because she does not have a key to her house     Hospital Problem/Diagnosis:   Hospital Problems             Last Modified POA    * (Principal) UTI (urinary tract infection) 2025 Yes         O2 Flow Rate:O2 Device: None (Room air)   (if applicable)  Cardiac Rhythm:   (if applicable)  Active LDA's:           How does patient ambulate? Unknown, did not assess in the Emergency Department    2. How does patient take pills? Whole with Water    3. Is patient alert? Alert    4. Is patient oriented? To Person and To Place    5.   Patient arrived from:  home  Facility Name: ___________________________________________    6. If patient is disoriented or from a Skill Nursing Facility has family been notified of admission? No    7. Patient belongings? Belongings: Cell Phone and Clothing    Disposition of belongings? Kept with Patient     8. Any specific patient or family belongings/needs/dynamics?   a. none    9. Miscellaneous comments/pending orders?  a. none      If there are any additional questions please reach out to the Emergency Department.

## 2025-02-16 NOTE — FLOWSHEET NOTE
02/16/25 1045   Vital Signs   Temp 98.8 °F (37.1 °C)   Temp Source Oral   Pulse 92   Heart Rate Source Monitor   Respirations 18   /62   MAP (Calculated) 87   Pain Assessment   Pain Assessment 0-10   Pain Level 4   Pain Location Back   Pain Type Chronic pain   Oxygen Therapy   SpO2 97 %   O2 Device None (Room air)     Admitted patient to room 4479 from ED with dx: UTI.  Arrived to unit via stretcher with all belongings. No family present at this time.    Transferred to bed. Patient awake, alert and oriented x4. Respirations easy unlabored. No acute distress.     Pain/Discomfort is being managed with PRN analgesics per MD orders (See MAR). Patient is able to express and rate pain using numerical scale.    Oriented to room, call light, tv, phone, bathroom, dietary services and unit schedule (including lab draws and rounds). Bed in lowest position and locked. Exit alarms in place. Non slip socks on. ID bracelet on and correct per patient verbally reporting name and date of birth. Call light and needed items in reach.

## 2025-02-16 NOTE — ED PROVIDER NOTES
GERD (gastroesophageal reflux disease), Hemorrhoids, History of blood transfusion, Hypothyroidism, Kidney stone, Mediterranean anemia, Normal cardiac stress test (3/2014), Sickle cell anemia (HCC), and UTI (urinary tract infection) (3/2014).     Chronic Conditions affecting Care:     EMERGENCY DEPARTMENT COURSE and DIFFERENTIAL DIAGNOSIS/MDM:   Vitals:    Vitals:    02/15/25 2034 02/15/25 2102 02/15/25 2132 02/15/25 2329   BP: (!) 147/98 127/74 (!) 114/94 136/81   Pulse: 83 76 75 76   Resp: 18 18 17 15   Temp: 98.7 °F (37.1 °C)      TempSrc: Oral      SpO2: 98% 95% 97% 96%   Weight: 86.2 kg (190 lb)      Height: 1.6 m (5' 3\")          Patient was given the following medications:  Medications   iopamidol (ISOVUE-370) 76 % injection 75 mL (75 mLs IntraVENous Given 2/15/25 2143)   ondansetron (ZOFRAN-ODT) disintegrating tablet 4 mg (4 mg Oral Given 2/15/25 2337)   acetaminophen (TYLENOL) tablet 1,000 mg (1,000 mg Oral Given 2/15/25 2336)   cefTRIAXone (ROCEPHIN) 1,000 mg in sterile water 10 mL IV syringe (1,000 mg IntraVENous Given 2/16/25 0022)             Is this patient to be included in the SEP-1 Core Measure due to severe sepsis or septic shock?   No   Exclusion criteria - the patient is NOT to be included for SEP-1 Core Measure due to:  2+ SIRS criteria are not met    CONSULTS: (Who and What was discussed)  None  Discussion with Other Profesionals : None    Social Determinants : None    Records Reviewed : None    CC/HPI Summary, DDx, ED Course, and Reassessment: 89 y.o. female who presents to the emergency room via EMS due to seeing bright red blood in the stool this morning.  Patient states that she has had this happen to her before states that she is unsure where the blood is coming from.  Patient denies any pain with bowel movement.  She denies any abdominal pain nausea or vomiting.  Patient states that she does have chronic lower back pain that radiates down her legs at times.  She denies any fevers or  chills.  She denies any nausea or vomiting.    On exam she otherwise appears well in no acute distress.  She has a soft abdomen full bowel sounds throughout 4 quadrants.  Chaperone present to perform rectal exam.  Rectal exam does not reveal any bright red blood per rectum rectal tone is normal there is no external hemorrhoids.  There is some superficial sacral wounds without any ulceration or bleeding noted today.    Differential diagnosis includes diverticulitis, lower GI bleed, colon polyp, urinary tract infection    Patient CBC with a hemoglobin 9.6 with a microcytic hyperchromic appearance patient does have a history of iron deficiency anemia.  CMP did not show any significant abnormalities.  Urinalysis was positive nitrates and large leukocyte esterase with 4+ bacteria significant about of white blood cells.  Patient was given Rocephin while here in the emergency department today.  To cover for the urinary tract infection.  She will be discharged with cefuroxime mean for this as well.    CT scan of the abdomen pelvis with IV contrast did not show any acute abnormalities did show some thickening of the bladder consistent with urinary tract infection.    Fecal Hemoccult blood test was negative suspicion for gastrointestinal bleeding at this time.    I discussed findings with the patient and patient will be discharged to follow-up primary care doctor and return to emergency room if she has any worsening symptoms.    At this time a low suspicion for kidney stone, pyelonephritis,  appendicitis, bowel obstruction, diverticulitis, hernia, gastritis/gastroenteritis, pancreatitis, cholecystitis, hepatitis, constipation, IBS, IBD, mesenteric ischemia, AAA.    I am the Primary Clinician of Record.    FINAL IMPRESSION      1. Urinary tract infection with hematuria, site unspecified    2. Blood in stool          DISPOSITION/PLAN     DISPOSITION Decision To Discharge 02/16/2025 12:26:47 AM   DISPOSITION CONDITION Stable

## 2025-02-16 NOTE — ED PROVIDER NOTES
[facility] EMERGENCY DEPARTMENT  EMERGENCY DEPARTMENT ENCOUNTER        Pt Name: Mounika Leahy  MRN: [id number]  Birthdate 1935  Date of evaluation: 2/16/2025  Provider: Norberto Rodriguez MD  PCP: Maribel De MD  Note Started: 5:55 AM EST 2/16/25    CHIEF COMPLAINT       Chief Complaint   Patient presents with    readmissioin     Pt [facility] via [facility] after discharge because she does not have a key to her house       HISTORY OF PRESENT ILLNESS: 1 or more Elements     History from : Patient    Limitations to history : None    Mounika Leahy is a 89 y.o. female who [facility] after she was attempting to be discharged from the ER yesterday but unfortunate was unable to get into her house.  Patient has history of chronic pain diabetes adjustment disorder hypertension recurrent urinary tract infections gastroparesis dementia.  Patient was initially seen yesterday for having bright red blood in her stool.  Was not have any rectal pain no pain with bowel movements no nausea vomiting or diarrhea no abdominal pain.  Chronic low back pain which is normal for her no fevers or chills.  Patient was found have a UTI was given antibiotics after CT scan and laboratory workup and attempting to discharge her however patient appears somewhat confused may have a component of dementia states that she had her keys but when they got to her house she did not have her keys and there was no family member there to care for her she has significant difficulty with mobility at baseline.  So EMS just brought her back to the ER.    Nursing Notes were all reviewed and agreed with or any disagreements were addressed in the HPI.    REVIEW OF SYSTEMS :      Review of Systems   Constitutional: Negative.    HENT:  Negative for congestion and sore throat.    Respiratory: Negative.  Negative for shortness of breath.    Cardiovascular: Negative.  Negative for chest pain.   Gastrointestinal:  Positive for blood in

## 2025-02-17 LAB
ANION GAP SERPL CALCULATED.3IONS-SCNC: 8 MMOL/L (ref 3–16)
BASOPHILS # BLD: 0 K/UL (ref 0–0.2)
BASOPHILS NFR BLD: 0.5 %
BUN SERPL-MCNC: 13 MG/DL (ref 7–20)
CALCIUM SERPL-MCNC: 9.2 MG/DL (ref 8.3–10.6)
CHLORIDE SERPL-SCNC: 104 MMOL/L (ref 99–110)
CO2 SERPL-SCNC: 27 MMOL/L (ref 21–32)
CREAT SERPL-MCNC: 0.8 MG/DL (ref 0.6–1.2)
DEPRECATED RDW RBC AUTO: 13.9 % (ref 12.4–15.4)
EOSINOPHIL # BLD: 0.2 K/UL (ref 0–0.6)
EOSINOPHIL NFR BLD: 3.6 %
GFR SERPLBLD CREATININE-BSD FMLA CKD-EPI: 70 ML/MIN/{1.73_M2}
GLUCOSE BLD-MCNC: 111 MG/DL (ref 70–99)
GLUCOSE BLD-MCNC: 114 MG/DL (ref 70–99)
GLUCOSE BLD-MCNC: 128 MG/DL (ref 70–99)
GLUCOSE BLD-MCNC: 131 MG/DL (ref 70–99)
GLUCOSE BLD-MCNC: 174 MG/DL (ref 70–99)
GLUCOSE SERPL-MCNC: 113 MG/DL (ref 70–99)
HCT VFR BLD AUTO: 29.6 % (ref 36–48)
HGB BLD-MCNC: 9.4 G/DL (ref 12–16)
LYMPHOCYTES # BLD: 2.2 K/UL (ref 1–5.1)
LYMPHOCYTES NFR BLD: 46.7 %
MCH RBC QN AUTO: 25 PG (ref 26–34)
MCHC RBC AUTO-ENTMCNC: 31.9 G/DL (ref 31–36)
MCV RBC AUTO: 78.6 FL (ref 80–100)
MONOCYTES # BLD: 0.4 K/UL (ref 0–1.3)
MONOCYTES NFR BLD: 8 %
NEUTROPHILS # BLD: 2 K/UL (ref 1.7–7.7)
NEUTROPHILS NFR BLD: 41.2 %
PERFORMED ON: ABNORMAL
PLATELET # BLD AUTO: 184 K/UL (ref 135–450)
PMV BLD AUTO: 8.2 FL (ref 5–10.5)
POTASSIUM SERPL-SCNC: 4.2 MMOL/L (ref 3.5–5.1)
RBC # BLD AUTO: 3.77 M/UL (ref 4–5.2)
SODIUM SERPL-SCNC: 139 MMOL/L (ref 136–145)
WBC # BLD AUTO: 4.8 K/UL (ref 4–11)

## 2025-02-17 PROCEDURE — 85025 COMPLETE CBC W/AUTO DIFF WBC: CPT

## 2025-02-17 PROCEDURE — 1200000000 HC SEMI PRIVATE

## 2025-02-17 PROCEDURE — 97162 PT EVAL MOD COMPLEX 30 MIN: CPT

## 2025-02-17 PROCEDURE — 97116 GAIT TRAINING THERAPY: CPT

## 2025-02-17 PROCEDURE — 6370000000 HC RX 637 (ALT 250 FOR IP): Performed by: INTERNAL MEDICINE

## 2025-02-17 PROCEDURE — 97530 THERAPEUTIC ACTIVITIES: CPT

## 2025-02-17 PROCEDURE — 6360000002 HC RX W HCPCS: Performed by: INTERNAL MEDICINE

## 2025-02-17 PROCEDURE — 97165 OT EVAL LOW COMPLEX 30 MIN: CPT

## 2025-02-17 PROCEDURE — 6370000000 HC RX 637 (ALT 250 FOR IP): Performed by: NURSE PRACTITIONER

## 2025-02-17 PROCEDURE — 2500000003 HC RX 250 WO HCPCS: Performed by: INTERNAL MEDICINE

## 2025-02-17 PROCEDURE — 80048 BASIC METABOLIC PNL TOTAL CA: CPT

## 2025-02-17 RX ORDER — CEFUROXIME AXETIL 250 MG/1
250 TABLET ORAL 2 TIMES DAILY
Qty: 8 TABLET | Refills: 0 | Status: SHIPPED | OUTPATIENT
Start: 2025-02-17 | End: 2025-02-19 | Stop reason: HOSPADM

## 2025-02-17 RX ADMIN — SERTRALINE 50 MG: 50 TABLET, FILM COATED ORAL at 08:03

## 2025-02-17 RX ADMIN — SPIRONOLACTONE 25 MG: 25 TABLET ORAL at 21:39

## 2025-02-17 RX ADMIN — DULOXETINE HYDROCHLORIDE 30 MG: 30 CAPSULE, DELAYED RELEASE ORAL at 08:03

## 2025-02-17 RX ADMIN — SODIUM CHLORIDE, PRESERVATIVE FREE 10 ML: 5 INJECTION INTRAVENOUS at 21:41

## 2025-02-17 RX ADMIN — HYDROCHLOROTHIAZIDE 25 MG: 25 TABLET ORAL at 08:03

## 2025-02-17 RX ADMIN — PYRIDOXINE HCL TAB 50 MG 50 MG: 50 TAB at 08:07

## 2025-02-17 RX ADMIN — METOPROLOL TARTRATE 50 MG: 50 TABLET, FILM COATED ORAL at 21:39

## 2025-02-17 RX ADMIN — LEVOTHYROXINE SODIUM 100 MCG: 0.1 TABLET ORAL at 08:03

## 2025-02-17 RX ADMIN — SPIRONOLACTONE 25 MG: 25 TABLET ORAL at 08:03

## 2025-02-17 RX ADMIN — TRAMADOL HYDROCHLORIDE 50 MG: 50 TABLET, COATED ORAL at 11:02

## 2025-02-17 RX ADMIN — TRAZODONE HYDROCHLORIDE 100 MG: 50 TABLET ORAL at 21:39

## 2025-02-17 RX ADMIN — METOPROLOL TARTRATE 50 MG: 50 TABLET, FILM COATED ORAL at 08:03

## 2025-02-17 RX ADMIN — PANTOPRAZOLE SODIUM 40 MG: 40 TABLET, DELAYED RELEASE ORAL at 05:18

## 2025-02-17 RX ADMIN — WATER 1000 MG: 1 INJECTION INTRAMUSCULAR; INTRAVENOUS; SUBCUTANEOUS at 00:33

## 2025-02-17 RX ADMIN — TRAMADOL HYDROCHLORIDE 50 MG: 50 TABLET, COATED ORAL at 21:39

## 2025-02-17 ASSESSMENT — PAIN DESCRIPTION - DESCRIPTORS: DESCRIPTORS: ACHING

## 2025-02-17 ASSESSMENT — PAIN DESCRIPTION - ORIENTATION: ORIENTATION: LOWER;RIGHT;LEFT

## 2025-02-17 ASSESSMENT — PAIN DESCRIPTION - PAIN TYPE: TYPE: CHRONIC PAIN

## 2025-02-17 ASSESSMENT — PAIN SCALES - GENERAL
PAINLEVEL_OUTOF10: 5
PAINLEVEL_OUTOF10: 7
PAINLEVEL_OUTOF10: 6

## 2025-02-17 ASSESSMENT — PAIN DESCRIPTION - FREQUENCY: FREQUENCY: CONTINUOUS

## 2025-02-17 ASSESSMENT — PAIN DESCRIPTION - LOCATION: LOCATION: BACK;LEG;HIP

## 2025-02-17 ASSESSMENT — PAIN DESCRIPTION - ONSET: ONSET: ON-GOING

## 2025-02-17 ASSESSMENT — PAIN - FUNCTIONAL ASSESSMENT: PAIN_FUNCTIONAL_ASSESSMENT: PREVENTS OR INTERFERES SOME ACTIVE ACTIVITIES AND ADLS

## 2025-02-17 NOTE — PROGRESS NOTES
Shift assessment complete at 2105.  VSS, A&Ox4, BS hyperactive, on RA.  Pitting edema present to BLE.   Zinc paste applied to under breasts and buttocks according to patient request.  Shaved the patient's face.  Care plan discussed, patient mutually agrees.  Call light within reach, no further needs at this time.    Contacted provider for tramadol per patient request as she takes this at home.  New order in.    2108: tramadol given for pain, see ROGERIO Nixon RN

## 2025-02-17 NOTE — DISCHARGE SUMMARY
Hospital Medicine Discharge Summary    Patient: Mounika Leahy     Gender: female  : 1935   Age: 89 y.o.  MRN: [id number]    Admitting Physician: Betty Cervantes MD  Discharge Physician: Betty Cervantes MD     Code Status: Full Code     Admit Date: 2025   Discharge Date:   2025    Disposition:  Home  Time spent arranging discharge: 31 minutes    Discharge Diagnoses:    Active Hospital Problems    Diagnosis Date Noted    UTI (urinary tract infection) [N39.0] 2025           Condition at Discharge:  Stable    Hospital Course:   Patient mid to hospital with UTI treat with IV antibiotics discharged on course of oral medications    Patient had 1.5 cm adrenal nodule, patient to follow with PCP if no f previous workup would recommend outpatient MRI adrenal protocol    Discharge Exam:    /88   Pulse 79   Temp 98.5 °F (36.9 °C)   Resp 16   Ht 1.6 m (5' 3\")   Wt 86.2 kg (190 lb)   SpO2 96%   BMI 33.66 kg/m²   General appearance:  Appears comfortable. AAOx3  HEENT: atraumatic, Pupils equal, muscous membranes moist, no masses appreciated  Cardiovascular: Regular rate and rhythm no murmurs appreciated  Respiratory: CTAB no wheezing  Gastrointestinal: Abdomen soft, non-tender, BS+  EXT: no edema  Neurology: no gross focal deficts  Psychiatry: Appropriate affect. Not agitated  Skin: Warm, dry, no rashes appreciated    Discharge Medications:   Current Discharge Medication List        START taking these medications    Details   cefUROXime (CEFTIN) 250 MG tablet Take 1 tablet by mouth 2 times daily for 4 days  Qty: 8 tablet, Refills: 0           Current Discharge Medication List        Current Discharge Medication List        CONTINUE these medications which have NOT CHANGED    Details   traMADol (ULTRAM) 50 MG tablet Take 1 tablet by mouth every 6 hours as needed for Pain. Max Daily Amount: 200 mg      promethazine (PHENERGAN) 12.5 MG tablet Take 1 tablet by mouth daily  Qty: 30  ALKPHOS 115 02/15/2025 08:51 PM    ALT 60 02/15/2025 08:51 PM    AST 72 02/15/2025 08:51 PM    BILITOT 0.4 02/15/2025 08:51 PM    BILIDIR <0.2 06/02/2018 05:49 PM    TRIG 115 03/04/2014 05:47 AM     Lab Results   Component Value Date    INR 1.14 10/25/2018    INR 1.06 06/02/2018    INR 1.07 06/26/2015       Radiology:  CT ABDOMEN PELVIS W IV CONTRAST Additional Contrast? None    Result Date: 2/15/2025  EXAMINATION: CT OF THE ABDOMEN AND PELVIS WITH CONTRAST 2/15/2025 9:26 pm TECHNIQUE: CT of the abdomen and pelvis was performed with the administration of intravenous contrast. Multiplanar reformatted images are provided for review. Automated exposure control, iterative reconstruction, and/or weight based adjustment of the mA/kV was utilized to reduce the radiation dose to as low as reasonably achievable. COMPARISON: None. HISTORY: ORDERING SYSTEM PROVIDED HISTORY: blood in toilet today, lower abdominal pain TECHNOLOGIST PROVIDED HISTORY: Additional Contrast?->None Reason for exam:->blood in toilet today, lower abdominal pain Decision Support Exception - unselect if not a suspected or confirmed emergency medical condition->Emergency Medical Condition (MA) FINDINGS: Lower Chest: Increased opacities in the lung bases, greater on the right, probably atelectasis.  No effusions. Organs: Liver: Enlarged with nodular contour.  The right hepatic lobe measures 18.6 cm in craniocaudal dimension..  No focal lesion. No biliary dilatation. Gallbladder: Not evaluated. Pancreas: Unremarkable, no focal lesions or peripancreatic collections. Spleen: Top normal limits in size, measuring 12.5 cm in craniocaudal dimension.. Adrenal glands: Right is unremarkable.  There is a 1.5 cm left adrenal nodule.. Kidneys: The kidneys are normal in size, demonstrating normal attenuation and contrast enhancement. No hydronephrosis. No focal lesions. GI/Bowel: Nonobstructive bowel gas pattern.  Moderate fecal material seen throughout the colon and

## 2025-02-17 NOTE — H&P
HOSPITALISTS HISTORY AND PHYSICAL    2/16/2025 8:09 PM    Patient Information:  MOUNIKA VALDEZ is a 89 y.o. female [id number]  PCP:  Maribel De MD (Tel: [phone] )    Chief complaint:    Chief Complaint   Patient presents with    readmissioin     Pt arrived via [facility] after discharge because she does not have a key to her house       History of Present Illness:  Mounika Valdez is a 89 y.o. female who presneted to our hospital with brp in her stool, hgb was sstable and occult stool was negative. Patient had uti started on atbx and discharged home, however patient did no thave any family at home or they keys thus was brought back to the ed. Patient currently denies any chest pain sob fever or chills, no further blodo in stool  Oriented x 3 but poor hisitoraian    REVIEW OF SYSTEMS:   Constitutional: Negative for fever,chills or night sweats  ENT: Negative for rhinorrhea, epistaxis, hoarseness, sore throat.  Respiratory: Negative for shortness of breath,wheezing  Cardiovascular: Negative for chest pain, palpitations   Gastrointestinal: Negative for nausea, vomiting, diarrhea  Genitourinary: Negative for polyuria, dysuria   Hematologic/Lymphatic: Negative for bleeding tendency, easy bruising  Musculoskeletal: Negative for myalgias and arthralgias  Neurologic: Negative for confusion,dysarthria.  Skin: Negative for itching,rash  Psychiatric: Negative for depression,anxiety, agitation.  Endocrine: Negative for polydipsia,polyuria,heat /cold intolerance.    Past Medical History:   has a past medical history of Arthritis, Chronic back pain, Diabetes mellitus (HCC), Esophagitis, Essential hypertension, Fatty liver, G6P deficiency (glucose-6-phosphatase deficiency) (HCC), G6PD deficiency, Gastric polyps, GERD (gastroesophageal reflux disease), Hemorrhoids, History of blood transfusion, Hypothyroidism, Kidney stone,  Mediterranean anemia, Normal cardiac stress test, Sickle cell anemia (HCC), and UTI (urinary tract infection).     Past Surgical History:   has a past surgical history that includes Appendectomy; Hysterectomy;  section; Cystocopy (2015); other surgical history (2017); Upper gastrointestinal endoscopy (N/A, 2018); and pr laps surg cholecystectomy w/cholangiography (N/A, 2018).     Medications:  No current facility-administered medications on file prior to encounter.     Current Outpatient Medications on File Prior to Encounter   Medication Sig Dispense Refill    traMADol (ULTRAM) 50 MG tablet Take 1 tablet by mouth every 6 hours as needed for Pain. Max Daily Amount: 200 mg      promethazine (PHENERGAN) 12.5 MG tablet Take 1 tablet by mouth daily 30 tablet 0    levothyroxine (SYNTHROID) 100 MCG tablet TAKE ONE TABLET BY MOUTH EVERY DAY 90 tablet 0    lansoprazole (PREVACID) 30 MG delayed release capsule TAKE ONE CAPSULE EVERY DAY 90 capsule 1    traZODone (DESYREL) 100 MG tablet Take 1 tablet by mouth nightly 90 tablet 0    DULoxetine (CYMBALTA) 30 MG extended release capsule Take 1 capsule by mouth daily 30 capsule 3    Acetaminophen Extra Strength 500 MG TABS TAKE ONE TABLET BY MOUTH EVERY 6 HOURS AS NEEDED FOR PAIN 120 tablet 3    hydroCHLOROthiazide (HYDRODIURIL) 25 MG tablet Take 1 tablet by mouth daily 90 tablet 3    metoprolol tartrate (LOPRESSOR) 50 MG tablet TAKE ONE TABLET TWICE DAILY 180 tablet 1    SITagliptin (JANUVIA) 50 MG tablet TAKE 1 TABLET BY MOUTH EVERY DAY 90 tablet 1    spironolactone (ALDACTONE) 25 MG tablet TAKE ONE TABLET BY MOUTH TWICE DAILY 180 tablet 0    sertraline (ZOLOFT) 50 MG tablet TAKE ONE TABLET EVERY DAY 90 tablet 0    ondansetron (ZOFRAN) 4 MG tablet Take 1 tablet by mouth every 8 hours as needed for Nausea or Vomiting 30 tablet 0    ZINC SULFATE ER PO Take 1 tablet by mouth daily      gabapentin (NEURONTIN) 300 MG capsule Take 2 capsules by mouth 3

## 2025-02-17 NOTE — PROGRESS NOTES
[facility] - Inpatient Rehabilitation Department   Phone: [phone]    Physical Therapy    [x] Initial Evaluation            [] Daily Treatment Note         [] Discharge Summary      Patient: Mounika Leahy   : 1935   MRN: [id number]   Date of Service:  2025  Admitting Diagnosis: UTI (urinary tract infection)  Current Admission Summary:  Mounika Leahy is a 89 y.o. female who presneted to our hospital with brp in her stool, hgb was sstable and occult stool was negative. Patient had uti started on atbx and discharged home, however patient did no thave any family at home or they keys thus was brought back to the ed. Patient currently denies any chest pain sob fever or chills, no further blodo in stool  Oriented x 3 but poor hisitoraian  Past Medical History:  has a past medical history of Arthritis, Chronic back pain, Diabetes mellitus (HCC), Esophagitis, Essential hypertension, Fatty liver, G6P deficiency (glucose-6-phosphatase deficiency) (HCC), G6PD deficiency, Gastric polyps, GERD (gastroesophageal reflux disease), Hemorrhoids, History of blood transfusion, Hypothyroidism, Kidney stone, Mediterranean anemia, Normal cardiac stress test, Sickle cell anemia (HCC), and UTI (urinary tract infection).  Past Surgical History:  has a past surgical history that includes Appendectomy; Hysterectomy;  section; Cystocopy (2015); other surgical history (2017); Upper gastrointestinal endoscopy (N/A, 2018); and pr laps surg cholecystectomy w/cholangiography (N/A, 2018).    Discharge Recommendations: Mounika Leahy scored a 15/24 on the AM-PAC short mobility form. Current research shows that an AM-PAC score of 17 or less is typically not associated with a discharge to the patient's home setting. Based on the patient's AM-PAC score and their current functional mobility deficits, it is recommended that the patient have 3-5 sessions per week of Physical Therapy at d/c to

## 2025-02-17 NOTE — CARE COORDINATION
Case Management Assessment  Initial Evaluation    Date/Time of Evaluation: 2/17/2025 3:03 PM  Assessment Completed by: Nikolay Bynum    If patient is discharged prior to next notation, then this note serves as note for discharge by case management.    Patient Name: Mounika Leahy                   YOB: 1935  Diagnosis: UTI (urinary tract infection) [N39.0]  Acute cystitis without hematuria [N30.00]  Impaired mobility and ADLs [Z74.09, Z78.9]                   Date / Time: 2/16/2025  4:47 AM    Patient Admission Status: Inpatient   Readmission Risk (Low < 19, Mod (19-27), High > 27): Readmission Risk Score: 22.9    Current PCP: Maribel De MD  PCP verified by CM? Yes    Chart Reviewed: Yes      History Provided by: Patient  Patient Orientation: Alert and Oriented    Patient Cognition: Alert    Hospitalization in the last 30 days (Readmission):  No    If yes, Readmission Assessment in CM Navigator will be completed.    Advance Directives:      Code Status: Full Code   Patient's Primary Decision Maker is: Legal Next of Kin    Primary Decision Maker: Eris Leahyren - Child - [phone]    Secondary Decision Maker: LeahyJose - Child - [phone]    Secondary Decision Maker: toñitovandana - Grandchild - [phone]    Secondary Decision Maker: Jayne Barrett - Other - [phone]    Discharge Planning:    Patient lives with: Children Type of Home: House  Primary Care Giver: Self  Patient Support Systems include: Children   Current Financial resources: Medicare  Current community resources: None  Current services prior to admission: None            Current DME:              Type of Home Care services:  None    ADLS  Prior functional level: Independent in ADLs/IADLs, Bathing, Dressing, Toileting, Feeding, Mobility  Current functional level: Assistance with the following:, Bathing, Toileting, Dressing, Mobility    PT AM-PAC: 15 /24  OT AM-PAC:   /24    Family can provide assistance at DC:

## 2025-02-17 NOTE — PROGRESS NOTES
[facility] - Inpatient Rehabilitation Department   Phone: [phone]    Occupational Therapy    [x] Initial Evaluation            [] Daily Treatment Note         [] Discharge Summary      Patient: Mounika Leahy   : 1935   MRN: [id number]   Date of Service:  2025    Admitting Diagnosis:  UTI (urinary tract infection)  Current Admission Summary: Pt orginally discharged from ED to home however did not have keys to enter home this came back to ED. Patient admitted to hospital with UTI treat with IV antibiotics discharged on course of oral medications.  Past Medical History:  has a past medical history of Arthritis, Chronic back pain, Diabetes mellitus (HCC), Esophagitis, Essential hypertension, Fatty liver, G6P deficiency (glucose-6-phosphatase deficiency) (HCC), G6PD deficiency, Gastric polyps, GERD (gastroesophageal reflux disease), Hemorrhoids, History of blood transfusion, Hypothyroidism, Kidney stone, Mediterranean anemia, Normal cardiac stress test, Sickle cell anemia (HCC), and UTI (urinary tract infection).  Past Surgical History:  has a past surgical history that includes Appendectomy; Hysterectomy;  section; Cystocopy (2015); other surgical history (2017); Upper gastrointestinal endoscopy (N/A, 2018); and pr laps surg cholecystectomy w/cholangiography (N/A, 2018).    Discharge Recommendations: Mounika Leahy scored a 15/24 on the AM-PAC ADL Inpatient form. Current research shows that an AM-PAC score of 17 or less is typically not associated with a discharge to the patient's home setting. Based on the patient's AM-PAC score and their current ADL deficits, it is recommended that the patient have 3-5 sessions per week of Occupational Therapy at d/c to increase the patient's independence.  Please see assessment section for further patient specific details.    If patient discharges prior to next session this note will serve as a discharge summary.  Please

## 2025-02-18 ENCOUNTER — TELEPHONE (OUTPATIENT)
Dept: INTERNAL MEDICINE CLINIC | Age: 89
End: 2025-02-18

## 2025-02-18 LAB
ANION GAP SERPL CALCULATED.3IONS-SCNC: 10 MMOL/L (ref 3–16)
BACTERIA UR CULT: ABNORMAL
BACTERIA UR CULT: ABNORMAL
BASOPHILS # BLD: 0 K/UL (ref 0–0.2)
BASOPHILS NFR BLD: 0.7 %
BUN SERPL-MCNC: 12 MG/DL (ref 7–20)
CALCIUM SERPL-MCNC: 9.3 MG/DL (ref 8.3–10.6)
CHLORIDE SERPL-SCNC: 104 MMOL/L (ref 99–110)
CO2 SERPL-SCNC: 27 MMOL/L (ref 21–32)
CREAT SERPL-MCNC: 0.8 MG/DL (ref 0.6–1.2)
DEPRECATED RDW RBC AUTO: 13.9 % (ref 12.4–15.4)
EOSINOPHIL # BLD: 0.2 K/UL (ref 0–0.6)
EOSINOPHIL NFR BLD: 4.4 %
GFR SERPLBLD CREATININE-BSD FMLA CKD-EPI: 70 ML/MIN/{1.73_M2}
GLUCOSE BLD-MCNC: 108 MG/DL (ref 70–99)
GLUCOSE BLD-MCNC: 113 MG/DL (ref 70–99)
GLUCOSE BLD-MCNC: 144 MG/DL (ref 70–99)
GLUCOSE BLD-MCNC: 153 MG/DL (ref 70–99)
GLUCOSE SERPL-MCNC: 109 MG/DL (ref 70–99)
HCT VFR BLD AUTO: 32.2 % (ref 36–48)
HGB BLD-MCNC: 9.9 G/DL (ref 12–16)
LYMPHOCYTES # BLD: 2.7 K/UL (ref 1–5.1)
LYMPHOCYTES NFR BLD: 49.5 %
MCH RBC QN AUTO: 24.7 PG (ref 26–34)
MCHC RBC AUTO-ENTMCNC: 30.8 G/DL (ref 31–36)
MCV RBC AUTO: 80.2 FL (ref 80–100)
MONOCYTES # BLD: 0.5 K/UL (ref 0–1.3)
MONOCYTES NFR BLD: 8.5 %
NEUTROPHILS # BLD: 2 K/UL (ref 1.7–7.7)
NEUTROPHILS NFR BLD: 36.9 %
ORGANISM: ABNORMAL
ORGANISM: ABNORMAL
PERFORMED ON: ABNORMAL
PLATELET # BLD AUTO: 213 K/UL (ref 135–450)
PMV BLD AUTO: 8.4 FL (ref 5–10.5)
POTASSIUM SERPL-SCNC: 4.3 MMOL/L (ref 3.5–5.1)
RBC # BLD AUTO: 4.02 M/UL (ref 4–5.2)
SODIUM SERPL-SCNC: 141 MMOL/L (ref 136–145)
WBC # BLD AUTO: 5.4 K/UL (ref 4–11)

## 2025-02-18 PROCEDURE — 2500000003 HC RX 250 WO HCPCS: Performed by: INTERNAL MEDICINE

## 2025-02-18 PROCEDURE — 85025 COMPLETE CBC W/AUTO DIFF WBC: CPT

## 2025-02-18 PROCEDURE — 36415 COLL VENOUS BLD VENIPUNCTURE: CPT

## 2025-02-18 PROCEDURE — 6360000002 HC RX W HCPCS: Performed by: INTERNAL MEDICINE

## 2025-02-18 PROCEDURE — 6370000000 HC RX 637 (ALT 250 FOR IP): Performed by: INTERNAL MEDICINE

## 2025-02-18 PROCEDURE — 80048 BASIC METABOLIC PNL TOTAL CA: CPT

## 2025-02-18 PROCEDURE — 6370000000 HC RX 637 (ALT 250 FOR IP): Performed by: NURSE PRACTITIONER

## 2025-02-18 PROCEDURE — 1200000000 HC SEMI PRIVATE

## 2025-02-18 RX ADMIN — TRAZODONE HYDROCHLORIDE 100 MG: 50 TABLET ORAL at 21:07

## 2025-02-18 RX ADMIN — HYDROCHLOROTHIAZIDE 25 MG: 25 TABLET ORAL at 07:52

## 2025-02-18 RX ADMIN — SODIUM CHLORIDE, PRESERVATIVE FREE 10 ML: 5 INJECTION INTRAVENOUS at 07:52

## 2025-02-18 RX ADMIN — PANTOPRAZOLE SODIUM 40 MG: 40 TABLET, DELAYED RELEASE ORAL at 05:17

## 2025-02-18 RX ADMIN — DULOXETINE HYDROCHLORIDE 30 MG: 30 CAPSULE, DELAYED RELEASE ORAL at 07:52

## 2025-02-18 RX ADMIN — SPIRONOLACTONE 25 MG: 25 TABLET ORAL at 07:52

## 2025-02-18 RX ADMIN — TRAMADOL HYDROCHLORIDE 50 MG: 50 TABLET, COATED ORAL at 05:19

## 2025-02-18 RX ADMIN — METOPROLOL TARTRATE 50 MG: 50 TABLET, FILM COATED ORAL at 07:52

## 2025-02-18 RX ADMIN — WATER 1000 MG: 1 INJECTION INTRAMUSCULAR; INTRAVENOUS; SUBCUTANEOUS at 23:46

## 2025-02-18 RX ADMIN — SERTRALINE 50 MG: 50 TABLET, FILM COATED ORAL at 07:52

## 2025-02-18 RX ADMIN — PYRIDOXINE HCL TAB 50 MG 50 MG: 50 TAB at 07:52

## 2025-02-18 RX ADMIN — SPIRONOLACTONE 25 MG: 25 TABLET ORAL at 21:07

## 2025-02-18 RX ADMIN — SODIUM CHLORIDE, PRESERVATIVE FREE 10 ML: 5 INJECTION INTRAVENOUS at 21:07

## 2025-02-18 RX ADMIN — WATER 1000 MG: 1 INJECTION INTRAMUSCULAR; INTRAVENOUS; SUBCUTANEOUS at 00:13

## 2025-02-18 RX ADMIN — TRAMADOL HYDROCHLORIDE 50 MG: 50 TABLET, COATED ORAL at 16:19

## 2025-02-18 RX ADMIN — LEVOTHYROXINE SODIUM 100 MCG: 0.1 TABLET ORAL at 07:52

## 2025-02-18 RX ADMIN — METOPROLOL TARTRATE 50 MG: 50 TABLET, FILM COATED ORAL at 21:07

## 2025-02-18 ASSESSMENT — PAIN DESCRIPTION - LOCATION
LOCATION: BACK
LOCATION: BACK;HIP;LEG

## 2025-02-18 ASSESSMENT — PAIN DESCRIPTION - DESCRIPTORS
DESCRIPTORS: ACHING
DESCRIPTORS: ACHING

## 2025-02-18 ASSESSMENT — PAIN SCALES - WONG BAKER: WONGBAKER_NUMERICALRESPONSE: NO HURT

## 2025-02-18 ASSESSMENT — PAIN SCALES - GENERAL
PAINLEVEL_OUTOF10: 8
PAINLEVEL_OUTOF10: 0
PAINLEVEL_OUTOF10: 10

## 2025-02-18 ASSESSMENT — PAIN DESCRIPTION - ORIENTATION: ORIENTATION: RIGHT;LEFT;LOWER

## 2025-02-18 ASSESSMENT — PAIN - FUNCTIONAL ASSESSMENT: PAIN_FUNCTIONAL_ASSESSMENT: ACTIVITIES ARE NOT PREVENTED

## 2025-02-18 NOTE — CARE COORDINATION
Discharge Planning:     (CM) received a call back from admissions at [facility]. The facility can accept and will start pre-cert today.    Electronically signed by Nikolay Bynum on 2/18/25 at 8:47 AM EST

## 2025-02-18 NOTE — PROGRESS NOTES
Shift assessment complete at 2136.  VSS, A&Ox4, on RA, BS active.  Patient reporting neuropathic pain.  Messaged provider about home gabapentin and she deferred to day shift doctor due to it being a high dose.  External catheter in place.  Care plan discussed, patient mutually agrees.  Call light within reach, no further needs at this time.    2139: tramadol given for pain, see ROGERIO Nixon RN

## 2025-02-18 NOTE — CARE COORDINATION
02/18/25 0921   IMM Letter   IMM Letter given to Patient/Family/Significant other/Guardian/POA/by: IMM Given   IMM Letter date given: 02/18/25   IMM Letter time given: 0921

## 2025-02-18 NOTE — PLAN OF CARE
Problem: Chronic Conditions and Co-morbidities  Goal: Patient's chronic conditions and co-morbidity symptoms are monitored and maintained or improved  2/18/2025 0758 by Luiza Foster RN  Flowsheets (Taken 2/18/2025 0758)  Care Plan - Patient's Chronic Conditions and Co-Morbidity Symptoms are Monitored and Maintained or Improved:   Monitor and assess patient's chronic conditions and comorbid symptoms for stability, deterioration, or improvement   Collaborate with multidisciplinary team to address chronic and comorbid conditions and prevent exacerbation or deterioration     Problem: Discharge Planning  Goal: Discharge to home or other facility with appropriate resources  2/18/2025 0758 by Luiza Foster RN  Flowsheets (Taken 2/18/2025 0758)  Discharge to home or other facility with appropriate resources:   Identify barriers to discharge with patient and caregiver   Arrange for needed discharge resources and transportation as appropriate   Identify discharge learning needs (meds, wound care, etc)     Problem: Pain  Goal: Verbalizes/displays adequate comfort level or baseline comfort level  2/18/2025 0758 by Luiza Foster RN  Flowsheets (Taken 2/18/2025 0758)  Verbalizes/displays adequate comfort level or baseline comfort level:   Encourage patient to monitor pain and request assistance   Assess pain using appropriate pain scale   Administer analgesics based on type and severity of pain and evaluate response   Implement non-pharmacological measures as appropriate and evaluate response     Problem: Skin/Tissue Integrity  Goal: Skin integrity remains intact  Description: 1.  Monitor for areas of redness and/or skin breakdown  2.  Assess vascular access sites hourly  3.  Every 4-6 hours minimum:  Change oxygen saturation probe site  4.  Every 4-6 hours:  If on nasal continuous positive airway pressure, respiratory therapy assess nares and determine need for appliance change or resting period  2/18/2025 0758 by

## 2025-02-18 NOTE — TELEPHONE ENCOUNTER
Pt is in the hospital and working with a  they have set her up to go to [facility].   She is supposed to be discharged today. She is calling to request Dr. De speak with her  so she can go home instead.

## 2025-02-19 LAB
ANION GAP SERPL CALCULATED.3IONS-SCNC: 10 MMOL/L (ref 3–16)
BASOPHILS # BLD: 0 K/UL (ref 0–0.2)
BASOPHILS NFR BLD: 0.9 %
BUN SERPL-MCNC: 12 MG/DL (ref 7–20)
CALCIUM SERPL-MCNC: 9.4 MG/DL (ref 8.3–10.6)
CHLORIDE SERPL-SCNC: 103 MMOL/L (ref 99–110)
CO2 SERPL-SCNC: 25 MMOL/L (ref 21–32)
CREAT SERPL-MCNC: 0.7 MG/DL (ref 0.6–1.2)
DEPRECATED RDW RBC AUTO: 13.9 % (ref 12.4–15.4)
EOSINOPHIL # BLD: 0.2 K/UL (ref 0–0.6)
EOSINOPHIL NFR BLD: 3.9 %
GFR SERPLBLD CREATININE-BSD FMLA CKD-EPI: 82 ML/MIN/{1.73_M2}
GLUCOSE BLD-MCNC: 119 MG/DL (ref 70–99)
GLUCOSE BLD-MCNC: 129 MG/DL (ref 70–99)
GLUCOSE BLD-MCNC: 148 MG/DL (ref 70–99)
GLUCOSE BLD-MCNC: 168 MG/DL (ref 70–99)
GLUCOSE SERPL-MCNC: 120 MG/DL (ref 70–99)
HCT VFR BLD AUTO: 32.8 % (ref 36–48)
HGB BLD-MCNC: 10.1 G/DL (ref 12–16)
LYMPHOCYTES # BLD: 1.9 K/UL (ref 1–5.1)
LYMPHOCYTES NFR BLD: 39.4 %
MCH RBC QN AUTO: 24.7 PG (ref 26–34)
MCHC RBC AUTO-ENTMCNC: 30.8 G/DL (ref 31–36)
MCV RBC AUTO: 80.3 FL (ref 80–100)
MONOCYTES # BLD: 0.4 K/UL (ref 0–1.3)
MONOCYTES NFR BLD: 7.9 %
NEUTROPHILS # BLD: 2.3 K/UL (ref 1.7–7.7)
NEUTROPHILS NFR BLD: 47.9 %
PERFORMED ON: ABNORMAL
PLATELET # BLD AUTO: 217 K/UL (ref 135–450)
PMV BLD AUTO: 8.5 FL (ref 5–10.5)
POTASSIUM SERPL-SCNC: ABNORMAL MMOL/L (ref 3.5–5.1)
RBC # BLD AUTO: 4.08 M/UL (ref 4–5.2)
SODIUM SERPL-SCNC: 138 MMOL/L (ref 136–145)
WBC # BLD AUTO: 4.8 K/UL (ref 4–11)

## 2025-02-19 PROCEDURE — 2500000003 HC RX 250 WO HCPCS: Performed by: INTERNAL MEDICINE

## 2025-02-19 PROCEDURE — 1200000000 HC SEMI PRIVATE

## 2025-02-19 PROCEDURE — 6370000000 HC RX 637 (ALT 250 FOR IP): Performed by: INTERNAL MEDICINE

## 2025-02-19 PROCEDURE — 85025 COMPLETE CBC W/AUTO DIFF WBC: CPT

## 2025-02-19 PROCEDURE — 80048 BASIC METABOLIC PNL TOTAL CA: CPT

## 2025-02-19 PROCEDURE — 6370000000 HC RX 637 (ALT 250 FOR IP): Performed by: NURSE PRACTITIONER

## 2025-02-19 PROCEDURE — 97535 SELF CARE MNGMENT TRAINING: CPT

## 2025-02-19 PROCEDURE — 36415 COLL VENOUS BLD VENIPUNCTURE: CPT

## 2025-02-19 RX ADMIN — SERTRALINE 50 MG: 50 TABLET, FILM COATED ORAL at 09:31

## 2025-02-19 RX ADMIN — METOPROLOL TARTRATE 50 MG: 50 TABLET, FILM COATED ORAL at 20:54

## 2025-02-19 RX ADMIN — ONDANSETRON 4 MG: 4 TABLET, ORALLY DISINTEGRATING ORAL at 11:43

## 2025-02-19 RX ADMIN — DULOXETINE HYDROCHLORIDE 30 MG: 30 CAPSULE, DELAYED RELEASE ORAL at 09:31

## 2025-02-19 RX ADMIN — HYDROCHLOROTHIAZIDE 25 MG: 25 TABLET ORAL at 09:31

## 2025-02-19 RX ADMIN — METOPROLOL TARTRATE 50 MG: 50 TABLET, FILM COATED ORAL at 09:31

## 2025-02-19 RX ADMIN — LEVOTHYROXINE SODIUM 100 MCG: 0.1 TABLET ORAL at 09:31

## 2025-02-19 RX ADMIN — SPIRONOLACTONE 25 MG: 25 TABLET ORAL at 20:54

## 2025-02-19 RX ADMIN — SPIRONOLACTONE 25 MG: 25 TABLET ORAL at 09:31

## 2025-02-19 RX ADMIN — PANTOPRAZOLE SODIUM 40 MG: 40 TABLET, DELAYED RELEASE ORAL at 05:24

## 2025-02-19 RX ADMIN — TRAZODONE HYDROCHLORIDE 100 MG: 50 TABLET ORAL at 20:54

## 2025-02-19 RX ADMIN — TRAMADOL HYDROCHLORIDE 50 MG: 50 TABLET, COATED ORAL at 09:31

## 2025-02-19 RX ADMIN — TRAMADOL HYDROCHLORIDE 50 MG: 50 TABLET, COATED ORAL at 20:54

## 2025-02-19 RX ADMIN — SODIUM CHLORIDE, PRESERVATIVE FREE 10 ML: 5 INJECTION INTRAVENOUS at 20:54

## 2025-02-19 RX ADMIN — PYRIDOXINE HCL TAB 50 MG 50 MG: 50 TAB at 09:31

## 2025-02-19 ASSESSMENT — PAIN SCALES - WONG BAKER: WONGBAKER_NUMERICALRESPONSE: NO HURT

## 2025-02-19 ASSESSMENT — PAIN DESCRIPTION - DESCRIPTORS
DESCRIPTORS: ACHING
DESCRIPTORS: ACHING

## 2025-02-19 ASSESSMENT — PAIN SCALES - GENERAL
PAINLEVEL_OUTOF10: 5
PAINLEVEL_OUTOF10: 7
PAINLEVEL_OUTOF10: 5
PAINLEVEL_OUTOF10: 7
PAINLEVEL_OUTOF10: 0

## 2025-02-19 ASSESSMENT — PAIN DESCRIPTION - LOCATION
LOCATION: KNEE;BACK
LOCATION: SHOULDER;LEG;BACK

## 2025-02-19 ASSESSMENT — PAIN DESCRIPTION - ORIENTATION
ORIENTATION: RIGHT;LEFT
ORIENTATION: RIGHT;LEFT;MID

## 2025-02-19 NOTE — PLAN OF CARE
Problem: Chronic Conditions and Co-morbidities  Goal: Patient's chronic conditions and co-morbidity symptoms are monitored and maintained or improved  2/19/2025 0855 by Luiza Foster RN  Flowsheets (Taken 2/19/2025 0855)  Care Plan - Patient's Chronic Conditions and Co-Morbidity Symptoms are Monitored and Maintained or Improved:   Collaborate with multidisciplinary team to address chronic and comorbid conditions and prevent exacerbation or deterioration   Monitor and assess patient's chronic conditions and comorbid symptoms for stability, deterioration, or improvement     Problem: Discharge Planning  Goal: Discharge to home or other facility with appropriate resources  2/19/2025 0855 by Luiza Foster RN  Flowsheets (Taken 2/19/2025 0855)  Discharge to home or other facility with appropriate resources:   Identify barriers to discharge with patient and caregiver   Arrange for needed discharge resources and transportation as appropriate   Identify discharge learning needs (meds, wound care, etc)     Problem: Pain  Goal: Verbalizes/displays adequate comfort level or baseline comfort level  2/19/2025 0855 by Luiza Foster RN  Flowsheets (Taken 2/19/2025 0855)  Verbalizes/displays adequate comfort level or baseline comfort level:   Encourage patient to monitor pain and request assistance   Assess pain using appropriate pain scale   Administer analgesics based on type and severity of pain and evaluate response     Problem: Skin/Tissue Integrity  Goal: Skin integrity remains intact  Description: 1.  Monitor for areas of redness and/or skin breakdown  2.  Assess vascular access sites hourly  3.  Every 4-6 hours minimum:  Change oxygen saturation probe site  4.  Every 4-6 hours:  If on nasal continuous positive airway pressure, respiratory therapy assess nares and determine need for appliance change or resting period  2/19/2025 0855 by Luiza Foster RN  Flowsheets (Taken 2/19/2025 0855)  Skin Integrity Remains

## 2025-02-19 NOTE — PLAN OF CARE
Problem: Chronic Conditions and Co-morbidities  Goal: Patient's chronic conditions and co-morbidity symptoms are monitored and maintained or improved  Outcome: Progressing  Flowsheets (Taken 2/18/2025 2105)  Care Plan - Patient's Chronic Conditions and Co-Morbidity Symptoms are Monitored and Maintained or Improved:   Monitor and assess patient's chronic conditions and comorbid symptoms for stability, deterioration, or improvement   Collaborate with multidisciplinary team to address chronic and comorbid conditions and prevent exacerbation or deterioration   Update acute care plan with appropriate goals if chronic or comorbid symptoms are exacerbated and prevent overall improvement and discharge     Problem: Discharge Planning  Goal: Discharge to home or other facility with appropriate resources  Outcome: Progressing  Flowsheets (Taken 2/18/2025 2105)  Discharge to home or other facility with appropriate resources:   Identify barriers to discharge with patient and caregiver   Arrange for needed discharge resources and transportation as appropriate     Problem: Pain  Goal: Verbalizes/displays adequate comfort level or baseline comfort level  Outcome: Progressing     Problem: Skin/Tissue Integrity  Goal: Skin integrity remains intact  Description: 1.  Monitor for areas of redness and/or skin breakdown  2.  Assess vascular access sites hourly  3.  Every 4-6 hours minimum:  Change oxygen saturation probe site  4.  Every 4-6 hours:  If on nasal continuous positive airway pressure, respiratory therapy assess nares and determine need for appliance change or resting period  Outcome: Progressing  Flowsheets (Taken 2/18/2025 2105)  Skin Integrity Remains Intact:   Monitor for areas of redness and/or skin breakdown   Assess vascular access sites hourly     Problem: Safety - Adult  Goal: Free from fall injury  Outcome: Progressing     Problem: ABCDS Injury Assessment  Goal: Absence of physical injury  Outcome:

## 2025-02-19 NOTE — PROGRESS NOTES
[facility] - Inpatient Rehabilitation Department   Phone: [phone]    Occupational Therapy    [] Initial Evaluation            [x] Daily Treatment Note         [] Discharge Summary      Patient: Mounika Leahy   : 1935   MRN: [id number]   Date of Service:  2025    Admitting Diagnosis:  UTI (urinary tract infection)  Current Admission Summary: Pt orginally discharged from ED to home however did not have keys to enter home this came back to ED. Patient admitted to hospital with UTI treat with IV antibiotics discharged on course of oral medications.  Past Medical History:  has a past medical history of Arthritis, Chronic back pain, Diabetes mellitus (HCC), Esophagitis, Essential hypertension, Fatty liver, G6P deficiency (glucose-6-phosphatase deficiency) (HCC), G6PD deficiency, Gastric polyps, GERD (gastroesophageal reflux disease), Hemorrhoids, History of blood transfusion, Hypothyroidism, Kidney stone, Mediterranean anemia, Normal cardiac stress test, Sickle cell anemia (HCC), and UTI (urinary tract infection).  Past Surgical History:  has a past surgical history that includes Appendectomy; Hysterectomy;  section; Cystocopy (2015); other surgical history (2017); Upper gastrointestinal endoscopy (N/A, 2018); and pr laps surg cholecystectomy w/cholangiography (N/A, 2018).    Discharge Recommendations: Mounika Leahy scored a 16/24 on the AM-PAC ADL Inpatient form. Current research shows that an AM-PAC score of 17 or less is typically not associated with a discharge to the patient's home setting. Based on the patient's AM-PAC score and their current ADL deficits, it is recommended that the patient have 3-5 sessions per week of Occupational Therapy at d/c to increase the patient's independence.  Please see assessment section for further patient specific details.    If patient discharges prior to next session this note will serve as a discharge summary.  Please

## 2025-02-19 NOTE — TELEPHONE ENCOUNTER
Pt states she spoke with the hospitalist. She has decided that it is in her best interest to go to [facility] for rehab.

## 2025-02-20 ENCOUNTER — CARE COORDINATION (OUTPATIENT)
Dept: CARE COORDINATION | Age: 89
End: 2025-02-20

## 2025-02-20 LAB
GLUCOSE BLD-MCNC: 119 MG/DL (ref 70–99)
GLUCOSE BLD-MCNC: 120 MG/DL (ref 70–99)
GLUCOSE BLD-MCNC: 124 MG/DL (ref 70–99)
GLUCOSE BLD-MCNC: 125 MG/DL (ref 70–99)
GLUCOSE BLD-MCNC: 133 MG/DL (ref 70–99)
PERFORMED ON: ABNORMAL

## 2025-02-20 PROCEDURE — 6370000000 HC RX 637 (ALT 250 FOR IP): Performed by: INTERNAL MEDICINE

## 2025-02-20 PROCEDURE — 97110 THERAPEUTIC EXERCISES: CPT

## 2025-02-20 PROCEDURE — 6360000002 HC RX W HCPCS: Performed by: INTERNAL MEDICINE

## 2025-02-20 PROCEDURE — 2500000003 HC RX 250 WO HCPCS: Performed by: INTERNAL MEDICINE

## 2025-02-20 PROCEDURE — 97530 THERAPEUTIC ACTIVITIES: CPT

## 2025-02-20 PROCEDURE — 1200000000 HC SEMI PRIVATE

## 2025-02-20 PROCEDURE — 6360000002 HC RX W HCPCS: Performed by: NURSE PRACTITIONER

## 2025-02-20 PROCEDURE — 6370000000 HC RX 637 (ALT 250 FOR IP): Performed by: NURSE PRACTITIONER

## 2025-02-20 PROCEDURE — 6370000000 HC RX 637 (ALT 250 FOR IP): Performed by: PHYSICIAN ASSISTANT

## 2025-02-20 RX ORDER — LIDOCAINE 4 G/G
1 PATCH TOPICAL DAILY
Status: DISCONTINUED | OUTPATIENT
Start: 2025-02-20 | End: 2025-02-21 | Stop reason: HOSPADM

## 2025-02-20 RX ORDER — DIPHENHYDRAMINE HYDROCHLORIDE 50 MG/ML
25 INJECTION, SOLUTION INTRAMUSCULAR; INTRAVENOUS
Status: COMPLETED | OUTPATIENT
Start: 2025-02-20 | End: 2025-02-20

## 2025-02-20 RX ORDER — LIDOCAINE 4 G/G
1 PATCH TOPICAL DAILY
Qty: 30 EACH | Refills: 0
Start: 2025-02-20

## 2025-02-20 RX ADMIN — SODIUM CHLORIDE, PRESERVATIVE FREE 10 ML: 5 INJECTION INTRAVENOUS at 08:47

## 2025-02-20 RX ADMIN — PYRIDOXINE HCL TAB 50 MG 50 MG: 50 TAB at 08:46

## 2025-02-20 RX ADMIN — SPIRONOLACTONE 25 MG: 25 TABLET ORAL at 08:46

## 2025-02-20 RX ADMIN — SODIUM CHLORIDE, PRESERVATIVE FREE 10 ML: 5 INJECTION INTRAVENOUS at 20:35

## 2025-02-20 RX ADMIN — SPIRONOLACTONE 25 MG: 25 TABLET ORAL at 20:34

## 2025-02-20 RX ADMIN — DULOXETINE HYDROCHLORIDE 30 MG: 30 CAPSULE, DELAYED RELEASE ORAL at 08:46

## 2025-02-20 RX ADMIN — METOPROLOL TARTRATE 50 MG: 50 TABLET, FILM COATED ORAL at 20:34

## 2025-02-20 RX ADMIN — ACETAMINOPHEN 650 MG: 325 TABLET ORAL at 08:44

## 2025-02-20 RX ADMIN — TRAMADOL HYDROCHLORIDE 50 MG: 50 TABLET, COATED ORAL at 06:28

## 2025-02-20 RX ADMIN — TRAMADOL HYDROCHLORIDE 50 MG: 50 TABLET, COATED ORAL at 15:28

## 2025-02-20 RX ADMIN — WATER 1000 MG: 1 INJECTION INTRAMUSCULAR; INTRAVENOUS; SUBCUTANEOUS at 00:38

## 2025-02-20 RX ADMIN — SERTRALINE 50 MG: 50 TABLET, FILM COATED ORAL at 08:46

## 2025-02-20 RX ADMIN — ACETAMINOPHEN 650 MG: 325 TABLET ORAL at 15:28

## 2025-02-20 RX ADMIN — DIPHENHYDRAMINE HYDROCHLORIDE 25 MG: 50 INJECTION INTRAMUSCULAR; INTRAVENOUS at 20:31

## 2025-02-20 RX ADMIN — PANTOPRAZOLE SODIUM 40 MG: 40 TABLET, DELAYED RELEASE ORAL at 06:28

## 2025-02-20 RX ADMIN — LEVOTHYROXINE SODIUM 100 MCG: 0.1 TABLET ORAL at 08:46

## 2025-02-20 RX ADMIN — TRAZODONE HYDROCHLORIDE 100 MG: 50 TABLET ORAL at 20:36

## 2025-02-20 RX ADMIN — METOPROLOL TARTRATE 50 MG: 50 TABLET, FILM COATED ORAL at 08:45

## 2025-02-20 RX ADMIN — HYDROCHLOROTHIAZIDE 25 MG: 25 TABLET ORAL at 08:46

## 2025-02-20 ASSESSMENT — PAIN SCALES - GENERAL
PAINLEVEL_OUTOF10: 6
PAINLEVEL_OUTOF10: 6
PAINLEVEL_OUTOF10: 4
PAINLEVEL_OUTOF10: 7
PAINLEVEL_OUTOF10: 6
PAINLEVEL_OUTOF10: 8
PAINLEVEL_OUTOF10: 0
PAINLEVEL_OUTOF10: 9
PAINLEVEL_OUTOF10: 6
PAINLEVEL_OUTOF10: 9
PAINLEVEL_OUTOF10: 7

## 2025-02-20 ASSESSMENT — PAIN DESCRIPTION - DESCRIPTORS
DESCRIPTORS: ACHING;SORE
DESCRIPTORS: SORE
DESCRIPTORS: ACHING
DESCRIPTORS: ACHING;SORE

## 2025-02-20 ASSESSMENT — PAIN DESCRIPTION - ORIENTATION
ORIENTATION: LEFT
ORIENTATION: MID;RIGHT
ORIENTATION: LEFT
ORIENTATION: MID

## 2025-02-20 ASSESSMENT — PAIN DESCRIPTION - ONSET
ONSET: ON-GOING
ONSET: ON-GOING

## 2025-02-20 ASSESSMENT — PAIN SCALES - WONG BAKER
WONGBAKER_NUMERICALRESPONSE: HURTS LITTLE MORE
WONGBAKER_NUMERICALRESPONSE: HURTS EVEN MORE
WONGBAKER_NUMERICALRESPONSE: HURTS EVEN MORE

## 2025-02-20 ASSESSMENT — PAIN DESCRIPTION - LOCATION
LOCATION: BACK
LOCATION: BACK;KNEE;SHOULDER
LOCATION: BACK;SHOULDER
LOCATION: BACK;SHOULDER;KNEE

## 2025-02-20 ASSESSMENT — PAIN DESCRIPTION - PAIN TYPE
TYPE: CHRONIC PAIN
TYPE: CHRONIC PAIN

## 2025-02-20 ASSESSMENT — PAIN - FUNCTIONAL ASSESSMENT
PAIN_FUNCTIONAL_ASSESSMENT: ACTIVITIES ARE NOT PREVENTED

## 2025-02-20 ASSESSMENT — PAIN DESCRIPTION - FREQUENCY
FREQUENCY: INTERMITTENT
FREQUENCY: INTERMITTENT

## 2025-02-20 NOTE — PROGRESS NOTES
8:54 AM  Shift assessment completed and charted. VSS. A/o x4. Standard safety measures in place. Patient ambulated up to chair with nursing. SpO2 > 90% on room air. PRN tylenol given for chronic back, shoulder, and knee pain. Pt stable and denied needs when writer left room.

## 2025-02-20 NOTE — PROGRESS NOTES
[facility] - Inpatient Rehabilitation Department   Phone: [phone]    Physical Therapy    [] Initial Evaluation            [x] Daily Treatment Note         [] Discharge Summary      Patient: Mounika Leahy   : 1935   MRN: [id number]   Date of Service:  2025  Admitting Diagnosis: UTI (urinary tract infection)  Current Admission Summary:  Mounika Leahy is a 89 y.o. female who presneted to our hospital with brp in her stool, hgb was sstable and occult stool was negative. Patient had uti started on atbx and discharged home, however patient did no thave any family at home or they keys thus was brought back to the ed. Patient currently denies any chest pain sob fever or chills, no further blodo in stool  Oriented x 3 but poor hisitoraian  Past Medical History:  has a past medical history of Arthritis, Chronic back pain, Diabetes mellitus (HCC), Esophagitis, Essential hypertension, Fatty liver, G6P deficiency (glucose-6-phosphatase deficiency) (HCC), G6PD deficiency, Gastric polyps, GERD (gastroesophageal reflux disease), Hemorrhoids, History of blood transfusion, Hypothyroidism, Kidney stone, Mediterranean anemia, Normal cardiac stress test, Sickle cell anemia (HCC), and UTI (urinary tract infection).  Past Surgical History:  has a past surgical history that includes Appendectomy; Hysterectomy;  section; Cystocopy (2015); other surgical history (2017); Upper gastrointestinal endoscopy (N/A, 2018); and pr laps surg cholecystectomy w/cholangiography (N/A, 2018).    Discharge Recommendations: Mounika Leahy scored a 13/24 on the AM-PAC short mobility form. Current research shows that an AM-PAC score of 17 or less is typically not associated with a discharge to the patient's home setting. Based on the patient's AM-PAC score and their current functional mobility deficits, it is recommended that the patient have 3-5 sessions per week of Physical Therapy at d/c to

## 2025-02-20 NOTE — PLAN OF CARE
Problem: Chronic Conditions and Co-morbidities  Goal: Patient's chronic conditions and co-morbidity symptoms are monitored and maintained or improved  2/20/2025 0854 by Esther Cummings RN  Outcome: Progressing  2/19/2025 2314 by Shala Da Silva RN  Outcome: Progressing     Problem: Discharge Planning  Goal: Discharge to home or other facility with appropriate resources  2/20/2025 0854 by Esther Cummings RN  Outcome: Progressing  2/19/2025 2314 by Shala Da Silva RN  Outcome: Progressing     Problem: Pain  Goal: Verbalizes/displays adequate comfort level or baseline comfort level  2/20/2025 0854 by Esther Cummings RN  Outcome: Progressing  2/19/2025 2314 by Shala Da Silva RN  Outcome: Progressing     Problem: Skin/Tissue Integrity  Goal: Skin integrity remains intact  Description: 1.  Monitor for areas of redness and/or skin breakdown  2.  Assess vascular access sites hourly  3.  Every 4-6 hours minimum:  Change oxygen saturation probe site  4.  Every 4-6 hours:  If on nasal continuous positive airway pressure, respiratory therapy assess nares and determine need for appliance change or resting period  2/20/2025 0854 by Esther Cummings RN  Outcome: Progressing  2/19/2025 2314 by Shala Da Silva RN  Outcome: Progressing     Problem: Safety - Adult  Goal: Free from fall injury  2/20/2025 0854 by Esther Cummings RN  Outcome: Progressing  2/19/2025 2314 by Shala Da Silva RN  Outcome: Progressing     Problem: ABCDS Injury Assessment  Goal: Absence of physical injury  2/20/2025 0854 by Esther Cummings RN  Outcome: Progressing  2/19/2025 2314 by Shala Da Silva RN  Outcome: Progressing

## 2025-02-20 NOTE — PROGRESS NOTES
[facility]    2/20/2025 12:45 PM        Name: Mounika Leahy .              Admitted: 2/16/2025  Primary Care Provider: Maribel De MD (Tel: [phone])      Subjective:    Patient admitted with UTI. Awaiting precert for snf. Having some low back, right low back pain. No dysuria. No diarrhea.     Reviewed interval ancillary notes    Current Medications  DULoxetine (CYMBALTA) extended release capsule 30 mg, Daily  hydroCHLOROthiazide (HYDRODIURIL) tablet 25 mg, Daily  pantoprazole (PROTONIX) tablet 40 mg, QAM AC  levothyroxine (SYNTHROID) tablet 100 mcg, Daily  metoprolol tartrate (LOPRESSOR) tablet 50 mg, BID  ondansetron (ZOFRAN-ODT) disintegrating tablet 4 mg, Q8H PRN  sertraline (ZOLOFT) tablet 50 mg, Daily  spironolactone (ALDACTONE) tablet 25 mg, BID  traZODone (DESYREL) tablet 100 mg, Nightly  pyridoxine (B-6) tablet 50 mg, Daily  cefTRIAXone (ROCEPHIN) 1,000 mg in sterile water 10 mL IV syringe, Q24H  sodium chloride flush 0.9 % injection 5-40 mL, 2 times per day  sodium chloride flush 0.9 % injection 5-40 mL, PRN  0.9 % sodium chloride infusion, PRN  polyethylene glycol (GLYCOLAX) packet 17 g, Daily PRN  acetaminophen (TYLENOL) tablet 650 mg, Q6H PRN   Or  acetaminophen (TYLENOL) suppository 650 mg, Q6H PRN  insulin lispro (HUMALOG,ADMELOG) injection vial 0-8 Units, 4x Daily AC & HS  dextrose bolus 10% 125 mL, PRN   Or  dextrose bolus 10% 250 mL, PRN  glucagon injection 1 mg, PRN  dextrose 10 % infusion, Continuous PRN  traMADol (ULTRAM) tablet 50 mg, Q8H PRN        Objective:  /75   Pulse 63   Temp 97.8 °F (36.6 °C) (Oral)   Resp 17   Ht 1.6 m (5' 3\")   Wt 86.2 kg (190 lb)   SpO2 99%   BMI 33.66 kg/m²     Intake/Output Summary (Last 24 hours) at 2/20/2025 1245  Last data filed at 2/20/2025 0856  Gross per 24 hour   Intake 360 ml   Output 1500 ml   Net -1140 ml      Wt Readings from Last 3

## 2025-02-20 NOTE — PROGRESS NOTES
[facility] - Inpatient Rehabilitation Department   Phone: [phone]    Occupational Therapy    [] Initial Evaluation            [x] Daily Treatment Note         [] Discharge Summary      Patient: Mounika Leahy   : 1935   MRN: [id number]   Date of Service:  2025    Admitting Diagnosis:  UTI (urinary tract infection)  Current Admission Summary: Pt orginally discharged from ED to home however did not have keys to enter home this came back to ED. Patient admitted to hospital with UTI treat with IV antibiotics discharged on course of oral medications.  Past Medical History:  has a past medical history of Arthritis, Chronic back pain, Diabetes mellitus (HCC), Esophagitis, Essential hypertension, Fatty liver, G6P deficiency (glucose-6-phosphatase deficiency) (HCC), G6PD deficiency, Gastric polyps, GERD (gastroesophageal reflux disease), Hemorrhoids, History of blood transfusion, Hypothyroidism, Kidney stone, Mediterranean anemia, Normal cardiac stress test, Sickle cell anemia (HCC), and UTI (urinary tract infection).  Past Surgical History:  has a past surgical history that includes Appendectomy; Hysterectomy;  section; Cystocopy (2015); other surgical history (2017); Upper gastrointestinal endoscopy (N/A, 2018); and pr laps surg cholecystectomy w/cholangiography (N/A, 2018).    Discharge Recommendations: Mounika Leahy scored a 15/24 on the AM-PAC ADL Inpatient form. Current research shows that an AM-PAC score of 17 or less is typically not associated with a discharge to the patient's home setting. Based on the patient's AM-PAC score and their current ADL deficits, it is recommended that the patient have 3-5 sessions per week of Occupational Therapy at d/c to increase the patient's independence.  Please see assessment section for further patient specific details.    If patient discharges prior to next session this note will serve as a discharge summary.  Please

## 2025-02-20 NOTE — CARE COORDINATION
Discharge Planning:     (CM) spoke to admissions/Jc, pre-cert still pending at this time.    Electronically signed by Nikolay Bynum on 2/20/25 at 4:47 PM EST

## 2025-02-20 NOTE — DISCHARGE INSTR - COC
Continuity of Care Form    Patient Name: Mounika Leahy   :  1935  MRN:  [id number]    Admit date:  2025  Discharge date:  25    Code Status Order: Full Code   Advance Directives:   Advance Care Flowsheet Documentation             Admitting Physician:  Betty Cervantes MD  PCP: Maribel De MD    Discharging Nurse: ROYER Andrade,RN  Discharging Hospital Unit/Room#: 4TN-4479/4479-01  Discharging Unit Phone Number: [phone]    Emergency Contact:   Extended Emergency Contact Information  Primary Emergency Contact: Jose Leahy           [facility]  Home Phone: [phone]  Relation: Child  Secondary Emergency Contact: Jorge Leahy           [facility]  Home Phone: [phone]  Relation: Child    Past Surgical History:  Past Surgical History:   Procedure Laterality Date    APPENDECTOMY       SECTION      X1    CYSTOSCOPY  2015    urethral dilitation    HYSTERECTOMY (CERVIX STATUS UNKNOWN)      OTHER SURGICAL HISTORY  2017    Ear cleaning under anesthesia    [place] LAPS SURG CHOLECYSTECTOMY W/CHOLANGIOGRAPHY N/A 2018    LAPAROSCOPIC CHOLECYSTECTOMY WITH CHOLANGIOGRAM performed by Regulo Schmidt MD at [facility] OR    UPPER GASTROINTESTINAL ENDOSCOPY N/A 2018    EGD BIOPSY performed by Juanito Howard MD at [facility] ENDOSCOPY       Immunization History:   Immunization History   Administered Date(s) Administered    COVID-19, PFIZER PURPLE top, DILUTE for use, (age 12 y+), 30mcg/0.3mL 2021, 2021    Influenza Vaccine, unspecified formulation 10/05/2013, 2014, 2015, 10/07/2016    Influenza Virus Vaccine 10/05/2013, 2014, 2015, 10/07/2016    Influenza Whole 2010    Influenza, FLUAD, (age 65 y+), IM, Quadv, 0.5mL 10/23/2020, 2021, 2023, 2024    Influenza, FLUAD, (age 65 y+), IM, Trivalent PF, 0.5mL 2019    Influenza, FLUZONE High Dose, (age 65 y+), IM,

## 2025-02-20 NOTE — CARE COORDINATION
[facility] has deferred outreach at this time. Patient is currently IP. [facility] will follow up with patient post discharge.

## 2025-02-21 VITALS
RESPIRATION RATE: 18 BRPM | WEIGHT: 190 LBS | TEMPERATURE: 98 F | HEIGHT: 63 IN | DIASTOLIC BLOOD PRESSURE: 88 MMHG | SYSTOLIC BLOOD PRESSURE: 122 MMHG | BODY MASS INDEX: 33.66 KG/M2 | HEART RATE: 80 BPM | OXYGEN SATURATION: 98 %

## 2025-02-21 LAB
EST. AVERAGE GLUCOSE BLD GHB EST-MCNC: 82.5 MG/DL
GLUCOSE BLD-MCNC: 111 MG/DL (ref 70–99)
GLUCOSE BLD-MCNC: 130 MG/DL (ref 70–99)
GLUCOSE BLD-MCNC: 133 MG/DL (ref 70–99)
HBA1C MFR BLD: 4.5 %
PERFORMED ON: ABNORMAL

## 2025-02-21 PROCEDURE — 6370000000 HC RX 637 (ALT 250 FOR IP): Performed by: INTERNAL MEDICINE

## 2025-02-21 PROCEDURE — 6360000002 HC RX W HCPCS: Performed by: INTERNAL MEDICINE

## 2025-02-21 PROCEDURE — 2500000003 HC RX 250 WO HCPCS: Performed by: INTERNAL MEDICINE

## 2025-02-21 PROCEDURE — 83036 HEMOGLOBIN GLYCOSYLATED A1C: CPT

## 2025-02-21 PROCEDURE — 6370000000 HC RX 637 (ALT 250 FOR IP): Performed by: NURSE PRACTITIONER

## 2025-02-21 PROCEDURE — 6370000000 HC RX 637 (ALT 250 FOR IP): Performed by: PHYSICIAN ASSISTANT

## 2025-02-21 RX ORDER — TRAMADOL HYDROCHLORIDE 50 MG/1
50 TABLET ORAL EVERY 6 HOURS PRN
Qty: 3 TABLET | Refills: 0 | Status: SHIPPED | OUTPATIENT
Start: 2025-02-21 | End: 2025-02-24

## 2025-02-21 RX ADMIN — SERTRALINE 50 MG: 50 TABLET, FILM COATED ORAL at 10:29

## 2025-02-21 RX ADMIN — SODIUM CHLORIDE, PRESERVATIVE FREE 10 ML: 5 INJECTION INTRAVENOUS at 10:42

## 2025-02-21 RX ADMIN — DULOXETINE HYDROCHLORIDE 30 MG: 30 CAPSULE, DELAYED RELEASE ORAL at 10:30

## 2025-02-21 RX ADMIN — METOPROLOL TARTRATE 50 MG: 50 TABLET, FILM COATED ORAL at 10:30

## 2025-02-21 RX ADMIN — SPIRONOLACTONE 25 MG: 25 TABLET ORAL at 10:29

## 2025-02-21 RX ADMIN — HYDROCHLOROTHIAZIDE 25 MG: 25 TABLET ORAL at 10:30

## 2025-02-21 RX ADMIN — PANTOPRAZOLE SODIUM 40 MG: 40 TABLET, DELAYED RELEASE ORAL at 05:43

## 2025-02-21 RX ADMIN — PYRIDOXINE HCL TAB 50 MG 50 MG: 50 TAB at 10:41

## 2025-02-21 RX ADMIN — WATER 1000 MG: 1 INJECTION INTRAMUSCULAR; INTRAVENOUS; SUBCUTANEOUS at 01:16

## 2025-02-21 RX ADMIN — TRAMADOL HYDROCHLORIDE 50 MG: 50 TABLET, COATED ORAL at 05:41

## 2025-02-21 RX ADMIN — TRAMADOL HYDROCHLORIDE 50 MG: 50 TABLET, COATED ORAL at 15:03

## 2025-02-21 RX ADMIN — LEVOTHYROXINE SODIUM 100 MCG: 0.1 TABLET ORAL at 10:30

## 2025-02-21 ASSESSMENT — PAIN SCALES - GENERAL
PAINLEVEL_OUTOF10: 7
PAINLEVEL_OUTOF10: 0
PAINLEVEL_OUTOF10: 8

## 2025-02-21 ASSESSMENT — PAIN - FUNCTIONAL ASSESSMENT: PAIN_FUNCTIONAL_ASSESSMENT: ACTIVITIES ARE NOT PREVENTED

## 2025-02-21 ASSESSMENT — PAIN DESCRIPTION - LOCATION: LOCATION: BACK

## 2025-02-21 ASSESSMENT — PAIN SCALES - WONG BAKER
WONGBAKER_NUMERICALRESPONSE: NO HURT

## 2025-02-21 ASSESSMENT — PAIN DESCRIPTION - DESCRIPTORS: DESCRIPTORS: ACHING

## 2025-02-21 ASSESSMENT — PAIN DESCRIPTION - ORIENTATION: ORIENTATION: LOWER

## 2025-02-21 NOTE — PLAN OF CARE
Problem: Chronic Conditions and Co-morbidities  Goal: Patient's chronic conditions and co-morbidity symptoms are monitored and maintained or improved  2/21/2025 1225 by Cy Crawford RN  Outcome: Progressing  2/21/2025 0408 by Red Eldridge RN  Outcome: Progressing     Problem: Discharge Planning  Goal: Discharge to home or other facility with appropriate resources  2/21/2025 1225 by Cy Crawford RN  Outcome: Progressing  2/21/2025 0408 by Red Eldridge RN  Outcome: Progressing     Problem: Pain  Goal: Verbalizes/displays adequate comfort level or baseline comfort level  2/21/2025 1225 by Cy Crawford RN  Outcome: Progressing  2/21/2025 0408 by Red Eldridge RN  Outcome: Progressing     Problem: Skin/Tissue Integrity  Goal: Skin integrity remains intact  Description: 1.  Monitor for areas of redness and/or skin breakdown  2.  Assess vascular access sites hourly  3.  Every 4-6 hours minimum:  Change oxygen saturation probe site  4.  Every 4-6 hours:  If on nasal continuous positive airway pressure, respiratory therapy assess nares and determine need for appliance change or resting period  2/21/2025 1225 by Cy Crawford RN  Outcome: Progressing  2/21/2025 0408 by Red Eldridge RN  Outcome: Progressing  Flowsheets (Taken 2/20/2025 2041)  Skin Integrity Remains Intact: Monitor for areas of redness and/or skin breakdown     Problem: Safety - Adult  Goal: Free from fall injury  2/21/2025 1225 by Cy Crawford RN  Outcome: Progressing  2/21/2025 0408 by Red Eldridge RN  Outcome: Progressing     Problem: ABCDS Injury Assessment  Goal: Absence of physical injury  2/21/2025 1225 by Cy Crawford RN  Outcome: Progressing  2/21/2025 0408 by Red Eldridge RN  Outcome: Progressing

## 2025-02-21 NOTE — PLAN OF CARE
Problem: Chronic Conditions and Co-morbidities  Goal: Patient's chronic conditions and co-morbidity symptoms are monitored and maintained or improved  2/21/2025 1524 by Cy Crawford RN  Outcome: Completed  2/21/2025 1225 by Cy Crawford RN  Outcome: Progressing  2/21/2025 0408 by Red Eldridge RN  Outcome: Progressing     Problem: Discharge Planning  Goal: Discharge to home or other facility with appropriate resources  2/21/2025 1524 by Cy Crawford RN  Outcome: Completed  2/21/2025 1225 by Cy Crawford RN  Outcome: Progressing  2/21/2025 0408 by Red Eldridge RN  Outcome: Progressing     Problem: Pain  Goal: Verbalizes/displays adequate comfort level or baseline comfort level  2/21/2025 1524 by Cy Crawford RN  Outcome: Completed  2/21/2025 1225 by Cy Crawford RN  Outcome: Progressing  2/21/2025 0408 by Red Eldridge RN  Outcome: Progressing     Problem: Skin/Tissue Integrity  Goal: Skin integrity remains intact  Description: 1.  Monitor for areas of redness and/or skin breakdown  2.  Assess vascular access sites hourly  3.  Every 4-6 hours minimum:  Change oxygen saturation probe site  4.  Every 4-6 hours:  If on nasal continuous positive airway pressure, respiratory therapy assess nares and determine need for appliance change or resting period  2/21/2025 1524 by Cy Crawford RN  Outcome: Completed  Flowsheets (Taken 2/21/2025 1226)  Skin Integrity Remains Intact: Monitor for areas of redness and/or skin breakdown  2/21/2025 1225 by Cy Crawford RN  Outcome: Progressing  2/21/2025 0408 by Red Eldridge RN  Outcome: Progressing  Flowsheets (Taken 2/20/2025 2041)  Skin Integrity Remains Intact: Monitor for areas of redness and/or skin breakdown     Problem: Safety - Adult  Goal: Free from fall injury  2/21/2025 1524 by Cy Crawford RN  Outcome: Completed  2/21/2025 1225 by Cy Crawford RN  Outcome:

## 2025-02-21 NOTE — CARE COORDINATION
02/21/25 0852   IMM Letter   IMM Letter given to Patient/Family/Significant other/Guardian/POA/by: IMM Given   IMM Letter date given: 02/21/25   IMM Letter time given: 0852

## 2025-02-21 NOTE — PLAN OF CARE
Problem: Chronic Conditions and Co-morbidities  Goal: Patient's chronic conditions and co-morbidity symptoms are monitored and maintained or improved  Outcome: Progressing     Problem: Discharge Planning  Goal: Discharge to home or other facility with appropriate resources  Outcome: Progressing     Problem: Pain  Goal: Verbalizes/displays adequate comfort level or baseline comfort level  Outcome: Progressing     Problem: Skin/Tissue Integrity  Goal: Skin integrity remains intact  Description: 1.  Monitor for areas of redness and/or skin breakdown  2.  Assess vascular access sites hourly  3.  Every 4-6 hours minimum:  Change oxygen saturation probe site  4.  Every 4-6 hours:  If on nasal continuous positive airway pressure, respiratory therapy assess nares and determine need for appliance change or resting period  Outcome: Progressing  Flowsheets (Taken 2/20/2025 2041)  Skin Integrity Remains Intact: Monitor for areas of redness and/or skin breakdown     Problem: Safety - Adult  Goal: Free from fall injury  Outcome: Progressing     Problem: ABCDS Injury Assessment  Goal: Absence of physical injury  Outcome: Progressing

## 2025-02-21 NOTE — CARE COORDINATION
Discharge Planning:    Pre-cert is still pending this morning.    CM team following.    Electronically signed by CARINA Henriquez on 2/21/2025 at 9:14 AM

## 2025-02-21 NOTE — PROGRESS NOTES
Physical Therapy  Physical Therapy  Attempt Note    Name:Mounika Leahy  :1935  MRN:[id number]  Room: [facility]4479/4479-01    Date of Service: 2025    PT attempted to see for PT tx at this time. Pt sitting in recliner upon therapist arrival. Pt stating \"I am hurting this morning. They gave me something for pain but I need some time before it kicks in. Can you come back later? I can't do anything right now.\"  Will attempt again as therapy schedule permits.              Electronically signed by Jordyn Medina, PT on 2025 at 12:10 PM

## 2025-02-21 NOTE — CARE COORDINATION
Pre-cert is approved as of 1445 today.    CM will schedule transport.    Electronically signed by CARINA Henriquez on 2/21/2025 at 2:54 PM

## 2025-02-21 NOTE — PROGRESS NOTES
[facility]    2/21/2025 12:44 PM        Name: Mounika Leahy .              Admitted: 2/16/2025  Primary Care Provider: Maribel De MD (Tel: [phone])      Subjective:    Patient admitted with UTI. Awaiting precert for snf. Having some low back, right low back pain. No dysuria. No diarrhea.     Reviewed interval ancillary notes    Current Medications  lidocaine 4 % external patch 1 patch, Daily  DULoxetine (CYMBALTA) extended release capsule 30 mg, Daily  hydroCHLOROthiazide (HYDRODIURIL) tablet 25 mg, Daily  pantoprazole (PROTONIX) tablet 40 mg, QAM AC  levothyroxine (SYNTHROID) tablet 100 mcg, Daily  metoprolol tartrate (LOPRESSOR) tablet 50 mg, BID  ondansetron (ZOFRAN-ODT) disintegrating tablet 4 mg, Q8H PRN  sertraline (ZOLOFT) tablet 50 mg, Daily  spironolactone (ALDACTONE) tablet 25 mg, BID  traZODone (DESYREL) tablet 100 mg, Nightly  pyridoxine (B-6) tablet 50 mg, Daily  cefTRIAXone (ROCEPHIN) 1,000 mg in sterile water 10 mL IV syringe, Q24H  sodium chloride flush 0.9 % injection 5-40 mL, 2 times per day  sodium chloride flush 0.9 % injection 5-40 mL, PRN  0.9 % sodium chloride infusion, PRN  polyethylene glycol (GLYCOLAX) packet 17 g, Daily PRN  acetaminophen (TYLENOL) tablet 650 mg, Q6H PRN   Or  acetaminophen (TYLENOL) suppository 650 mg, Q6H PRN  insulin lispro (HUMALOG,ADMELOG) injection vial 0-8 Units, 4x Daily AC & HS  dextrose bolus 10% 125 mL, PRN   Or  dextrose bolus 10% 250 mL, PRN  glucagon injection 1 mg, PRN  dextrose 10 % infusion, Continuous PRN  traMADol (ULTRAM) tablet 50 mg, Q8H PRN        Objective:  /88   Pulse 80   Temp 98 °F (36.7 °C) (Oral)   Resp 14   Ht 1.6 m (5' 3\")   Wt 86.2 kg (190 lb)   SpO2 98%   BMI 33.66 kg/m²     Intake/Output Summary (Last 24 hours) at 2/21/2025 1244  Last data filed at 2/21/2025 0600  Gross per 24 hour   Intake 240 ml   Output 750 ml

## 2025-02-21 NOTE — CARE COORDINATION
Case Management -  Discharge Note      Patient Name: Mounika Leahy                   YOB: 1935  Room: [address] [place], [place] [address]            Readmission Risk (Low < 19, Mod (19-27), High > 27): Readmission Risk Score: 21.8    Current PCP: Maribel De MD      (IMM) Important Message from Medicare:    Has pt received appropriate compliance notices before being discharged if required: yes  Compliance doc:  [x] 2nd IMM; [] Code 44 [] [place]  Date Given: 2/21/25 Given By: HIEU Malik    PT AM-PAC: 13 /24  OT AM-PAC: 15 /24    Patient/patient representative has been educated on the benefits of SNF as well as the possible risks of declining recommended services. Patient/patient representative has acknowledged the information provided and decided on the following discharge plan. Patient/ patient representative has been provided freedom of choice regarding service provider, supported by basic dialogue that supports the patient's individualized plan of care/goals.    Patient noted to have a discharge order.  Pt has been medically cleared for transition to Skilled Nursing Rehab Facility    [address]     [address] [place] [address]  Phone: [phone]  Fax: [phone]  NURSE REPORT line: [phone]         HENS Completed:  yes  Pre-cert required/obtained:  yes    Transportation scheduled for 2/21/25 at 5:00 pm  Transportation provided by [facility] [phone]  AVS faxed and agency notified:  yes  The following prescriptions sent with pt: Tramadol    Family Notified:  yes    Nurse to call report to facility      [facility] agency notified of discharge:  [] Yes [] No  [x] NA    Family notified of discharge:  [x] Yes  [] No  [] NA    Facility notified of discharge:  [x] Yes  [] No  [] NA    Pt is being discharged with Outpt IV Antibiotics  [] Yes [] No  [x] NA  If yes, make sure SANDOR is faxed to [facility] agency, and meds are called in to pharmacy by RN

## 2025-02-24 ENCOUNTER — CARE COORDINATION (OUTPATIENT)
Dept: CARE COORDINATION | Age: 89
End: 2025-02-24

## 2025-02-24 NOTE — CARE COORDINATION
Ambulatory Care Coordination Note     2/24/2025 10:00 AM        ACM: Melinda Hernandez, RN     Care Summary Note: ACM deferred outreach at this time. Patient discharged to:   [facility] [address] [place] [address]  Phone: [phone]  Fax: [phone]    PCP/Specialist follow up:

## 2025-02-28 ENCOUNTER — TELEPHONE (OUTPATIENT)
Dept: INTERNAL MEDICINE CLINIC | Age: 89
End: 2025-02-28

## 2025-02-28 NOTE — TELEPHONE ENCOUNTER
Jeff don't have any recommendation. She can also check w her insurance to see if any one in the network

## 2025-03-03 ENCOUNTER — CARE COORDINATION (OUTPATIENT)
Dept: CARE COORDINATION | Age: 89
End: 2025-03-03

## 2025-03-03 NOTE — CARE COORDINATION
Patient recently hospitalized 2/16/25-2/21/25 2/2 UTI. Subsequently discharged to SNF. RD will sign off.    Electronically signed by Andressa Oakley RD on 3/3/2025 at 2:14 PM

## 2025-03-06 ENCOUNTER — CARE COORDINATION (OUTPATIENT)
Dept: CARE COORDINATION | Age: 89
End: 2025-03-06

## 2025-03-06 NOTE — CARE COORDINATION
Ambulatory Care Coordination Note     3/6/2025 8:34 AM     Patient Current Location:  [place]     ACM contacted the patient by telephone. Verified name and  with patient as identifiers.         ACM: Peggy Hartmann RN     Challenges to be reviewed by the provider   Additional needs identified to be addressed with provider No  none               Method of communication with provider: none.      Care Summary Note: Patient answered phone and confirmed that she is currently in SNF. Patient is not sure of anticipated discharge timeline at this time.     Follow Up:   Plan for next ACM outreach in approximately 2 weeks to complete:  - SNF discharge? .   Patient  is agreeable to this plan.

## 2025-03-17 ENCOUNTER — CARE COORDINATION (OUTPATIENT)
Dept: CASE MANAGEMENT | Age: 89
End: 2025-03-17

## 2025-03-17 ENCOUNTER — CARE COORDINATION (OUTPATIENT)
Dept: CARE COORDINATION | Age: 89
End: 2025-03-17

## 2025-03-17 ENCOUNTER — TELEPHONE (OUTPATIENT)
Dept: INTERNAL MEDICINE CLINIC | Age: 89
End: 2025-03-17

## 2025-03-17 DIAGNOSIS — M48.062 SPINAL STENOSIS OF LUMBAR REGION WITH NEUROGENIC CLAUDICATION: ICD-10-CM

## 2025-03-17 DIAGNOSIS — G89.29 CHRONIC RIGHT-SIDED LOW BACK PAIN WITH RIGHT-SIDED SCIATICA: ICD-10-CM

## 2025-03-17 DIAGNOSIS — M54.41 CHRONIC RIGHT-SIDED LOW BACK PAIN WITH RIGHT-SIDED SCIATICA: ICD-10-CM

## 2025-03-17 DIAGNOSIS — L29.9 ITCHING: Primary | ICD-10-CM

## 2025-03-17 RX ORDER — TRAMADOL HYDROCHLORIDE 50 MG/1
TABLET ORAL
Qty: 60 TABLET | Refills: 1 | OUTPATIENT
Start: 2025-03-17

## 2025-03-17 NOTE — CARE COORDINATION
patient? Yes.   Reviewed appropriate site of care based on symptoms and resources available to patient including: PCP. The family agrees to contact the primary care provider and/or specialist office for questions related to their healthcare.      Advance Care Planning:   Does patient have an Advance Directive: reviewed and current.    Medication Reconciliation:  Medication reconciliation was not performed during this call, medlist not available .       Assessments:  Discharge Assessment.     Follow Up Appointment:   Discussed follow up appointments. Patient has hospital follow up appointment scheduled within 7 days of discharge.   Future Appointments         Provider Specialty Dept Phone    3/20/2025 2:00 PM Maribel De MD Internal Medicine [phone]            Post Acute Care Manager provided contact information.  Patient referred back to PETRA Darnell for continued management. based on severity of symptoms and risk factors.  Plan for next call: referral to ambulatory care manager-GABINO GalvanN, RN   Care Transition Nurse  [facility]   [phone]

## 2025-03-17 NOTE — TELEPHONE ENCOUNTER
Pt's pharmacy called requesting a script for benadryl. The pt has been taking it OTC as needed for itchiness and rash.   The pharmacy is requesting a script of 100 tablets to better monitor her usage and medication interactions.

## 2025-03-18 PROBLEM — N39.0 UTI (URINARY TRACT INFECTION): Status: RESOLVED | Noted: 2025-02-16 | Resolved: 2025-03-18

## 2025-03-18 RX ORDER — DIPHENHYDRAMINE HCL 25 MG
25 CAPSULE ORAL EVERY 6 HOURS PRN
Qty: 100 CAPSULE | Refills: 0 | Status: CANCELLED | OUTPATIENT
Start: 2025-03-18

## 2025-03-18 RX ORDER — CETIRIZINE HYDROCHLORIDE 10 MG/1
10 TABLET ORAL DAILY
Qty: 100 TABLET | Refills: 0 | Status: SHIPPED | OUTPATIENT
Start: 2025-03-18

## 2025-03-18 NOTE — TELEPHONE ENCOUNTER
I would recommend the nondrowsy, second generation antihistamine such as claritin or zyrtec and avoid Benadryl as it can cause confusion and affecting mentation.

## 2025-03-19 ENCOUNTER — CARE COORDINATION (OUTPATIENT)
Dept: CARE COORDINATION | Age: 89
End: 2025-03-19

## 2025-03-19 NOTE — CARE COORDINATION
Ambulatory Care Coordination Note     3/19/2025 2:35 PM     Patient Current Location:  [place]     ACM contacted the family by telephone. Verified name and  with family as identifiers.         ACM: Alysa Darnell RN     Challenges to be reviewed by the provider   Additional needs identified to be addressed with provider No  none               Method of communication with provider: chart routing.    Utilization:  Pt discharged from [facility] 3/14    Care Summary Note: ACM completed outreach with pt's son Jorge.  Son states that his mom is doing well.  States that she is doing okay at home living independently for now, but they are still thinking of looking into long term care or assisted living facilities to help with some of the stress of pt living at home and caring for herself and reduce caregiver strain.  Jorge states he is pt's medical POA. Son states he thought they had an appt scheduled with PCP but no appt showing at this time and son states he will call PCP office to get scheduled.  Jorge requesting resources for assisted living facility or long term care facilities in the [place] or [place] area as that is where most of the family that cares for her is located.  States he is going to get Mychart set up and ACM will send resources. Son states pt is monitoring her blood sugars and BP but is not sure what the most recent values have been.  States taking all medications as prescribed.  Denies any further questions or concerns at this time.     Offered patient enrollment in the Remote Patient Monitoring (RPM) program for in-home monitoring: Yes, but did not enroll at this time: already monitoring with home equipment.     Assessments Completed:   Hypertension - Encounter Level          ,   General Assessment    Do you have any symptoms that are causing concern?: No          Medications Reviewed:   Patient denies any changes with medications and reports taking all medications as

## 2025-03-25 DIAGNOSIS — R11.0 NAUSEA: ICD-10-CM

## 2025-03-25 RX ORDER — PROMETHAZINE HYDROCHLORIDE 12.5 MG/1
12.5 TABLET ORAL DAILY
Qty: 30 TABLET | Refills: 0 | Status: SHIPPED | OUTPATIENT
Start: 2025-03-25

## 2025-03-26 ENCOUNTER — CARE COORDINATION (OUTPATIENT)
Dept: CARE COORDINATION | Age: 89
End: 2025-03-26

## 2025-03-26 NOTE — CARE COORDINATION
Ambulatory Care Coordination Note     3/26/2025 3:27 PM     Family, son Jorge, verified HIPPA  outreach attempt by this ACM today to perform care management follow up . ACM was unable to reach the family,    by telephone today;   left voice message requesting a return phone call to this ACM.     ACM: Alysa Darnell, RN     Care Summary Note: [facility] facilities     PCP/Specialist follow up:       Follow Up:   Plan for next ACM outreach in approximately 1 week to complete:  - SDOH assessments  - medication review  - advance care planning  - goal progression  - education .

## 2025-03-27 ENCOUNTER — TELEPHONE (OUTPATIENT)
Dept: INTERNAL MEDICINE CLINIC | Age: 89
End: 2025-03-27

## 2025-03-27 RX ORDER — NITROFURANTOIN 25; 75 MG/1; MG/1
100 CAPSULE ORAL 2 TIMES DAILY
Qty: 14 CAPSULE | Refills: 0 | Status: SHIPPED | OUTPATIENT
Start: 2025-03-27 | End: 2025-04-03

## 2025-03-28 ENCOUNTER — TELEPHONE (OUTPATIENT)
Dept: INTERNAL MEDICINE CLINIC | Age: 89
End: 2025-03-28

## 2025-03-28 NOTE — TELEPHONE ENCOUNTER
Pt needs refill   traMADol (ULTRAM) tablet 50 mg   [[id number]]   [facility] - [place], [place] - [facility] [address] - P [phone] - F [phone]

## 2025-03-29 DIAGNOSIS — M54.41 CHRONIC RIGHT-SIDED LOW BACK PAIN WITH RIGHT-SIDED SCIATICA: ICD-10-CM

## 2025-03-29 DIAGNOSIS — M48.062 SPINAL STENOSIS OF LUMBAR REGION WITH NEUROGENIC CLAUDICATION: ICD-10-CM

## 2025-03-29 DIAGNOSIS — G89.29 CHRONIC RIGHT-SIDED LOW BACK PAIN WITH RIGHT-SIDED SCIATICA: ICD-10-CM

## 2025-03-31 ENCOUNTER — TELEPHONE (OUTPATIENT)
Dept: INTERNAL MEDICINE CLINIC | Age: 89
End: 2025-03-31

## 2025-03-31 DIAGNOSIS — M54.41 CHRONIC RIGHT-SIDED LOW BACK PAIN WITH RIGHT-SIDED SCIATICA: Primary | ICD-10-CM

## 2025-03-31 DIAGNOSIS — M79.605 BILATERAL LEG PAIN: ICD-10-CM

## 2025-03-31 DIAGNOSIS — G89.29 CHRONIC RIGHT-SIDED LOW BACK PAIN WITH RIGHT-SIDED SCIATICA: Primary | ICD-10-CM

## 2025-03-31 DIAGNOSIS — M79.604 BILATERAL LEG PAIN: ICD-10-CM

## 2025-03-31 DIAGNOSIS — G89.4 CHRONIC PAIN SYNDROME: Chronic | ICD-10-CM

## 2025-03-31 RX ORDER — TRAMADOL HYDROCHLORIDE 50 MG/1
50 TABLET ORAL 2 TIMES DAILY PRN
Qty: 14 TABLET | Refills: 0 | Status: SHIPPED | OUTPATIENT
Start: 2025-03-31 | End: 2025-04-07

## 2025-03-31 RX ORDER — TRAMADOL HYDROCHLORIDE 50 MG/1
TABLET ORAL
COMMUNITY
Start: 2025-03-17 | End: 2025-04-04

## 2025-03-31 RX ORDER — TRAMADOL HYDROCHLORIDE 50 MG/1
TABLET ORAL
Qty: 60 TABLET | Refills: 1 | OUTPATIENT
Start: 2025-03-31

## 2025-03-31 NOTE — TELEPHONE ENCOUNTER
Medication:   Requested Prescriptions     Pending Prescriptions Disp Refills    traMADol (ULTRAM) 50 MG tablet [Pharmacy Med Name: tramadol 50 mg tablet] 60 tablet 1     Sig: TAKE ONE TABLET BY MOUTH EVERY 8 HOURS AS NEEDED FOR 30 DAYS     Last Filled:  03/17/25    Last appt: 1/10/2025   Next appt: Visit date not found    Last OARRS:       1/11/2023     4:29 PM   RX Monitoring   Periodic Controlled Substance Monitoring No signs of potential drug abuse or diversion identified.   Chronic Pain > 50 MEDD Re-evaluated the status of the patient's underlying condition causing pain.

## 2025-03-31 NOTE — TELEPHONE ENCOUNTER
Requesting verbal for home care for back and leg pain.      Pt/ot- gilmra  given     Fax [phone] most recent OV, sanp shot , ins card.  Please fax

## 2025-04-01 ENCOUNTER — TELEPHONE (OUTPATIENT)
Dept: INTERNAL MEDICINE CLINIC | Age: 89
End: 2025-04-01

## 2025-04-01 NOTE — TELEPHONE ENCOUNTER
Pt states that the pain is so bad in lower back is it time for an epidural and if not should she go back to assistant living?    [phone]  Pls call and advise

## 2025-04-01 NOTE — TELEPHONE ENCOUNTER
It is difficult to evaluate over the phone.  I would advise an appointment.  However if she cannot come to the office then please send a referral for her to see Dr. Montanez.  Start home physical therapy.  If severe they would need to go to the emergency room.

## 2025-04-02 ENCOUNTER — CARE COORDINATION (OUTPATIENT)
Dept: CARE COORDINATION | Age: 89
End: 2025-04-02

## 2025-04-02 NOTE — TELEPHONE ENCOUNTER
Pt is asking if Dr De can help her get admitted to [facility] for rehab. She states she is in too much pain and cannot do the things she needs to do because of it. She already has [facility] coming to her home for PT.

## 2025-04-02 NOTE — CARE COORDINATION
Ambulatory Care Coordination Note     2025 11:31 AM     Patient Current Location:  [place]     ACM contacted the patient by telephone. Verified name and  with patient as identifiers.         ACM: Alysa Darnell RN     Challenges to be reviewed by the provider   Additional needs identified to be addressed with provider No  none               Method of communication with provider: none.    Utilization: Patient has not had any utilization since our last call.     Care Summary Note: ACM completed follow up outreach with pt.  Pt states she is still having issues with back pain and contacted PCP office regarding.  Per last note, verbal order for PT and OT sent.  Pt has not heard from [facility] at this time but has communicated with them in the past.  Pt states she does not feel she can care for herself at home and wants to go to an assisted living facility and was interested in going back to [facility] [facility].  [facility] attempted to assist pt in calling [facility] for more information on assisted living and LVM.  [facility] contacted mik Tan, verified HIPAA, who has been working on getting referrals for different [facility] together.  States they have contact [facility] and has pricing for these and is working with pt and his brother to determine the best route to go.  They were not able to access [employer] so [facility] emailed list of R[facility] in [place] per Tan request.  PT and Tan deny any further concerns at this time and will reach out to [facility] for assistance if needed before next scheduled outreach in 1 week.     Offered patient enrollment in the Remote Patient Monitoring (RPM) program for in-home monitoring: Yes, but did not enroll at this time: limited patient ability to navigate RPM/equipment.     Assessments Completed:   General Assessment    Do you have any symptoms that are causing concern?: Yes  Progression since Onset: Gradually Worsening  Reported Symptoms: Pain          Medications Reviewed:   Patient

## 2025-04-02 NOTE — TELEPHONE ENCOUNTER
Called [facility].She is scheduled for a visit on 4/3/25.   LM with Abhinav for her nurse to obtain UA and culture at her next visit.

## 2025-04-03 ENCOUNTER — CARE COORDINATION (OUTPATIENT)
Dept: CARE COORDINATION | Age: 89
End: 2025-04-03

## 2025-04-03 NOTE — CARE COORDINATION
Message received pt would like to go back to [facility] from PCP.  [facility] attempted to contact [facility] to determine if pt was out of full coverage days and if approved for co pay days.  Unable to reach [facility] due to technical issues with their phone lines.  VM left.  ACM contact pt to let her know AC is working to contact pt to see what the process is going to be to try and get her back. ACM to continue to attempt to get a hold of [facility] and update pt and PCP when information received.     1100- call received back from [facility] stating they would send email to admissions coordinator Kristina Alvarez and to await phone call back and if ACM does not hear from her call Anna back at [phone]    1230- pt called [facility] requesting update on situation.  States she spoke with Anna at [facility] but was unable to tell ACM what Anna called regarding.  ACM attempted to contact Anna and unable to connect and LVM.      1317-Pt contacted to provide update.  Pt was advised if pain in intolerable she should proceed to ED for evaluation and they could assist with getting placement back at [facility].  Pt states she wanted to wait to hear back from [facility] after [facility] reached back out.  Pt states if she does not hear back from [facility] or [facility] she will go to the ED later this evening.  Pt advised [facility] is unsure if [facility] does 24/7 admissions and pt may need to stay in hospital overnight if they were unable get admission to [facility] approved.  Pt verbalized understanding.     1341- [facility] received call from Kristina stating pt would need to have pre-cert completed by ED before they were able to admit.  AC notified Pt of orders given by [facility] and pt verbalized understanding.  Denies any further questions at this time.

## 2025-04-04 ENCOUNTER — HOSPITAL ENCOUNTER (INPATIENT)
Age: 89
LOS: 3 days | Discharge: NURSING, REHAB OR LONG-TERM CARE | DRG: 690 | End: 2025-04-08
Attending: EMERGENCY MEDICINE | Admitting: STUDENT IN AN ORGANIZED HEALTH CARE EDUCATION/TRAINING PROGRAM
Payer: MEDICARE

## 2025-04-04 DIAGNOSIS — M54.9 ACUTE ON CHRONIC BACK PAIN: Primary | ICD-10-CM

## 2025-04-04 DIAGNOSIS — G89.29 ACUTE ON CHRONIC BACK PAIN: Primary | ICD-10-CM

## 2025-04-04 DIAGNOSIS — N30.00 ACUTE CYSTITIS WITHOUT HEMATURIA: ICD-10-CM

## 2025-04-04 LAB
ALBUMIN SERPL-MCNC: 3.8 G/DL (ref 3.4–5)
ALBUMIN/GLOB SERPL: 1.2 {RATIO} (ref 1.1–2.2)
ALP SERPL-CCNC: 83 U/L (ref 40–129)
ALT SERPL-CCNC: 58 U/L (ref 10–40)
ANION GAP SERPL CALCULATED.3IONS-SCNC: 11 MMOL/L (ref 3–16)
AST SERPL-CCNC: 68 U/L (ref 15–37)
BACTERIA URNS QL MICRO: ABNORMAL /HPF
BASOPHILS # BLD: 0.1 K/UL (ref 0–0.2)
BASOPHILS NFR BLD: 1.1 %
BILIRUB SERPL-MCNC: 0.4 MG/DL (ref 0–1)
BILIRUB UR QL STRIP.AUTO: NEGATIVE
BUN SERPL-MCNC: 11 MG/DL (ref 7–20)
CALCIUM SERPL-MCNC: 9.5 MG/DL (ref 8.3–10.6)
CHLORIDE SERPL-SCNC: 107 MMOL/L (ref 99–110)
CLARITY UR: ABNORMAL
CO2 SERPL-SCNC: 23 MMOL/L (ref 21–32)
COLOR UR: YELLOW
CREAT SERPL-MCNC: 0.7 MG/DL (ref 0.6–1.2)
DEPRECATED RDW RBC AUTO: 14.4 % (ref 12.4–15.4)
EOSINOPHIL # BLD: 0.2 K/UL (ref 0–0.6)
EOSINOPHIL NFR BLD: 3.1 %
EPI CELLS #/AREA URNS HPF: ABNORMAL /HPF (ref 0–5)
GFR SERPLBLD CREATININE-BSD FMLA CKD-EPI: 82 ML/MIN/{1.73_M2}
GLUCOSE SERPL-MCNC: 109 MG/DL (ref 70–99)
GLUCOSE UR STRIP.AUTO-MCNC: NEGATIVE MG/DL
HCT VFR BLD AUTO: 31.8 % (ref 36–48)
HGB BLD-MCNC: 9.7 G/DL (ref 12–16)
HGB UR QL STRIP.AUTO: NEGATIVE
KETONES UR STRIP.AUTO-MCNC: NEGATIVE MG/DL
LEUKOCYTE ESTERASE UR QL STRIP.AUTO: ABNORMAL
LYMPHOCYTES # BLD: 2.3 K/UL (ref 1–5.1)
LYMPHOCYTES NFR BLD: 43.1 %
MCH RBC QN AUTO: 24.6 PG (ref 26–34)
MCHC RBC AUTO-ENTMCNC: 30.4 G/DL (ref 31–36)
MCV RBC AUTO: 80.7 FL (ref 80–100)
MONOCYTES # BLD: 0.3 K/UL (ref 0–1.3)
MONOCYTES NFR BLD: 5.9 %
MUCOUS THREADS #/AREA URNS LPF: ABNORMAL /LPF
NEUTROPHILS # BLD: 2.5 K/UL (ref 1.7–7.7)
NEUTROPHILS NFR BLD: 46.8 %
NITRITE UR QL STRIP.AUTO: NEGATIVE
PH UR STRIP.AUTO: 6 [PH] (ref 5–8)
PLATELET # BLD AUTO: 221 K/UL (ref 135–450)
PMV BLD AUTO: 9.2 FL (ref 5–10.5)
POTASSIUM SERPL-SCNC: 4 MMOL/L (ref 3.5–5.1)
PROT SERPL-MCNC: 7 G/DL (ref 6.4–8.2)
PROT UR STRIP.AUTO-MCNC: ABNORMAL MG/DL
RBC # BLD AUTO: 3.94 M/UL (ref 4–5.2)
RBC #/AREA URNS HPF: ABNORMAL /HPF (ref 0–4)
SODIUM SERPL-SCNC: 141 MMOL/L (ref 136–145)
SP GR UR STRIP.AUTO: 1.02 (ref 1–1.03)
UA COMPLETE W REFLEX CULTURE PNL UR: YES
UA DIPSTICK W REFLEX MICRO PNL UR: YES
URN SPEC COLLECT METH UR: ABNORMAL
UROBILINOGEN UR STRIP-ACNC: 0.2 E.U./DL
WBC # BLD AUTO: 5.2 K/UL (ref 4–11)
WBC #/AREA URNS HPF: ABNORMAL /HPF (ref 0–5)

## 2025-04-04 PROCEDURE — 81001 URINALYSIS AUTO W/SCOPE: CPT

## 2025-04-04 PROCEDURE — 97535 SELF CARE MNGMENT TRAINING: CPT

## 2025-04-04 PROCEDURE — 97167 OT EVAL HIGH COMPLEX 60 MIN: CPT

## 2025-04-04 PROCEDURE — 87086 URINE CULTURE/COLONY COUNT: CPT

## 2025-04-04 PROCEDURE — 85025 COMPLETE CBC W/AUTO DIFF WBC: CPT

## 2025-04-04 PROCEDURE — 6370000000 HC RX 637 (ALT 250 FOR IP): Performed by: EMERGENCY MEDICINE

## 2025-04-04 PROCEDURE — G0378 HOSPITAL OBSERVATION PER HR: HCPCS

## 2025-04-04 PROCEDURE — 6370000000 HC RX 637 (ALT 250 FOR IP)

## 2025-04-04 PROCEDURE — 80053 COMPREHEN METABOLIC PANEL: CPT

## 2025-04-04 PROCEDURE — 2500000003 HC RX 250 WO HCPCS: Performed by: EMERGENCY MEDICINE

## 2025-04-04 PROCEDURE — 6360000002 HC RX W HCPCS: Performed by: EMERGENCY MEDICINE

## 2025-04-04 PROCEDURE — 2500000003 HC RX 250 WO HCPCS

## 2025-04-04 PROCEDURE — 99285 EMERGENCY DEPT VISIT HI MDM: CPT

## 2025-04-04 PROCEDURE — 97530 THERAPEUTIC ACTIVITIES: CPT

## 2025-04-04 PROCEDURE — 97163 PT EVAL HIGH COMPLEX 45 MIN: CPT

## 2025-04-04 PROCEDURE — 36415 COLL VENOUS BLD VENIPUNCTURE: CPT

## 2025-04-04 PROCEDURE — 99223 1ST HOSP IP/OBS HIGH 75: CPT | Performed by: HOSPITALIST

## 2025-04-04 PROCEDURE — 96375 TX/PRO/DX INJ NEW DRUG ADDON: CPT

## 2025-04-04 RX ORDER — SODIUM CHLORIDE 0.9 % (FLUSH) 0.9 %
5-40 SYRINGE (ML) INJECTION EVERY 12 HOURS SCHEDULED
Status: DISCONTINUED | OUTPATIENT
Start: 2025-04-04 | End: 2025-04-08 | Stop reason: HOSPADM

## 2025-04-04 RX ORDER — ONDANSETRON 4 MG/1
4 TABLET, ORALLY DISINTEGRATING ORAL EVERY 8 HOURS PRN
Status: DISCONTINUED | OUTPATIENT
Start: 2025-04-04 | End: 2025-04-08 | Stop reason: HOSPADM

## 2025-04-04 RX ORDER — LEVOTHYROXINE SODIUM 100 UG/1
100 TABLET ORAL
Status: DISCONTINUED | OUTPATIENT
Start: 2025-04-05 | End: 2025-04-08 | Stop reason: HOSPADM

## 2025-04-04 RX ORDER — ACETAMINOPHEN 650 MG/1
650 SUPPOSITORY RECTAL EVERY 6 HOURS PRN
Status: CANCELLED | OUTPATIENT
Start: 2025-04-04

## 2025-04-04 RX ORDER — SODIUM CHLORIDE 9 MG/ML
INJECTION, SOLUTION INTRAVENOUS PRN
Status: DISCONTINUED | OUTPATIENT
Start: 2025-04-04 | End: 2025-04-08 | Stop reason: HOSPADM

## 2025-04-04 RX ORDER — SODIUM CHLORIDE, SODIUM LACTATE, POTASSIUM CHLORIDE, AND CALCIUM CHLORIDE .6; .31; .03; .02 G/100ML; G/100ML; G/100ML; G/100ML
1000 INJECTION, SOLUTION INTRAVENOUS ONCE
Status: DISCONTINUED | OUTPATIENT
Start: 2025-04-04 | End: 2025-04-04

## 2025-04-04 RX ORDER — ACETAMINOPHEN 500 MG
1000 TABLET ORAL ONCE
Status: COMPLETED | OUTPATIENT
Start: 2025-04-04 | End: 2025-04-04

## 2025-04-04 RX ORDER — CEPHALEXIN 500 MG/1
500 CAPSULE ORAL 4 TIMES DAILY
Qty: 28 CAPSULE | Refills: 0 | Status: SHIPPED | OUTPATIENT
Start: 2025-04-04 | End: 2025-04-07 | Stop reason: HOSPADM

## 2025-04-04 RX ORDER — SPIRONOLACTONE 25 MG/1
25 TABLET ORAL 2 TIMES DAILY
Status: COMPLETED | OUTPATIENT
Start: 2025-04-04 | End: 2025-04-04

## 2025-04-04 RX ORDER — POLYETHYLENE GLYCOL 3350 17 G/17G
17 POWDER, FOR SOLUTION ORAL DAILY PRN
Status: CANCELLED | OUTPATIENT
Start: 2025-04-04

## 2025-04-04 RX ORDER — DULOXETIN HYDROCHLORIDE 30 MG/1
30 CAPSULE, DELAYED RELEASE ORAL DAILY
Status: COMPLETED | OUTPATIENT
Start: 2025-04-04 | End: 2025-04-04

## 2025-04-04 RX ORDER — LIDOCAINE 4 G/G
1 PATCH TOPICAL ONCE
Status: COMPLETED | OUTPATIENT
Start: 2025-04-04 | End: 2025-04-04

## 2025-04-04 RX ORDER — ONDANSETRON 2 MG/ML
4 INJECTION INTRAMUSCULAR; INTRAVENOUS EVERY 6 HOURS PRN
Status: DISCONTINUED | OUTPATIENT
Start: 2025-04-04 | End: 2025-04-08 | Stop reason: HOSPADM

## 2025-04-04 RX ORDER — POLYETHYLENE GLYCOL 3350 17 G/17G
17 POWDER, FOR SOLUTION ORAL DAILY PRN
Status: DISCONTINUED | OUTPATIENT
Start: 2025-04-04 | End: 2025-04-08 | Stop reason: HOSPADM

## 2025-04-04 RX ORDER — TRAMADOL HYDROCHLORIDE 50 MG/1
50 TABLET ORAL ONCE
Status: COMPLETED | OUTPATIENT
Start: 2025-04-04 | End: 2025-04-04

## 2025-04-04 RX ORDER — PANTOPRAZOLE SODIUM 20 MG/1
20 TABLET, DELAYED RELEASE ORAL
Status: COMPLETED | OUTPATIENT
Start: 2025-04-04 | End: 2025-04-04

## 2025-04-04 RX ORDER — SPIRONOLACTONE 25 MG/1
25 TABLET ORAL 2 TIMES DAILY
Status: DISCONTINUED | OUTPATIENT
Start: 2025-04-04 | End: 2025-04-04

## 2025-04-04 RX ORDER — ACETAMINOPHEN 650 MG/1
650 SUPPOSITORY RECTAL EVERY 6 HOURS PRN
Status: ACTIVE | OUTPATIENT
Start: 2025-04-04 | End: 2025-04-04

## 2025-04-04 RX ORDER — SODIUM CHLORIDE 0.9 % (FLUSH) 0.9 %
5-40 SYRINGE (ML) INJECTION PRN
Status: DISCONTINUED | OUTPATIENT
Start: 2025-04-04 | End: 2025-04-07 | Stop reason: SDUPTHER

## 2025-04-04 RX ORDER — METHOCARBAMOL 500 MG/1
750 TABLET, FILM COATED ORAL ONCE
Status: COMPLETED | OUTPATIENT
Start: 2025-04-04 | End: 2025-04-04

## 2025-04-04 RX ORDER — ONDANSETRON 4 MG/1
4 TABLET, ORALLY DISINTEGRATING ORAL EVERY 8 HOURS PRN
Status: CANCELLED | OUTPATIENT
Start: 2025-04-04

## 2025-04-04 RX ORDER — TRAMADOL HYDROCHLORIDE 50 MG/1
50 TABLET ORAL 2 TIMES DAILY PRN
Status: DISCONTINUED | OUTPATIENT
Start: 2025-04-04 | End: 2025-04-08 | Stop reason: HOSPADM

## 2025-04-04 RX ORDER — SODIUM CHLORIDE 0.9 % (FLUSH) 0.9 %
5-40 SYRINGE (ML) INJECTION EVERY 12 HOURS SCHEDULED
Status: CANCELLED | OUTPATIENT
Start: 2025-04-04

## 2025-04-04 RX ORDER — SODIUM CHLORIDE 9 MG/ML
INJECTION, SOLUTION INTRAVENOUS PRN
Status: CANCELLED | OUTPATIENT
Start: 2025-04-04

## 2025-04-04 RX ORDER — POTASSIUM CHLORIDE 1500 MG/1
40 TABLET, EXTENDED RELEASE ORAL PRN
Status: CANCELLED | OUTPATIENT
Start: 2025-04-04

## 2025-04-04 RX ORDER — SODIUM CHLORIDE 9 MG/ML
INJECTION, SOLUTION INTRAVENOUS PRN
Status: DISCONTINUED | OUTPATIENT
Start: 2025-04-04 | End: 2025-04-07 | Stop reason: SDUPTHER

## 2025-04-04 RX ORDER — ONDANSETRON 2 MG/ML
4 INJECTION INTRAMUSCULAR; INTRAVENOUS EVERY 6 HOURS PRN
Status: CANCELLED | OUTPATIENT
Start: 2025-04-04

## 2025-04-04 RX ORDER — ACETAMINOPHEN 325 MG/1
650 TABLET ORAL EVERY 6 HOURS PRN
Status: DISPENSED | OUTPATIENT
Start: 2025-04-04 | End: 2025-04-04

## 2025-04-04 RX ORDER — GABAPENTIN 300 MG/1
900 CAPSULE ORAL 2 TIMES DAILY
Qty: 180 CAPSULE | Refills: 1 | Status: SHIPPED | OUTPATIENT
Start: 2025-04-04 | End: 2025-06-03

## 2025-04-04 RX ORDER — GABAPENTIN 300 MG/1
900 CAPSULE ORAL 2 TIMES DAILY
Status: DISCONTINUED | OUTPATIENT
Start: 2025-04-04 | End: 2025-04-08 | Stop reason: HOSPADM

## 2025-04-04 RX ORDER — ACETAMINOPHEN 325 MG/1
650 TABLET ORAL EVERY 6 HOURS PRN
Status: CANCELLED | OUTPATIENT
Start: 2025-04-04

## 2025-04-04 RX ORDER — METOPROLOL TARTRATE 25 MG/1
50 TABLET, FILM COATED ORAL 2 TIMES DAILY
Status: DISCONTINUED | OUTPATIENT
Start: 2025-04-04 | End: 2025-04-08 | Stop reason: HOSPADM

## 2025-04-04 RX ORDER — DIPHENHYDRAMINE HYDROCHLORIDE 25 MG/1
1 TABLET ORAL DAILY
Status: DISCONTINUED | OUTPATIENT
Start: 2025-04-05 | End: 2025-04-04 | Stop reason: CLARIF

## 2025-04-04 RX ORDER — HYDROCHLOROTHIAZIDE 25 MG/1
25 TABLET ORAL DAILY
Status: COMPLETED | OUTPATIENT
Start: 2025-04-04 | End: 2025-04-04

## 2025-04-04 RX ORDER — MAGNESIUM SULFATE IN WATER 40 MG/ML
2000 INJECTION, SOLUTION INTRAVENOUS PRN
Status: CANCELLED | OUTPATIENT
Start: 2025-04-04

## 2025-04-04 RX ORDER — POTASSIUM CHLORIDE 7.45 MG/ML
10 INJECTION INTRAVENOUS PRN
Status: CANCELLED | OUTPATIENT
Start: 2025-04-04

## 2025-04-04 RX ORDER — LANOLIN ALCOHOL/MO/W.PET/CERES
50 CREAM (GRAM) TOPICAL DAILY
Status: DISCONTINUED | OUTPATIENT
Start: 2025-04-05 | End: 2025-04-08 | Stop reason: HOSPADM

## 2025-04-04 RX ORDER — CETIRIZINE HYDROCHLORIDE 10 MG/1
10 TABLET ORAL DAILY
Status: COMPLETED | OUTPATIENT
Start: 2025-04-04 | End: 2025-04-04

## 2025-04-04 RX ORDER — SODIUM CHLORIDE 0.9 % (FLUSH) 0.9 %
5-40 SYRINGE (ML) INJECTION PRN
Status: DISCONTINUED | OUTPATIENT
Start: 2025-04-04 | End: 2025-04-08 | Stop reason: HOSPADM

## 2025-04-04 RX ORDER — SODIUM CHLORIDE 0.9 % (FLUSH) 0.9 %
5-40 SYRINGE (ML) INJECTION PRN
Status: CANCELLED | OUTPATIENT
Start: 2025-04-04

## 2025-04-04 RX ORDER — ENOXAPARIN SODIUM 100 MG/ML
40 INJECTION SUBCUTANEOUS DAILY
Status: DISCONTINUED | OUTPATIENT
Start: 2025-04-04 | End: 2025-04-08 | Stop reason: HOSPADM

## 2025-04-04 RX ADMIN — HYDROCHLOROTHIAZIDE 25 MG: 25 TABLET ORAL at 08:11

## 2025-04-04 RX ADMIN — SODIUM CHLORIDE, PRESERVATIVE FREE 10 ML: 5 INJECTION INTRAVENOUS at 20:19

## 2025-04-04 RX ADMIN — ACETAMINOPHEN 1000 MG: 500 TABLET ORAL at 01:46

## 2025-04-04 RX ADMIN — SPIRONOLACTONE 25 MG: 25 TABLET ORAL at 20:17

## 2025-04-04 RX ADMIN — METOPROLOL 50 MG: 25 TABLET ORAL at 22:49

## 2025-04-04 RX ADMIN — ACETAMINOPHEN 650 MG: 325 TABLET ORAL at 20:17

## 2025-04-04 RX ADMIN — GABAPENTIN 900 MG: 300 CAPSULE ORAL at 22:49

## 2025-04-04 RX ADMIN — METHOCARBAMOL 750 MG: 500 TABLET ORAL at 01:46

## 2025-04-04 RX ADMIN — TRAMADOL HYDROCHLORIDE 50 MG: 50 TABLET, COATED ORAL at 22:49

## 2025-04-04 RX ADMIN — WATER 1000 MG: 1 INJECTION INTRAMUSCULAR; INTRAVENOUS; SUBCUTANEOUS at 07:48

## 2025-04-04 RX ADMIN — ACETAMINOPHEN 650 MG: 325 TABLET ORAL at 14:23

## 2025-04-04 RX ADMIN — SPIRONOLACTONE 25 MG: 25 TABLET ORAL at 10:43

## 2025-04-04 RX ADMIN — DULOXETINE HYDROCHLORIDE 30 MG: 30 CAPSULE, DELAYED RELEASE ORAL at 08:11

## 2025-04-04 RX ADMIN — CETIRIZINE HYDROCHLORIDE 10 MG: 10 TABLET, FILM COATED ORAL at 08:11

## 2025-04-04 RX ADMIN — TRAMADOL HYDROCHLORIDE 50 MG: 50 TABLET ORAL at 04:46

## 2025-04-04 RX ADMIN — PANTOPRAZOLE SODIUM 20 MG: 20 TABLET, DELAYED RELEASE ORAL at 09:12

## 2025-04-04 ASSESSMENT — PAIN DESCRIPTION - LOCATION
LOCATION: BACK
LOCATION: BUTTOCKS;SHOULDER
LOCATION: BACK

## 2025-04-04 ASSESSMENT — PAIN - FUNCTIONAL ASSESSMENT
PAIN_FUNCTIONAL_ASSESSMENT: ACTIVITIES ARE NOT PREVENTED
PAIN_FUNCTIONAL_ASSESSMENT: 0-10
PAIN_FUNCTIONAL_ASSESSMENT: ACTIVITIES ARE NOT PREVENTED

## 2025-04-04 ASSESSMENT — PAIN SCALES - GENERAL
PAINLEVEL_OUTOF10: 0
PAINLEVEL_OUTOF10: 0
PAINLEVEL_OUTOF10: 7
PAINLEVEL_OUTOF10: 10
PAINLEVEL_OUTOF10: 3
PAINLEVEL_OUTOF10: 0

## 2025-04-04 ASSESSMENT — PAIN DESCRIPTION - ORIENTATION
ORIENTATION: LOWER
ORIENTATION: LOWER

## 2025-04-04 ASSESSMENT — PAIN DESCRIPTION - PAIN TYPE
TYPE: CHRONIC PAIN
TYPE: CHRONIC PAIN

## 2025-04-04 ASSESSMENT — PAIN DESCRIPTION - ONSET
ONSET: ON-GOING
ONSET: ON-GOING

## 2025-04-04 ASSESSMENT — PAIN DESCRIPTION - FREQUENCY
FREQUENCY: CONTINUOUS
FREQUENCY: CONTINUOUS

## 2025-04-04 ASSESSMENT — LIFESTYLE VARIABLES
HOW OFTEN DO YOU HAVE A DRINK CONTAINING ALCOHOL: NEVER
HOW MANY STANDARD DRINKS CONTAINING ALCOHOL DO YOU HAVE ON A TYPICAL DAY: PATIENT DOES NOT DRINK

## 2025-04-04 ASSESSMENT — PAIN DESCRIPTION - DESCRIPTORS
DESCRIPTORS: ACHING
DESCRIPTORS: ACHING

## 2025-04-04 NOTE — TELEPHONE ENCOUNTER
Patient is requesting a refill of the following medication    Gabapentin 100 mg caps - takes 3 caps twice daily    30 day supply    Sent to [facility]

## 2025-04-04 NOTE — ED PROVIDER NOTES
ED Attending Attestation Note     Date of evaluation: 4/4/2025    This patient was seen by the resident.  I have seen and examined the patient, agree with the workup, evaluation, management and diagnosis. The care plan has been discussed.  My assessment reveals an elderly, frail appearing patient, uncomfortable, but in no acute distress.  She presents to the emergency department with a number of complaints, all of which she states are chronic, including abdominal pain, urinary symptoms, lymphedema of her bilateral lower extremities, but most particularly states that the thing that brought her to the emergency room tonight with worsening of her right low back pain radiating through her right hip into the posterior aspect of her right thigh, which has made it difficult for her to get around recently, and is making it difficult for her to sleep tonight.  On review of records, it appears that she has had worsening of this chronic pain for perhaps a week or more, has been in contact with her primary care provider, who refilled a prescription of tramadol, and is working on trying to help her get back into inpatient rehab, as she is having difficulty managing at home.          [facility] Emergency Department  EDObservation Admission Note   Emergency Physicians       Time Placed in ED Observation: DISPOSITION Ed Observation 04/04/2025 02:46:11 AM   DISPOSITION CONDITION Stable         Impression and Plan      In summary, Mounika Leahy is admitted by to the [facility] Observation Unit for PT/OT/case management.  Dr. Leung is the observation admission attending.    This patient has been risk-stratified based on the available history, physical exam, and related study findings.  Admission to observation status for further diagnosis/treatment/monitoring of chronic low back pain, general debility is warranted clinically.  This extended period of observation is specifically required to determine the need for 
THE [facility]  EMERGENCY DEPARTMENT ENCOUNTER          ATTENDING PHYSICIAN NOTE       Date of evaluation: 4/4/2025    ADDENDUM:      Care of this patient was assumed from day physician.  The patient was seen for Abdominal Pain (Patient stated that she has had multiple complains, abd pain, buttock pain, burning while urinating, pain/swelling in both legs x multiple months. )  .  The patient's initial evaluation and plan have been discussed with the prior provider who initially evaluated the patient.  Nursing Notes, Past Medical Hx, Past Surgical Hx, Social Hx, Allergies, and Family Hx were all reviewed.    ASSESSMENT / PLAN  (MEDICAL DECISION MAKING)     Mounika Leahy is a 89 y.o. female here for back pain and needed placement.  They are unable to place her tonight therefore patient was admitted.  Please see all consults and all notes from earlier in the day.  She has been here over 18 hours in the ED.        Medical Decision Making  Amount and/or Complexity of Data Reviewed  Labs: ordered.    Risk  OTC drugs.  Prescription drug management.  Decision regarding hospitalization.            Clinical Impression     1. Acute on chronic back pain    2. Acute cystitis without hematuria        Disposition     PATIENT REFERRED TO:  No follow-up provider specified.    DISCHARGE MEDICATIONS:  New Prescriptions    CEPHALEXIN (KEFLEX) 500 MG CAPSULE    Take 1 capsule by mouth 4 times daily for 7 days       DISPOSITION Admitted 04/04/2025 05:46:05 PM                 Diagnostic Results and Other Data                                   RADIOLOGY:  No orders to display       LABS:   Results for orders placed or performed during the hospital encounter of 04/04/25   CBC with Auto Differential   Result Value Ref Range    WBC 5.2 4.0 - 11.0 K/uL    RBC 3.94 (L) 4.00 - 5.20 M/uL    Hemoglobin 9.7 (L) 12.0 - 16.0 g/dL    Hematocrit 31.8 (L) 36.0 - 48.0 %    MCV 80.7 80.0 - 100.0 fL    MCH 24.6 (L) 26.0 - 34.0 pg    MCHC 30.4 (L) 
Affect: Mood normal.         Behavior: Behavior normal.          Kamille Parker MD  Resident  04/04/25 1344

## 2025-04-04 NOTE — ED NOTES
Patient Name: Mounika Leahy  : 1935 89 y.o.  MRN: [id number]  ED Room #: A09/A09-09     Chief complaint:   Chief Complaint   Patient presents with    Abdominal Pain     Patient stated that she has had multiple complains, abd pain, buttock pain, burning while urinating, pain/swelling in both legs x multiple months.      Hospital Problem/Diagnosis:   Hospital Problems           Last Modified POA    * (Principal) Intractable back pain 2025 Yes         O2 Flow Rate:O2 Device: None (Room air)    Cardiac Rhythm:  NSR  Active LDA's:   Peripheral IV 25 Left;Anterior Forearm (Active)      External Urinary Catheter (Active)       External Urinary Catheter (Active)          How does patient ambulate? Contact Guard    2. How does patient take pills? Whole with Water    3. Is patient alert? Alert    4. Is patient oriented? To Person, To Place, and To Time, goes back and forth with situation.     5.   Patient arrived from: home    6. If patient is disoriented or from a Skill Nursing Facility has family been notified of admission?  N/A, but family aware of admission    7. Patient belongings? Belongings: Cell Phone and Clothing    Disposition of belongings? Kept with Patient     8. Any specific patient or family belongings/needs/dynamics?   a. Placement at [facility].    9. Miscellaneous comments/pending orders?  a.      If there are any additional questions please reach out to the Emergency Department.       Haily Ordonez, RN  25 [phone]

## 2025-04-04 NOTE — ED NOTES
Pt resting comfortably in bed. Pt repositioned in bed. Pt eating lunch and drinking fluids. Pt updated on plan of care.      Haily Ordonez RN  04/04/25 6465

## 2025-04-04 NOTE — H&P
Addendum to Resident H&P/Progress Note/Discharge Summary:  I have personally seen,examined and evaluated the patient. I have reviewed the current history, physical findings, labs.  I have created the assessment and plan/medical decision making in its entirety and agree with note as documented by resident MD ( Jose De Souza )          NALLELY KESSLER MD            Internal Medicine H&P    Date:   2025   Patient:  Mounika Leahy   :   1935     CC:  Lower back back, debility       Source of HPI: Chart review, patient    Subjective     HPI:   Ms. Mounika Leahy is a 89 y.o. 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    She is here for placement. She presents from home complaining of worsening debility. She attributes being sedentary to lower back pain which started about one month ago. She describes pain as sharp, intermittent, radiating down posterior upper leg. Exacerbated by moving.  Denies alleviating factors. She has had similar pain in the past that was worked up during a previous admission.  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  She had severe narrowing at L3-4 and multilevel spondylolisthesis.  Patient was eventually discharged to to [facility] and stayed there for about 1 month.  Patient states that she left there because her brother and sister .  She states that she lives at home alone and has been feeling \"worn\" causing her to present to the emergency department for concern that she is no longer mobile or able to take care of herself.  Per chart review, the patient has little assistance from family. She wants to return to [facility].    CODE STATUS discussed, patient wishes to be limited x 4 at this time.    ED Course:  On arrival to the

## 2025-04-05 PROBLEM — G89.29 ACUTE ON CHRONIC BACK PAIN: Status: ACTIVE | Noted: 2025-04-04

## 2025-04-05 LAB
ALBUMIN SERPL-MCNC: 3.7 G/DL (ref 3.4–5)
ANION GAP SERPL CALCULATED.3IONS-SCNC: 10 MMOL/L (ref 3–16)
BACTERIA UR CULT: NORMAL
BASOPHILS # BLD: 0.1 K/UL (ref 0–0.2)
BASOPHILS NFR BLD: 1.6 %
BUN SERPL-MCNC: 9 MG/DL (ref 7–20)
CALCIUM SERPL-MCNC: 9.8 MG/DL (ref 8.3–10.6)
CHLORIDE SERPL-SCNC: 106 MMOL/L (ref 99–110)
CO2 SERPL-SCNC: 26 MMOL/L (ref 21–32)
CREAT SERPL-MCNC: 0.7 MG/DL (ref 0.6–1.2)
DEPRECATED RDW RBC AUTO: 14.2 % (ref 12.4–15.4)
EOSINOPHIL # BLD: 0.2 K/UL (ref 0–0.6)
EOSINOPHIL NFR BLD: 3.2 %
GFR SERPLBLD CREATININE-BSD FMLA CKD-EPI: 82 ML/MIN/{1.73_M2}
GLUCOSE SERPL-MCNC: 97 MG/DL (ref 70–99)
HCT VFR BLD AUTO: 34.3 % (ref 36–48)
HGB BLD-MCNC: 10.7 G/DL (ref 12–16)
LYMPHOCYTES # BLD: 2.5 K/UL (ref 1–5.1)
LYMPHOCYTES NFR BLD: 53.1 %
MAGNESIUM SERPL-MCNC: 1.56 MG/DL (ref 1.8–2.4)
MCH RBC QN AUTO: 24.9 PG (ref 26–34)
MCHC RBC AUTO-ENTMCNC: 31.1 G/DL (ref 31–36)
MCV RBC AUTO: 80 FL (ref 80–100)
MONOCYTES # BLD: 0.2 K/UL (ref 0–1.3)
MONOCYTES NFR BLD: 4.9 %
NEUTROPHILS # BLD: 1.8 K/UL (ref 1.7–7.7)
NEUTROPHILS NFR BLD: 37.2 %
PHOSPHATE SERPL-MCNC: 3.1 MG/DL (ref 2.5–4.9)
PLATELET # BLD AUTO: 208 K/UL (ref 135–450)
PMV BLD AUTO: 8.6 FL (ref 5–10.5)
POTASSIUM SERPL-SCNC: 4.3 MMOL/L (ref 3.5–5.1)
RBC # BLD AUTO: 4.29 M/UL (ref 4–5.2)
SODIUM SERPL-SCNC: 142 MMOL/L (ref 136–145)
WBC # BLD AUTO: 4.8 K/UL (ref 4–11)

## 2025-04-05 PROCEDURE — 96365 THER/PROPH/DIAG IV INF INIT: CPT

## 2025-04-05 PROCEDURE — 6360000002 HC RX W HCPCS

## 2025-04-05 PROCEDURE — 6370000000 HC RX 637 (ALT 250 FOR IP)

## 2025-04-05 PROCEDURE — 80069 RENAL FUNCTION PANEL: CPT

## 2025-04-05 PROCEDURE — 96376 TX/PRO/DX INJ SAME DRUG ADON: CPT

## 2025-04-05 PROCEDURE — 2500000003 HC RX 250 WO HCPCS

## 2025-04-05 PROCEDURE — 96366 THER/PROPH/DIAG IV INF ADDON: CPT

## 2025-04-05 PROCEDURE — 1200000000 HC SEMI PRIVATE

## 2025-04-05 PROCEDURE — 85025 COMPLETE CBC W/AUTO DIFF WBC: CPT

## 2025-04-05 PROCEDURE — 96372 THER/PROPH/DIAG INJ SC/IM: CPT

## 2025-04-05 PROCEDURE — 36415 COLL VENOUS BLD VENIPUNCTURE: CPT

## 2025-04-05 PROCEDURE — 99233 SBSQ HOSP IP/OBS HIGH 50: CPT | Performed by: HOSPITALIST

## 2025-04-05 PROCEDURE — 83735 ASSAY OF MAGNESIUM: CPT

## 2025-04-05 RX ORDER — METHOCARBAMOL 500 MG/1
750 TABLET, FILM COATED ORAL ONCE
Status: COMPLETED | OUTPATIENT
Start: 2025-04-05 | End: 2025-04-05

## 2025-04-05 RX ORDER — MAGNESIUM SULFATE IN WATER 40 MG/ML
4000 INJECTION, SOLUTION INTRAVENOUS ONCE
Status: COMPLETED | OUTPATIENT
Start: 2025-04-05 | End: 2025-04-05

## 2025-04-05 RX ADMIN — TRAMADOL HYDROCHLORIDE 50 MG: 50 TABLET, COATED ORAL at 05:46

## 2025-04-05 RX ADMIN — GABAPENTIN 900 MG: 300 CAPSULE ORAL at 08:17

## 2025-04-05 RX ADMIN — ENOXAPARIN SODIUM 40 MG: 100 INJECTION SUBCUTANEOUS at 08:16

## 2025-04-05 RX ADMIN — Medication 50 MG: at 11:33

## 2025-04-05 RX ADMIN — METOPROLOL 50 MG: 25 TABLET ORAL at 20:34

## 2025-04-05 RX ADMIN — METOPROLOL 50 MG: 25 TABLET ORAL at 08:17

## 2025-04-05 RX ADMIN — METHOCARBAMOL 750 MG: 500 TABLET ORAL at 11:32

## 2025-04-05 RX ADMIN — MAGNESIUM SULFATE HEPTAHYDRATE 4000 MG: 40 INJECTION, SOLUTION INTRAVENOUS at 11:32

## 2025-04-05 RX ADMIN — SERTRALINE HYDROCHLORIDE 50 MG: 50 TABLET ORAL at 08:17

## 2025-04-05 RX ADMIN — GABAPENTIN 900 MG: 300 CAPSULE ORAL at 20:34

## 2025-04-05 RX ADMIN — SODIUM CHLORIDE, PRESERVATIVE FREE 10 ML: 5 INJECTION INTRAVENOUS at 08:18

## 2025-04-05 RX ADMIN — WATER 1000 MG: 1 INJECTION INTRAMUSCULAR; INTRAVENOUS; SUBCUTANEOUS at 08:16

## 2025-04-05 RX ADMIN — LEVOTHYROXINE SODIUM 100 MCG: 0.1 TABLET ORAL at 05:46

## 2025-04-05 RX ADMIN — TRAMADOL HYDROCHLORIDE 50 MG: 50 TABLET, COATED ORAL at 20:34

## 2025-04-05 RX ADMIN — SODIUM CHLORIDE, PRESERVATIVE FREE 10 ML: 5 INJECTION INTRAVENOUS at 20:35

## 2025-04-05 ASSESSMENT — PAIN DESCRIPTION - LOCATION
LOCATION: BACK
LOCATION: BACK;LEG
LOCATION: BACK

## 2025-04-05 ASSESSMENT — PAIN DESCRIPTION - PAIN TYPE
TYPE: CHRONIC PAIN
TYPE: CHRONIC PAIN

## 2025-04-05 ASSESSMENT — PAIN - FUNCTIONAL ASSESSMENT
PAIN_FUNCTIONAL_ASSESSMENT: PREVENTS OR INTERFERES SOME ACTIVE ACTIVITIES AND ADLS
PAIN_FUNCTIONAL_ASSESSMENT: ACTIVITIES ARE NOT PREVENTED
PAIN_FUNCTIONAL_ASSESSMENT: PREVENTS OR INTERFERES SOME ACTIVE ACTIVITIES AND ADLS

## 2025-04-05 ASSESSMENT — PAIN DESCRIPTION - DESCRIPTORS
DESCRIPTORS: ACHING;DISCOMFORT;SORE;NAGGING
DESCRIPTORS: ACHING;DISCOMFORT
DESCRIPTORS: ACHING

## 2025-04-05 ASSESSMENT — PAIN SCALES - GENERAL
PAINLEVEL_OUTOF10: 0
PAINLEVEL_OUTOF10: 0
PAINLEVEL_OUTOF10: 5
PAINLEVEL_OUTOF10: 8
PAINLEVEL_OUTOF10: 6
PAINLEVEL_OUTOF10: 5
PAINLEVEL_OUTOF10: 8
PAINLEVEL_OUTOF10: 0

## 2025-04-05 ASSESSMENT — PAIN DESCRIPTION - ONSET
ONSET: GRADUAL
ONSET: ON-GOING

## 2025-04-05 ASSESSMENT — PAIN DESCRIPTION - ORIENTATION
ORIENTATION: LOWER
ORIENTATION: RIGHT;LEFT;MID
ORIENTATION: RIGHT;LEFT;MID

## 2025-04-05 ASSESSMENT — PAIN DESCRIPTION - FREQUENCY
FREQUENCY: CONTINUOUS
FREQUENCY: CONTINUOUS

## 2025-04-05 NOTE — CONSULTS
supply: 3 days. Take lowest dose possible to manage pain    traZODone (DESYREL) 100 mg, Oral, NIGHTLY    vitamin B-6 (PYRIDOXINE) 50 MG tablet TAKE 1 TABLET BY MOUTH EVERY DAY    ZINC SULFATE ER PO 1 tablet, DAILY     Thank you for consulting pharmacy,    Asim Wiseman (Iris)  Pharmacy Intern, PharmD Candidate 2026

## 2025-04-06 LAB
ALBUMIN SERPL-MCNC: 3.6 G/DL (ref 3.4–5)
ANION GAP SERPL CALCULATED.3IONS-SCNC: 11 MMOL/L (ref 3–16)
BASOPHILS # BLD: 0 K/UL (ref 0–0.2)
BASOPHILS NFR BLD: 0.9 %
BUN SERPL-MCNC: 10 MG/DL (ref 7–20)
CALCIUM SERPL-MCNC: 9.9 MG/DL (ref 8.3–10.6)
CHLORIDE SERPL-SCNC: 103 MMOL/L (ref 99–110)
CO2 SERPL-SCNC: 27 MMOL/L (ref 21–32)
CREAT SERPL-MCNC: 0.8 MG/DL (ref 0.6–1.2)
DEPRECATED RDW RBC AUTO: 14.3 % (ref 12.4–15.4)
EOSINOPHIL # BLD: 0.1 K/UL (ref 0–0.6)
EOSINOPHIL NFR BLD: 2.7 %
GFR SERPLBLD CREATININE-BSD FMLA CKD-EPI: 70 ML/MIN/{1.73_M2}
GLUCOSE BLD-MCNC: 109 MG/DL (ref 70–99)
GLUCOSE SERPL-MCNC: 141 MG/DL (ref 70–99)
HCT VFR BLD AUTO: 33.6 % (ref 36–48)
HGB BLD-MCNC: 10.3 G/DL (ref 12–16)
LYMPHOCYTES # BLD: 2.3 K/UL (ref 1–5.1)
LYMPHOCYTES NFR BLD: 43.8 %
MAGNESIUM SERPL-MCNC: 1.78 MG/DL (ref 1.8–2.4)
MCH RBC QN AUTO: 24.8 PG (ref 26–34)
MCHC RBC AUTO-ENTMCNC: 30.8 G/DL (ref 31–36)
MCV RBC AUTO: 80.6 FL (ref 80–100)
MONOCYTES # BLD: 0.3 K/UL (ref 0–1.3)
MONOCYTES NFR BLD: 6 %
NEUTROPHILS # BLD: 2.5 K/UL (ref 1.7–7.7)
NEUTROPHILS NFR BLD: 46.6 %
PERFORMED ON: ABNORMAL
PHOSPHATE SERPL-MCNC: 2.5 MG/DL (ref 2.5–4.9)
PLATELET # BLD AUTO: 229 K/UL (ref 135–450)
PMV BLD AUTO: 9.7 FL (ref 5–10.5)
POTASSIUM SERPL-SCNC: 3.8 MMOL/L (ref 3.5–5.1)
RBC # BLD AUTO: 4.17 M/UL (ref 4–5.2)
SODIUM SERPL-SCNC: 141 MMOL/L (ref 136–145)
WBC # BLD AUTO: 5.3 K/UL (ref 4–11)

## 2025-04-06 PROCEDURE — 80069 RENAL FUNCTION PANEL: CPT

## 2025-04-06 PROCEDURE — 6370000000 HC RX 637 (ALT 250 FOR IP)

## 2025-04-06 PROCEDURE — 1200000000 HC SEMI PRIVATE

## 2025-04-06 PROCEDURE — 99232 SBSQ HOSP IP/OBS MODERATE 35: CPT | Performed by: HOSPITALIST

## 2025-04-06 PROCEDURE — 36415 COLL VENOUS BLD VENIPUNCTURE: CPT

## 2025-04-06 PROCEDURE — 2500000003 HC RX 250 WO HCPCS

## 2025-04-06 PROCEDURE — 6360000002 HC RX W HCPCS

## 2025-04-06 PROCEDURE — 85025 COMPLETE CBC W/AUTO DIFF WBC: CPT

## 2025-04-06 PROCEDURE — 83735 ASSAY OF MAGNESIUM: CPT

## 2025-04-06 RX ORDER — METHOCARBAMOL 500 MG/1
750 TABLET, FILM COATED ORAL ONCE
Status: COMPLETED | OUTPATIENT
Start: 2025-04-06 | End: 2025-04-06

## 2025-04-06 RX ORDER — LIDOCAINE 4 G/G
1 PATCH TOPICAL DAILY
Status: DISCONTINUED | OUTPATIENT
Start: 2025-04-06 | End: 2025-04-08 | Stop reason: HOSPADM

## 2025-04-06 RX ADMIN — GABAPENTIN 900 MG: 300 CAPSULE ORAL at 09:16

## 2025-04-06 RX ADMIN — METOPROLOL 50 MG: 25 TABLET ORAL at 09:18

## 2025-04-06 RX ADMIN — ENOXAPARIN SODIUM 40 MG: 100 INJECTION SUBCUTANEOUS at 09:19

## 2025-04-06 RX ADMIN — METOPROLOL 50 MG: 25 TABLET ORAL at 21:31

## 2025-04-06 RX ADMIN — TRAMADOL HYDROCHLORIDE 50 MG: 50 TABLET, COATED ORAL at 09:16

## 2025-04-06 RX ADMIN — SODIUM CHLORIDE, PRESERVATIVE FREE 10 ML: 5 INJECTION INTRAVENOUS at 21:31

## 2025-04-06 RX ADMIN — SODIUM CHLORIDE, PRESERVATIVE FREE 10 ML: 5 INJECTION INTRAVENOUS at 09:20

## 2025-04-06 RX ADMIN — WATER 1000 MG: 1 INJECTION INTRAMUSCULAR; INTRAVENOUS; SUBCUTANEOUS at 09:12

## 2025-04-06 RX ADMIN — TRAMADOL HYDROCHLORIDE 50 MG: 50 TABLET, COATED ORAL at 21:31

## 2025-04-06 RX ADMIN — LEVOTHYROXINE SODIUM 100 MCG: 0.1 TABLET ORAL at 05:00

## 2025-04-06 RX ADMIN — METHOCARBAMOL 750 MG: 500 TABLET ORAL at 21:30

## 2025-04-06 RX ADMIN — SERTRALINE HYDROCHLORIDE 50 MG: 50 TABLET ORAL at 09:18

## 2025-04-06 RX ADMIN — GABAPENTIN 900 MG: 300 CAPSULE ORAL at 21:31

## 2025-04-06 ASSESSMENT — PAIN SCALES - GENERAL
PAINLEVEL_OUTOF10: 8
PAINLEVEL_OUTOF10: 3
PAINLEVEL_OUTOF10: 3
PAINLEVEL_OUTOF10: 6

## 2025-04-06 ASSESSMENT — PAIN DESCRIPTION - ORIENTATION
ORIENTATION: LOWER
ORIENTATION: LOWER;RIGHT;LEFT

## 2025-04-06 ASSESSMENT — PAIN DESCRIPTION - DESCRIPTORS
DESCRIPTORS: ACHING
DESCRIPTORS: ACHING;DISCOMFORT

## 2025-04-06 ASSESSMENT — PAIN DESCRIPTION - FREQUENCY: FREQUENCY: CONTINUOUS

## 2025-04-06 ASSESSMENT — PAIN DESCRIPTION - LOCATION
LOCATION: BACK
LOCATION: BACK;LEG

## 2025-04-06 ASSESSMENT — PAIN DESCRIPTION - ONSET: ONSET: ON-GOING

## 2025-04-06 ASSESSMENT — PAIN DESCRIPTION - PAIN TYPE: TYPE: CHRONIC PAIN

## 2025-04-06 NOTE — PLAN OF CARE
Problem: Discharge Planning  Goal: Discharge to home or other facility with appropriate resources  Outcome: Progressing  Note: Case management following for d/c needs.  Pre-cert pending for [facility].       Problem: Safety - Adult  Goal: Free from fall injury  Outcome: Progressing  Note: Fall precautions in place.  Bed alarm activated.  Call light and belongings within reach. Patient encouraged to call for needs and concerns.  Continue to monitor safety.       Problem: Pain  Goal: Verbalizes/displays adequate comfort level or baseline comfort level  Outcome: Progressing  Flowsheets (Taken 4/6/2025 0028)  Verbalizes/displays adequate comfort level or baseline comfort level:   Encourage patient to monitor pain and request assistance   Assess pain using appropriate pain scale  Note: Medicated with tramadol for back and BLE pain.  Patient satisfied with current pain regimen.  Will continue to monitor s/s of pain.      Problem: Skin/Tissue Integrity  Goal: Skin integrity remains intact  Description: 1.  Monitor for areas of redness and/or skin breakdown  2.  Assess vascular access sites hourly  3.  Every 4-6 hours minimum:  Change oxygen saturation probe site  4.  Every 4-6 hours:  If on nasal continuous positive airway pressure, respiratory therapy assess nares and determine need for appliance change or resting period  Outcome: Progressing  Flowsheets (Taken 4/6/2025 0028)  Skin Integrity Remains Intact: Monitor for areas of redness and/or skin breakdown  Note: Redness to abdominal folds/under breasts.  Patient repositioned with pillow support, heels elevated.  Will continue to monitor skin integrity.

## 2025-04-06 NOTE — PLAN OF CARE
Problem: Chronic Conditions and Co-morbidities  Goal: Patient's chronic conditions and co-morbidity symptoms are monitored and maintained or improved  Flowsheets (Taken 4/6/2025 1831)  Care Plan - Patient's Chronic Conditions and Co-Morbidity Symptoms are Monitored and Maintained or Improved: Monitor and assess patient's chronic conditions and comorbid symptoms for stability, deterioration, or improvement  Note: Pt reports she checks blood sugars at home sometimes and would like it checked: blood sugar checked with result 109. Educated normal result.

## 2025-04-07 LAB
ALBUMIN SERPL-MCNC: 3.3 G/DL (ref 3.4–5)
ANION GAP SERPL CALCULATED.3IONS-SCNC: 8 MMOL/L (ref 3–16)
BASOPHILS # BLD: 0 K/UL (ref 0–0.2)
BASOPHILS NFR BLD: 0.8 %
BUN SERPL-MCNC: 13 MG/DL (ref 7–20)
CALCIUM SERPL-MCNC: 9.6 MG/DL (ref 8.3–10.6)
CHLORIDE SERPL-SCNC: 105 MMOL/L (ref 99–110)
CO2 SERPL-SCNC: 27 MMOL/L (ref 21–32)
CREAT SERPL-MCNC: 0.6 MG/DL (ref 0.6–1.2)
DEPRECATED RDW RBC AUTO: 14.1 % (ref 12.4–15.4)
EOSINOPHIL # BLD: 0.2 K/UL (ref 0–0.6)
EOSINOPHIL NFR BLD: 3 %
GFR SERPLBLD CREATININE-BSD FMLA CKD-EPI: 85 ML/MIN/{1.73_M2}
GLUCOSE SERPL-MCNC: 120 MG/DL (ref 70–99)
HCT VFR BLD AUTO: 31.1 % (ref 36–48)
HGB BLD-MCNC: 9.5 G/DL (ref 12–16)
LYMPHOCYTES # BLD: 2.6 K/UL (ref 1–5.1)
LYMPHOCYTES NFR BLD: 46.2 %
MAGNESIUM SERPL-MCNC: 1.68 MG/DL (ref 1.8–2.4)
MCH RBC QN AUTO: 24.6 PG (ref 26–34)
MCHC RBC AUTO-ENTMCNC: 30.6 G/DL (ref 31–36)
MCV RBC AUTO: 80.3 FL (ref 80–100)
MONOCYTES # BLD: 0.4 K/UL (ref 0–1.3)
MONOCYTES NFR BLD: 7.2 %
NEUTROPHILS # BLD: 2.4 K/UL (ref 1.7–7.7)
NEUTROPHILS NFR BLD: 42.8 %
PHOSPHATE SERPL-MCNC: 2.7 MG/DL (ref 2.5–4.9)
PLATELET # BLD AUTO: 197 K/UL (ref 135–450)
PMV BLD AUTO: 8.6 FL (ref 5–10.5)
POTASSIUM SERPL-SCNC: 4.3 MMOL/L (ref 3.5–5.1)
RBC # BLD AUTO: 3.87 M/UL (ref 4–5.2)
SODIUM SERPL-SCNC: 140 MMOL/L (ref 136–145)
WBC # BLD AUTO: 5.7 K/UL (ref 4–11)

## 2025-04-07 PROCEDURE — 6370000000 HC RX 637 (ALT 250 FOR IP)

## 2025-04-07 PROCEDURE — 2500000003 HC RX 250 WO HCPCS

## 2025-04-07 PROCEDURE — 99232 SBSQ HOSP IP/OBS MODERATE 35: CPT | Performed by: HOSPITALIST

## 2025-04-07 PROCEDURE — 97116 GAIT TRAINING THERAPY: CPT

## 2025-04-07 PROCEDURE — 1200000000 HC SEMI PRIVATE

## 2025-04-07 PROCEDURE — 97530 THERAPEUTIC ACTIVITIES: CPT

## 2025-04-07 PROCEDURE — 36415 COLL VENOUS BLD VENIPUNCTURE: CPT

## 2025-04-07 PROCEDURE — 85025 COMPLETE CBC W/AUTO DIFF WBC: CPT

## 2025-04-07 PROCEDURE — 83735 ASSAY OF MAGNESIUM: CPT

## 2025-04-07 PROCEDURE — 80069 RENAL FUNCTION PANEL: CPT

## 2025-04-07 PROCEDURE — 6360000002 HC RX W HCPCS

## 2025-04-07 RX ORDER — METHOCARBAMOL 500 MG/1
750 TABLET, FILM COATED ORAL ONCE
Status: COMPLETED | OUTPATIENT
Start: 2025-04-07 | End: 2025-04-07

## 2025-04-07 RX ORDER — ACETAMINOPHEN 325 MG/1
650 TABLET ORAL EVERY 4 HOURS PRN
Status: DISCONTINUED | OUTPATIENT
Start: 2025-04-07 | End: 2025-04-08 | Stop reason: HOSPADM

## 2025-04-07 RX ORDER — MAGNESIUM SULFATE IN WATER 40 MG/ML
2000 INJECTION, SOLUTION INTRAVENOUS ONCE
Status: COMPLETED | OUTPATIENT
Start: 2025-04-07 | End: 2025-04-07

## 2025-04-07 RX ADMIN — METHOCARBAMOL 750 MG: 500 TABLET ORAL at 04:19

## 2025-04-07 RX ADMIN — TRAMADOL HYDROCHLORIDE 50 MG: 50 TABLET, COATED ORAL at 16:56

## 2025-04-07 RX ADMIN — LEVOTHYROXINE SODIUM 100 MCG: 0.1 TABLET ORAL at 05:30

## 2025-04-07 RX ADMIN — METOPROLOL 50 MG: 25 TABLET ORAL at 09:11

## 2025-04-07 RX ADMIN — SERTRALINE HYDROCHLORIDE 50 MG: 50 TABLET ORAL at 09:12

## 2025-04-07 RX ADMIN — Medication 50 MG: at 16:58

## 2025-04-07 RX ADMIN — ENOXAPARIN SODIUM 40 MG: 100 INJECTION SUBCUTANEOUS at 09:16

## 2025-04-07 RX ADMIN — ACETAMINOPHEN 650 MG: 325 TABLET ORAL at 16:57

## 2025-04-07 RX ADMIN — SODIUM CHLORIDE, PRESERVATIVE FREE 10 ML: 5 INJECTION INTRAVENOUS at 19:47

## 2025-04-07 RX ADMIN — MAGNESIUM SULFATE HEPTAHYDRATE 2000 MG: 40 INJECTION, SOLUTION INTRAVENOUS at 06:26

## 2025-04-07 RX ADMIN — GABAPENTIN 900 MG: 300 CAPSULE ORAL at 09:12

## 2025-04-07 RX ADMIN — ACETAMINOPHEN 650 MG: 325 TABLET ORAL at 04:19

## 2025-04-07 RX ADMIN — ONDANSETRON 4 MG: 2 INJECTION, SOLUTION INTRAMUSCULAR; INTRAVENOUS at 17:34

## 2025-04-07 RX ADMIN — SODIUM CHLORIDE, PRESERVATIVE FREE 10 ML: 5 INJECTION INTRAVENOUS at 10:00

## 2025-04-07 RX ADMIN — ACETAMINOPHEN 650 MG: 325 TABLET ORAL at 09:12

## 2025-04-07 RX ADMIN — TRAMADOL HYDROCHLORIDE 50 MG: 50 TABLET, COATED ORAL at 09:12

## 2025-04-07 RX ADMIN — METOPROLOL 50 MG: 25 TABLET ORAL at 19:47

## 2025-04-07 RX ADMIN — GABAPENTIN 900 MG: 300 CAPSULE ORAL at 19:47

## 2025-04-07 ASSESSMENT — PAIN SCALES - GENERAL
PAINLEVEL_OUTOF10: 0
PAINLEVEL_OUTOF10: 0
PAINLEVEL_OUTOF10: 9
PAINLEVEL_OUTOF10: 7

## 2025-04-07 ASSESSMENT — PAIN DESCRIPTION - FREQUENCY: FREQUENCY: INTERMITTENT

## 2025-04-07 ASSESSMENT — PAIN DESCRIPTION - LOCATION
LOCATION: GENERALIZED;BACK
LOCATION: BACK

## 2025-04-07 ASSESSMENT — PAIN - FUNCTIONAL ASSESSMENT: PAIN_FUNCTIONAL_ASSESSMENT: PREVENTS OR INTERFERES SOME ACTIVE ACTIVITIES AND ADLS

## 2025-04-07 ASSESSMENT — PAIN DESCRIPTION - DESCRIPTORS
DESCRIPTORS: THROBBING;ACHING
DESCRIPTORS: ACHING;DISCOMFORT

## 2025-04-07 ASSESSMENT — PAIN DESCRIPTION - ORIENTATION: ORIENTATION: LOWER

## 2025-04-07 ASSESSMENT — PAIN DESCRIPTION - ONSET: ONSET: GRADUAL

## 2025-04-07 ASSESSMENT — PAIN DESCRIPTION - PAIN TYPE: TYPE: CHRONIC PAIN

## 2025-04-07 NOTE — CARE COORDINATION
Case Management Assessment  Initial Evaluation    Date/Time of Evaluation: 4/7/2025 2:40 PM  Assessment Completed by: KARINA Lee    If patient is discharged prior to next notation, then this note serves as note for discharge by case management.    Patient Name: Mounika Leahy                   YOB: 1935  Diagnosis: Acute cystitis without hematuria [N30.00]  Intractable back pain [M54.9]  Acute on chronic back pain [M54.9, G89.29]                   Date / Time: 4/4/2025 12:50 AM    Patient Admission Status: Inpatient   Readmission Risk (Low < 19, Mod (19-27), High > 27): Readmission Risk Score: 23.4    Current PCP: Maribel De MD  PCP verified by CM? Yes    Chart Reviewed: Yes      History Provided by: Patient  Patient Orientation: Alert and Oriented    Patient Cognition: Alert    Hospitalization in the last 30 days (Readmission):  No    If yes, Readmission Assessment in CM Navigator will be completed.    Advance Directives:      Code Status: Limited   Patient's Primary Decision Maker is: Legal Next of Kin    Primary Decision Maker: Jorge Leahy - Child - [phone]    Secondary Decision Maker: ArmondJose - Child - [phone]    Secondary Decision Maker: vandana leahy - Grandchild - [phone]    Secondary Decision Maker: Jayne Barrett - Other - [phone]    Discharge Planning:    Patient lives with: Children Type of Home: House  Primary Care Giver: Family  Patient Support Systems include: Children   Current Financial resources: None  Current community resources: None (TBD)  Current services prior to admission: None            Current DME:              Type of Home Care services:  None    ADLS  Prior functional level: Assistance with the following:, Mobility  Current functional level: Assistance with the following:, Mobility    PT AM-PAC: 14 /24  OT AM-PAC: 14 /24    Family can provide assistance at DC: Yes  Would you like Case Management to discuss the discharge plan

## 2025-04-07 NOTE — PLAN OF CARE
Problem: Discharge Planning  Goal: Discharge to home or other facility with appropriate resources  Outcome: Progressing  Note: Awaiting pre-cert to [facility].       Problem: Safety - Adult  Goal: Free from fall injury  Outcome: Progressing  Note: Fall precautions in place.  Bed alarm activated. Up with assist x 1 with vernon smith.  Call light and belongings within reach.  Continue to monitor safety.       Problem: Pain  Goal: Verbalizes/displays adequate comfort level or baseline comfort level  Outcome: Progressing  Flowsheets (Taken 4/7/2025 0048)  Verbalizes/displays adequate comfort level or baseline comfort level:   Encourage patient to monitor pain and request assistance   Assess pain using appropriate pain scale  Note: C/o lower back pain radiating to BLE.  Medicated with prn tramadol and robaxin.  Patient sleeping when pain reassessed.  Will continue to monitor.     Problem: Skin/Tissue Integrity  Goal: Skin integrity remains intact  Description: 1.  Monitor for areas of redness and/or skin breakdown  2.  Assess vascular access sites hourly  3.  Every 4-6 hours minimum:  Change oxygen saturation probe site  4.  Every 4-6 hours:  If on nasal continuous positive airway pressure, respiratory therapy assess nares and determine need for appliance change or resting period  Outcome: Progressing  Flowsheets (Taken 4/7/2025 0048)  Skin Integrity Remains Intact: Monitor for areas of redness and/or skin breakdown  Note: Redness under abdominal fold, breasts, buttock.  Barrier cream applied to buttocks with sacral heart.  Inner-dry to folds.  Patient repositioned to prevent skin breakdown.  Will continue to monitor skin integrity.

## 2025-04-07 NOTE — DISCHARGE INSTR - COC
Continuity of Care Form    Patient Name: Mounika Leahy   :  1935  MRN:  [id number]    Admit date:  2025  Discharge date:  2025    Code Status Order: Limited   Advance Directives:     Admitting Physician:  Sharee Cortes MD  PCP: Maribel De MD    Discharging Nurse: Crow RAMOS  Discharging Hospital Unit/Room#: 3311/3311-01  Discharging Unit Phone Number: [phone]    Emergency Contact:   Extended Emergency Contact Information  Primary Emergency Contact: Jose Leahy           [facility]  Home Phone: [phone]  Relation: Child  Secondary Emergency Contact: Jorge Leahy           [facility]  Home Phone: [phone]  Relation: Child    Past Surgical History:  Past Surgical History:   Procedure Laterality Date    APPENDECTOMY       SECTION      X1    CYSTOSCOPY  2015    urethral dilitation    HYSTERECTOMY (CERVIX STATUS UNKNOWN)      OTHER SURGICAL HISTORY  2017    Ear cleaning under anesthesia    PA LAPS SURG CHOLECYSTECTOMY W/CHOLANGIOGRAPHY N/A 2018    LAPAROSCOPIC CHOLECYSTECTOMY WITH CHOLANGIOGRAM performed by Regulo Schmidt MD at [facility] OR    UPPER GASTROINTESTINAL ENDOSCOPY N/A 2018    EGD BIOPSY performed by Juanito Howard MD at [facility] ENDOSCOPY       Immunization History:   Immunization History   Administered Date(s) Administered    COVID-19, PFIZER PURPLE top, DILUTE for use, (age 12 y+), 30mcg/0.3mL 2021, 2021    Influenza Vaccine, unspecified formulation 10/05/2013, 2014, 2015, 10/07/2016    Influenza Virus Vaccine 10/05/2013, 2014, 2015, 10/07/2016    Influenza Whole 2010    Influenza, FLUAD, (age 65 y+), IM, Quadv, 0.5mL 10/23/2020, 2021, 2023, 2024    Influenza, FLUAD, (age 65 y+), IM, Trivalent PF, 0.5mL 2019    Influenza, FLUZONE High Dose, (age 65 y+), IM, Trivalent PF, 0.5mL 2012, 2014, 2015, 10/07/2016,

## 2025-04-08 VITALS
HEART RATE: 67 BPM | WEIGHT: 204.37 LBS | RESPIRATION RATE: 18 BRPM | OXYGEN SATURATION: 98 % | DIASTOLIC BLOOD PRESSURE: 70 MMHG | TEMPERATURE: 98.1 F | HEIGHT: 63 IN | BODY MASS INDEX: 36.21 KG/M2 | SYSTOLIC BLOOD PRESSURE: 141 MMHG

## 2025-04-08 LAB
ALBUMIN SERPL-MCNC: 3.5 G/DL (ref 3.4–5)
ANION GAP SERPL CALCULATED.3IONS-SCNC: 9 MMOL/L (ref 3–16)
BASOPHILS # BLD: 0 K/UL (ref 0–0.2)
BASOPHILS NFR BLD: 0.9 %
BUN SERPL-MCNC: 13 MG/DL (ref 7–20)
CALCIUM SERPL-MCNC: 9.8 MG/DL (ref 8.3–10.6)
CHLORIDE SERPL-SCNC: 102 MMOL/L (ref 99–110)
CO2 SERPL-SCNC: 28 MMOL/L (ref 21–32)
CREAT SERPL-MCNC: 0.7 MG/DL (ref 0.6–1.2)
DEPRECATED RDW RBC AUTO: 14.2 % (ref 12.4–15.4)
EOSINOPHIL # BLD: 0.2 K/UL (ref 0–0.6)
EOSINOPHIL NFR BLD: 3.2 %
GFR SERPLBLD CREATININE-BSD FMLA CKD-EPI: 82 ML/MIN/{1.73_M2}
GLUCOSE SERPL-MCNC: 130 MG/DL (ref 70–99)
HCT VFR BLD AUTO: 34 % (ref 36–48)
HGB BLD-MCNC: 10.4 G/DL (ref 12–16)
LYMPHOCYTES # BLD: 2.3 K/UL (ref 1–5.1)
LYMPHOCYTES NFR BLD: 42.3 %
MAGNESIUM SERPL-MCNC: 1.7 MG/DL (ref 1.8–2.4)
MCH RBC QN AUTO: 24.6 PG (ref 26–34)
MCHC RBC AUTO-ENTMCNC: 30.7 G/DL (ref 31–36)
MCV RBC AUTO: 80.2 FL (ref 80–100)
MONOCYTES # BLD: 0.3 K/UL (ref 0–1.3)
MONOCYTES NFR BLD: 5.4 %
NEUTROPHILS # BLD: 2.6 K/UL (ref 1.7–7.7)
NEUTROPHILS NFR BLD: 48.2 %
PHOSPHATE SERPL-MCNC: 2.9 MG/DL (ref 2.5–4.9)
PLATELET # BLD AUTO: 212 K/UL (ref 135–450)
PMV BLD AUTO: 9.1 FL (ref 5–10.5)
POTASSIUM SERPL-SCNC: 4.2 MMOL/L (ref 3.5–5.1)
RBC # BLD AUTO: 4.24 M/UL (ref 4–5.2)
SODIUM SERPL-SCNC: 139 MMOL/L (ref 136–145)
WBC # BLD AUTO: 5.4 K/UL (ref 4–11)

## 2025-04-08 PROCEDURE — 80069 RENAL FUNCTION PANEL: CPT

## 2025-04-08 PROCEDURE — 36415 COLL VENOUS BLD VENIPUNCTURE: CPT

## 2025-04-08 PROCEDURE — 83735 ASSAY OF MAGNESIUM: CPT

## 2025-04-08 PROCEDURE — 99232 SBSQ HOSP IP/OBS MODERATE 35: CPT | Performed by: HOSPITALIST

## 2025-04-08 PROCEDURE — 6370000000 HC RX 637 (ALT 250 FOR IP)

## 2025-04-08 PROCEDURE — 2500000003 HC RX 250 WO HCPCS

## 2025-04-08 PROCEDURE — 97116 GAIT TRAINING THERAPY: CPT

## 2025-04-08 PROCEDURE — 97530 THERAPEUTIC ACTIVITIES: CPT

## 2025-04-08 PROCEDURE — 85025 COMPLETE CBC W/AUTO DIFF WBC: CPT

## 2025-04-08 PROCEDURE — 6360000002 HC RX W HCPCS

## 2025-04-08 RX ORDER — MAGNESIUM SULFATE IN WATER 40 MG/ML
2000 INJECTION, SOLUTION INTRAVENOUS ONCE
Status: DISCONTINUED | OUTPATIENT
Start: 2025-04-08 | End: 2025-04-08

## 2025-04-08 RX ORDER — LANOLIN ALCOHOL/MO/W.PET/CERES
400 CREAM (GRAM) TOPICAL ONCE
Status: COMPLETED | OUTPATIENT
Start: 2025-04-08 | End: 2025-04-08

## 2025-04-08 RX ADMIN — ACETAMINOPHEN 650 MG: 325 TABLET ORAL at 11:25

## 2025-04-08 RX ADMIN — METOPROLOL 50 MG: 25 TABLET ORAL at 10:40

## 2025-04-08 RX ADMIN — LEVOTHYROXINE SODIUM 100 MCG: 0.1 TABLET ORAL at 05:41

## 2025-04-08 RX ADMIN — Medication 50 MG: at 10:40

## 2025-04-08 RX ADMIN — SODIUM CHLORIDE, PRESERVATIVE FREE 10 ML: 5 INJECTION INTRAVENOUS at 10:41

## 2025-04-08 RX ADMIN — Medication 400 MG: at 13:27

## 2025-04-08 RX ADMIN — GABAPENTIN 900 MG: 300 CAPSULE ORAL at 10:40

## 2025-04-08 RX ADMIN — SERTRALINE HYDROCHLORIDE 50 MG: 50 TABLET ORAL at 10:40

## 2025-04-08 RX ADMIN — ENOXAPARIN SODIUM 40 MG: 100 INJECTION SUBCUTANEOUS at 10:40

## 2025-04-08 RX ADMIN — TRAMADOL HYDROCHLORIDE 50 MG: 50 TABLET, COATED ORAL at 05:48

## 2025-04-08 ASSESSMENT — PAIN SCALES - GENERAL
PAINLEVEL_OUTOF10: 7

## 2025-04-08 ASSESSMENT — PAIN DESCRIPTION - LOCATION
LOCATION: BACK
LOCATION: GENERALIZED

## 2025-04-08 ASSESSMENT — PAIN DESCRIPTION - DESCRIPTORS: DESCRIPTORS: ACHING;DISCOMFORT

## 2025-04-08 ASSESSMENT — PAIN DESCRIPTION - ONSET: ONSET: AWAKENED FROM SLEEP

## 2025-04-08 ASSESSMENT — PAIN DESCRIPTION - PAIN TYPE: TYPE: CHRONIC PAIN

## 2025-04-08 ASSESSMENT — PAIN - FUNCTIONAL ASSESSMENT
PAIN_FUNCTIONAL_ASSESSMENT: ACTIVITIES ARE NOT PREVENTED
PAIN_FUNCTIONAL_ASSESSMENT: ACTIVITIES ARE NOT PREVENTED

## 2025-04-08 ASSESSMENT — PAIN DESCRIPTION - ORIENTATION: ORIENTATION: LOWER

## 2025-04-08 ASSESSMENT — PAIN DESCRIPTION - FREQUENCY: FREQUENCY: INTERMITTENT

## 2025-04-08 NOTE — CARE COORDINATION
SANDHYA called Tessy in SNF admissions [phone] who is following for SNF approval. SANDHYA LVM asking for a call back in regards to insurance precert.     Electronically signed by KARINA Lee on 4/8/2025 at 9:52 AM     11:39 AM      Tessy in SNF admissions [phone] called SANDHYA to notify that precert is still pending at this time.     Electronically signed by KARINA Lee on 4/8/2025 at 11:39 AM

## 2025-04-08 NOTE — DISCHARGE SUMMARY
INTERNAL MEDICINE DEPARTMENT AT THE [facility]  DISCHARGE SUMMARY    Patient ID:  Mounika Leahy                                             Discharge Date:  4/9/2025  Patient's PCP:  Maribel De MD                                          Discharge Physician:  Sharee Cortes MD  Admit Date:  4/4/2025   Admitting Physician:  Jasmeet Steen MD      DISCHARGE DIAGNOSES:  Present on Admission:  Chronic back pain  Debility      Follow-up Appointments:  Primary care provider in 2 weeks      Hospital Course:  Ms. 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  She was admitted for placement at her request to return to [facility].     Her back pain was well-controlled with gabapentin, Tylenol, and a lidocaine patch.  She participated in physical and occupational therapy with recommendations to continue sessions 2-5 times per week with recommendations to continue strengthening, balance training, functional mobility training, endurance training, safety education & training, self-care/ADL, transfer training, gait training, neuromuscular re-education, self-exercise program, and safety education & training.    At discharge, she denied sweats, chills, lightheadedness, dizziness, headache, chest pain/pressure/tightness, dyspnea, abdominal pain, nausea, and vomiting.  She was discharged to [facility] in stable condition.      Physical Exam:  BP (!) 141/70   Pulse 67   Temp 98.1 °F (36.7 °C) (Oral)   Resp 18   Ht 1.6 m (5' 3\")   Wt 92.7 kg (204 lb 5.9 oz)   SpO2 98%   BMI 36.20 kg/m²     Gen: Very pleasant, elderly female, resting comfortably upright in bed, in NAD.   HEENT: NCAT.  No scleral icterus

## 2025-04-08 NOTE — PROGRESS NOTES
Internal Medicine Progress Note    Patient: Mounika Leahy   : 1935   Admit Date: 2025     Date: 2025     Interval History     Mounika reports feeling well overall.  She states she is ready to go to [facility].    She remains afebrile, saturating well on room air, hemodynamically stable.  Mg 1.68 - replacement ordered    ROS     As per interval history above.    Objective     I/Os:  I/O last 3 completed shifts:  In: 1340 [P.O.:1340]  Out: 1400 [Urine:1400]    Vital Signs:  Patient Vitals for the past 5 hrs:   Weight   25 0541 92.7 kg (204 lb 5.9 oz)       Physical Exam:  Gen: Very pleasant, elderly female, resting comfortably upright in bed, in NAD.   HEENT: NCAT.  No scleral icterus or conjunctival injection.  No rhinorrhea or epistaxis.   CVS: No apparent JVD.  RRR. No murmurs, rubs, or gallops.  Radial pulses 2+.   Pulm: No perioral cyanosis.  No apparent accessory muscle use.  Able to speak in full sentences w/o frequent pauses on room air.  CTAB.   Abd: Non-distended.   MSK: Trace b/l pedal edema.   Skin: Appropriately warm and dry.   Neuro: Alert, awake.   Psych: Cooperative.  Appropriate affect.  Normal speech and thought processes.     Medication:  Scheduled:   magnesium sulfate  2,000 mg IntraVENous Once    lidocaine  1 patch TransDERmal Daily    sodium chloride flush  5-40 mL IntraVENous 2 times per day    sodium chloride flush  5-40 mL IntraVENous 2 times per day    gabapentin  900 mg Oral BID    levothyroxine  100 mcg Oral QAM AC    sertraline  50 mg Oral Daily    vitamin B-6  50 mg Oral Daily    metoprolol tartrate  50 mg Oral BID    enoxaparin  40 mg SubCUTAneous Daily     Continuous Infusions:   sodium chloride       PRN:  acetaminophen, melatonin, sodium chloride, sodium chloride flush, ondansetron **OR** ondansetron, polyethylene glycol, traMADol    Labs:  CBC:   Recent Labs     25  0945 25  0521   WBC 5.3 5.7   HGB 10.3* 9.5*   HCT 33.6* 31.1*    197 
     Internal Medicine Progress Note    Date: 4/5/2025   Patient: Mounika Leahy   [place] Day: 0      CC: Abdominal Pain (Patient stated that she has had multiple complains, abd pain, buttock pain, burning while urinating, pain/swelling in both legs x multiple months. )       Interval Hx   Briefly, Ms. Mounika Leahy is a 89 y.o. female with a MHx significant for, sickle cell disease, G6PD, hypothyroidism, HTN, T2DM, chronic pain, lymphedema, UTIs, chronic low back pain with right sided sciatica, who presented to the ED on 4/4 with lower back pain and is admitted for debility, needing placement and treatment for UTI.     BP stable and on room air. Reports lumbar back pain.       Objective     Vital Signs:  Patient Vitals for the past 8 hrs:   BP Temp Temp src Pulse Resp SpO2   04/05/25 0814 (!) 106/94 97.2 °F (36.2 °C) Oral 78 14 98 %   04/05/25 0600 -- -- -- 72 -- --   04/05/25 0546 -- -- -- -- 18 --   04/05/25 0400 (!) 142/72 98.1 °F (36.7 °C) Oral 74 13 98 %       Physical Exam  Constitutional:       Appearance: She is not ill-appearing or toxic-appearing.      Comments: Chronically ill appearing, pleasant   HENT:      Head: Normocephalic and atraumatic.      Nose: Nose normal.      Mouth/Throat:      Mouth: Mucous membranes are dry.   Eyes:      Conjunctiva/sclera: Conjunctivae normal.   Cardiovascular:      Rate and Rhythm: Normal rate and regular rhythm.      Heart sounds: No murmur heard.  Pulmonary:      Effort: Pulmonary effort is normal.      Breath sounds: Normal breath sounds.   Abdominal:      General: There is no distension.      Palpations: Abdomen is soft.      Tenderness: There is no abdominal tenderness.   Musculoskeletal:      Right lower leg: No edema.      Left lower leg: No edema.   Skin:     General: Skin is warm and dry.      Capillary Refill: Capillary refill takes 2 to 3 seconds.   Neurological:      Mental Status: Mental status is at baseline.   Psychiatric:      Comments: Tangential 
     Internal Medicine Progress Note    Date: 4/6/2025   Patient: Mounika Leahy   [place] [facility] Day: 1      CC: Abdominal Pain (Patient stated that she has had multiple complains, abd pain, buttock pain, burning while urinating, pain/swelling in both legs x multiple months. )       Interval Hx     NAEO, slight back pain, otherwise VSS/HDS.    Objective     Vital Signs:  Patient Vitals for the past 8 hrs:   BP Temp Temp src Pulse Resp SpO2 Weight   04/06/25 0544 -- -- -- -- -- -- 92.3 kg (203 lb 7.8 oz)   04/06/25 0212 (!) 141/95 97.9 °F (36.6 °C) Oral 76 14 100 % --       Physical Exam  Constitutional:       Appearance: She is not ill-appearing or toxic-appearing.      Comments: Chronically ill appearing, pleasant   HENT:      Head: Normocephalic and atraumatic.      Nose: Nose normal.      Mouth/Throat:      Mouth: Mucous membranes are dry.   Eyes:      Conjunctiva/sclera: Conjunctivae normal.   Cardiovascular:      Rate and Rhythm: Normal rate and regular rhythm.      Heart sounds: No murmur heard.  Pulmonary:      Effort: Pulmonary effort is normal.      Breath sounds: Normal breath sounds.   Abdominal:      General: There is no distension.      Palpations: Abdomen is soft.      Tenderness: There is no abdominal tenderness.   Musculoskeletal:      Right lower leg: No edema.      Left lower leg: No edema.   Skin:     General: Skin is warm and dry.      Capillary Refill: Capillary refill takes 2 to 3 seconds.   Neurological:      Mental Status: Mental status is at baseline.   Psychiatric:      Comments: Tangential speech             Labs:  CBC:   Recent Labs     04/04/25  0209 04/05/25  0532   WBC 5.2 4.8   HGB 9.7* 10.7*   HCT 31.8* 34.3*    208       BMP:   Recent Labs     04/04/25  0209 04/05/25  0532    142   K 4.0 4.3    106   CO2 23 26   BUN 11 9   CREATININE 0.7 0.7   GLUCOSE 109* 97   PHOS  --  3.1     Magnesium:   Recent Labs     04/05/25  0532   MG 1.56*     LFT's:   Recent Labs     
 1.68, perfect served on-call resident.    
Called report to 3s.   
DISCHARGE PLANNING:    CM following for discharge.  Patient requesting to discharge to [facility].  PT/OT recommending SNF.  CM reached out to [facility] and they can accept.  Precert started today.     [facility]  Precert Ref#  [id number]    CM to follow up later today with patient.    Electronically signed by ROYER Sánchez, RN Case Manager on 4/4/2025 at 9:56 AM   
Occupational Therapy  Facility/Department: [address]  Occupational Therapy Treatment Note    Name: Mounika Leahy  : 1935  MRN: [id number]  Date of Service: 2025    Discharge Recommendations:  Subacute/Skilled Nursing Facility  OT Equipment Recommendations  Equipment Needed: No  Other: defer to next level of care       Patient Diagnosis(es): The primary encounter diagnosis was Acute on chronic back pain. A diagnosis of Acute cystitis without hematuria was also pertinent to this visit.  Past Medical History:  has a past medical history of Arthritis, Chronic back pain, Diabetes mellitus (HCC), Esophagitis, Essential hypertension, Fatty liver, G6P deficiency (glucose-6-phosphatase deficiency) (HCC), G6PD deficiency, Gastric polyps, GERD (gastroesophageal reflux disease), Hemorrhoids, History of blood transfusion, Hypothyroidism, Kidney stone, Mediterranean anemia, Normal cardiac stress test, Sickle cell anemia (HCC), and UTI (urinary tract infection).  Past Surgical History:  has a past surgical history that includes Appendectomy; Hysterectomy;  section; Cystocopy (2015); other surgical history (2017); Upper gastrointestinal endoscopy (N/A, 2018); and pr laps surg cholecystectomy w/cholangiography (N/A, 2018).    Treatment Diagnosis: impaired ADLs/transfers and decreased functional activity tolerance      Assessment  Performance deficits / Impairments: Decreased functional mobility ;Decreased ADL status;Decreased endurance  Assessment: Pt's low back pain of level 10 inhibiting her from doing ADL tasks today.  She required [place] with functional transfer/mobility with rolling walker, mod A sit>supine.  Recommend SNF at discharge to increase her functional status.  Treatment Diagnosis: impaired ADLs/transfers and decreased functional activity tolerance  Prognosis: Fair  REQUIRES OT FOLLOW-UP: Yes     Plan  Occupational Therapy Plan  Times Per Week: 2-5  Current Treatment 
Occupational Therapy  Facility/Department: THE [facility] EMERGENCY DEPARTMENT  Occupational Therapy Initial Assessment and Treatment    Name: Mounika Leahy  : 1935  MRN: [id number]  Date of Service: 2025    Discharge Recommendations:  Subacute/Skilled Nursing Facility  OT Equipment Recommendations  Equipment Needed: No (defer recommendations to discharge facility)       Patient Diagnosis(es): The primary encounter diagnosis was Acute on chronic back pain. A diagnosis of Acute cystitis without hematuria was also pertinent to this visit.  Past Medical History:  has a past medical history of Arthritis, Chronic back pain, Diabetes mellitus (HCC), Esophagitis, Essential hypertension, Fatty liver, G6P deficiency (glucose-6-phosphatase deficiency) (HCC), G6PD deficiency, Gastric polyps, GERD (gastroesophageal reflux disease), Hemorrhoids, History of blood transfusion, Hypothyroidism, Kidney stone, Mediterranean anemia, Normal cardiac stress test, Sickle cell anemia (HCC), and UTI (urinary tract infection).  Past Surgical History:  has a past surgical history that includes Appendectomy; Hysterectomy;  section; Cystocopy (2015); other surgical history (2017); Upper gastrointestinal endoscopy (N/A, 2018); and pr laps surg cholecystectomy w/cholangiography (N/A, 2018).    Treatment Diagnosis: impaired ADLs/transfers and decreased functional activity tolerance      Assessment  Performance deficits / Impairments: Decreased functional mobility ;Decreased ADL status;Decreased endurance  Assessment: Presenting from home w/ c/o acute on chronic back pain, which has been impairing ability to care for self at home. Pt lives w/ son, who cannot provide physical assistance. At baseline, pt reports she is independent w/ ADLs using AE and reports independent w/ ambulation using RW. Today, pt with limited tolerance for activity 2* c/o fatigue and pain. Pt required dependent assist for 
Patient transferred to [facility] via [facility] transport with all belongings. Report called and spoke to [facility].   
Physical Therapy  Facility/Department: [address]  Daily Treatment Note  NAME: Mounika Leahy  : 1935  MRN: [id number]    Date of Service: 2025    Discharge Recommendations:  Subacute/Skilled Nursing Facility   PT Equipment Recommendations  Equipment Needed: No  Other: plan to continue to assess and likely defer recommendations to the next level of care    Patient Diagnosis(es): The primary encounter diagnosis was Acute on chronic back pain. A diagnosis of Acute cystitis without hematuria was also pertinent to this visit.    Assessment  Assessment: Pt with improving assist levels; requiring CGA with transfers today and CGA for ambulation of two short bouts. Pt requiring increased time to perform mobility and has generalized weakness, impaired activity tolerance and standing balance from baseline. She would benefit from further IP PT at discharge.  Activity Tolerance: Patient limited by fatigue;Patient limited by endurance  Equipment Needed: No  Other: plan to continue to assess and likely defer recommendations to the next level of care    Plan  Physical Therapy Plan  General Plan:  (2-5)  Current Treatment Recommendations: Strengthening;Balance training;Functional mobility training;Transfer training;Gait training;Neuromuscular re-education;Home exercise program;Safety education & training;Patient/Caregiver education & training;ROM;Equipment evaluation, education, & procurement;Endurance training;Pain management;Positioning;Therapeutic activities    Restrictions  Restrictions/Precautions  Restrictions/Precautions: Fall Risk (high fall risk)  Position Activity Restriction  Other Position/Activity Restrictions: ambulate patient, up with assistance     Subjective   Subjective  Subjective: \"I can only do a little bit.\" Pt presents supine in bed and willing to work with therapist.  Pain: Pt noting back pain but not rating and stating tolerable for therapy.    Objective  Vitals     Bed Mobility Training  Bed 
Physical Therapy  Facility/Department: [address]  Physical Therapy Treatment    Name: Mounika Leahy  : 1935  MRN: [id number]  Date of Service: 2025    Discharge Recommendations:  Subacute/Skilled Nursing Facility   PT Equipment Recommendations  Other: plan to continue to assess and likely defer recommendations to the next level of care      Patient Diagnosis(es): The primary encounter diagnosis was Acute on chronic back pain. A diagnosis of Acute cystitis without hematuria was also pertinent to this visit.  Past Medical History:  has a past medical history of Arthritis, Chronic back pain, Diabetes mellitus (HCC), Esophagitis, Essential hypertension, Fatty liver, G6P deficiency (glucose-6-phosphatase deficiency) (HCC), G6PD deficiency, Gastric polyps, GERD (gastroesophageal reflux disease), Hemorrhoids, History of blood transfusion, Hypothyroidism, Kidney stone, Mediterranean anemia, Normal cardiac stress test, Sickle cell anemia (HCC), and UTI (urinary tract infection).  Past Surgical History:  has a past surgical history that includes Appendectomy; Hysterectomy;  section; Cystocopy (2015); other surgical history (2017); Upper gastrointestinal endoscopy (N/A, 2018); and pr laps surg cholecystectomy w/cholangiography (N/A, 2018).    Assessment  Body Structures, Functions, Activity Limitations Requiring Skilled Therapeutic Intervention: Decreased functional mobility ;Decreased ADL status;Decreased ROM;Decreased strength;Decreased safe awareness;Decreased endurance;Decreased balance;Increased pain  Assessment: Patient demonstrates impaired functional mobility, requiring (A) for all standing mobility, including gait up to 20ft and transfers.  Patient planning to d/c to [facility].  Patient demonstrates generalized weakness, deconditioning, questionable insight, and decreased overall safety awareness.  PT recommends SNF, anticipate increased (A) needed long term after 
Physical Therapy  Facility/Department: THE [facility] EMERGENCY DEPARTMENT  Physical Therapy Initial Assessment    Name: Mounika Leahy  : 1935  MRN: [id number]  Date of Service: 2025    Discharge Recommendations:  Subacute/Skilled Nursing Facility   PT Equipment Recommendations  Equipment Needed: No      Assessment  Assessment: Pt from home, admitted for abdominal pain.  Pt reports difficulty managing care at home, wanting to to go [facility].  Pt currently needing Min-Mod A for mobility using rolling walker.  Unable to tolerate ambulation away from bed due to increased pain.  Pt demonstrates poor balance and is at risk for falls.  Noted decreased congition, impairing insight and safety awareness. Recommend ongoing PT at SNF to increase strength and maximize mobility.  Anticipate increased assist long term after SNF.  Treatment Diagnosis: Decreased gait associated with abdominal pain.  Decision Making: High Complexity  Requires PT Follow-Up: Yes    Plan  General Plan:  (2-5)  Current Treatment Recommendations: Strengthening, Balance training, Functional mobility training, Transfer training, Gait training, Neuromuscular re-education, Home exercise program, Safety education & training, Patient/Caregiver education & training, Co-Treatment    Safety Devices  Type of Devices: Left in bed, Call light within reach, Nurse notified (on stretcher in ED)    Restrictions  Other Position/Activity Restrictions: Up with assist     Subjective  Chart Reviewed: Yes  Additional Pertinent Hx: Pt to ED  with abdominal pain.  Pt with ongoing dysuria and periumbilical abdominal pain she was told was a UTI. Is on ABX per PCP.  PMH:  sciatica, GERD, DM, sickle cell anemia, UTI, OA, HTN, fatty liver    Diagnosis: Abdominal pain    Subjective  Subjective: Pt found supine in bed.  \"Now, tell me.  You need me to do this for the doctor?\"  Pt reports back/shoulder/LE pain.  Pain not rated.  RN aware.    Social/Functional 
Physical Therapy  Mounika Leahy    New PT orders received, chart reviewed.  No change in status noted to warrant new PT evaluation - plan to continue to follow per previously established plan of care.  Patient evaluated 4/4/25.  Plan to acknowledge orders without additional PT evaluation this date.  Plan to continue to follow per plan of care.    Marilu Cerrato PT, DPT [id number]       
33.6* 31.1*    229 197   MCV 80.0 80.6 80.3     Renal:    Recent Labs     04/05/25  0532 04/06/25  0944 04/07/25  0521    141 140   K 4.3 3.8 4.3    103 105   CO2 26 27 27   BUN 9 10 13   CREATININE 0.7 0.8 0.6   GLUCOSE 97 141* 120*   CALCIUM 9.8 9.9 9.6   MG 1.56* 1.78* 1.68*   PHOS 3.1 2.5 2.7   ANIONGAP 10 11 8     Radiology:  No orders to display       Assessment & Plan   89 y.o. female who presented from home to the ED on 4/4/25 with a 1-month history of lower back pain, which she attributed to being sedentary.    Chronic lower back pain with bilateral sciatica - well-controlled with current pain regimen  Debility  Prior MRI lumbar spine on 12/9/24 demonstrated multi-level spondylosis most significant at L3-4 and L4-5, severe narrowing of central canal at L4-L5.  Continue PT/OT  Continue Tylenol prn  Continue gabapentin 900 mg bid  Continue lidocaine 4% transdermal  Continue tramadol 50 mg bid prn for pain up to 7 days only  No additional imaging needed at this time    Uncomplicated cystitis - resolved  Completed a 3-day course of ceftriaxone yesterday (4/6) w/o any ongoing symptoms.    Hypomagnesemia  Magnesium sulfate 2 g IV     Chronic Medical Conditions:  HTN:  spironolactone 25 mg qd, HCTZ 25 mg qd, metoprolol tartrate 50 mg bid     Hypothyroidism:  levothyroxine 100 mg qd     Chronic anemia:  stable     Depression:  sertraline 50 mg qd      DVT PPx:  Lovenox  Diet:  ADULT DIET; Regular   Code status:  Limited     ELOS:  3 days  Barriers to discharge:  She is medically ready for discharge.    Disposition  - Preadmission:  home  - Current:  [facility]  - Upon discharge:  Precert for [facility] was started on 4/4/25.      Will discuss with attending physician Sharee Miller MD.     Sarah Mcduffie MD, YESENIA, CCRC  Internal Medicine, PGY-3

## 2025-04-08 NOTE — PLAN OF CARE
Problem: Pain  Goal: Verbalizes/displays adequate comfort level or baseline comfort level  4/8/2025 1130 by Crow Major RN  Flowsheets (Taken 4/8/2025 1130)  Verbalizes/displays adequate comfort level or baseline comfort level:   Encourage patient to monitor pain and request assistance   Assess pain using appropriate pain scale   Administer analgesics based on type and severity of pain and evaluate response   Implement non-pharmacological measures as appropriate and evaluate response  Note: Patient is able to manage pain and work with therapy  4/8/2025 0007 by Afia Jesus RN  Outcome: Progressing  Flowsheets (Taken 4/8/2025 0007)  Verbalizes/displays adequate comfort level or baseline comfort level:   Encourage patient to monitor pain and request assistance   Assess pain using appropriate pain scale  Note: C/o back and L shoulder pain.  Administered prn pain medication per MAR.  Heat pack to L shoulder.  Will monitor.

## 2025-04-08 NOTE — PLAN OF CARE
Problem: Discharge Planning  Goal: Discharge to home or other facility with appropriate resources  Outcome: Progressing  Note: Case management following.  Awaiting pre-cert to [facility].       Problem: Safety - Adult  Goal: Free from fall injury  Outcome: Progressing  Note: Fall precautions in place.  Bed alarm activated.  Call light and belongings within reach. Continue to monitor safety.       Problem: Pain  Goal: Verbalizes/displays adequate comfort level or baseline comfort level  Outcome: Progressing  Flowsheets (Taken 4/8/2025 0007)  Verbalizes/displays adequate comfort level or baseline comfort level:   Encourage patient to monitor pain and request assistance   Assess pain using appropriate pain scale  Note: C/o back and L shoulder pain.  Administered prn pain medication per MAR.  Heat pack to L shoulder.  Will monitor.       Problem: Skin/Tissue Integrity  Goal: Skin integrity remains intact  Description: 1.  Monitor for areas of redness and/or skin breakdown  2.  Assess vascular access sites hourly  3.  Every 4-6 hours minimum:  Change oxygen saturation probe site  4.  Every 4-6 hours:  If on nasal continuous positive airway pressure, respiratory therapy assess nares and determine need for appliance change or resting period  Outcome: Progressing  Flowsheets (Taken 4/8/2025 0007)  Skin Integrity Remains Intact: Monitor for areas of redness and/or skin breakdown  Note: Repositioned with pillow support.  Sacral heart to buttocks.

## 2025-04-08 NOTE — CARE COORDINATION
Case Management Assessment            Discharge Note                    Date / Time of Note: 4/8/2025 5:15 PM                  Discharge Note Completed by: KARINA Lee    Patient Name: Mounika Leahy   YOB: 1935  Diagnosis: Acute cystitis without hematuria [N30.00]  Intractable back pain [M54.9]  Acute on chronic back pain [M54.9, G89.29]   Date / Time: 4/4/2025 12:50 AM    Current PCP: Maribel De MD  Clinic patient: Yes    Hospitalization in the last 30 days: No       Advance Directives:  Code Status: Limited  [place] DNR form completed and on chart: Not Indicated    Financial:  Payor: [employer] MEDICARE / Plan: [employer] MEDICARE ADVANTAGE HMO / Product Type: Medicare /      Pharmacy:    [facility] [place], [place] - [address] - P [phone] - F [phone]  [address]  [facility] [address]  Phone: [phone] Fax: [phone]    [facility]/pharmacy #[id number] [place], [place] - [address] [facility] -  [phone] - F [phone]  [id number] [place] [address]  Phone: [phone] Fax: [phone]      Assistance purchasing medications?: Potential Assistance Purchasing Medications: No  Assistance provided by Case Management: None at this time    Does patient want to participate in local refill/ meds to beds program?: No    Meds To Beds General Rules:  1. Can ONLY be done Monday- Friday between 8:30am-5pm  2. Prescription(s) must be in pharmacy by 3pm to be filled same day  3.Copy of patient's insurance/ prescription drug card and patient face sheet must be sent along with the prescription(s)  4. Cost of Rx cannot be added to hospital bill. If financial assistance is needed, please contact unit  or ;  or  CANNOT provide pharmacy voucher for patients co-pays  5. Patients can then  the prescription on their way out of the hospital at discharge, or pharmacy can deliver to the bedside if

## 2025-04-08 NOTE — DISCHARGE INSTRUCTIONS
Important Reminders:    Please call [phone] and schedule an appointment with your primary care physician, Dr. Maribel De, within 1 week of discharge from the hospital.        You should seek immediate medical attention if:  You have a fever (greater than 101 degrees F),  You have chest pain, shortness of breath, abdominal pain, or uncontrollable vomiting,  You are unable to eat or drink,  You pass out,  You have difficulty moving your arms or legs,   You have difficulty speaking or slurred speech,  Or, you have any concern that you feel needs acute physician evaluation.

## 2025-04-08 NOTE — CARE COORDINATION
HENS submitted [id number]     Electronically signed by Stephanie Palmer RN on 4/8/2025 at 4:46 PM  [phone]

## 2025-04-09 ENCOUNTER — CARE COORDINATION (OUTPATIENT)
Dept: CARE COORDINATION | Age: 89
End: 2025-04-09

## 2025-04-09 PROCEDURE — 99239 HOSP IP/OBS DSCHRG MGMT >30: CPT | Performed by: HOSPITALIST

## 2025-04-09 NOTE — CARE COORDINATION
Per ACM- Pt admitted to [facility].  Please follow for updates/DC.

## 2025-04-09 NOTE — CARE COORDINATION
ACM deferred outreach at this time due to pt being admitted to [facility].  Will route to CCSS to monitor for discharge and updates.

## 2025-04-14 NOTE — CARE COORDINATION
No status change at this time. Still admitted to SNF. Will check back later this week for update.

## 2025-04-16 NOTE — CARE COORDINATION
Patient still admitted to [facility]. Call to [facility] to see if d/c date available at this time.  [phone] opt 1 then 7 for SW  LVM should hear back 4/22 will continue to monitor via [employer] as well.

## 2025-04-21 NOTE — CARE COORDINATION
No change in dispo at this time. Waiting for Jc ARTHUR to call back this week to see if she has any updates.

## 2025-04-23 NOTE — CARE COORDINATION
Spoke with Anna/SANDHYA at [facility].  Patient will be moved to Assisted Living with their facility over the next few weeks.  She would benefit from HRCM but they do have nurses on staff as well.  Anna asked that we call back next week for an update as they are waiting to hear back from insurance.    Routed to [facility] as FYI

## 2025-04-23 NOTE — CARE COORDINATION
Call to [facility] to follow up on d/c plans for patient. Had to leave message again for SW.  [place] shows patient still admitted to [facility]

## 2025-04-30 NOTE — CARE COORDINATION
Received call from Jen/SANDHYA Greene  They have not received and update yet from [facility] on approval for patient to move to Assisted living within their facility.  Anna said she would be out next week but if we want to check with patients caregiver/Aide she may be able to provide an update as well.    Anna did not have Caregiver/Aides name but said her number is [phone]    CCSS will follow up again next week

## 2025-04-30 NOTE — CARE COORDINATION
Call to Jc /Anna  Had to leave message asking for update on patients move to assisted living from SNF.

## 2025-05-01 ENCOUNTER — CARE COORDINATION (OUTPATIENT)
Dept: CARE COORDINATION | Age: 89
End: 2025-05-01

## 2025-05-01 NOTE — CARE COORDINATION
ACM deferred outreach at this time as pt still showing admitted to [facility] in [facility].

## 2025-05-08 ENCOUNTER — CARE COORDINATION (OUTPATIENT)
Dept: CARE COORDINATION | Age: 89
End: 2025-05-08

## 2025-05-08 NOTE — CARE COORDINATION
ACM deferred outreach at this time as pt still admitted to [facility] per SNF.  CCSS scheduled to reach out for update on pt 5/12.

## 2025-05-09 ENCOUNTER — TELEPHONE (OUTPATIENT)
Dept: INTERNAL MEDICINE CLINIC | Age: 89
End: 2025-05-09

## 2025-05-09 NOTE — TELEPHONE ENCOUNTER
Pt is currently in [facility] and is moving to assisted living tomorrow. Pt is needing help with what she can do for her butt area. Pt states every time she gets cleaned up at the nursing home the skin on her butt tears and she has some open areas on it. Pt states she has nystatin cream but it is not working for it. Pt would like to know if a dermatologists can help with this issue?     [phone]

## 2025-05-09 NOTE — TELEPHONE ENCOUNTER
Pt states that she is going to keep using the ointment they have been using. She has seen wound care in the past and is wiling to see them again but she cannot set that up right now because she has too much going on.

## 2025-05-12 ENCOUNTER — CARE COORDINATION (OUTPATIENT)
Dept: CARE COORDINATION | Age: 89
End: 2025-05-12

## 2025-05-12 DIAGNOSIS — G89.29 ACUTE ON CHRONIC BACK PAIN: Primary | ICD-10-CM

## 2025-05-12 DIAGNOSIS — M54.9 ACUTE ON CHRONIC BACK PAIN: Primary | ICD-10-CM

## 2025-05-12 PROCEDURE — 1111F DSCHRG MED/CURRENT MED MERGE: CPT

## 2025-05-12 NOTE — CARE COORDINATION
Per recent provider note, patient has moved to assisted living facility with [facility].  Per previous conversation with SW at [facility], patient would benefit from HRCM outreach.    Forwarded to [facility]

## 2025-05-12 NOTE — CARE COORDINATION
discharge instructions available to patient? Pt was DC to SNF in early April and recently transferred to [facility] last week at the same facility..   Reviewed appropriate site of care based on symptoms and resources available to patient including: PCP. The patient agrees to contact the primary care provider and/or specialist office for questions related to their healthcare.     Patients top risk factors for readmission: functional physical ability    Hospital follow up appointment: Patient does not have a follow up appointment scheduled at time of call.  Pt was DC to SNF at [facility] and transferred to [facility] one day last week.  ACM offered assistance to contact office to schedule follow up appt but pt requested the office contact her to schedule appt.  ACM routed chart message to [place] office staff.       Care Summary Note: ACM completed follow up outreach with pt after notification pt was DC from SNF at [facility] to [facility]OH[facility] at [facility]. Pt reports she is doing well.  States she did move last week and things have been going well with her family.  Pt states she is still going to see Dr. De while in [facility].  ACM advised pt a follow up appt following her DC from the hospital and SNF needed to be scheduled and offered to assist in contacting office to get scheduled.  Pt requested office call her.  Pt states she will need help with transportation to get to appt.  ACM advised [place] has a phone number for transportation assistance and could assist pt in calling, but we needed a date and time of appt prior to calling.  AC provided pt with contact information to reach out after appt with PCP has been set up.  ACM notified office to contact pt, see yellow box.  Pt then had to go as someone from [facility] was in her room to check on her.     Offered patient enrollment in the Remote Patient Monitoring (RPM) program for in-home monitoring: Yes, but did not enroll at this time: limited patient ability to navigate

## 2025-05-14 NOTE — TELEPHONE ENCOUNTER
I called patient. Mailbox is full, unable to leave message. I will keep trying to reach her to schedule. Does she need to be seen urgently? Or would it be ok to wait for Dr De in late June?

## 2025-05-19 ENCOUNTER — CARE COORDINATION (OUTPATIENT)
Dept: CARE COORDINATION | Age: 89
End: 2025-05-19

## 2025-05-19 NOTE — CARE COORDINATION
Ambulatory Care Coordination Note     5/19/2025 10:41 AM     Patient outreach attempt by this ACM today to perform care management follow up . ACM was unable to reach the patient by telephone today;   voicemail full and unable to leave a message.      ACM: Alysa Darnell, RN     Care Summary Note:     PCP/Specialist follow up:       Follow Up:   Plan for next ACM outreach in approximately 1 week to complete:  - disease specific assessments  - medication review  - advance care planning  - goal progression  - education   Set up follow up appt with PCP  .

## 2025-05-27 ENCOUNTER — CARE COORDINATION (OUTPATIENT)
Dept: CARE COORDINATION | Age: 89
End: 2025-05-27

## 2025-05-27 NOTE — CARE COORDINATION
Ambulatory Care Coordination Note     5/27/2025 9:10 AM     Patient outreach attempt by this ACM today to perform care management follow up . ACM was unable to reach the patient by telephone today;   voicemail full and unable to leave a message.      ACM: Alysa Darnell, RN     Care Summary Note:     PCP/Specialist follow up:       Follow Up:   Plan for next ACM outreach in approximately 1 week to complete:  - disease specific assessments  - medication review  - advance care planning  - goal progression  - education .

## 2025-06-02 ENCOUNTER — CARE COORDINATION (OUTPATIENT)
Dept: CARE COORDINATION | Age: 89
End: 2025-06-02

## 2025-06-02 NOTE — CARE COORDINATION
Ambulatory Care Coordination Note     6/2/2025 9:29 AM     patient outreach attempt by this ACM today to perform care management follow up . ACM was unable to reach the patient by telephone today;   voicemail full and unable to leave a message.      Patient closed (unable to reach patient) from the High Risk Care Management program on 6/2/2025.  No further Ambulatory Care Manager follow up scheduled.

## 2025-06-20 DIAGNOSIS — K21.9 GASTROESOPHAGEAL REFLUX DISEASE WITHOUT ESOPHAGITIS: ICD-10-CM

## 2025-06-20 RX ORDER — LANSOPRAZOLE 30 MG/1
CAPSULE, DELAYED RELEASE ORAL
Qty: 90 CAPSULE | Refills: 0 | Status: SHIPPED | OUTPATIENT
Start: 2025-06-20

## 2025-06-20 NOTE — TELEPHONE ENCOUNTER
Last appointment: 1/10/2025  Next appointment: Visit date not found        Last refill: 1/13/2025) by Maribel De MD

## (undated) DEVICE — STANDARD HYPODERMIC NEEDLE,POLYPROPYLENE HUB: Brand: MONOJECT

## (undated) DEVICE — GOWN,SIRUS,POLYRNF,SETINSLV,L,20/CS: Brand: MEDLINE

## (undated) DEVICE — TOWEL,OR,DSP,ST,BLUE,STD,4/PK,20PK/CS: Brand: MEDLINE

## (undated) DEVICE — GOWN,SIRUS,FABRNF,XL,20/CS: Brand: MEDLINE

## (undated) DEVICE — SYRINGE MED 10ML TRNSLUC BRL PLUNG BLK MRK POLYPR CTRL

## (undated) DEVICE — PENCIL ES L3M ROCK SWCH S STL HEX LOK BLDE ELECTRD HOLSTER

## (undated) DEVICE — PUMP SUC IRR TBNG L10FT W/ HNDPC ASSEMB STRYKEFLOW 2

## (undated) DEVICE — ELECTRODE PT RET AD L9FT HI MOIST COND ADH HYDRGEL CORDED

## (undated) DEVICE — SYRINGE MED 20ML STD CLR PLAS LUERLOCK TIP N CTRL DISP

## (undated) DEVICE — GLOVE SURG SZ 7 L12IN FNGR THK75MIL WHT LTX POLYMER BEAD

## (undated) DEVICE — Z INACTIVE USE 2641839 CLIP INT M L POLYMER LOK LIG HEM O LOK

## (undated) DEVICE — SYRINGE, LUER LOCK, 10ML: Brand: MEDLINE

## (undated) DEVICE — SET EXTN IV L30IN TBNG DIA0.1IN PRIMING 4ML MACBOR FEM ADPT

## (undated) DEVICE — SURGICAL SET UP - SURE SET: Brand: MEDLINE INDUSTRIES, INC.

## (undated) DEVICE — SOLUTION IV 1000ML 0.9% SOD CHL

## (undated) DEVICE — KIT,ANTI FOG,W/SPONGE & FLUID,SOFT PACK: Brand: MEDLINE

## (undated) DEVICE — [HIGH FLOW INSUFFLATOR,  DO NOT USE IF PACKAGE IS DAMAGED,  KEEP DRY,  KEEP AWAY FROM SUNLIGHT,  PROTECT FROM HEAT AND RADIOACTIVE SOURCES.]: Brand: PNEUMOSURE

## (undated) DEVICE — COVER LT HNDL BLU PLAS

## (undated) DEVICE — SURE SET-DOUBLE BASIN-LF: Brand: MEDLINE INDUSTRIES, INC.

## (undated) DEVICE — INTENDED FOR TISSUE SEPARATION, AND OTHER PROCEDURES THAT REQUIRE A SHARP SURGICAL BLADE TO PUNCTURE OR CUT.: Brand: BARD-PARKER ® CARBON RIB-BACK BLADES

## (undated) DEVICE — GAUZE,SPONGE,4"X4",16PLY,XRAY,STRL,LF: Brand: MEDLINE

## (undated) DEVICE — SKIN AFFIX SURG ADHESIVE 72/CS 0.55ML: Brand: MEDLINE

## (undated) DEVICE — TROCARS: Brand: KII® BALLOON BLUNT TIP SYSTEM

## (undated) DEVICE — LAPAROSCOPIC SCISSORS: Brand: EPIX LAPAROSCOPIC SCISSORS

## (undated) DEVICE — TISSUE RETRIEVAL SYSTEM: Brand: INZII RETRIEVAL SYSTEM

## (undated) DEVICE — PAD,NON-ADHERENT,3X8,STERILE,LF,1/PK: Brand: MEDLINE

## (undated) DEVICE — GARMENT,MEDLINE,DVT,INT,CALF,MED, GEN2: Brand: MEDLINE

## (undated) DEVICE — SUTURE VCRL SZ 0 L27IN ABSRB UD L26MM CT-2 1/2 CIR J270H

## (undated) DEVICE — DRAPE,LAP,CHOLE,W/TROUGHS,STERILE: Brand: MEDLINE

## (undated) DEVICE — TURNOVER KIT RM INF CTRL TECH

## (undated) DEVICE — DRAPE C ARM UNIV W41XL74IN CLR PLAS XR VELC CLSR POLY STRP

## (undated) DEVICE — TROCAR: Brand: KII FIOS FIRST ENTRY

## (undated) DEVICE — FORCEPS BX L240CM DIA2.4MM L NDL RAD JAW 4 133340

## (undated) DEVICE — APPLIER CLP 13IN M L 20 5MM CLPS ENDOSCP LIG POLYMER SGL

## (undated) DEVICE — APPLICATOR PREP 26ML 0.7% IOD POVACRYLEX 74% ISO ALC ST

## (undated) DEVICE — TROCAR: Brand: KII SHIELDED BLADED ACCESS SYSTEM

## (undated) DEVICE — LARGE BORE STOPCOCK WITH ROTATING MALE LUER LOCK

## (undated) DEVICE — SOLUTION INJ LR VISIV 1000ML BG

## (undated) DEVICE — SUTURE MCRYL SZ 4-0 L18IN ABSRB UD L19MM PS-2 3/8 CIR PRIM Y496G

## (undated) DEVICE — PACK,BASIC: Brand: MEDLINE

## (undated) DEVICE — CORD ES L10FT MPLR LAP

## (undated) DEVICE — CATHETER URET 4FR L70CM POLYUR WHSTL FLX TIP KINK RESIST W/

## (undated) DEVICE — ELECTRODE ENDOSCP L36CM PTFE SPAT CRV DISP CLEANCOAT